# Patient Record
Sex: MALE | Race: WHITE | NOT HISPANIC OR LATINO | Employment: FULL TIME | URBAN - METROPOLITAN AREA
[De-identification: names, ages, dates, MRNs, and addresses within clinical notes are randomized per-mention and may not be internally consistent; named-entity substitution may affect disease eponyms.]

---

## 2017-01-05 ENCOUNTER — MEDICAL CORRESPONDENCE (OUTPATIENT)
Dept: HEALTH INFORMATION MANAGEMENT | Facility: CLINIC | Age: 57
End: 2017-01-05

## 2017-01-11 DIAGNOSIS — Z53.9 DIAGNOSIS NOT YET DEFINED: Primary | ICD-10-CM

## 2017-01-11 PROCEDURE — G0179 MD RECERTIFICATION HHA PT: HCPCS | Performed by: FAMILY MEDICINE

## 2017-01-17 ENCOUNTER — TELEPHONE (OUTPATIENT)
Dept: FAMILY MEDICINE | Facility: CLINIC | Age: 57
End: 2017-01-17

## 2017-01-17 NOTE — TELEPHONE ENCOUNTER
RN called and spoke with Sheila.  RN approved the order requested below to continue home care as it states below.  Sheila was able to take the verbal order.    Jesusita HAIDER RN, BSN

## 2017-01-17 NOTE — TELEPHONE ENCOUNTER
Reason for Call: Request for an order or referral:    Order or referral being requested: want order to continue home care. Once per week, every other week for 60 days.     Date needed: as soon as possible    Has the patient been seen by the PCP for this problem? YES    Additional comments: na    Phone number Patient can be reached at:  Other phone number:  Number above.     Best Time:  any    Can we leave a detailed message on this number?  YES    Call taken on 1/17/2017 at 9:03 AM by Antonia Egan

## 2017-02-21 DIAGNOSIS — Z53.9 DIAGNOSIS NOT YET DEFINED: Primary | ICD-10-CM

## 2017-02-21 PROCEDURE — G0179 MD RECERTIFICATION HHA PT: HCPCS | Performed by: FAMILY MEDICINE

## 2017-03-14 DIAGNOSIS — Z53.9 DIAGNOSIS NOT YET DEFINED: Primary | ICD-10-CM

## 2017-03-14 PROCEDURE — G0179 MD RECERTIFICATION HHA PT: HCPCS | Performed by: FAMILY MEDICINE

## 2017-04-07 DIAGNOSIS — R35.0 URINARY FREQUENCY: ICD-10-CM

## 2017-04-07 RX ORDER — TAMSULOSIN HYDROCHLORIDE 0.4 MG/1
CAPSULE ORAL
Qty: 30 CAPSULE | Refills: 0 | OUTPATIENT
Start: 2017-04-07

## 2017-04-07 NOTE — TELEPHONE ENCOUNTER
tamsulosin (FLOMAX) 0.4 MG capsule (Discontinued)         Last Written Prescription Date: 12/05/16  Last Fill Quantity: 30, # refills: 3    Last Office Visit with G, ESTIVEN or [facility] prescribing provider:  12/23/2016   Future Office Visit:      BP Readings from Last 3 Encounters:   12/23/16 104/60   12/05/16 104/70   12/22/15 95/66       Carla Mcneil CMA

## 2017-04-17 ENCOUNTER — OFFICE VISIT (OUTPATIENT)
Dept: FAMILY MEDICINE | Facility: CLINIC | Age: 57
End: 2017-04-17
Payer: MEDICARE

## 2017-04-17 VITALS
DIASTOLIC BLOOD PRESSURE: 66 MMHG | HEIGHT: 66 IN | TEMPERATURE: 96.9 F | BODY MASS INDEX: 32.88 KG/M2 | WEIGHT: 204.6 LBS | HEART RATE: 73 BPM | OXYGEN SATURATION: 96 % | SYSTOLIC BLOOD PRESSURE: 116 MMHG

## 2017-04-17 DIAGNOSIS — R21 RASH: ICD-10-CM

## 2017-04-17 DIAGNOSIS — R21 RASH OF HANDS: ICD-10-CM

## 2017-04-17 DIAGNOSIS — S60.222A: Primary | ICD-10-CM

## 2017-04-17 DIAGNOSIS — Z12.11 SCREEN FOR COLON CANCER: ICD-10-CM

## 2017-04-17 PROCEDURE — 99213 OFFICE O/P EST LOW 20 MIN: CPT | Performed by: FAMILY MEDICINE

## 2017-04-17 RX ORDER — PHENOL 1.4 %
10 AEROSOL, SPRAY (ML) MUCOUS MEMBRANE AT BEDTIME
COMMUNITY
End: 2019-07-31 | Stop reason: DRUGHIGH

## 2017-04-17 RX ORDER — TRIAMCINOLONE ACETONIDE 1 MG/G
CREAM TOPICAL
Qty: 30 G | Refills: 0 | Status: SHIPPED | OUTPATIENT
Start: 2017-04-17 | End: 2018-09-11

## 2017-04-17 RX ORDER — DIAPER,BRIEF,INFANT-TODD,DISP
EACH MISCELLANEOUS
Qty: 30 G | Refills: 0 | COMMUNITY
Start: 2017-04-17 | End: 2018-09-11

## 2017-04-17 NOTE — MR AVS SNAPSHOT
After Visit Summary   4/17/2017    Yair Benites    MRN: [id number]           Patient Information     Date Of Birth          1960        Visit Information        Provider Department      4/17/2017 4:00 PM Alvin Johnson MD [facility]        Today's Diagnoses     Traumatic bruise of hand, left, initial encounter    -  1    Rash: Left maxillary        Rash of hand: Lt        Screen for colon cancer          Care Instructions    [facility]    If you have any questions regarding to your visit please contact your care team:       Team Purple:   Clinic Hours Telephone Number   [address]   7am-7pm  Monday - Thursday   7am-5pm  Fridays  [phone]  (Appointment scheduling available 24/7)    Questions about your Visit?   Team Line:  [phone]   [facility] [place] [facility] - 11am-9pm Monday-Friday Saturday-Sunday- 9am-5pm   [place] - 5pm-9pm Monday-Friday Saturday-Sunday- 9am-5pm  [phone] - [place]   [phone] - [place]       What options do I have for visits at the clinic other than the traditional office visit?  To expand how we care for you, many of our providers are utilizing electronic visits (e-visits) and telephone visits, when medically appropriate, for interactions with their patients rather than a visit in the clinic.   We also offer nurse visits for many medical concerns. Just like any other service, we will bill your insurance company for this type of visit based on time spent on the phone with your provider. Not all insurance companies cover these visits. Please check with your medical insurance if this type of visit is covered. You will be responsible for any charges that are not paid by your insurance.      E-visits via 8Trip:  generally incur a $35.00 fee.  Telephone visits:  Time spent on the phone: *charged based on time that is spent on  "the phone in increments of 10 minutes. Estimated cost:   5-10 mins $30.00   11-20 mins. $59.00   21-30 mins. $85.00     Use Boundaryhart (secure email communication and access to your chart) to send your primary care provider a message or make an appointment. Ask someone on your Team how to sign up for Booster Packt.  For a Price Quote for your services, please call our Mimvi Line at [phone].  As always, Thank you for trusting us with your health care needs!    Discharged By: An          Follow-ups after your visit        Future tests that were ordered for you today     Open Future Orders        Priority Expected Expires Ordered    Fecal colorectal cancer screen (FIT) Routine 5/8/2017 7/10/2017 4/17/2017            Who to contact     If you have questions or need follow up information about today's clinic visit or your schedule please contact [facility] directly at [phone].  Normal or non-critical lab and imaging results will be communicated to you by Boundaryhart, letter or phone within 4 business days after the clinic has received the results. If you do not hear from us within 7 days, please contact the clinic through Boundaryhart or phone. If you have a critical or abnormal lab result, we will notify you by phone as soon as possible.  Submit refill requests through [employer] or call your pharmacy and they will forward the refill request to us. Please allow 3 business days for your refill to be completed.          Additional Information About Your Visit        Boundaryhart Information     [employer] lets you send messages to your doctor, view your test results, renew your prescriptions, schedule appointments and more. To sign up, go to [email or web address] . Click on \"Log in\" on the left side of the screen, which will take you to the Welcome page. Then click on \"Sign up Now\" on the right side of the page.     You will be asked to enter the access code listed below, as well as some personal information. " "Please follow the directions to create your username and password.     Your access code is: [email or web address]  Expires: 2017  4:42 PM     Your access code will  in 90 days. If you need help or a new code, please call your [facility] or [phone].        Care EveryWhere ID     This is your Care EveryWhere ID. This could be used by other organizations to access your [place] medical records  [id number]        Your Vitals Were     Pulse Temperature Height Pulse Oximetry BMI (Body Mass Index)       73 96.9  F (36.1  C) (Oral) 5' 5.95\" (1.675 m) 96% 33.08 kg/m2        Blood Pressure from Last 3 Encounters:   17 116/66   16 104/60   16 104/70    Weight from Last 3 Encounters:   17 204 lb 9.6 oz (92.8 kg)   16 201 lb (91.2 kg)   16 198 lb 12.8 oz (90.2 kg)                 Today's Medication Changes          These changes are accurate as of: 17  4:42 PM.  If you have any questions, ask your nurse or doctor.               Start taking these medicines.        Dose/Directions    hydrocortisone 1 % ointment   Used for:  Rash   Started by:  Alvin Johnson MD        Apply sparingly to affected area three times daily for 14 days.   Quantity:  30 g   Refills:  0       triamcinolone 0.1 % cream   Commonly known as:  KENALOG   Used for:  Rash of hands   Started by:  Alvin Johnson MD        Apply sparingly to affected area three times daily for 14 days (apply to back of left hand).   Quantity:  30 g   Refills:  0            Where to get your medicines      These medications were sent to [facility] - [place] - [address] AT [facility] & [place]  [address] [place] [address]     Phone:  [phone]     triamcinolone 0.1 % cream         Some of these will need a paper prescription and others can be bought over the counter.  Ask your nurse if you have questions.     You don't need a prescription for these " medications     hydrocortisone 1 % ointment                Primary Care Provider Office Phone # Fax #    Radha Villegas [phone]       [address]  [facility] [address]        Thank you!     Thank you for choosing [facility]  for your care. Our goal is always to provide you with excellent care. Hearing back from our patients is one way we can continue to improve our services. Please take a few minutes to complete the written survey that you may receive in the mail after your visit with us. Thank you!             Your Updated Medication List - Protect others around you: Learn how to safely use, store and throw away your medicines at www.disposemymeds.org.          This list is accurate as of: 4/17/17  4:42 PM.  Always use your most recent med list.                   Brand Name Dispense Instructions for use    ABILIFY 15 MG tablet   Generic drug:  ARIPiprazole      Take 15 mg by mouth daily Reported on 4/17/2017       aspirin 81 MG tablet      Take 1 tablet by mouth daily.       clonazePAM 0.5 MG tablet    klonoPIN     Take 1 tablet by mouth At Bedtime.       diphenhydrAMINE HCl (Sleep) 25 MG Tabs     30 tablet    Take 25 mg by mouth nightly as needed       FLAX SEED OIL PO      Take  by mouth daily. Uses powder on food.       hydrocortisone 1 % ointment     30 g    Apply sparingly to affected area three times daily for 14 days.       Melatonin 10 MG Tabs tablet      Take 10 mg by mouth At Bedtime       mirtazapine 30 MG tablet    REMERON     Take 30 mg by mouth At Bedtime.       MULTIVITAMIN & MINERAL PO      Take 1 tablet by mouth daily.       order for DME     1 Device    Equipment being ordered:Face mask for CPAP machine size small with tubing and filter. [facility] ([phone])       polyethylene glycol powder    MIRALAX/GLYCOLAX    510 g    MIX AS DIRECTED AND TAKE 17 GRAMS BY MOUTH DAILY       simvastatin 40 MG tablet    ZOCOR     30 tablet    TAKE 1 TABLET(40 MG) BY MOUTH AT BEDTIME       triamcinolone 0.1 % cream    KENALOG    30 g    Apply sparingly to affected area three times daily for 14 days (apply to back of left hand).       vitamin D 1000 UNITS capsule      Take 1 capsule by mouth daily.

## 2017-04-17 NOTE — NURSING NOTE
"Chief Complaint   Patient presents with     Hand Problem     top of left hand feels raw x 2 weeks- when playing basketball and ball must of scratched left hand, have  some leaves also, possible allergic reaction . - have been using hydrcortisone      Eye Swelling     and redness in left eye x 2 weeks possible from using cpap mask at night      Fall     yesterday- injured left hand- using Bacitracin        Initial /66  Pulse 73  Temp 96.9  F (36.1  C) (Oral)  Ht 5' 5.95\" (1.675 m)  Wt 204 lb 9.6 oz (92.8 kg)  SpO2 96%  BMI 33.08 kg/m2 Estimated body mass index is 33.08 kg/(m^2) as calculated from the following:    Height as of this encounter: 5' 5.95\" (1.675 m).    Weight as of this encounter: 204 lb 9.6 oz (92.8 kg).  Medication Reconciliation: complete     An JEIMY Ryan    "

## 2017-04-17 NOTE — PROGRESS NOTES
SUBJECTIVE:                                                    Yair Benites is a 56 year old male who presents to clinic today for the following health issues:    Patient presents with:  Hand Problem: top of left hand feels raw x 2 weeks- when playing basketball and ball must of scratched left hand, have  some leaves also, possible allergic reaction . - have been using hydrcortisone   Eye Swelling: and redness in left eye x 2 weeks possible from using cpap mask at night   Fall: yesterday- injured left hand- using Bacitracin       Rash    On the Rt hand and below the left eye.  Denies any exposure to any new chemicals but possible contact with some irritant. The rash on the hand is itchy but not the one below the eye.    Problem list and histories reviewed & adjusted, as indicated.  Additional history: as documented    Patient Active Problem List   Diagnosis     Colon polyp     Traumatic brain injury (H)     IBS (irritable bowel syndrome)     Elevated glucose     Synovitis and tenosynovitis     Dizziness - light-headed     Primary localized osteoarthrosis, ankle and foot     Hyperlipidemia LDL goal <130     Obesity     Depression, major     Headache disorder     SANDRA (obstructive sleep apnea)     Chronic gout without tophus, unspecified cause, unspecified site     Past Surgical History:   Procedure Laterality Date     SURGICAL HISTORY OF -       nose       Social History   Substance Use Topics     Smoking status: Former Smoker     Years: 6.00     Types: Cigarettes     Quit date: 1/1/1983     Smokeless tobacco: Never Used     Alcohol use No     Family History   Problem Relation Age of Onset     C.A.D. Father      MI age 66     HEART DISEASE Father      Depression Brother      CANCER Brother      Brain Tumor     Respiratory Brother      Depression Brother      Gallbladder Disease Brother      Dementia Brother      CANCER Brother      brain ca d age 67     DIABETES Maternal Uncle      Asthma No family hx of  "     Hypertension No family hx of            Reviewed and updated as needed this visit by clinical staff  Tobacco  Allergies  Meds  Med Hx  Surg Hx  Fam Hx  Soc Hx        ROS:  Constitutional, HEENT, cardiovascular, pulmonary, gi and gu systems are negative, except as otherwise noted.    OBJECTIVE:                                                    /66  Pulse 73  Temp 96.9  F (36.1  C) (Oral)  Ht 5' 5.95\" (1.675 m)  Wt 204 lb 9.6 oz (92.8 kg)  SpO2 96%  BMI 33.08 kg/m2  Body mass index is 33.08 kg/(m^2).  GENERAL: healthy, alert and no distress  NECK: no adenopathy, no asymmetry, masses, or scars and thyroid normal to palpation  RESP: lungs clear to auscultation - no rales, rhonchi or wheezes  CV: regular rate and rhythm, no murmur, click or rub, no peripheral edema and peripheral pulses strong  MS: no gross musculoskeletal defects noted, no edema  SKIN: Dry erythematous patch on the back of the Rt hand. A small similar patch on the left upper cheek  Diagnostic Test Results:     ASSESSMENT/PLAN:                                                    (S60.222A) Traumatic bruise of hand, left, initial encounter, Active  (primary encounter diagnosis)  Comment: Some swelling could have been from injury but also reports picking some leaves given some itchiness likely irritant as well    (R21) Rash: Left maxillary, Active  Plan: hydrocortisone 1 % ointment    (R21) Rash of hand: Lt, Active  Comment: Contact dermatitis  Plan: triamcinolone (KENALOG) 0.1 % cream    (Z12.11) Screen for colon cancer  Plan: Fecal colorectal cancer screen (FIT)    Call or return to clinic prn if these symptoms worsen or fail to improve as anticipated in 1 week.    Alvin Johnson MD  [facility]  "

## 2017-04-17 NOTE — PATIENT INSTRUCTIONS
[facility]    If you have any questions regarding to your visit please contact your care team:       Team Purple:   Clinic Hours Telephone Number   [address]   7am-7pm  Monday - Thursday   7am-5pm  Fridays  [phone]  (Appointment scheduling available 24/7)    Questions about your Visit?   Team Line:  [phone]   Urgent Care - [place] and [facility] - 11am-9pm Monday-Friday Saturday-Sunday- 9am-5pm   [place] - 5pm-9pm Monday-Friday Saturday-Sunday- 9am-5pm  [phone] - [facility]  [phone] - [place]       What options do I have for visits at the clinic other than the traditional office visit?  To expand how we care for you, many of our providers are utilizing electronic visits (e-visits) and telephone visits, when medically appropriate, for interactions with their patients rather than a visit in the clinic.   We also offer nurse visits for many medical concerns. Just like any other service, we will bill your insurance company for this type of visit based on time spent on the phone with your provider. Not all insurance companies cover these visits. Please check with your medical insurance if this type of visit is covered. You will be responsible for any charges that are not paid by your insurance.      E-visits via AfterShipt:  generally incur a $35.00 fee.  Telephone visits:  Time spent on the phone: *charged based on time that is spent on the phone in increments of 10 minutes. Estimated cost:   5-10 mins $30.00   11-20 mins. $59.00   21-30 mins. $85.00     Use AfterShipt (secure email communication and access to your chart) to send your primary care provider a message or make an appointment. Ask someone on your Team how to sign up for Bauble[employer].  For a Price Quote for your services, please call our Consumer Price Line at [phone].  As always, Thank you for trusting us with your health care  needs!    Discharged By: An

## 2017-04-24 ENCOUNTER — OFFICE VISIT (OUTPATIENT)
Dept: FAMILY MEDICINE | Facility: CLINIC | Age: 57
End: 2017-04-24
Payer: MEDICARE

## 2017-04-24 VITALS
BODY MASS INDEX: 33.17 KG/M2 | DIASTOLIC BLOOD PRESSURE: 60 MMHG | SYSTOLIC BLOOD PRESSURE: 100 MMHG | WEIGHT: 206.4 LBS | HEIGHT: 66 IN | OXYGEN SATURATION: 95 % | HEART RATE: 79 BPM | TEMPERATURE: 96.2 F

## 2017-04-24 DIAGNOSIS — L57.8 DERMATITIS DUE TO SUNBURN: Primary | ICD-10-CM

## 2017-04-24 PROCEDURE — 99212 OFFICE O/P EST SF 10 MIN: CPT | Performed by: FAMILY MEDICINE

## 2017-04-24 NOTE — PATIENT INSTRUCTIONS
Sunburn  A sunburn is an injury to the skin caused by over-exposure to ultraviolet (UV) light from the sun. The skin becomes pink or red and painful. Very severe sunburns may cause blistering and fluid draining from the skin. Open blisters may become infected, so watch for signs of infection such as worsening pain, redness, swelling, or pus draining from the burn.  Sunburn starts to get better after 1 to 2 days. A few days later the skin begins to peel. Depending on how severe the burn is, it may take up to 3 weeks to fully heal.  Home care  The following guidelines will help you care for your sunburn at home:    You can use an ice pack (ice cubes in a plastic bag, wrapped in a towel) over the injured area for 20 minutes every 1 to 2 hours the first day for pain relief. Don't put the ice directly on your skin. This can cause damage. You can use an ice pack at least 3 to 4 times a day until the pain goes away. Cool baths and showers will also help with pain relief.    If there are blisters, don't break them. Open blisters slow the healing process and increase the risk of infection. You can cover the open blisters with antibacterial cream or ointment and a dressing or bandage.    Wash the burned area daily with soap and water and then gently dry it with a clean towel. If a dressing was used, put a clean one back on until any open blisters dry up. If the bandage sticks, soak it off in warm water. You can also use nonstick dressings to help prevent this from happening.    Drink plenty of fluids to avoid dehydration.  Here is some information regarding sunburn and medications:    You can take acetaminophen or ibuprofen for pain, unless you were given a different pain medicine to use. If you have chronic liver or kidney disease or ever had a stomach ulcer or GI bleeding or are taking blood thinner medications, talk with your doctor before using these medicines. Don't use ibuprofen in children under 6 months of  "age.    Over-the-counter first-aid creams and sprays containing lidocaine or benzocaine can also help with pain. However, some people are sensitive to these \"anders.\" If the redness or itching gets worse, stop using these medications. You can use aloe or a moisturizing cream with aloe, or hydrocortisone cream (sold over the counter) to help with pain and swelling. Use these only over spots that do not have broken blisters.    Antibiotics are usually not given unless there is an infection. If you were prescribed antibiotics, take them until they are finished. It is important to finish the antibiotics even if the burn looks better. This will ensure the infection has cleared.  Prevention  Sun exposure damages the DNA of skin cells and contributes to premature skin aging. Sun exposure is the main cause of skin cancer. Protect your skin using the following tips:    Limit your exposure to UV light. The sun is strongest during the hours 10 a.m. and 4 p.m. If possible, arrange your sun exposure to be before or after those hours. The effect is more intense at the beach where light reflects off the sand and water. The sun is also more intense at higher altitudes, especially where there is reflecting snow. You can even get sunburn on a cloudy day, because UV light passes through clouds.    Wear protective clothing and a hat. Clothing is more effective than sunscreen alone in blocking UV light.    Stay in the shade or carry an umbrella.    Apply sunscreen to skin. Put sunscreen on 15 to 20 minutes before exposure to the sun to give it time to interact with your skin. It is important to reapply sunscreen every 1 to 2 hours, or sooner if it is washed away by sweating or water. Use a sunscreen rated at SPF 15 or higher.    Wear sunglasses to protect your eyes from UV exposure.    Many heart, nausea, anti-inflammatory, and diabetic medications, as well as antibiotics and diuretics, can increase your sensitivity to the sun. Check " medication pamphlets and talk with your doctor if you are unsure about your increased risk of sun sensitivity. Sunscreens may not prevent this response.   Follow-up care  Sunburn usually heals without complications. Follow up with your doctor if your sunburn is not healing with home treatments or you experience signs and symptoms of infection.  When to seek medical care  Get prompt medical attention if any of the following occur:    Pain that gets worse    Increasing redness, or red streaks leading away from an open blister    Swelling or pus coming from open blisters    Nausea, dizziness, fainting, or weakness    Fever over 100.4  F (38.0  C)    6119-1904 [employer]. [address] [place] [address]. All rights reserved. This information is not intended as a substitute for professional medical care. Always follow your healthcare professional's instructions.      [facility]    If you have any questions regarding to your visit please contact your care team:       Team Purple:   Clinic Hours Telephone Number   [address] Dr[address]   7am-7pm  Monday - Thursday   7am-5pm  Fridays  [phone]  (Appointment scheduling available 24/7)    Questions about your Visit?   Team Line:  [phone]   Urgent Care - [place] and [place] - 11am-9pm Monday-Friday Saturday-Sunday- 9am-5pm   [place] - 5pm-9pm Monday-Friday Saturday-Sunday- 9am-5pm  [phone] - [place]   [phone] - [place]       What options do I have for visits at the clinic other than the traditional office visit?  To expand how we care for you, many of our providers are utilizing electronic visits (e-visits) and telephone visits, when medically appropriate, for interactions with their patients rather than a visit in the clinic.   We also offer nurse visits for many medical concerns. Just like any other service, we will bill your insurance  company for this type of visit based on time spent on the phone with your provider. Not all insurance companies cover these visits. Please check with your medical insurance if this type of visit is covered. You will be responsible for any charges that are not paid by your insurance.      E-visits via GlobalPayhart:  generally incur a $35.00 fee.  Telephone visits:  Time spent on the phone: *charged based on time that is spent on the phone in increments of 10 minutes. Estimated cost:   5-10 mins $30.00   11-20 mins. $59.00   21-30 mins. $85.00     Use [employer] (secure email communication and access to your chart) to send your primary care provider a message or make an appointment. Ask someone on your Team how to sign up for S[employer].  For a Price Quote for your services, please call our Consumer Price Line at [phone].  As always, Thank you for trusting us with your health care needs!    Discharged By: An

## 2017-04-24 NOTE — PROGRESS NOTES
SUBJECTIVE:                                                    Yair Benites is a 56 year old male who presents to clinic today for the following health issues:    Patient presents with:  RECHECK: Rash in left hand- rash is spreading to the right hand     The rash is now on the Rt hand and has developed red rash in areas exposed to the sun; face up to hairline, neck to collar of shirt. It also hurts.  He was in the sun this weekend; he has in the sun for at least 3 hours.    Problem list and histories reviewed & adjusted, as indicated.  Additional history: as documented    Patient Active Problem List   Diagnosis     Colon polyp     Traumatic brain injury (H)     IBS (irritable bowel syndrome)     Elevated glucose     Synovitis and tenosynovitis     Dizziness - light-headed     Primary localized osteoarthrosis, ankle and foot     Hyperlipidemia LDL goal <130     Obesity     Depression, major     Headache disorder     SANDRA (obstructive sleep apnea)     Chronic gout without tophus, unspecified cause, unspecified site     Past Surgical History:   Procedure Laterality Date     SURGICAL HISTORY OF -       nose       Social History   Substance Use Topics     Smoking status: Former Smoker     Years: 6.00     Types: Cigarettes     Quit date: 1/1/1983     Smokeless tobacco: Never Used     Alcohol use No     Family History   Problem Relation Age of Onset     C.A.D. Father      MI age 66     HEART DISEASE Father      Depression Brother      CANCER Brother      Brain Tumor     Respiratory Brother      Depression Brother      Gallbladder Disease Brother      Dementia Brother      CANCER Brother      brain ca d age 67     DIABETES Maternal Uncle      Asthma No family hx of      Hypertension No family hx of            Reviewed and updated as needed this visit by clinical staff  Tobacco  Allergies  Meds  Med Hx  Surg Hx  Fam Hx  Soc Hx      Reviewed and updated as needed this visit by Provider      "    ROS:  Constitutional, HEENT, cardiovascular, pulmonary, gi and gu systems are negative, except as otherwise noted.    OBJECTIVE:                                                    /60  Pulse 79  Temp 96.2  F (35.7  C) (Oral)  Ht 5' 5.94\" (1.675 m)  Wt 206 lb 6.4 oz (93.6 kg)  SpO2 95%  BMI 33.37 kg/m2  Body mass index is 33.37 kg/(m^2).  GENERAL: healthy, alert and no distress  NECK: no adenopathy and thyroid normal to palpation  RESP: lungs clear to auscultation - no rales, rhonchi or wheezes  CV: regular rate and rhythm, no murmur, click or rub, no peripheral edema  MS: no gross musculoskeletal defects noted, no edema  SKIN: Erythematous rash on the face, neck and dorsum of the hands  Diagnostic Test Results:     ASSESSMENT/PLAN:                                                    (L55.9) Dermatitis due to sunburn  (primary encounter diagnosis)  Comment: Instructed on etiology and exasserbating factors of dermatitis; in this case exposure to sunlight for long..  Reviewed efficacy of emolient creams as well as prescription options.  Avoid unnecessary exposure to sun or wear protective clothing and hat as well as use sun screen when will be out for long..    Call or return to clinic prn if these symptoms worsen or fail to improve as anticipated in 2 weeks.    Alvin Johnson MD  [facility]  "

## 2017-04-24 NOTE — MR AVS SNAPSHOT
After Visit Summary   4/24/2017    Yair Benites    MRN: [id number]           Patient Information     Date Of Birth          1960        Visit Information        Provider Department      4/24/2017 4:40 PM Alvin Johnson MD [facility]        Today's Diagnoses     Dermatitis due to sunburn    -  1      Care Instructions      Sunburn  A sunburn is an injury to the skin caused by over-exposure to ultraviolet (UV) light from the sun. The skin becomes pink or red and painful. Very severe sunburns may cause blistering and fluid draining from the skin. Open blisters may become infected, so watch for signs of infection such as worsening pain, redness, swelling, or pus draining from the burn.  Sunburn starts to get better after 1 to 2 days. A few days later the skin begins to peel. Depending on how severe the burn is, it may take up to 3 weeks to fully heal.  Home care  The following guidelines will help you care for your sunburn at home:    You can use an ice pack (ice cubes in a plastic bag, wrapped in a towel) over the injured area for 20 minutes every 1 to 2 hours the first day for pain relief. Don't put the ice directly on your skin. This can cause damage. You can use an ice pack at least 3 to 4 times a day until the pain goes away. Cool baths and showers will also help with pain relief.    If there are blisters, don't break them. Open blisters slow the healing process and increase the risk of infection. You can cover the open blisters with antibacterial cream or ointment and a dressing or bandage.    Wash the burned area daily with soap and water and then gently dry it with a clean towel. If a dressing was used, put a clean one back on until any open blisters dry up. If the bandage sticks, soak it off in warm water. You can also use nonstick dressings to help prevent this from happening.    Drink plenty of fluids to avoid dehydration.  Here is some information regarding  "sunburn and medications:    You can take acetaminophen or ibuprofen for pain, unless you were given a different pain medicine to use. If you have chronic liver or kidney disease or ever had a stomach ulcer or GI bleeding or are taking blood thinner medications, talk with your doctor before using these medicines. Don't use ibuprofen in children under 6 months of age.    Over-the-counter first-aid creams and sprays containing lidocaine or benzocaine can also help with pain. However, some people are sensitive to these \"anders.\" If the redness or itching gets worse, stop using these medications. You can use aloe or a moisturizing cream with aloe, or hydrocortisone cream (sold over the counter) to help with pain and swelling. Use these only over spots that do not have broken blisters.    Antibiotics are usually not given unless there is an infection. If you were prescribed antibiotics, take them until they are finished. It is important to finish the antibiotics even if the burn looks better. This will ensure the infection has cleared.  Prevention  Sun exposure damages the DNA of skin cells and contributes to premature skin aging. Sun exposure is the main cause of skin cancer. Protect your skin using the following tips:    Limit your exposure to UV light. The sun is strongest during the hours 10 a.m. and 4 p.m. If possible, arrange your sun exposure to be before or after those hours. The effect is more intense at the beach where light reflects off the sand and water. The sun is also more intense at higher altitudes, especially where there is reflecting snow. You can even get sunburn on a cloudy day, because UV light passes through clouds.    Wear protective clothing and a hat. Clothing is more effective than sunscreen alone in blocking UV light.    Stay in the shade or carry an umbrella.    Apply sunscreen to skin. Put sunscreen on 15 to 20 minutes before exposure to the sun to give it time to interact with your skin. It " is important to reapply sunscreen every 1 to 2 hours, or sooner if it is washed away by sweating or water. Use a sunscreen rated at SPF 15 or higher.    Wear sunglasses to protect your eyes from UV exposure.    Many heart, nausea, anti-inflammatory, and diabetic medications, as well as antibiotics and diuretics, can increase your sensitivity to the sun. Check medication pamphlets and talk with your doctor if you are unsure about your increased risk of sun sensitivity. Sunscreens may not prevent this response.   Follow-up care  Sunburn usually heals without complications. Follow up with your doctor if your sunburn is not healing with home treatments or you experience signs and symptoms of infection.  When to seek medical care  Get prompt medical attention if any of the following occur:    Pain that gets worse    Increasing redness, or red streaks leading away from an open blister    Swelling or pus coming from open blisters    Nausea, dizziness, fainting, or weakness    Fever over 100.4  F (38.0  C)    8249-7941 [employer]. [address] [place] [address]. All rights reserved. This information is not intended as a substitute for professional medical care. Always follow your healthcare professional's instructions.      [facility]    If you have any questions regarding to your visit please contact your care team:       Team Purple:   Clinic Hours Telephone Number   [address]   7am-7pm  Monday - Thursday   7am-5pm  Fridays  [phone]  (Appointment scheduling available 24/7)    Questions about your Visit?   Team Line:  [phone]   Urgent Care - [place] - 11am-9pm Monday-Friday Saturday-Sunday- 9am-5pm   [place] - 5pm-9pm Monday-Friday Saturday-Sunday- 9am-5pm  [phone] - [place]  [phone] - [place]       What options do I have for visits at the clinic other than  the traditional office visit?  To expand how we care for you, many of our providers are utilizing electronic visits (e-visits) and telephone visits, when medically appropriate, for interactions with their patients rather than a visit in the clinic.   We also offer nurse visits for many medical concerns. Just like any other service, we will bill your insurance company for this type of visit based on time spent on the phone with your provider. Not all insurance companies cover these visits. Please check with your medical insurance if this type of visit is covered. You will be responsible for any charges that are not paid by your insurance.      E-visits via [employer]:  generally incur a $35.00 fee.  Telephone visits:  Time spent on the phone: *charged based on time that is spent on the phone in increments of 10 minutes. Estimated cost:   5-10 mins $30.00   11-20 mins. $59.00   21-30 mins. $85.00     Use BluelightApp (secure email communication and access to your chart) to send your primary care provider a message or make an appointment. Ask someone on your Team how to sign up for [employer]App.  For a Price Quote for your services, please call our [employer] Line at [phone].  As always, Thank you for trusting us with your health care needs!    Discharged By: An          Follow-ups after your visit        Who to contact     If you have questions or need follow up information about today's clinic visit or your schedule please contact [facility] directly at [phone].  Normal or non-critical lab and imaging results will be communicated to you by [employer], letter or phone within 4 business days after the clinic has received the results. If you do not hear from us within 7 days, please contact the clinic through [employer] or phone. If you have a critical or abnormal lab result, we will notify you by phone as soon as possible.  Submit refill requests through [employer]App or call your pharmacy and they will forward the  "refill request to us. Please allow 3 business days for your refill to be completed.          Additional Information About Your Visit        MyChart Information     Stonewedge lets you send messages to your doctor, view your test results, renew your prescriptions, schedule appointments and more. To sign up, go to [email or web address] . Click on \"Log in\" on the left side of the screen, which will take you to the Welcome page. Then click on \"Sign up Now\" on the right side of the page.     You will be asked to enter the access code listed below, as well as some personal information. Please follow the directions to create your username and password.     Your access code is: [email or web address]  Expires: 2017  4:42 PM     Your access code will  in 90 days. If you need help or a new code, please call your [place] clinic or [phone].        Care EveryWhere ID     This is your Care EveryWhere ID. This could be used by other organizations to access your [place] medical records  [id number]        Your Vitals Were     Pulse Temperature Height Pulse Oximetry BMI (Body Mass Index)       79 96.2  F (35.7  C) (Oral) 5' 5.94\" (1.675 m) 95% 33.37 kg/m2        Blood Pressure from Last 3 Encounters:   17 100/60   17 116/66   16 104/60    Weight from Last 3 Encounters:   17 206 lb 6.4 oz (93.6 kg)   17 204 lb 9.6 oz (92.8 kg)   16 201 lb (91.2 kg)              Today, you had the following     No orders found for display       Primary Care Provider Office Phone # Fax #    Radha Villegas [phone]       [facility] [place] [address] [facility] [address]        Thank you!     Thank you for choosing [facility]  for your care. Our goal is always to provide you with excellent care. Hearing back from our patients is one way we can continue to improve our services. Please take a few minutes to complete the written survey that you may receive in the mail " after your visit with us. Thank you!             Your Updated Medication List - Protect others around you: Learn how to safely use, store and throw away your medicines at www.disposemymeds.org.          This list is accurate as of: 4/24/17  4:41 PM.  Always use your most recent med list.                   Brand Name Dispense Instructions for use    ABILIFY 15 MG tablet   Generic drug:  ARIPiprazole      Take 15 mg by mouth daily Reported on 4/17/2017       aspirin 81 MG tablet      Take 1 tablet by mouth daily.       clonazePAM 0.5 MG tablet    klonoPIN     Take 1 tablet by mouth At Bedtime.       diphenhydrAMINE HCl (Sleep) 25 MG Tabs     30 tablet    Take 25 mg by mouth nightly as needed       FLAX SEED OIL PO      Take  by mouth daily. Uses powder on food.       hydrocortisone 1 % ointment     30 g    Apply sparingly to affected area three times daily for 14 days.       Melatonin 10 MG Tabs tablet      Take 10 mg by mouth At Bedtime       mirtazapine 30 MG tablet    REMERON     Take 30 mg by mouth At Bedtime.       MULTIVITAMIN & MINERAL PO      Take 1 tablet by mouth daily.       order for DME     1 Device    Equipment being ordered:Face mask for CPAP machine size small with tubing and filter. [facility] [employer] [facility] ([phone])       polyethylene glycol powder    MIRALAX/GLYCOLAX    510 g    MIX AS DIRECTED AND TAKE 17 GRAMS BY MOUTH DAILY       simvastatin 40 MG tablet    ZOCOR    30 tablet    TAKE 1 TABLET(40 MG) BY MOUTH AT BEDTIME       triamcinolone 0.1 % cream    KENALOG    30 g    Apply sparingly to affected area three times daily for 14 days (apply to back of left hand).       vitamin D 1000 UNITS capsule      Take 1 capsule by mouth daily.

## 2017-04-27 DIAGNOSIS — E78.5 HYPERLIPIDEMIA LDL GOAL <130: ICD-10-CM

## 2017-04-27 RX ORDER — SIMVASTATIN 40 MG
TABLET ORAL
Qty: 30 TABLET | Refills: 11 | Status: CANCELLED | OUTPATIENT
Start: 2017-04-27

## 2017-04-27 NOTE — TELEPHONE ENCOUNTER
simvastatin (ZOCOR) 40 MG tablet     Last Written Prescription Date: 12/16/16  Last Fill Quantity: 30, # refills: 11  Last Office Visit with G, P or [facility] prescribing provider: 04/24/17       Lab Results   Component Value Date    CHOL 158 11/18/2016     Lab Results   Component Value Date    HDL 42 11/18/2016     Lab Results   Component Value Date    LDL 76 11/18/2016     Lab Results   Component Value Date    TRIG 202 11/18/2016     Lab Results   Component Value Date    CHOLHDLRATIO 3.9 03/23/2015       Carla Mcneil [facility]

## 2017-05-03 DIAGNOSIS — Z53.9 DIAGNOSIS NOT YET DEFINED: Primary | ICD-10-CM

## 2017-05-03 PROCEDURE — G0179 MD RECERTIFICATION HHA PT: HCPCS | Performed by: FAMILY MEDICINE

## 2017-05-16 DIAGNOSIS — Z53.9 DIAGNOSIS NOT YET DEFINED: Primary | ICD-10-CM

## 2017-05-16 PROCEDURE — G0179 MD RECERTIFICATION HHA PT: HCPCS | Performed by: FAMILY MEDICINE

## 2017-05-19 ENCOUNTER — TELEPHONE (OUTPATIENT)
Dept: FAMILY MEDICINE | Facility: CLINIC | Age: 57
End: 2017-05-19

## 2017-05-19 NOTE — TELEPHONE ENCOUNTER
Nurse called and left a detailed voicemail on a secure line for Gabby (HC)  Verbal order was given for skilled nurses requested below.  For Gabby to call the clinic at [phone] with any further questions.    Jesusita HAIDER RN, BSN

## 2017-05-19 NOTE — TELEPHONE ENCOUNTER
Reason for call: Order   Order or referral being requested: to continue skilled nursing 1x every other week for 60 days  Reason for request:for medication manegement  Date needed: 5/21/2017  Has the patient been seen by the PCP for this problem? YES    Additional comments: verbal order needed    Phone Number Pt can be reached at: Other phone number:  [phone]*  Best Time: anytime  Can we leave a detailed message on this number? YES

## 2017-06-07 DIAGNOSIS — Z53.9 DIAGNOSIS NOT YET DEFINED: Primary | ICD-10-CM

## 2017-06-07 PROCEDURE — G0179 MD RECERTIFICATION HHA PT: HCPCS | Performed by: FAMILY MEDICINE

## 2017-07-08 ENCOUNTER — HEALTH MAINTENANCE LETTER (OUTPATIENT)
Age: 57
End: 2017-07-08

## 2017-07-26 ENCOUNTER — TELEPHONE (OUTPATIENT)
Dept: FAMILY MEDICINE | Facility: CLINIC | Age: 57
End: 2017-07-26

## 2017-07-26 DIAGNOSIS — Z53.9 DIAGNOSIS NOT YET DEFINED: Primary | ICD-10-CM

## 2017-07-26 PROCEDURE — G0179 MD RECERTIFICATION HHA PT: HCPCS | Performed by: FAMILY MEDICINE

## 2017-07-26 NOTE — TELEPHONE ENCOUNTER
RN spoke with Gabby and gave a verbal order for the skilled nursing requested below.  No further action needed.    Jesusita HAIDER RN, BSN

## 2017-07-26 NOTE — TELEPHONE ENCOUNTER
Reason for call:  Order   Order or referral being requested: Home care   Reason for request: Verbal Order  Date needed: as soon as possible  Has the patient been seen by the PCP for this problem? YES    Additional comments: Verbal order to continue skilled nursing  1 x every other week for 60 days for medication management and set up, health assessment.      Phone number to reach patient:  Other phone number:  [phone] Sheila RITTER    Best Time:  anytime    Can we leave a detailed message on this number?  YES

## 2017-07-31 ENCOUNTER — TELEPHONE (OUTPATIENT)
Dept: FAMILY MEDICINE | Facility: CLINIC | Age: 57
End: 2017-07-31

## 2017-07-31 NOTE — TELEPHONE ENCOUNTER
Reason for Call:  Other call back    Detailed comments: Verbal  Ok Order to change frequency once every other week effective as of 07/20/17     Phone Number Patient can be reached at: Other phone number:  [phone]    Best Time: anytime    Can we leave a detailed message on this number? YES    Call taken on 7/31/2017 at 11:16 AM by Tobin Patterson

## 2017-07-31 NOTE — TELEPHONE ENCOUNTER
RN called and left a detailed VM for Gabby on a secure line with the approval of the order requested below and to call the clinic at [phone] with any further questions or concerns.    Jesusita HAIDER RN, BSN

## 2017-08-07 ENCOUNTER — ALLIED HEALTH/NURSE VISIT (OUTPATIENT)
Dept: NURSING | Facility: CLINIC | Age: 57
End: 2017-08-07
Payer: MEDICARE

## 2017-08-07 DIAGNOSIS — H61.23 BILATERAL IMPACTED CERUMEN: Primary | ICD-10-CM

## 2017-08-07 PROCEDURE — 99207 ZZC NO CHARGE NURSE ONLY: CPT

## 2017-08-07 NOTE — MR AVS SNAPSHOT
"              After Visit Summary   2017    Yair Benites    MRN: [id number]           Patient Information     Date Of Birth          1960        Visit Information        Provider Department      2017 12:00 PM [facility]        Today's Diagnoses     Bilateral impacted cerumen    -  1       Follow-ups after your visit        Who to contact     If you have questions or need follow up information about today's clinic visit or your schedule please contact [facility] directly at [phone].  Normal or non-critical lab and imaging results will be communicated to you by MyChart, letter or phone within 4 business days after the clinic has received the results. If you do not hear from us within 7 days, please contact the clinic through [employer] or phone. If you have a critical or abnormal lab result, we will notify you by phone as soon as possible.  Submit refill requests through PeerApp or call your pharmacy and they will forward the refill request to us. Please allow 3 business days for your refill to be completed.          Additional Information About Your Visit        MyChart Information     PeerApp lets you send messages to your doctor, view your test results, renew your prescriptions, schedule appointments and more. To sign up, go to [email or web address] . Click on \"Log in\" on the left side of the screen, which will take you to the Welcome page. Then click on \"Sign up Now\" on the right side of the page.     You will be asked to enter the access code listed below, as well as some personal information. Please follow the directions to create your username and password.     Your access code is: [email or web address]  Expires: 2017 12:06 PM     Your access code will  in 90 days. If you need help or a new code, please call your [facility] or [phone].        Care EveryWhere ID     This is your Care EveryWhere ID. This could be used by other " organizations to access your [place] medical records  [id number]         Blood Pressure from Last 3 Encounters:   04/24/17 100/60   04/17/17 116/66   12/23/16 104/60    Weight from Last 3 Encounters:   04/24/17 206 lb 6.4 oz (93.6 kg)   04/17/17 204 lb 9.6 oz (92.8 kg)   12/23/16 201 lb (91.2 kg)              We Performed the Following     REMOVE IMPACTED THONY        Primary Care Provider Office Phone # Fax #    Radha Villegas [phone]       [facility] [address] [place] [address]        Equal Access to Services     RUSLAN HAAS : Hadii aad ku hadasho Sotrina, waaxda luqadaha, qaybta kaalmada adealtayada, stephany ziegler . So [facility] [phone].    ATENCIÓN: Si habla español, tiene a phillips disposición servicios gratuitos de asistencia lingüística. Llame al [phone].    We comply with applicable federal civil rights laws and [place] laws. We do not discriminate on the basis of race, color, national origin, age, disability sex, sexual orientation or gender identity.            Thank you!     Thank you for choosing [facility]  for your care. Our goal is always to provide you with excellent care. Hearing back from our patients is one way we can continue to improve our services. Please take a few minutes to complete the written survey that you may receive in the mail after your visit with us. Thank you!             Your Updated Medication List - Protect others around you: Learn how to safely use, store and throw away your medicines at www.disposemymeds.org.          This list is accurate as of: 8/7/17 12:06 PM.  Always use your most recent med list.                   Brand Name Dispense Instructions for use Diagnosis    ABILIFY 15 MG tablet   Generic drug:  ARIPiprazole      Take 15 mg by mouth daily Reported on 4/17/2017        aspirin 81 MG tablet      Take 1 tablet by mouth daily.        clonazePAM 0.5 MG tablet    klonoPIN      Take 1 tablet by mouth At Bedtime.        diphenhydrAMINE HCl (Sleep) 25 MG Tabs     30 tablet    Take 25 mg by mouth nightly as needed    Primary insomnia       FLAX SEED OIL PO      Take  by mouth daily. Uses powder on food.        hydrocortisone 1 % ointment     30 g    Apply sparingly to affected area three times daily for 14 days.    Rash       Melatonin 10 MG Tabs tablet      Take 10 mg by mouth At Bedtime        mirtazapine 30 MG tablet    REMERON     Take 30 mg by mouth At Bedtime.        MULTIVITAMIN & MINERAL PO      Take 1 tablet by mouth daily.        order for DME     1 Device    Equipment being ordered:Face mask for CPAP machine size small with tubing and filter. [employer] ([phone])    SANDRA (obstructive sleep apnea)       polyethylene glycol powder    MIRALAX/GLYCOLAX    510 g    MIX AS DIRECTED AND TAKE 17 GRAMS BY MOUTH DAILY    Constipation       simvastatin 40 MG tablet    ZOCOR    30 tablet    TAKE 1 TABLET(40 MG) BY MOUTH AT BEDTIME    Hyperlipidemia LDL goal <130       triamcinolone 0.1 % cream    KENALOG    30 g    Apply sparingly to affected area three times daily for 14 days (apply to back of left hand).    Rash of hands       vitamin D 1000 UNITS capsule      Take 1 capsule by mouth daily.

## 2017-08-07 NOTE — NURSING NOTE
"Chief Complaint   Patient presents with     Ear Lavage     Bilateral ear lavage       Initial There were no vitals taken for this visit. Estimated body mass index is 33.37 kg/(m^2) as calculated from the following:    Height as of 4/24/17: 5' 5.94\" (1.675 m).    Weight as of 4/24/17: 206 lb 6.4 oz (93.6 kg).  Medication Reconciliation: unable or not appropriate to perform   ONSET:  When did the symptoms begin? 1 months ago    DURATION:  Describe the duration of the symptoms: 1 month    LOCATION:  side:  Binaural    PAIN SEVERITY:  0/10    ADDITIONAL SYMPTOMS:  plugged sensation bilaterally    AFFECTS ON ADLs: [place] to hear    HISTORY:  cerumen impaction    INTERVENTIONS TAKEN:  OTC medications Debrtox    MEASURES WHICH RELIEVE SYMPTOMS: none    AGE: 56 year old  Patient came ion for bilateral ear lavage. Ear lavage completed using luke warm water and elephant ear flusher. RN verified before and after.  Andressa PETERS CMA ([facility])        "

## 2017-08-16 DIAGNOSIS — Z53.9 DIAGNOSIS NOT YET DEFINED: Primary | ICD-10-CM

## 2017-08-16 PROCEDURE — G0179 MD RECERTIFICATION HHA PT: HCPCS | Performed by: FAMILY MEDICINE

## 2017-08-25 ENCOUNTER — TRANSFERRED RECORDS (OUTPATIENT)
Dept: HEALTH INFORMATION MANAGEMENT | Facility: CLINIC | Age: 57
End: 2017-08-25

## 2017-09-13 ENCOUNTER — TELEPHONE (OUTPATIENT)
Dept: FAMILY MEDICINE | Facility: CLINIC | Age: 57
End: 2017-09-13

## 2017-09-13 NOTE — TELEPHONE ENCOUNTER
Message left for pSarkle () to call the RN hotline # at [phone] as unsure if her voicemail is secure as there is no indication on the VM to leave .    Jesusita HAIDER RN, BSN

## 2017-09-13 NOTE — TELEPHONE ENCOUNTER
Reason for Call:  Other call back    Detailed comments: Sparkle from [facility] would like verbal orders for skilled nursing visit every other week for 60 days.  Please call her at: [phone] thank you!    Phone Number Patient can be reached at: Other phone number:  [phone]    Best Time: any    Can we leave a detailed message on this number? Not Applicable    Call taken on 9/13/2017 at 4:35 PM by Estephania Mckeon

## 2017-09-15 NOTE — TELEPHONE ENCOUNTER
RN spoke with Sparkle (HC nurse) and gave a verbal order for the skilled nursing requested below.  Sparkle was minoo to take the verbal order.    Jesusita HAIDER, RN, BSN

## 2017-09-17 DIAGNOSIS — Z53.9 DIAGNOSIS NOT YET DEFINED: Primary | ICD-10-CM

## 2017-09-17 PROCEDURE — G0179 MD RECERTIFICATION HHA PT: HCPCS | Performed by: FAMILY MEDICINE

## 2017-09-27 ENCOUNTER — MEDICAL CORRESPONDENCE (OUTPATIENT)
Dept: HEALTH INFORMATION MANAGEMENT | Facility: CLINIC | Age: 57
End: 2017-09-27

## 2017-10-23 ENCOUNTER — MEDICAL CORRESPONDENCE (OUTPATIENT)
Dept: HEALTH INFORMATION MANAGEMENT | Facility: CLINIC | Age: 57
End: 2017-10-23

## 2017-11-06 DIAGNOSIS — Z53.9 DIAGNOSIS NOT YET DEFINED: Primary | ICD-10-CM

## 2017-11-06 PROCEDURE — G0179 MD RECERTIFICATION HHA PT: HCPCS | Performed by: FAMILY MEDICINE

## 2017-11-21 ENCOUNTER — TELEPHONE (OUTPATIENT)
Dept: FAMILY MEDICINE | Facility: CLINIC | Age: 57
End: 2017-11-21

## 2017-11-21 NOTE — TELEPHONE ENCOUNTER
Reason for Call: Request for an order or referral:    Order or referral being requested: verbal order for skilled nursing one time every other week for 60 days.     Date needed: as soon as possible    Has the patient been seen by the PCP for this problem? YES    Additional comments:  verbal order for skilled nursing one time every other week for 60 days.     Phone number Patient can be reached at:  Other phone number:  [phone]    Best Time:  any    Can we leave a detailed message on this number?  Not Applicable    Call taken on 11/21/2017 at 4:24 PM by DIAMOND BROOKS

## 2017-11-28 ENCOUNTER — TELEPHONE (OUTPATIENT)
Dept: FAMILY MEDICINE | Facility: CLINIC | Age: 57
End: 2017-11-28

## 2017-11-28 NOTE — TELEPHONE ENCOUNTER
Reason for call:  Medication   If this is a refill request, has the caller requested the refill from the pharmacy already? Yes  Will the patient be using a [facility]? No  Name of the pharmacy and phone number for the current request: [facility] - [place], [place] - [address] AT [facility]    Name of the medication requested: For Gout - did not know    Other request: Patient needs refill, he is out.      Phone number to reach patient:  Home number on file [phone] (home)    Best Time:  ASAP    Can we leave a detailed message on this number?  YES

## 2017-11-28 NOTE — TELEPHONE ENCOUNTER
Spoke with patient.  He had meds for gout several years ago (indomethacin)   Denies current flare up but wanted something on hand.  Advised patient to monitor symptoms and schedule an appointment to see provider if he notes the start of a flare up  Patient verbalized understanding.  Sohail Melvin RN

## 2017-12-04 ENCOUNTER — TELEPHONE (OUTPATIENT)
Dept: FAMILY MEDICINE | Facility: CLINIC | Age: 57
End: 2017-12-04

## 2017-12-04 DIAGNOSIS — E78.5 HYPERLIPIDEMIA LDL GOAL <130: ICD-10-CM

## 2017-12-05 ENCOUNTER — OFFICE VISIT (OUTPATIENT)
Dept: FAMILY MEDICINE | Facility: CLINIC | Age: 57
End: 2017-12-05
Payer: MEDICARE

## 2017-12-05 VITALS
OXYGEN SATURATION: 94 % | WEIGHT: 209 LBS | HEIGHT: 66 IN | HEART RATE: 107 BPM | DIASTOLIC BLOOD PRESSURE: 70 MMHG | SYSTOLIC BLOOD PRESSURE: 106 MMHG | BODY MASS INDEX: 33.59 KG/M2 | TEMPERATURE: 97.2 F

## 2017-12-05 DIAGNOSIS — Z87.898 HISTORY OF PREDIABETES: ICD-10-CM

## 2017-12-05 DIAGNOSIS — L82.1 KERATOSIS, SEBORRHEIC: ICD-10-CM

## 2017-12-05 DIAGNOSIS — M1A.0620 IDIOPATHIC CHRONIC GOUT OF LEFT KNEE WITHOUT TOPHUS: Primary | ICD-10-CM

## 2017-12-05 DIAGNOSIS — E78.5 HYPERLIPIDEMIA LDL GOAL <100: ICD-10-CM

## 2017-12-05 DIAGNOSIS — R73.09 ELEVATED GLUCOSE: ICD-10-CM

## 2017-12-05 DIAGNOSIS — Z23 NEED FOR PROPHYLACTIC VACCINATION AND INOCULATION AGAINST INFLUENZA: ICD-10-CM

## 2017-12-05 LAB
ANION GAP SERPL CALCULATED.3IONS-SCNC: 10 MMOL/L (ref 3–14)
BUN SERPL-MCNC: 12 MG/DL (ref 7–30)
CALCIUM SERPL-MCNC: 9.4 MG/DL (ref 8.5–10.1)
CHLORIDE SERPL-SCNC: 103 MMOL/L (ref 94–109)
CHOLEST SERPL-MCNC: 177 MG/DL
CO2 SERPL-SCNC: 24 MMOL/L (ref 20–32)
CREAT SERPL-MCNC: 0.87 MG/DL (ref 0.66–1.25)
GFR SERPL CREATININE-BSD FRML MDRD: 90 ML/MIN/1.7M2
GLUCOSE SERPL-MCNC: 175 MG/DL (ref 70–99)
HBA1C MFR BLD: 7.1 % (ref 4.3–6)
HDLC SERPL-MCNC: 39 MG/DL
LDLC SERPL CALC-MCNC: ABNORMAL MG/DL
LDLC SERPL DIRECT ASSAY-MCNC: 106 MG/DL
NONHDLC SERPL-MCNC: 138 MG/DL
POTASSIUM SERPL-SCNC: 4 MMOL/L (ref 3.4–5.3)
SODIUM SERPL-SCNC: 137 MMOL/L (ref 133–144)
TRIGL SERPL-MCNC: 492 MG/DL
URATE SERPL-MCNC: 7.8 MG/DL (ref 3.5–7.2)

## 2017-12-05 PROCEDURE — 36415 COLL VENOUS BLD VENIPUNCTURE: CPT | Performed by: FAMILY MEDICINE

## 2017-12-05 PROCEDURE — 17110 DESTRUCTION B9 LES UP TO 14: CPT | Performed by: FAMILY MEDICINE

## 2017-12-05 PROCEDURE — 83721 ASSAY OF BLOOD LIPOPROTEIN: CPT | Mod: 59 | Performed by: FAMILY MEDICINE

## 2017-12-05 PROCEDURE — 80061 LIPID PANEL: CPT | Performed by: FAMILY MEDICINE

## 2017-12-05 PROCEDURE — 80048 BASIC METABOLIC PNL TOTAL CA: CPT | Performed by: FAMILY MEDICINE

## 2017-12-05 PROCEDURE — 99214 OFFICE O/P EST MOD 30 MIN: CPT | Mod: 25 | Performed by: FAMILY MEDICINE

## 2017-12-05 PROCEDURE — 84550 ASSAY OF BLOOD/URIC ACID: CPT | Performed by: FAMILY MEDICINE

## 2017-12-05 PROCEDURE — 83036 HEMOGLOBIN GLYCOSYLATED A1C: CPT | Performed by: FAMILY MEDICINE

## 2017-12-05 RX ORDER — INDOMETHACIN 50 MG/1
50 CAPSULE ORAL 3 TIMES DAILY PRN
Qty: 30 CAPSULE | Refills: 3 | Status: SHIPPED | OUTPATIENT
Start: 2017-12-05 | End: 2018-10-01

## 2017-12-05 ASSESSMENT — PATIENT HEALTH QUESTIONNAIRE - PHQ9: SUM OF ALL RESPONSES TO PHQ QUESTIONS 1-9: 4

## 2017-12-05 NOTE — MR AVS SNAPSHOT
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 Uric acid forms crystals that collect in the joints. The immune response to these crystals brings on symptoms of joint pain and swelling. This is called a gout attack. Often, medications and diet changes are combined to manage gout. Below are some guidelines for changing your diet to help you manage gout and prevent attacks. Your health care provider will help you determine the best eating plan for you.     Eating to manage gout  Weight loss for those who are overweight may help reduce gout attacks.  Eat less of these foods  Eating too many foods containing purines may raise the levels of uric acid in your body. This raises your risk for a gout attack. Try to limit these foods and drinks:    Alcohol, such as beer and red wine. You may be told to avoid alcohol completely.    Soft drinks that contain sugar or high fructose corn syrup    Certain fish, including anchovies, sardines, fish eggs, and herring    Shellfish    Certain meats, such as red meat, hot dogs, luncheon meats, and turkey    Organ meats, such as liver, kidneys, and sweetbreads    Legumes, such as dried beans and peas    Other high fat foods such as gravy, whole milk, and high fat cheeses    Vegetables such as asparagus, cauliflower, spinach, and mushrooms  used to be thought to contribute to an increased risk for a gout attack, but recent studies show that high purine vegetables don't increase the risk for a gout attack.  Eat more of these foods  Other foods may be helpful for people with gout. Add some of these foods to your diet:    Cherries contain chemicals that may lower uric acid.    Omega fatty acids. These are found in some fatty fish such as salmon, certain oils (flax, olive, or nut), and nuts themselves. Omega fatty acids may help prevent inflammation due to gout.    Dairy products that are low-fat or fat-free, such as cheese and yogurt    Complex carbohydrate foods, including whole grains, brown rice, oats, and beans    Coffee, in moderation    Water, approximately 64 ounces per day  Follow-up care  Follow up with your healthcare provider as advised.  When to seek medical advice  Call your healthcare provider right away if any of these occur:    Return of gout symptoms, usually at night:    Severe pain, swelling, and heat in a joint, especially the base of the big toe    Affected joint is hard to move    Skin of the affected joint is purple or red    Fever of 100.4 F (38 C) or higher    Pain that doesn't get better even with prescribed medicine   Date Last Reviewed: 1/12/2016 2000-2017 [employer]. [address] [place] [address], [place] [id number]. All rights reserved. This information is not intended as a substitute for professional medical care. Always follow your healthcare professional's instructions.        What Is Gout?  Gout is a disease that affects the joints. Left untreated, it can lead to painful foot and joint deformities and even kidney problems. But, by treating gout early, you can relieve pain and help prevent future problems. Gout can usually be treated with medication and proper diet. In severe cases, surgery may be needed.  What causes gout?  Gout is caused by an excess of uric acid (a waste product made by the body). Uric acid is  "excreted by the kidneys. If the uric acid level in your blood rises too high, the uric acid may form crystals that collect in the joints, bringing on a gout attack. If you have many gout attacks, crystals may form large deposits called tophi. Tophi can damage joints and cause deformity.  Who is at risk for gout?  Men are more likely to have gout than women. But women can also be affected, mostly after menopause. Some health problems, such as obesity and high cholesterol, make gout more likely. And some medications, such as diuretics ( water pills ), can trigger a gout attack. People who drink a lot of alcohol are at high risk for gout. Certain foods can also trigger a gout attack.  Substances that may trigger a gout attack  To help prevent a gout attack, avoid these foods:    Alcohol (particularly beer, but also red wine and spirits)    Certain meats (red meat, processed meat, turkey)    Organ meats (kidney, liver, sweetbread)    Shellfish (lobster, crab, shrimp, scallop, mussel)    Certain fish (anchovy, sardine, herring, mackerel)   Treatment    Lifestyle changes, including weight loss, exercise, and quitting tobacco use    Reducing consumption of the food groups above as well as high fructose corn syrup, found in many foods including sodas and energy drinks    Changing non-essential medications that may contribute to gout (such as thiazide diuretics--\"water pills\")    Medications to reduce the amount of uric acid in the blood, such as allopurinol or others    Medications to treat acute gout attacks, including NSAIDs (nonsteroidal anti-inflammatory medicines), steroids, and colchicine  Date Last Reviewed: 2/1/2016 2000-2017 [employer]. [address] [place] [address], [place] [address]. All rights reserved. This information is not intended as a substitute for professional medical care. Always follow your healthcare professional's instructions.        Understanding Seborrheic Keratosis  Seborrheic " keratoses are wart-like growths on the skin. These growths are not cancer. Many people get them, especially after age 30.  How to say it  [id number]   What causes seborrheic keratoses?  Doctors do not know what causes seborrheic keratoses. They may run in families. In addition, they become more common as people get older.  What are the symptoms of seborrheic keratoses?  Here is what seborrheic keratoses often look like:    They tend to be round or oval in shape. They can be very small or about as big across as a quarter.    They can appear singly or in clusters.    They tend to be tan, brown, or black in color.    The edges may be scalloped or uneven-looking.    They can have a waxy or scaly look.    They can be a bit raised, appearing to be  stuck on  the skin.    They may become red and irritated if scratched or rubbed by clothing  Sebhorreic keratoses are not painful, but they may be itchy. They can become irritated if they are continually rubbed by skin or clothing. Seborrheic keratoses most often appear on the face, arms, chest, back, or belly.  How are seborrheic keratoses treated?  Seborrheic keratoses don t need to be treated unless they bother you. You may choose to have them removed because you find them unattractive. You may also want them removed because they get irritated and uncomfortable. A healthcare provider can remove them in an office visit. Ways that seborrheic keratoses can be removed include:    Freezing them off with liquid nitrogen    Cutting them off with a scalpel    Burning them off with electricity  What are the complications of seborrheic keratoses?  Seborrheic keratoses are not cancer, but they can look like some types of skin cancer. So it s a good idea to ask your healthcare provider to check any new skin growths. Ask your healthcare provider about any skin problem that concerns you.  When should I call my healthcare provider?  Call your healthcare provider right  "away if any of these occur:    You develop a lot of seborrheic keratoses very quickly    You have a sore that does not heal within a few weeks, or heals and then comes back    You have a mole or skin growth that is changing in size, shape, or color    You have a mole or skin growth that looks different on one side from the other    You have a mole or skin growth that is not the same color throughout   Date Last Reviewed: 5/1/2016 2000-2017 [employer]. [address] [place] [address]. All rights reserved. This information is not intended as a substitute for professional medical care. Always follow your healthcare professional's instructions.                Follow-ups after your visit        Follow-up notes from your care team     Return in about 1 week (around 12/12/2017) for recheck skin lesion.      Who to contact     If you have questions or need follow up information about today's clinic visit or your schedule please contact [facility] directly at [phone].  Normal or non-critical lab and imaging results will be communicated to you by GillBushart, letter or phone within 4 business days after the clinic has received the results. If you do not hear from us within 7 days, please contact the clinic through MyChart or phone. If you have a critical or abnormal lab result, we will notify you by phone as soon as possible.  Submit refill requests through [employer] or call your pharmacy and they will forward the refill request to us. Please allow 3 business days for your refill to be completed.          Additional Information About Your Visit        GillBusharI Move You Information     [employer] lets you send messages to your doctor, view your test results, renew your prescriptions, schedule appointments and more. To sign up, go to [email or web address] . Click on \"Log in\" on the left side of the screen, which will take you to the Welcome page. Then click on \"Sign up Now\" on the right side of " "the page.     You will be asked to enter the access code listed below, as well as some personal information. Please follow the directions to create your username and password.     Your access code is: [email or web address]  Expires: 3/5/2018  3:09 PM     Your access code will  in 90 days. If you need help or a new code, please call your [facility] or [phone].        Care EveryWhere ID     This is your Care EveryWhere ID. This could be used by other organizations to access your [facility] medical records  [id number]        Your Vitals Were     Pulse Temperature Height Pulse Oximetry BMI (Body Mass Index)       107 97.2  F (36.2  C) (Temporal) 5' 5.94\" (1.675 m) 94% 33.79 kg/m2        Blood Pressure from Last 3 Encounters:   17 106/70   17 100/60   17 116/66    Weight from Last 3 Encounters:   17 209 lb (94.8 kg)   17 206 lb 6.4 oz (93.6 kg)   17 204 lb 9.6 oz (92.8 kg)              We Performed the Following     BASIC METABOLIC PANEL     DESTRUCT BENIGN LESION, UP TO 14     Hemoglobin A1c     Lipid panel reflex to direct LDL Fasting     Uric acid          Today's Medication Changes          These changes are accurate as of: 17  3:09 PM.  If you have any questions, ask your nurse or doctor.               Start taking these medicines.        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 tophus   Started by:  Ramiro Sorensen MD        Dose:  50 mg   Take 1 capsule (50 mg) by mouth 3 times daily as needed for moderate pain   Quantity:  30 capsule   Refills:  3            Where to get your medicines      These medications were sent to [id number] - [place] - [address] AT [facility] & [place]  [address] [place] [address]     Phone:  [phone]     indomethacin 50 MG capsule                Primary Care Provider " Office Phone # Fax #    Radha Villegas [phone] [address] [facility]  [facility] [address]        Equal Access to Services     DIANAKEYA SAMINA : Hadzee erin lyle jahairafarheen Brando, wacalivnda luqmirtha, qaybta kabaljitda anna, stephany garcia lasashanathen joy. So [facility] [phone].    ATENCIÓN: Si habla español, tiene a phillips disposición servicios gratuitos de asistencia lingüística. Llame al [phone].    We comply with applicable federal civil rights laws and [place] laws. We do not discriminate on the basis of race, color, national origin, age, disability, sex, sexual orientation, or gender identity.            Thank you!     Thank you for choosing [facility]  for your care. Our goal is always to provide you with excellent care. Hearing back from our patients is one way we can continue to improve our services. Please take a few minutes to complete the written survey that you may receive in the mail after your visit with us. Thank you!             Your Updated Medication List - Protect others around you: Learn how to safely use, store and throw away your medicines at www.disposemymeds.org.          This list is accurate as of: 12/5/17  3:09 PM.  Always use your most recent med list.                   Brand Name Dispense Instructions for use Diagnosis    ABILIFY 15 MG tablet   Generic drug:  ARIPiprazole      Take 15 mg by mouth daily Reported on 4/17/2017        aspirin 81 MG tablet      Take 1 tablet by mouth daily.        clonazePAM 0.5 MG tablet    klonoPIN     Take 1 tablet by mouth At Bedtime.        diphenhydrAMINE HCl (Sleep) 25 MG Tabs     30 tablet    Take 25 mg by mouth nightly as needed    Primary insomnia       FLAX SEED OIL PO      Take  by mouth daily. Uses powder on food.        hydrocortisone 1 % ointment     30 g    Apply sparingly to affected area three times daily for 14 days.    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        MULTIVITAMIN & MINERAL PO      Take 1 tablet by mouth daily.        order for DME     1 Device    Equipment being ordered:Face mask for CPAP machine size small with tubing and filter. [employer] ([phone])    SANDRA (obstructive sleep apnea)       OVER-THE-COUNTER      Beta Prostate- Take 1 tablet daily.        polyethylene glycol powder    MIRALAX/GLYCOLAX    510 g    MIX AS DIRECTED AND TAKE 17 GRAMS BY MOUTH DAILY    Constipation       simvastatin 40 MG tablet    ZOCOR    30 tablet    TAKE 1 TABLET(40 MG) BY MOUTH AT BEDTIME    Hyperlipidemia LDL goal <130       triamcinolone 0.1 % cream    KENALOG    30 g    Apply sparingly to affected area three times daily for 14 days (apply to back of left hand).    Rash of hands       vitamin D 1000 UNITS capsule      Take 1 capsule by mouth daily.

## 2017-12-05 NOTE — NURSING NOTE
"Chief Complaint   Patient presents with     Arthritis     gout on left knee. very painful this morning      Mole     on thigh        Initial /70  Pulse 107  Temp 97.2  F (36.2  C) (Temporal)  Ht 5' 5.94\" (1.675 m)  Wt 209 lb (94.8 kg)  SpO2 94%  BMI 33.79 kg/m2 Estimated body mass index is 33.79 kg/(m^2) as calculated from the following:    Height as of this encounter: 5' 5.94\" (1.675 m).    Weight as of this encounter: 209 lb (94.8 kg).  Medication Reconciliation: complete     An JEIMY Ryan    "

## 2017-12-05 NOTE — LETTER
December 5, 2017      Yair Benites  [address] [place] [id number]        To Whom It May Concern:    Yair Benites  was seen on 12/5/2017.  Please excuse him through 12/6/2017 due to illness.        Sincerely,        Ramiro Cummings MD

## 2017-12-05 NOTE — LETTER
[address] [place] [address]    December 12, 2017    Yair DIEUDONNE Benites  [address] [place] [address] [place] [address]          Dear Yair,  Starting from the top:  Your cholesterol panel shows that your triglycerides are much more elevated than previous values.  This isn't too concerning given that you recently ate prior to obtaining labs, we'll recheck in 6 months or so.  Your good cholesterol (HDL) is a little low.  To increase this, try to get more fiber in your diet and get regular cardiovascular exercise.  Your bad cholesterol (LDL) is only slightly elevated, so it's not too concerning at this time.  Please continue to take your simvastatin as prescribed. Your electrolytes are normal, your blood sugar is elevated, which is expected given your history of diabetes and that your ate prior to obtaining blood work.  Your kidney function is normal.  Your baseline uric acid is 7.8.  Uric acid precipitates into joints at around 6.8 mg/dL.  I would recommend starting a gout diet if your haven't already in order to keep your uric acid low.  I also don't have a problem starting allopurinol if you would like to go that route,  however given you only have a few gout flare-ups per year we can hold off as well.  Please let me know if you have any questions.  Results for orders placed or performed in visit on 12/05/17   BASIC METABOLIC PANEL   Result Value Ref Range    Sodium 137 133 - 144 mmol/L    Potassium 4.0 3.4 - 5.3 mmol/L    Chloride 103 94 - 109 mmol/L    Carbon Dioxide 24 20 - 32 mmol/L    Anion Gap 10 3 - 14 mmol/L    Glucose 175 (H) 70 - 99 mg/dL    Urea Nitrogen 12 7 - 30 mg/dL    Creatinine 0.87 0.66 - 1.25 mg/dL    GFR Estimate 90 >60 mL/min/1.7m2    GFR Estimate If Black >90 >60 mL/min/1.7m2    Calcium 9.4 8.5 - 10.1 mg/dL   Uric acid   Result Value Ref Range    Uric Acid 7.8 (H) 3.5 - 7.2 mg/dL   Hemoglobin A1c    Result Value Ref Range    Hemoglobin A1C 7.1 (H) 4.3 - 6.0 %   Lipid panel reflex to direct LDL Non-fasting   Result Value Ref Range    Cholesterol 177 <200 mg/dL    Triglycerides 492 (H) <150 mg/dL    HDL Cholesterol 39 (L) >39 mg/dL    LDL Cholesterol Calculated  <100 mg/dL     Cannot estimate LDL when triglyceride exceeds 400 mg/dL    Non HDL Cholesterol 138 (H) <130 mg/dL   LDL cholesterol direct   Result Value Ref Range    LDL Cholesterol Direct 106 (H) <100 mg/dL       If you have any questions or concerns, please me or my clinic team at [phone].      Sincerely,        Ramiro Sorensen MD/bt

## 2017-12-05 NOTE — PATIENT INSTRUCTIONS
Gout Diet  Gout is a painful condition caused by an excess of uric acid, a waste product made by the body. Uric acid forms crystals that collect in the joints. The immune response to these crystals brings on symptoms of joint pain and swelling. This is called a gout attack. Often, medications and diet changes are combined to manage gout. Below are some guidelines for changing your diet to help you manage gout and prevent attacks. Your health care provider will help you determine the best eating plan for you.     Eating to manage gout  Weight loss for those who are overweight may help reduce gout attacks.  Eat less of these foods  Eating too many foods containing purines may raise the levels of uric acid in your body. This raises your risk for a gout attack. Try to limit these foods and drinks:    Alcohol, such as beer and red wine. You may be told to avoid alcohol completely.    Soft drinks that contain sugar or high fructose corn syrup    Certain fish, including anchovies, sardines, fish eggs, and herring    Shellfish    Certain meats, such as red meat, hot dogs, luncheon meats, and turkey    Organ meats, such as liver, kidneys, and sweetbreads    Legumes, such as dried beans and peas    Other high fat foods such as gravy, whole milk, and high fat cheeses    Vegetables such as asparagus, cauliflower, spinach, and mushrooms used to be thought to contribute to an increased risk for a gout attack, but recent studies show that high purine vegetables don't increase the risk for a gout attack.  Eat more of these foods  Other foods may be helpful for people with gout. Add some of these foods to your diet:    Cherries contain chemicals that may lower uric acid.    Omega fatty acids. These are found in some fatty fish such as salmon, certain oils (flax, olive, or nut), and nuts themselves. Omega fatty acids may help prevent inflammation due to gout.    Dairy products that are low-fat or fat-free, such as cheese and yogurt     Complex carbohydrate foods, including whole grains, brown rice, oats, and beans    Coffee, in moderation    Water, approximately 64 ounces per day  Follow-up care  Follow up with your healthcare provider as advised.  When to seek medical advice  Call your healthcare provider right away if any of these occur:    Return of gout symptoms, usually at night:    Severe pain, swelling, and heat in a joint, especially the base of the big toe    Affected joint is hard to move    Skin of the affected joint is purple or red    Fever of 100.4 F (38 C) or higher    Pain that doesn't get better even with prescribed medicine   Date Last Reviewed: 1/12/2016 2000-2017 [employer]. [address] [place] [address], [place] [address]. All rights reserved. This information is not intended as a substitute for professional medical care. Always follow your healthcare professional's instructions.        What Is Gout?  Gout is a disease that affects the joints. Left untreated, it can lead to painful foot and joint deformities and even kidney problems. But, by treating gout early, you can relieve pain and help prevent future problems. Gout can usually be treated with medication and proper diet. In severe cases, surgery may be needed.  What causes gout?  Gout is caused by an excess of uric acid (a waste product made by the body). Uric acid is excreted by the kidneys. If the uric acid level in your blood rises too high, the uric acid may form crystals that collect in the joints, bringing on a gout attack. If you have many gout attacks, crystals may form large deposits called tophi. Tophi can damage joints and cause deformity.  Who is at risk for gout?  Men are more likely to have gout than women. But women can also be affected, mostly after menopause. Some health problems, such as obesity and high cholesterol, make gout more likely. And some medications, such as diuretics ( water pills ), can trigger a gout attack. People who drink a  "lot of alcohol are at high risk for gout. Certain foods can also trigger a gout attack.  Substances that may trigger a gout attack  To help prevent a gout attack, avoid these foods:    Alcohol (particularly beer, but also red wine and spirits)    Certain meats (red meat, processed meat, turkey)    Organ meats (kidney, liver, sweetbread)    Shellfish (lobster, crab, shrimp, scallop, mussel)    Certain fish (anchovy, sardine, herring, mackerel)   Treatment    Lifestyle changes, including weight loss, exercise, and quitting tobacco use    Reducing consumption of the food groups above as well as high fructose corn syrup, found in many foods including sodas and energy drinks    Changing non-essential medications that may contribute to gout (such as thiazide diuretics \"water pills\")    Medications to reduce the amount of uric acid in the blood, such as allopurinol or others    Medications to treat acute gout attacks, including NSAIDs (nonsteroidal anti-inflammatory medicines), steroids, and colchicine  Date Last Reviewed: 2/1/2016 2000-2017 [employer]. [address] [place] [address]. All rights reserved. This information is not intended as a substitute for professional medical care. Always follow your healthcare professional's instructions.        Understanding Seborrheic Keratosis  Seborrheic keratoses are wart-like growths on the skin. These growths are not cancer. Many people get them, especially after age 30.  How to say it  [id number]   What causes seborrheic keratoses?  Doctors do not know what causes seborrheic keratoses. They may run in families. In addition, they become more common as people get older.  What are the symptoms of seborrheic keratoses?  Here is what seborrheic keratoses often look like:    They tend to be round or oval in shape. They can be very small or about as big across as a quarter.    They can appear singly or in clusters.    They tend to be tan, " brown, or black in color.    The edges may be scalloped or uneven-looking.    They can have a waxy or scaly look.    They can be a bit raised, appearing to be  stuck on  the skin.    They may become red and irritated if scratched or rubbed by clothing  Sebhorreic keratoses are not painful, but they may be itchy. They can become irritated if they are continually rubbed by skin or clothing. Seborrheic keratoses most often appear on the face, arms, chest, back, or belly.  How are seborrheic keratoses treated?  Seborrheic keratoses don t need to be treated unless they bother you. You may choose to have them removed because you find them unattractive. You may also want them removed because they get irritated and uncomfortable. A healthcare provider can remove them in an office visit. Ways that seborrheic keratoses can be removed include:    Freezing them off with liquid nitrogen    Cutting them off with a scalpel    Burning them off with electricity  What are the complications of seborrheic keratoses?  Seborrheic keratoses are not cancer, but they can look like some types of skin cancer. So it s a good idea to ask your healthcare provider to check any new skin growths. Ask your healthcare provider about any skin problem that concerns you.  When should I call my healthcare provider?  Call your healthcare provider right away if any of these occur:    You develop a lot of seborrheic keratoses very quickly    You have a sore that does not heal within a few weeks, or heals and then comes back    You have a mole or skin growth that is changing in size, shape, or color    You have a mole or skin growth that looks different on one side from the other    You have a mole or skin growth that is not the same color throughout   Date Last Reviewed: 5/1/2016 2000-2017 [employer]. [address] [place] [address], [place] [address]. All rights reserved. This information is not intended as a substitute for professional medical  care. Always follow your healthcare professional's instructions.

## 2017-12-05 NOTE — PROGRESS NOTES
"  SUBJECTIVE:   Yair Benites is a 57 year old male who presents to clinic today for the following health issues:    Gout  Duration: ongoing   Description   Location: knee - left  Joint Swelling: not sure   Redness: no   Pain intensity:  8/10  Accompanying signs and symptoms: None  History  Previous history of gout: YES   Trauma to the area: YES- fall this morning on left knee   Precipitating factors:   Alcohol usage: none  Diuretic use: no   Recent illness: no   Therapies tried and outcome: tylenol       Gout left knee, using tylenol for pain  Patient has 3-4 flare-ups per year    Problem list and histories reviewed & adjusted, as indicated.  Additional history: as documented    BP Readings from Last 3 Encounters:   12/05/17 106/70   04/24/17 100/60   04/17/17 116/66    Wt Readings from Last 3 Encounters:   12/05/17 209 lb (94.8 kg)   04/24/17 206 lb 6.4 oz (93.6 kg)   04/17/17 204 lb 9.6 oz (92.8 kg)             Reviewed and updated as needed this visit by clinical staffTobacco  Allergies  Meds  Problems  Med Hx  Surg Hx  Fam Hx  Soc Hx        Reviewed and updated as needed this visit by Provider  Allergies  Meds  Problems         ROS:  Constitutional, HEENT, cardiovascular, pulmonary, gi and gu systems are negative, except as otherwise noted.      OBJECTIVE:   /70  Pulse 107  Temp 97.2  F (36.2  C) (Temporal)  Ht 5' 5.94\" (1.675 m)  Wt 209 lb (94.8 kg)  SpO2 94%  BMI 33.79 kg/m2  Body mass index is 33.79 kg/(m^2).  GENERAL: healthy, alert and no distress  NECK: no adenopathy, no asymmetry, masses, or scars and thyroid normal to palpation  RESP: lungs clear to auscultation - no rales, rhonchi or wheezes  CV: regular rate and rhythm, normal S1 S2, no S3 or S4, no murmur, click or rub, no peripheral edema and peripheral pulses strong  MS: No swelling, warmth, or effusion of left knee, some tenderness over lateral aspect of joint.  SKIN: Hyperpigmented, \"stuck-on\" lesion on left " thigh  PSYCH: mentation appears normal, affect normal/bright    ASSESSMENT/PLAN:     1. Idiopathic chronic gout of left knee without tophus  Will obtain uric acid and start indomethacin  - BASIC METABOLIC PANEL  - Uric acid  - indomethacin (INDOCIN) 50 MG capsule; Take 1 capsule (50 mg) by mouth 3 times daily as needed for moderate pain  Dispense: 30 capsule; Refill: 3    2. Keratosis, seborrheic  Cryotherapy performed for lesion on left thigh  - DESTRUCT BENIGN LESION, UP TO 14    3. History of prediabetes  Follow-up labs  - BASIC METABOLIC PANEL  - Lipid panel reflex to direct LDL Non-fasting    4. Hyperlipidemia LDL goal <100  - Lipid panel reflex to direct LDL Non-fasting    5. Elevated glucose  - Hemoglobin A1c    Follow-up in 1 week for stacie Cummings MD  [facility]

## 2017-12-06 NOTE — TELEPHONE ENCOUNTER
Routing refill request to provider for review/approval because:  Labs out of range:  LDL      Yenni Patterson RN - [facility]

## 2017-12-08 RX ORDER — SIMVASTATIN 40 MG
TABLET ORAL
Qty: 30 TABLET | Refills: 0 | Status: SHIPPED | OUTPATIENT
Start: 2017-12-08 | End: 2018-01-11

## 2017-12-12 NOTE — TELEPHONE ENCOUNTER
Called patients mobile number and left message to make appointment to get his medication refilled.  Rosalba Gomes CMA ([facility])

## 2017-12-29 DIAGNOSIS — Z53.9 DIAGNOSIS NOT YET DEFINED: Primary | ICD-10-CM

## 2017-12-29 PROCEDURE — G0179 MD RECERTIFICATION HHA PT: HCPCS | Performed by: FAMILY MEDICINE

## 2018-01-04 ENCOUNTER — OFFICE VISIT (OUTPATIENT)
Dept: FAMILY MEDICINE | Facility: CLINIC | Age: 58
End: 2018-01-04
Payer: MEDICARE

## 2018-01-04 VITALS
TEMPERATURE: 97 F | RESPIRATION RATE: 20 BRPM | WEIGHT: 213 LBS | BODY MASS INDEX: 33.43 KG/M2 | DIASTOLIC BLOOD PRESSURE: 60 MMHG | SYSTOLIC BLOOD PRESSURE: 100 MMHG | HEIGHT: 67 IN | HEART RATE: 89 BPM | OXYGEN SATURATION: 95 %

## 2018-01-04 DIAGNOSIS — J06.9 UPPER RESPIRATORY TRACT INFECTION, UNSPECIFIED TYPE: Primary | ICD-10-CM

## 2018-01-04 PROCEDURE — 99213 OFFICE O/P EST LOW 20 MIN: CPT | Performed by: FAMILY MEDICINE

## 2018-01-04 RX ORDER — BENZONATATE 200 MG/1
200 CAPSULE ORAL 3 TIMES DAILY PRN
Qty: 21 CAPSULE | Refills: 0 | Status: SHIPPED | OUTPATIENT
Start: 2018-01-04 | End: 2018-09-11

## 2018-01-04 NOTE — LETTER
My Depression Action Plan  Name: Yair Benites   Date of Birth 1960  Date: 1/4/2018    My doctor: Radha Villegas   My clinic: [address]  [place] [address] [phone]          GREEN    ZONE   Good Control    What it looks like:     Things are going generally well. You have normal up s and down s. You may even feel depressed from time to time, but bad moods usually last less than a day.   What you need to do:  1. Continue to care for yourself (see self care plan)  2. Check your depression survival kit and update it as needed  3. Follow your physician s recommendations including any medication.  4. Do not stop taking medication unless you consult with your physician first.           YELLOW         ZONE Getting Worse    What it looks like:     Depression is starting to interfere with your life.     It may be hard to get out of bed; you may be starting to isolate yourself from others.    Symptoms of depression are starting to last most all day and this has happened for several days.     You may have suicidal thoughts but they are not constant.   What you need to do:     1. Call your care team, your response to treatment will improve if you keep your care team informed of your progress. Yellow periods are signs an adjustment may need to be made.     2. Continue your self-care, even if you have to fake it!    3. Talk to someone in your support network    4. Open up your depression survival kit           RED    ZONE Medical Alert - Get Help    What it looks like:     Depression is seriously interfering with your life.     You may experience these or other symptoms: You can t get out of bed most days, can t work or engage in other necessary activities, you have trouble taking care of basic hygiene, or basic responsibilities, thoughts of suicide or death that will not go away, self-injurious behavior.     What you need to do:  1. Call your care team and request  a same-day appointment. If they are not available (weekends or after hours) call your local crisis line, emergency room or 911.      Electronically signed by: Radha Villegas, January 4, 2018    Depression Self Care Plan / Survival Kit    Self-Care for Depression  Here s the deal. Your body and mind are really not as separate as most people think.  What you do and think affects how you feel and how you feel influences what you do and think. This means if you do things that people who feel good do, it will help you feel better.  Sometimes this is all it takes.  There is also a place for medication and therapy depending on how severe your depression is, so be sure to consult with your medical provider and/ or Behavioral Health Consultant if your symptoms are worsening or not improving.     In order to better manage my stress, I will:    Exercise  Get some form of exercise, every day. This will help reduce pain and release endorphins, the  feel good  chemicals in your brain. This is almost as good as taking antidepressants!  This is not the same as joining a gym and then never going! (they count on that by the way ) It can be as simple as just going for a walk or doing some gardening, anything that will get you moving.      Hygiene   Maintain good hygiene (Get out of bed in the morning, Make your bed, Brush your teeth, Take a shower, and Get dressed like you were going to work, even if you are unemployed).  If your clothes don't fit try to get ones that do.    Diet  I will strive to eat foods that are good for me, drink plenty of water, and avoid excessive sugar, caffeine, alcohol, and other mood-altering substances.  Some foods that are helpful in depression are: complex carbohydrates, B vitamins, flaxseed, fish or fish oil, fresh fruits and vegetables.    Psychotherapy  I agree to participate in Individual Therapy (if recommended).    Medication  If prescribed medications, I agree to take them.  Missing doses can result  in serious side effects.  I understand that drinking alcohol, or other illicit drug use, may cause potential side effects.  I will not stop my medication abruptly without first discussing it with my provider.    Staying Connected With Others  I will stay in touch with my friends, family members, and my primary care provider/team.    Use your imagination  Be creative.  We all have a creative side; it doesn t matter if it s oil painting, sand castles, or mud pies! This will also kick up the endorphins.    Witness Beauty  (AKA stop and smell the roses) Take a look outside, even in mid-winter. Notice colors, textures. Watch the squirrels and birds.     Service to others  Be of service to others.  There is always someone else in need.  By helping others we can  get out of ourselves  and remember the really important things.  This also provides opportunities for practicing all the other parts of the program.    Humor  Laugh and be silly!  Adjust your TV habits for less news and crime-drama and more comedy.    Control your stress  Try breathing deep, massage therapy, biofeedback, and meditation. Find time to relax each day.     My support system    Clinic Contact:  Phone number:    Contact 1:  Phone number:    Contact 2:  Phone number:    [facility]/:  Phone number:    Therapist:  Phone number:    Local crisis center:    Phone number:    Other community support:  Phone number:

## 2018-01-04 NOTE — MR AVS SNAPSHOT
After Visit Summary   1/4/2018    Yair Benites    MRN: [id number]           Patient Information     Date Of Birth          1960        Visit Information        Provider Department      1/4/2018 12:20 PM Radha Villegas MD [facility] Instructions    [facility]    If you have any questions regarding to your visit please contact your care team:       Team Red:   Clinic Hours Telephone Number   Dr. Cary Latif NP   7am-7pm  Monday - Thursday   7am-5pm  Fridays  [phone]  (Appointment scheduling available 24/7)    Questions about your visit?   Team Line  [phone]   Urgent Care - [place] and [place] - 11am-9pm Monday-Friday Saturday-Sunday- 9am-5pm   [place] - 5pm-9pm Monday-Friday Saturday-Sunday- 9am-5pm  [phone] - [place]   [phone] - [place]       What options do I have for visits at the clinic other than the traditional office visit?  To expand how we care for you, many of our providers are utilizing electronic visits (e-visits) and telephone visits, when medically appropriate, for interactions with their patients rather than a visit in the clinic.   We also offer nurse visits for many medical concerns. Just like any other service, we will bill your insurance company for this type of visit based on time spent on the phone with your provider. Not all insurance companies cover these visits. Please check with your medical insurance if this type of visit is covered. You will be responsible for any charges that are not paid by your insurance.      E-visits via Collect.it:  generally incur a $35.00 fee.  Telephone visits:  Time spent on the phone: *charged based on time that is spent on the phone in increments of 10 minutes. Estimated cost:   5-10 mins $30.00   11-20 mins. $59.00   21-30 mins. $85.00     Use Collect.it (secure email communication and access to your chart)  "to send your primary care provider a message or make an appointment. Ask someone on your Team how to sign up for Crunchyroll.  For a Price Quote for your services, please call our Consumer Price Line at [phone].      As always, Thank you for trusting us with your health care needs!  Discharged by Andressa PETERS CMA ([facility])            Follow-ups after your visit        Who to contact     If you have questions or need follow up information about today's clinic visit or your schedule please contact [facility] directly at [phone].  Normal or non-critical lab and imaging results will be communicated to you by MyChart, letter or phone within 4 business days after the clinic has received the results. If you do not hear from us within 7 days, please contact the clinic through coUrbanizehart or phone. If you have a critical or abnormal lab result, we will notify you by phone as soon as possible.  Submit refill requests through Crunchyroll or call your pharmacy and they will forward the refill request to us. Please allow 3 business days for your refill to be completed.          Additional Information About Your Visit        MyChart Information     Crunchyroll lets you send messages to your doctor, view your test results, renew your prescriptions, schedule appointments and more. To sign up, go to [email or web address] . Click on \"Log in\" on the left side of the screen, which will take you to the Welcome page. Then click on \"Sign up Now\" on the right side of the page.     You will be asked to enter the access code listed below, as well as some personal information. Please follow the directions to create your username and password.     Your access code is: [email or web address]  Expires: 3/5/2018  3:09 PM     Your access code will  in 90 days. If you need help or a new code, please call your [facility] or [phone].        Care EveryWhere ID     This is your Care EveryWhere ID. This could be used by other organizations " "to access your [facility] medical records  [id number]        Your Vitals Were     Pulse Temperature Respirations Height Pulse Oximetry BMI (Body Mass Index)    89 97  F (36.1  C) (Oral) 20 5' 6.5\" (1.689 m) 95% 33.86 kg/m2       Blood Pressure from Last 3 Encounters:   01/04/18 100/60   12/05/17 106/70   04/24/17 100/60    Weight from Last 3 Encounters:   01/04/18 213 lb (96.6 kg)   12/05/17 209 lb (94.8 kg)   04/24/17 206 lb 6.4 oz (93.6 kg)              We Performed the Following     DEPRESSION ACTION PLAN (DAP)        Primary Care Provider Office Phone # Fax #    Radha Villegas [phone] [address] [facility] [address]        Equal Access to Services     [facility]: Hadii erin campoo Sotrina, waaxda luqadaha, qaybta kaalmada adealtayada, stephany ziegler . So [facility] [place] [facility] [phone].    ATENCIÓN: Si habla español, tiene a phillips disposición servicios gratuitos de asistencia lingüística. Llame al [phone].    We comply with applicable federal civil rights laws and [place] laws. We do not discriminate on the basis of race, color, national origin, age, disability, sex, sexual orientation, or gender identity.            Thank you!     Thank you for choosing [facility]  for your care. Our goal is always to provide you with excellent care. Hearing back from our patients is one way we can continue to improve our services. Please take a few minutes to complete the written survey that you may receive in the mail after your visit with us. Thank you!             Your Updated Medication List - Protect others around you: Learn how to safely use, store and throw away your medicines at www.disposemymeds.org.          This list is accurate as of: 1/4/18 12:22 PM.  Always use your most recent med list.                   Brand Name Dispense Instructions for use Diagnosis    ABILIFY 15 MG tablet   Generic drug:  ARIPiprazole      Take 15 mg by mouth daily " Reported on 4/17/2017        aspirin 81 MG tablet      Take 1 tablet by mouth daily.        clonazePAM 0.5 MG tablet    klonoPIN     Take 1 tablet by mouth At Bedtime.        diphenhydrAMINE HCl (Sleep) 25 MG Tabs     30 tablet    Take 25 mg by mouth nightly as needed    Primary insomnia       FLAX SEED OIL PO      Take  by mouth daily. Uses powder on food.        hydrocortisone 1 % ointment     30 g    Apply sparingly to affected area three times daily for 14 days.    Rash       indomethacin 50 MG capsule    INDOCIN    30 capsule    Take 1 capsule (50 mg) by mouth 3 times daily as needed for moderate pain    Idiopathic chronic gout of left knee without tophus       Melatonin 10 MG Tabs tablet      Take 10 mg by mouth At Bedtime        mirtazapine 30 MG tablet    REMERON     Take 30 mg by mouth At Bedtime.        MULTIVITAMIN & MINERAL PO      Take 1 tablet by mouth daily.        order for DME     1 Device    Equipment being ordered:Face mask for CPAP machine size small with tubing and filter. [employer] ([phone])    SANDRA (obstructive sleep apnea)       * OVER-THE-COUNTER      Beta Prostate- Take 1 tablet daily.        * OVER-THE-COUNTER      Allergy med        polyethylene glycol powder    MIRALAX/GLYCOLAX    510 g    MIX AS DIRECTED AND TAKE 17 GRAMS BY MOUTH DAILY    Constipation       simvastatin 40 MG tablet    ZOCOR    30 tablet    TAKE 1 TABLET(40 MG) BY MOUTH AT BEDTIME    Hyperlipidemia LDL goal <130       triamcinolone 0.1 % cream    KENALOG    30 g    Apply sparingly to affected area three times daily for 14 days (apply to back of left hand).    Rash of hands       vitamin D 1000 UNITS capsule      Take 1 capsule by mouth daily.        * Notice:  This list has 2 medication(s) that are the same as other medications prescribed for you. Read the directions carefully, and ask your doctor or other care provider to review them with you.

## 2018-01-04 NOTE — PATIENT INSTRUCTIONS
[facility]    If you have any questions regarding to your visit please contact your care team:       Team Red:   Clinic Hours Telephone Number   Dr. Cary Latif, NP   7am-7pm  Monday - Thursday   7am-5pm  Fridays  [phone]  (Appointment scheduling available 24/7)    Questions about your visit?   Team Line  [phone]   [facility] [place] [facility] 11am-9pm Monday-Friday Saturday-Sunday- 9am-5pm   [place] - 5pm-9pm Monday-Friday Saturday-Sunday- 9am-5pm  [phone] - [place]   [phone] - [place]       What options do I have for visits at the clinic other than the traditional office visit?  To expand how we care for you, many of our providers are utilizing electronic visits (e-visits) and telephone visits, when medically appropriate, for interactions with their patients rather than a visit in the clinic.   We also offer nurse visits for many medical concerns. Just like any other service, we will bill your insurance company for this type of visit based on time spent on the phone with your provider. Not all insurance companies cover these visits. Please check with your medical insurance if this type of visit is covered. You will be responsible for any charges that are not paid by your insurance.      E-visits via stylemarks:  generally incur a $35.00 fee.  Telephone visits:  Time spent on the phone: *charged based on time that is spent on the phone in increments of 10 minutes. Estimated cost:   5-10 mins $30.00   11-20 mins. $59.00   21-30 mins. $85.00     Use Threadboxt (secure email communication and access to your chart) to send your primary care provider a message or make an appointment. Ask someone on your Team how to sign up for stylemarks.  For a Price Quote for your services, please call our Consumer Price Line at [phone].      As always, Thank you for trusting us with your health care needs!  Discharged  by Andressa PETERS CMA ([facility])

## 2018-01-04 NOTE — PROGRESS NOTES
SUBJECTIVE:   Yair Benites is a 57 year old male who presents to clinic today for the following health issues:        Acute Illness   Acute illness concerns: cough  Onset: 3-4 days    Fever: no    Chills/Sweats: YES    Headache (location?): no    Sinus Pressure:YES    Conjunctivitis:  no    Ear Pain: no    Rhinorrhea: YES    Congestion: YES    Sore Throat: no     Cough: YES-productive of yellow sputum    Wheeze: YES    Decreased Appetite: YES    Nausea: no    Vomiting: no    Diarrhea:  no    Dysuria/Freq.: no    Fatigue/Achiness: YES    Sick/Strep Exposure: no     Therapies Tried and outcome: none            Problem list and histories reviewed & adjusted, as indicated.  Additional history: as documented    Patient Active Problem List   Diagnosis     Colon polyp     Traumatic brain injury (H)     IBS (irritable bowel syndrome)     Elevated glucose     Synovitis and tenosynovitis     Dizziness - light-headed     Primary localized osteoarthrosis, ankle and foot     Hyperlipidemia LDL goal <130     Obesity     Depression, major     Headache disorder     SANDRA (obstructive sleep apnea)     Chronic gout without tophus, unspecified cause, unspecified site     Past Surgical History:   Procedure Laterality Date     SURGICAL HISTORY OF -       nose       Social History   Substance Use Topics     Smoking status: Former Smoker     Years: 6.00     Types: Cigarettes     Quit date: 1/1/1983     Smokeless tobacco: Never Used     Alcohol use No     Family History   Problem Relation Age of Onset     C.A.D. Father      MI age 66     HEART DISEASE Father      Depression Brother      CANCER Brother      Brain Tumor     Respiratory Brother      Depression Brother      Gallbladder Disease Brother      Dementia Brother      CANCER Brother      Parkinsonism Brother      DIABETES Maternal Uncle      Asthma No family hx of      Hypertension No family hx of          Current Outpatient Prescriptions   Medication Sig Dispense Refill      OVER-THE-COUNTER Allergy med       benzonatate (TESSALON) 200 MG capsule Take 1 capsule (200 mg) by mouth 3 times daily as needed for cough 21 capsule 0     simvastatin (ZOCOR) 40 MG tablet TAKE 1 TABLET(40 MG) BY MOUTH AT BEDTIME 30 tablet 0     OVER-THE-COUNTER Beta Prostate- Take 1 tablet daily.       indomethacin (INDOCIN) 50 MG capsule Take 1 capsule (50 mg) by mouth 3 times daily as needed for moderate pain 30 capsule 3     Melatonin 10 MG TABS tablet Take 10 mg by mouth At Bedtime       ARIPiprazole (ABILIFY) 15 MG tablet Take 15 mg by mouth daily Reported on 4/17/2017       mirtazapine (REMERON) 30 MG tablet Take 30 mg by mouth At Bedtime.       Cholecalciferol (VITAMIN D) 1000 UNIT capsule Take 1 capsule by mouth daily.       Flaxseed, Linseed, (FLAX SEED OIL PO) Take  by mouth daily. Uses powder on food.        aspirin 81 MG tablet Take 1 tablet by mouth daily.       ClonAZEPAM (KLONOPIN) 0.5 MG tablet Take 1 tablet by mouth At Bedtime.       Multiple Vitamins-Minerals (MULTIVITAMIN & MINERAL PO) Take 1 tablet by mouth daily.       triamcinolone (KENALOG) 0.1 % cream Apply sparingly to affected area three times daily for 14 days (apply to back of left hand). (Patient not taking: Reported on 12/5/2017) 30 g 0     hydrocortisone 1 % ointment Apply sparingly to affected area three times daily for 14 days. (Patient not taking: Reported on 1/4/2018) 30 g 0     order for DME Equipment being ordered:Face mask for CPAP machine size small with tubing and filter. [employer] ([phone]) 1 Device 0     polyethylene glycol (MIRALAX/GLYCOLAX) powder MIX AS DIRECTED AND TAKE 17 GRAMS BY MOUTH DAILY (Patient not taking: Reported on 12/5/2017) 510 g 6     diphenhydrAMINE HCl, Sleep, 25 MG TABS Take 25 mg by mouth nightly as needed (Patient not taking: Reported on 1/4/2018) 30 tablet 3     Allergies   Allergen Reactions     Erythromycin Nausea     Imipramine Hcl      Toxic levels     Recent Labs   Lab  "Test  12/05/17   1530  12/23/16   1420  12/05/16   1405  11/18/16   1152  03/23/15   0953  09/22/14   1055  04/01/13   1511  02/18/13   1113   02/03/12   1237   A1C  7.1*   --   5.9   --   6.2*   --    --   5.8   --    --    LDL  Cannot estimate LDL when triglyceride exceeds 400 mg/dL  106*   --    --   76  71  67   --   73   --   78   HDL  39*   --    --   42  39*  43   --   35*   --   28*   TRIG  492*   --    --   202*  206*  252*   --   283*   --   148   ALT   --    --    --    --    --   34   --   43   --   40   CR  0.87  1.12   --    --    --    --    --   0.96   --    --    GFRESTIMATED  90  67   --    --    --    --    --   82   --    --    GFRESTBLACK  >90  81   --    --    --    --    --   >90   --    --    POTASSIUM  4.0  4.0   --    --    --    --    --   4.4   < >   --    TSH   --    --    --    --   2.69   --   3.72   --    --   1.47    < > = values in this interval not displayed.      BP Readings from Last 3 Encounters:   01/04/18 100/60   12/05/17 106/70   04/24/17 100/60    Wt Readings from Last 3 Encounters:   01/04/18 213 lb (96.6 kg)   12/05/17 209 lb (94.8 kg)   04/24/17 206 lb 6.4 oz (93.6 kg)                  Labs reviewed in EPIC          Reviewed and updated as needed this visit by clinical staffTobacco  Allergies  Meds  Med Hx  Surg Hx  Fam Hx  Soc Hx      Reviewed and updated as needed this visit by Provider         ROS:  C: NEGATIVE for fever, chills, change in weight  INTEGUMENTARY/SKIN: NEGATIVE for worrisome rashes, moles or lesions  ENT/MOUTH: as above  RESP:as above,no sob  CV: NEGATIVE for chest pain, palpitations or peripheral edema  GI: NEGATIVE for nausea, abdominal pain, heartburn, or change in bowel habits  MUSCULOSKELETAL: NEGATIVE for significant arthralgias or myalgia    OBJECTIVE:     /60  Pulse 89  Temp 97  F (36.1  C) (Oral)  Resp 20  Ht 5' 6.5\" (1.689 m)  Wt 213 lb (96.6 kg)  SpO2 95%  BMI 33.86 kg/m2  Body mass index is 33.86 kg/(m^2).  GENERAL: " healthy, alert and no distress  EYES: Eyes grossly normal to inspection, PERRL and conjunctivae and sclerae normal  HENT: ear canals and TM's normal, nose congestion and mouth without ulcers or lesions  NECK: no adenopathy, no asymmetry, masses, or scars and thyroid normal to palpation  RESP: lungs clear to auscultation - no rales, rhonchi or wheezes  CV: regular rate and rhythm, normal S1 S2, no S3 or S4, no murmur, click or rub, no peripheral edema and peripheral pulses strong  ABDOMEN: soft, nontender, no hepatosplenomegaly, no masses and bowel sounds normal  MS: no gross musculoskeletal defects noted, no edema    Diagnostic Test Results:  none     ASSESSMENT/PLAN:           1. Upper respiratory tract infection, unspecified type  Advised symptomatic Treatment  Rest/fluids/Tylenol  - benzonatate (TESSALON) 200 MG capsule; Take 1 capsule (200 mg) by mouth 3 times daily as needed for cough  Dispense: 21 capsule; Refill: 0  Follow up 1 week if not better/sooner if worse        Radha Villegas MD  [facility]

## 2018-01-04 NOTE — LETTER
[address] [place] [address]  [phone]          January 4, 2018    RE:  Yair Benites                                                                                                                                                       [facility] [address] [place] [id number]            To whom it may concern:    Yair Benites is under my professional care.  Pt unable to go to work for 2-3 days.    Sincerely,        Radha Villegas MD

## 2018-01-05 ASSESSMENT — PATIENT HEALTH QUESTIONNAIRE - PHQ9: SUM OF ALL RESPONSES TO PHQ QUESTIONS 1-9: 2

## 2018-01-12 ENCOUNTER — OFFICE VISIT (OUTPATIENT)
Dept: FAMILY MEDICINE | Facility: CLINIC | Age: 58
End: 2018-01-12
Payer: MEDICARE

## 2018-01-12 VITALS
HEART RATE: 84 BPM | BODY MASS INDEX: 32.65 KG/M2 | WEIGHT: 208 LBS | RESPIRATION RATE: 20 BRPM | SYSTOLIC BLOOD PRESSURE: 100 MMHG | OXYGEN SATURATION: 96 % | TEMPERATURE: 98.9 F | HEIGHT: 67 IN | DIASTOLIC BLOOD PRESSURE: 56 MMHG

## 2018-01-12 DIAGNOSIS — E11.9 NEW ONSET TYPE 2 DIABETES MELLITUS (H): ICD-10-CM

## 2018-01-12 DIAGNOSIS — Z00.01 ENCOUNTER FOR ROUTINE ADULT PHYSICAL EXAM WITH ABNORMAL FINDINGS: Primary | ICD-10-CM

## 2018-01-12 DIAGNOSIS — Z12.5 SCREENING FOR PROSTATE CANCER: ICD-10-CM

## 2018-01-12 DIAGNOSIS — S06.9X0S TRAUMATIC BRAIN INJURY, WITHOUT LOSS OF CONSCIOUSNESS, SEQUELA (H): ICD-10-CM

## 2018-01-12 DIAGNOSIS — F32.9 MAJOR DEPRESSIVE DISORDER WITH SINGLE EPISODE, REMISSION STATUS UNSPECIFIED: ICD-10-CM

## 2018-01-12 DIAGNOSIS — E78.5 HYPERLIPIDEMIA LDL GOAL <100: ICD-10-CM

## 2018-01-12 DIAGNOSIS — E66.811 CLASS 1 OBESITY DUE TO EXCESS CALORIES WITH SERIOUS COMORBIDITY AND BODY MASS INDEX (BMI) OF 33.0 TO 33.9 IN ADULT: ICD-10-CM

## 2018-01-12 DIAGNOSIS — G47.33 OSA (OBSTRUCTIVE SLEEP APNEA): ICD-10-CM

## 2018-01-12 DIAGNOSIS — Z23 ENCOUNTER FOR IMMUNIZATION: ICD-10-CM

## 2018-01-12 DIAGNOSIS — E66.09 CLASS 1 OBESITY DUE TO EXCESS CALORIES WITH SERIOUS COMORBIDITY AND BODY MASS INDEX (BMI) OF 33.0 TO 33.9 IN ADULT: ICD-10-CM

## 2018-01-12 DIAGNOSIS — Z23 NEED FOR PROPHYLACTIC VACCINATION AND INOCULATION AGAINST INFLUENZA: ICD-10-CM

## 2018-01-12 LAB
PSA SERPL-ACNC: 1.02 UG/L (ref 0–4)
TSH SERPL DL<=0.005 MIU/L-ACNC: 1.84 MU/L (ref 0.4–4)

## 2018-01-12 PROCEDURE — G0103 PSA SCREENING: HCPCS | Performed by: FAMILY MEDICINE

## 2018-01-12 PROCEDURE — 90746 HEPB VACCINE 3 DOSE ADULT IM: CPT | Performed by: FAMILY MEDICINE

## 2018-01-12 PROCEDURE — 36415 COLL VENOUS BLD VENIPUNCTURE: CPT | Performed by: FAMILY MEDICINE

## 2018-01-12 PROCEDURE — G0010 ADMIN HEPATITIS B VACCINE: HCPCS | Performed by: FAMILY MEDICINE

## 2018-01-12 PROCEDURE — 90686 IIV4 VACC NO PRSV 0.5 ML IM: CPT | Performed by: FAMILY MEDICINE

## 2018-01-12 PROCEDURE — 99213 OFFICE O/P EST LOW 20 MIN: CPT | Mod: 25 | Performed by: FAMILY MEDICINE

## 2018-01-12 PROCEDURE — 99396 PREV VISIT EST AGE 40-64: CPT | Mod: 25 | Performed by: FAMILY MEDICINE

## 2018-01-12 PROCEDURE — G0008 ADMIN INFLUENZA VIRUS VAC: HCPCS | Performed by: FAMILY MEDICINE

## 2018-01-12 PROCEDURE — 84443 ASSAY THYROID STIM HORMONE: CPT | Performed by: FAMILY MEDICINE

## 2018-01-12 RX ORDER — METFORMIN HCL 500 MG
500 TABLET, EXTENDED RELEASE 24 HR ORAL DAILY
Qty: 30 TABLET | Refills: 1 | Status: SHIPPED | OUTPATIENT
Start: 2018-01-12 | End: 2018-03-05

## 2018-01-12 RX ORDER — GLUCOSAMINE HCL/CHONDROITIN SU 500-400 MG
CAPSULE ORAL
Qty: 100 EACH | Refills: 3 | Status: SHIPPED | OUTPATIENT
Start: 2018-01-12 | End: 2021-02-16

## 2018-01-12 RX ORDER — LANCETS
EACH MISCELLANEOUS
Qty: 100 EACH | Refills: 6 | Status: SHIPPED | OUTPATIENT
Start: 2018-01-12 | End: 2019-04-02

## 2018-01-12 RX ORDER — ATORVASTATIN CALCIUM 20 MG/1
20 TABLET, FILM COATED ORAL DAILY
Qty: 30 TABLET | Refills: 1 | Status: SHIPPED | OUTPATIENT
Start: 2018-01-12 | End: 2018-03-05

## 2018-01-12 NOTE — LETTER
[address] [place] [address]    January 16, 2018    [facility] [address] [place] [address]  [place] [place] [address]          Dear Yair,  Thyroid test is normal   Prostate test is normal   Take care   Enclosed is a copy of your results.     Results for orders placed or performed in visit on 01/12/18   Prostate spec antigen screen   Result Value Ref Range    PSA 1.02 0 - 4 ug/L   TSH with free T4 reflex   Result Value Ref Range    TSH 1.84 0.40 - 4.00 mU/L       If you have any questions or concerns, please call myself or my nurse at [phone].      Sincerely,        Radha Villegas MD/ashlyn

## 2018-01-12 NOTE — MR AVS SNAPSHOT
After Visit Summary   1/12/2018    Yair Benites    MRN: [id number]           Patient Information     Date Of Birth          1960        Visit Information        Provider Department      1/12/2018 3:00 PM Radha Villegas MD [facility]        Today's Diagnoses     New onset type 2 diabetes mellitus (H)    -  1    Encounter for immunization        Hyperlipidemia LDL goal <100        Screening for prostate cancer          Care Instructions    [facility]    If you have any questions regarding to your visit please contact your care team:       Team Red:   Clinic Hours Telephone Number   Dr. Cary Latif, NP   7am-7pm  Monday - Thursday   7am-5pm  Fridays  [phone]  (Appointment scheduling available 24/7)    Questions about your visit?   Team Line  [phone]   Urgent Care - [place] and [facility] - 11am-9pm Monday-Friday Saturday-Sunday- 9am-5pm   [place] - 5pm-9pm Monday-Friday Saturday-Sunday- 9am-5pm  [phone] - [facility]  [phone] - [place]       What options do I have for visits at the clinic other than the traditional office visit?  To expand how we care for you, many of our providers are utilizing electronic visits (e-visits) and telephone visits, when medically appropriate, for interactions with their patients rather than a visit in the clinic.   We also offer nurse visits for many medical concerns. Just like any other service, we will bill your insurance company for this type of visit based on time spent on the phone with your provider. Not all insurance companies cover these visits. Please check with your medical insurance if this type of visit is covered. You will be responsible for any charges that are not paid by your insurance.      E-visits via [facility]:  generally incur a $35.00 fee.  Telephone visits:  Time spent on the phone: *charged based on time that is spent on the  phone in increments of 10 minutes. Estimated cost:   5-10 mins $30.00   11-20 mins. $59.00   21-30 mins. $85.00     Use E-Semble[employer] (secure email communication and access to your chart) to send your primary care provider a message or make an appointment. Ask someone on your Team how to sign up for [email or web address].  For a Price Quote for your services, please call our [employer] Line at [phone].      As always, Thank you for trusting us with your health care needs!    Preventive Health Recommendations  Male Ages 50 - 64    Yearly exam:             See your health care provider every year in order to  o   Review health changes.   o   Discuss preventive care.    o   Review your medicines if your doctor has prescribed any.     Have a cholesterol test every 5 years, or more frequently if you are at risk for high cholesterol/heart disease.     Have a diabetes test (fasting glucose) every three years. If you are at risk for diabetes, you should have this test more often.     Have a colonoscopy at age 50, or have a yearly FIT test (stool test). These exams will check for colon cancer.      Talk with your health care provider about whether or not a prostate cancer screening test (PSA) is right for you.    You should be tested each year for STDs (sexually transmitted diseases), if you re at risk.     Shots: Get a flu shot each year. Get a tetanus shot every 10 years.     Nutrition:    Eat at least 5 servings of fruits and vegetables daily.     Eat whole-grain bread, whole-wheat pasta and brown rice instead of white grains and rice.     Talk to your provider about Calcium and Vitamin D.     Lifestyle    Exercise for at least 150 minutes a week (30 minutes a day, 5 days a week). This will help you control your weight and prevent disease.     Limit alcohol to one drink per day.     No smoking.     Wear sunscreen to prevent skin cancer.     See your dentist every six months for an exam and cleaning.     See your eye doctor every 1  to 2 years.          Please make appointment with Diabetic educator  Start Metformin ones daily with Food  Start Lipitor daily  Stop Simvastatin  See Dr Villegas in 2 months  Radha Villegas MD                Follow-ups after your visit        Additional Services     DIABETES EDUCATOR REFERRAL       Your provider has referred you to Diabetes Education: FMG: Diabetes Education - All [facility] [phone]   [email or web address]     If an urgent visit is needed or A1C is above 12, Care Team to call the Diabetes  Education Team at [phone] or send an In Basket message to the Diabetes Education Pool (P DIAB ED-PATIENT CARE).    This is a New Diagnosis: Initial group DSMT - 10 hours.    Type of diabetes is Type 2 - On Oral Medication                                                          A1C is: Lab Results       Component                Value               Date                       A1C                      7.1                 12/05/2017            If an urgent visit is needed or A1C is above 12, Care Team to call the diabetes education team at [phone] or send a message to the diabetes education pool (P DIAB ED-PATIENT CARE).    Diabetes education focus: Comprehensive Knowledge Assessment and Instruction      Education needs: Cognitive Impairment                                                                                                                                                      Please be aware that coverage of these services is subject to the terms and limitations of your health insurance plan.  Call member services at your health plan to determine Diabetes Self-Management Training benefits and ask which blood glucose monitor brands are covered by your plan.      Please bring the following to your appointment:    -   List of current medications   -   List of blood glucose monitor brands that are covered by your insurance plan  -   Blood glucose  "monitor and log book  -   Food records for the 3 days prior to your visit                  Who to contact     If you have questions or need follow up information about today's clinic visit or your schedule please contact [facility] directly at [phone].  Normal or non-critical lab and imaging results will be communicated to you by MyChart, letter or phone within 4 business days after the clinic has received the results. If you do not hear from us within 7 days, please contact the clinic through [employer] or phone. If you have a critical or abnormal lab result, we will notify you by phone as soon as possible.  Submit refill requests through [employer] or call your pharmacy and they will forward the refill request to us. Please allow 3 business days for your refill to be completed.          Additional Information About Your Visit        Fis[employer] Information     [employer] lets you send messages to your doctor, view your test results, renew your prescriptions, schedule appointments and more. To sign up, go to [email or web address] . Click on \"Log in\" on the left side of the screen, which will take you to the Welcome page. Then click on \"Sign up Now\" on the right side of the page.     You will be asked to enter the access code listed below, as well as some personal information. Please follow the directions to create your username and password.     Your access code is: [email or web address]  Expires: 3/5/2018  3:09 PM     Your access code will  in 90 days. If you need help or a new code, please call your [place] clinic or [phone].        Care EveryWhere ID     This is your Care EveryWhere ID. This could be used by other organizations to access your [place] medical records  [id number]        Your Vitals Were     Pulse Temperature Respirations Height Pulse Oximetry BMI (Body Mass Index)    84 98.9  F (37.2  C) (Oral) 20 5' 6.5\" (1.689 m) 96% 33.07 kg/m2       Blood Pressure from Last 3 Encounters: "   01/12/18 100/56   01/04/18 100/60   12/05/17 106/70    Weight from Last 3 Encounters:   01/12/18 208 lb (94.3 kg)   01/04/18 213 lb (96.6 kg)   12/05/17 209 lb (94.8 kg)              We Performed the Following     Albumin Random Urine Quantitative with Creat Ratio     DIABETES EDUCATOR REFERRAL     HEPATITIS B VACCINE,ADULT,IM     Prostate spec antigen screen     TSH with free T4 reflex          Today's Medication Changes          These changes are accurate as of: 1/12/18  3:30 PM.  If you have any questions, ask your nurse or doctor.               Start taking these medicines.        Dose/Directions    alcohol swab prep pads   Used for:  New onset type 2 diabetes mellitus (H)   Started by:  Radha Villegas MD        Use to swab area of injection/reba as directed.   Quantity:  100 each   Refills:  3       atorvastatin 20 MG tablet   Commonly known as:  LIPITOR   Used for:  Hyperlipidemia LDL goal <100   Started by:  Radha Villegas MD        Dose:  20 mg   Take 1 tablet (20 mg) by mouth daily   Quantity:  30 tablet   Refills:  1       blood glucose calibration solution   Commonly known as:  NO BRAND SPECIFIED   Used for:  New onset type 2 diabetes mellitus (H)   Started by:  Radha Villegas MD        To accompany: Blood Glucose Monitor Brands: per insurance.   Quantity:  1 Bottle   Refills:  3       blood glucose monitoring meter device kit   Commonly known as:  no brand specified   Used for:  New onset type 2 diabetes mellitus (H)   Started by:  Radha Villegas MD        Use to test blood sugar 1 times daily or as directed. Preferred blood glucose meter OR supplies to accompany: Blood Glucose Monitor Brands: per insurance.   Quantity:  1 kit   Refills:  0       blood glucose monitoring test strip   Commonly known as:  no brand specified   Used for:  New onset type 2 diabetes mellitus (H)   Started by:  Radha Villegas MD        Use to test blood sugar 1 times daily or as directed. To accompany: Blood Glucose Monitor  Brands: per insurance.   Quantity:  100 strip   Refills:  6       metFORMIN 500 MG 24 hr tablet   Commonly known as:  GLUCOPHAGE-XR   Used for:  New onset type 2 diabetes mellitus (H)   Started by:  Radha Villegas MD        Dose:  500 mg   Take 1 tablet (500 mg) by mouth daily   Quantity:  30 tablet   Refills:  1       thin lancets   Commonly known as:  NO BRAND SPECIFIED   Used for:  New onset type 2 diabetes mellitus (H)   Started by:  Radha Villegas MD        Use with lanceting device. To accompany: Blood Glucose Monitor Brands: per insurance.   Quantity:  100 each   Refills:  6         Stop taking these medicines if you haven't already. Please contact your care team if you have questions.     simvastatin 40 MG tablet   Commonly known as:  ZOCOR   Stopped by:  Radha Villegas MD                Where to get your medicines      These medications [email or web address] [address] [facility] [address] AT [facility] [address] [place] [id number] [facility] [address]     Phone:  [phone]     alcohol swab prep pads    atorvastatin 20 MG tablet    blood glucose calibration solution    blood glucose monitoring test strip    metFORMIN 500 MG 24 hr tablet    thin lancets         Some of these will need a paper prescription and others can be bought over the counter.  Ask your nurse if you have questions.     Bring a paper prescription for each of these medications     blood glucose monitoring meter device kit                Primary Care Provider Office Phone # Fax #    Radha Villegas [phone] [address] [facility] [address]        Equal Access to Services     KEYA HAAS : Hadii erin campoo Sotrina, waaxda luqadaha, qaybta kaalmada adeegyada, stephany joy. So [facility] [phone].    ATENCIÓN: Si habla español, tiene a phillips disposición servicios gratuitos de asistencia lingüística. Llame al [phone].    We comply with  applicable federal civil rights laws and [place] laws. We do not discriminate on the basis of race, color, national origin, age, disability, sex, sexual orientation, or gender identity.            Thank you!     Thank you for choosing [facility]  for your care. Our goal is always to provide you with excellent care. Hearing back from our patients is one way we can continue to improve our services. Please take a few minutes to complete the written survey that you may receive in the mail after your visit with us. Thank you!             Your Updated Medication List - Protect others around you: Learn how to safely use, store and throw away your medicines at www.disposemymeds.org.          This list is accurate as of: 1/12/18  3:30 PM.  Always use your most recent med list.                   Brand Name Dispense Instructions for use Diagnosis    ABILIFY 15 MG tablet   Generic drug:  ARIPiprazole      Take 15 mg by mouth daily Reported on 4/17/2017        alcohol swab prep pads     100 each    Use to swab area of injection/reba as directed.    New onset type 2 diabetes mellitus (H)       aspirin 81 MG tablet      Take 1 tablet by mouth daily.        atorvastatin 20 MG tablet    LIPITOR    30 tablet    Take 1 tablet (20 mg) by mouth daily    Hyperlipidemia LDL goal <100       benzonatate 200 MG capsule    TESSALON    21 capsule    Take 1 capsule (200 mg) by mouth 3 times daily as needed for cough    Upper respiratory tract infection, unspecified type       blood glucose calibration solution    NO BRAND SPECIFIED    1 Bottle    To accompany: Blood Glucose Monitor Brands: per insurance.    New onset type 2 diabetes mellitus (H)       blood glucose monitoring meter device kit    no brand specified    1 kit    Use to test blood sugar 1 times daily or as directed. Preferred blood glucose meter OR supplies to accompany: Blood Glucose Monitor Brands: per insurance.    New onset type 2 diabetes mellitus (H)        blood glucose monitoring test strip    no brand specified    100 strip    Use to test blood sugar 1 times daily or as directed. To accompany: Blood Glucose Monitor Brands: per insurance.    New onset type 2 diabetes mellitus (H)       clonazePAM 0.5 MG tablet    klonoPIN     Take 1 tablet by mouth At Bedtime.        diphenhydrAMINE HCl (Sleep) 25 MG Tabs     30 tablet    Take 25 mg by mouth nightly as needed    Primary insomnia       FLAX SEED OIL PO      Take  by mouth daily. Uses powder on food.        hydrocortisone 1 % ointment     30 g    Apply sparingly to affected area three times daily for 14 days.    Rash       indomethacin 50 MG capsule    INDOCIN    30 capsule    Take 1 capsule (50 mg) by mouth 3 times daily as needed for moderate pain    Idiopathic chronic gout of left knee without tophus       Melatonin 10 MG Tabs tablet      Take 10 mg by mouth At Bedtime        metFORMIN 500 MG 24 hr tablet    GLUCOPHAGE-XR    30 tablet    Take 1 tablet (500 mg) by mouth daily    New onset type 2 diabetes mellitus (H)       mirtazapine 30 MG tablet    REMERON     Take 30 mg by mouth At Bedtime.        MULTIVITAMIN & MINERAL PO      Take 1 tablet by mouth daily.        order for DME     1 Device    Equipment being ordered:Face mask for CPAP machine size small with tubing and filter. [facility] ([phone])    SANDRA (obstructive sleep apnea)       * OVER-THE-COUNTER      Beta Prostate- Take 1 tablet daily.        * OVER-THE-COUNTER      Allergy med        polyethylene glycol powder    MIRALAX/GLYCOLAX    510 g    MIX AS DIRECTED AND TAKE 17 GRAMS BY MOUTH DAILY    Constipation       thin lancets    NO BRAND SPECIFIED    100 each    Use with lanceting device. To accompany: Blood Glucose Monitor Brands: per insurance.    New onset type 2 diabetes mellitus (H)       triamcinolone 0.1 % cream    KENALOG    30 g    Apply sparingly to affected area three times daily for 14 days (apply to back of left hand).     Rash of hands       vitamin D 1000 UNITS capsule      Take 1 capsule by mouth daily.        * Notice:  This list has 2 medication(s) that are the same as other medications prescribed for you. Read the directions carefully, and ask your doctor or other care provider to review them with you.

## 2018-01-12 NOTE — PATIENT INSTRUCTIONS
[facility]    If you have any questions regarding to your visit please contact your care team:       Team Red:   Clinic Hours Telephone Number   Dr. Cary Latif, NP   7am-7pm  Monday - Thursday   7am-5pm  Fridays  [phone]  (Appointment scheduling available 24/7)    Questions about your visit?   Team Line  [phone]   [facility] [place] [facility]-9pm Monday-Friday Saturday-Sunday- 9am-5pm   [place] - 5pm-9pm Monday-Friday Saturday-Sunday- 9am-5pm  [phone] - [place]   [phone] - [place]       What options do I have for visits at the clinic other than the traditional office visit?  To expand how we care for you, many of our providers are utilizing electronic visits (e-visits) and telephone visits, when medically appropriate, for interactions with their patients rather than a visit in the clinic.   We also offer nurse visits for many medical concerns. Just like any other service, we will bill your insurance company for this type of visit based on time spent on the phone with your provider. Not all insurance companies cover these visits. Please check with your medical insurance if this type of visit is covered. You will be responsible for any charges that are not paid by your insurance.      E-visits via Activehours:  generally incur a $35.00 fee.  Telephone visits:  Time spent on the phone: *charged based on time that is spent on the phone in increments of 10 minutes. Estimated cost:   5-10 mins $30.00   11-20 mins. $59.00   21-30 mins. $85.00     Use [employer] (secure email communication and access to your chart) to send your primary care provider a message or make an appointment. Ask someone on your Team how to sign up for Activehours.  For a Price Quote for your services, please call our Consumer Price Line at [phone].      As always, Thank you for trusting us with your health care needs!    Preventive  Health Recommendations  Male Ages 50   64    Yearly exam:             See your health care provider every year in order to  o   Review health changes.   o   Discuss preventive care.    o   Review your medicines if your doctor has prescribed any.     Have a cholesterol test every 5 years, or more frequently if you are at risk for high cholesterol/heart disease.     Have a diabetes test (fasting glucose) every three years. If you are at risk for diabetes, you should have this test more often.     Have a colonoscopy at age 50, or have a yearly FIT test (stool test). These exams will check for colon cancer.      Talk with your health care provider about whether or not a prostate cancer screening test (PSA) is right for you.    You should be tested each year for STDs (sexually transmitted diseases), if you re at risk.     Shots: Get a flu shot each year. Get a tetanus shot every 10 years.     Nutrition:    Eat at least 5 servings of fruits and vegetables daily.     Eat whole-grain bread, whole-wheat pasta and brown rice instead of white grains and rice.     Talk to your provider about Calcium and Vitamin D.     Lifestyle    Exercise for at least 150 minutes a week (30 minutes a day, 5 days a week). This will help you control your weight and prevent disease.     Limit alcohol to one drink per day.     No smoking.     Wear sunscreen to prevent skin cancer.     See your dentist every six months for an exam and cleaning.     See your eye doctor every 1 to 2 years.          Please make appointment with Diabetic educator  Start Metformin ones daily with Food  Start Lipitor daily  Stop Simvastatin  See Dr Villegas in 2 months  Radha Villegas MD      What Is Diabetes?  If you have diabetes, your body does not make enough insulin (a hormone), or the insulin it makes does not work the way it should. Diabetes is a lifelong disease.  Glucose (sugar) is your body's main source of energy. Insulin carries glucose from the bloodstream into  your body's cells. But if you have diabetes, glucose builds up in your blood. This is called high blood glucose (high blood sugar).  High blood glucose can lead to damage in many parts of your body, including your eyes, kidneys, heart, blood vessels, nerves and skin.  What are the symptoms of diabetes?  Symptoms include:    Extreme thirst    Needing to urinate more often    Headache    Hunger    Blurred vision    Feeling drowsy or tired    Slow healing after an illness or injury    Frequent infections.  What should I do if I have diabetes?  Your first step is to learn how to manage your diabetes. The goal is to keep your blood glucose as close to normal as possible. (A normal level is 70 to 100.) By doing this, you can prevent or control damage to your body.  To manage your diabetes, you will need to:    Eat a wide range of healthy foods.    Manage your weight.    Be physically active.    Test your blood glucose as prescribed.    Control your blood pressure and cholesterol.    Take your medicines as prescribed.  Are there different kinds of diabetes?  There are two basic kinds of diabetes: type 1 and type 2.  Type 1 diabetes  Type 1 diabetes occurs when the cells that make insulin are destroyed by your body's immune system. Your body can no longer make insulin on its own. People who have type 1 diabetes must take insulin to manage it.  Type 2 diabetes  Type 2 diabetes occurs when the cells of your body cannot use insulin or glucose normally. Over time, your body cannot make enough insulin to meet your body's needs. This is the most common kind of diabetes.  You are more likely to develop type 2 diabetes if you:    Are overweight    Have high blood pressure    Have high cholesterol    Are not physically active    Have a family history of type 2 diabetes    Are , /, ,  American or     Are a woman who has given birth to a baby weighing over 9 pounds, or  you have had gestational diabetes (diabetes that occurs in pregnancy).  For informational purposes only. Not to replace the advice of your health care provider.  Copyright   2006 [facility]. All rights reserved. [employer] [id number]   REV 12/15.    Diet: Diabetes  Food is an important tool that you can use to control diabetes and stay healthy. Eating well-balanced meals in the correct amounts will help you control your blood glucose levels and prevent low blood sugar reactions. It will also help you reduce the health risks of diabetes. There is no one specific diet that is right for everyone with diabetes. But there are general guidelines to follow. A registered dietitian (MAIRA) will create a tailored diet approach that s just right for you. He or she will also help you plan healthy meals and snacks. If you have any questions, call your dietitian for advice.     Guidelines for success  Talk with your healthcare provider before starting a diabetes diet or weight loss program. If you haven't talked with a dietitian yet, ask your provider for a referral. The following guidelines can help you succeed:    Select foods from the 6 food groups below. Your dietitian will help you find food choices within each group. He or she will also show you serving sizes and how many servings you can have at each meal.    Grains, beans, and starchy vegetables    Vegetables    Fruit    Milk or yogurt    Meat, poultry, fish, or tofu    Healthy fats    Check your blood sugar levels as directed by your provider. Take any medicine as prescribed by your provider.    Learn to read food labels and pick the right portion sizes.    Eat only the amount of food in your meal plan. Eat about the same amount of food at regular times each day. Don t skip meals. Eat meals 4 to 5 hours apart, with snacks in between.    Limit alcohol. It raises blood sugar levels. Drink water or calorie-free diet drinks that use safe sweeteners.    Eat less fat to  help lower your risk of heart disease. Use nonfat or low-fat dairy products and lean meats. Avoid fried foods. Use cooking oils that are unsaturated, such as olive, canola, or peanut oil.    Talk with your dietitian about safe sugar substitutes.    Avoid added salt. It can contribute to high blood pressure, which can cause heart disease. People with diabetes already have a risk of high blood pressure and heart disease.    Stay at a healthy weight. If you need to lose weight, cut down on your portion sizes. But don t skip meals. Exercise is an important part of any weight management program. Talk with your provider about an exercise program that s right for you.    For more information about the best diet plan for you, talk with a registered dietitian (RD). To find an RD in your area, contact:    Academy of Nutrition and Dietetics www.eatright.org    The American Diabetes Association 972-194-9231 www.diabetes.org  Date Last Reviewed: 8/1/2016 2000-2017 [employer]. [address] [place] [address]. All rights reserved. This information is not intended as a substitute for professional medical care. Always follow your healthcare professional's instructions.        Before You Start Your Diabetes Exercise Plan  Fitness plays a special role for people who have diabetes. Being fit means becoming healthier by adding activity to your day. Talk to your healthcare provider before getting started. He or she may want you to have a checkup before you become more active. Also, having certain tests first helps you and your healthcare provider learn how you will respond to a fitness program.    Your checkup  A medical checkup helps ensure that your fitness plan will bring you the most benefit. It also keeps at a minimum the risks that may come with physical activity. As part of your checkup:    You may have a test called a hemoglobin A1C. The A1C test measures your average glucose (sugar) level over 2 to 3  months. A1C is usually given as a percentage. But it is now also given as a number representing estimated Average Glucose (eAG). Unlike the A1C percentage, eAG gives you a number similar to the numbers listed on your daily glucose monitor.    Your healthcare provider may check the health of your heart with a resting electrocardiogram. Diabetes can cause heart problems. But a fitness program can help these problems get better. Being fit can also help improve your cholesterol and blood pressure levels.    Your healthcare provider may check the health of your feet. People who have diabetes can have problems with their feet. Your healthcare provider can check your feet before you become more active. Take time to find shoes that will support your feet well while you exercise.   If you have an exercise stress test  An exercise stress test can show how your heart responds to activity and what level of exercise is right for you. You will have small electrodes placed on your chest. You will then walk on a treadmill or ride an exercise bike while your heart rate is monitored. If you have this test, your healthcare provider will give you more details.     Date Last Reviewed: 7/1/2016 2000-2017 [employer]. [address] [place], [place] [id number]. All rights reserved. This information is not intended as a substitute for professional medical care. Always follow your healthcare professional's instructions.        Your Diabetes Foot Care Program    Every day you depend on your feet to keep you moving. But when you have diabetes, your feet need special care. Even a small foot problem can become very serious. So don t take your feet for granted. By working with your diabetes healthcare team, you can learn how to protect your feet and keep them healthy.  Evaluating your feet  An evaluation helps your healthcare provider check the condition of your feet. The evaluation includes a review of your diabetes history and  overall health. It may also include a foot exam, X-rays, or other tests. These can help show problems beneath the skin that you can t see or feel.  Medical history  You will be asked about your overall health and any history of foot problems. You ll also discuss your diabetes history, such as whether your blood sugar level has changed over time. It also includes questions about sensations of pain, tingling, pins and needles, or numbness. Your healthcare provider will also want to know if you have high blood pressure and heart disease, or if you smoke. Be sure to mention any medicines (including over-the-counter), supplements, or herbal remedies you take.  Foot exam  A foot exam checks the condition of different parts of your foot. First, your skin and nails are examined for any signs of infection. Blood flow is checked by feeling for the pulses in each foot. You may also have tests to study the nerves in the foot. These include using a small filament (wire) to see how sensitive your feet are. In certain cases, you will be asked to walk a short distance to check for bone, joint, and muscle problems.  Diagnostic tests  If needed, your healthcare provider will suggest certain tests to learn more about your feet. These include:    Doppler tests to measure blood flow in the feet and lower leg.    X-rays, which can show bone or joint problems.    Other imaging tests, such as an MRI (magnetic resonance imaging), bone scan, and CT (computed tomography) scan. These can help show bone infections.    Other tests, such as vascular tests, which study the blood flow in your feet and legs. You may also have nerve studies to learn how sensitive your feet are.  Creating a foot care program  Based on the evaluation, your healthcare provider will create a foot care program for you. Your program may be as simple as starting a daily self-care routine and changing the types of shoes your wear. It may also involve treating minor foot  problems, such as a corn or blister. In some cases, surgery will be needed to treat an infection or mechanical problems, such as hammer toes.  Preventing problems  When you have diabetes, it s easier to prevent problems than to treat them later on. So see your healthcare team for regular checkups and foot care. Your healthcare team can also help you learn more about caring for your feet at home. For example, you may be told to avoid walking barefoot. Or you may be told that special footwear is needed to protect your feet.  Have regular checkups  Foot problems can develop quickly. So be sure to follow your healthcare team s schedule for regular checkups. During office visits, take off your shoes and socks as soon as you get in the exam room. Ask your healthcare provider to examine your feet for problems. This will make it easier to find and treat small skin irritations before they get worse. Regular checkups can also help keep track of the blood flow and feeling in your feet. If you have neuropathy (lack of feeling in your feet), you will need to have checkups more often.  Learn about self-care  The more you know about diabetes and your feet, the easier it will be to prevent problems. Members of your healthcare team can teach you how to inspect your feet and teach you to look for warning signs. They can also give you other foot care tips. During office visits, be sure to ask any questions you have.  Date Last Reviewed: 7/1/2016 2000-2017 [employer]. [address] [place] [address], [place] [address]. All rights reserved. This information is not intended as a substitute for professional medical care. Always follow your healthcare professional's instructions.

## 2018-01-12 NOTE — PROGRESS NOTES
SUBJECTIVE:   CC: Yair Benites is an 57 year old male who presents for preventative health visit.     Healthy Habits:    Do you get at least three servings of calcium containing foods daily (dairy, green leafy vegetables, etc.)? yes    Amount of exercise or daily activities, outside of work: 0 day(s) per week    Problems taking medications regularly No    Medication side effects: No    Have you had an eye exam in the past two years? yes    Do you see a dentist twice per year? yearly    Do you have sleep apnea, excessive snoring or daytime drowsiness?sleep apnea         Pt is a new Diabetic.  No polyuria, polydipsia, blurry vision, chest pain, dyspnea or claudication.  No foot burning, numbness or pain.   Hyperlipidemia Follow-Up      Rate your low fat/cholesterol diet?: good    Taking statin?  Yes, no muscle aches from statin    Other lipid medications/supplements?:  none    Depression Followup    Status since last visit: Stable     See PHQ-9 for current symptoms.  Other associated symptoms: None    Complicating factors:   Significant life event:  No   Current substance abuse:  None  Anxiety or Panic symptoms:  No    PHQ-9 Score and MyChart F/U Questions 12/6/2016 12/5/2017 1/5/2018   Total Score 3 4 2   Q9: Suicide Ideation Not at all Not at all Not at all     In the past two weeks have you had thoughts of suicide or self-harm?  No.    Do you have concerns about your personal safety or the safety of others?   No    PHQ-9  English  PHQ-9   Any Language  Suicide Assessment Five-step Evaluation and Treatment (SAFE-T)        Amount of exercise or physical activity: None    Problems taking medications regularly: No    Medication side effects: none  Diet: regular (no restrictions)      Today's PHQ-2 Score: PHQ-2 ( 1999 Pfizer) 1/4/2018 12/5/2017   Q1: Little interest or pleasure in doing things 0 1   Q2: Feeling down, depressed or hopeless 1 1   PHQ-2 Score 1 2         Abuse: Current or Past(Physical, Sexual or  Emotional)- No  Do you feel safe in your environment - Yes  Social History   Substance Use Topics     Smoking status: Former Smoker     Years: 6.00     Types: Cigarettes     Quit date: 1/1/1983     Smokeless tobacco: Never Used     Alcohol use No      If you drink alcohol do you typically have >3 drinks per day or >7 drinks per week? No                      Last PSA:   PSA   Date Value Ref Range Status   12/05/2016 0.59 0 - 4 ug/L Final     Comment:     Assay Method:  Chemiluminescence using Siemens Vista analyzer       Reviewed orders with patient. Reviewed health maintenance and updated orders accordingly - Yes  Labs reviewed in EPIC  BP Readings from Last 3 Encounters:   01/12/18 100/56   01/04/18 100/60   12/05/17 106/70    Wt Readings from Last 3 Encounters:   01/12/18 208 lb (94.3 kg)   01/04/18 213 lb (96.6 kg)   12/05/17 209 lb (94.8 kg)                  Patient Active Problem List   Diagnosis     Colon polyp     Traumatic brain injury (H)     IBS (irritable bowel syndrome)     Elevated glucose     Synovitis and tenosynovitis     Dizziness - light-headed     Primary localized osteoarthrosis, ankle and foot     Obesity     Depression, major     Headache disorder     SANDRA (obstructive sleep apnea)     Chronic gout without tophus, unspecified cause, unspecified site     Hyperlipidemia LDL goal <100     Encounter for routine adult physical exam with abnormal findings     New onset type 2 diabetes mellitus (H)     Past Surgical History:   Procedure Laterality Date     SURGICAL HISTORY OF -       nose       Social History   Substance Use Topics     Smoking status: Former Smoker     Years: 6.00     Types: Cigarettes     Quit date: 1/1/1983     Smokeless tobacco: Never Used     Alcohol use No     Family History   Problem Relation Age of Onset     C.A.D. Father      MI age 66     HEART DISEASE Father      Depression Brother      CANCER Brother      Brain Tumor     Respiratory Brother      Depression Brother       Gallbladder Disease Brother      Dementia Brother      CANCER Brother      Parkinsonism Brother      DIABETES Maternal Uncle      Asthma No family hx of      Hypertension No family hx of          Current Outpatient Prescriptions   Medication Sig Dispense Refill     metFORMIN (GLUCOPHAGE-XR) 500 MG 24 hr tablet Take 1 tablet (500 mg) by mouth daily 30 tablet 1     blood glucose monitoring (NO BRAND SPECIFIED) meter device kit Use to test blood sugar 1 times daily or as directed. Preferred blood glucose meter OR supplies to accompany: Blood Glucose Monitor Brands: per insurance. 1 kit 0     blood glucose monitoring (NO BRAND SPECIFIED) test strip Use to test blood sugar 1 times daily or as directed. To accompany: Blood Glucose Monitor Brands: per insurance. 100 strip 6     blood glucose calibration (NO BRAND SPECIFIED) solution To accompany: Blood Glucose Monitor Brands: per insurance. 1 Bottle 3     thin (NO BRAND SPECIFIED) lancets Use with lanceting device. To accompany: Blood Glucose Monitor Brands: per insurance. 100 each 6     alcohol swab prep pads Use to swab area of injection/reba as directed. 100 each 3     atorvastatin (LIPITOR) 20 MG tablet Take 1 tablet (20 mg) by mouth daily 30 tablet 1     ACE/ARB/ARNI NOT PRESCRIBED, INTENTIONAL, ACE & ARB not prescribed due to Other:BP at goal       OVER-THE-COUNTER Allergy med       OVER-THE-COUNTER Beta Prostate- Take 1 tablet daily.       indomethacin (INDOCIN) 50 MG capsule Take 1 capsule (50 mg) by mouth 3 times daily as needed for moderate pain 30 capsule 3     Melatonin 10 MG TABS tablet Take 10 mg by mouth At Bedtime       triamcinolone (KENALOG) 0.1 % cream Apply sparingly to affected area three times daily for 14 days (apply to back of left hand). 30 g 0     hydrocortisone 1 % ointment Apply sparingly to affected area three times daily for 14 days. 30 g 0     order for DME Equipment being ordered:Face mask for CPAP machine size small with tubing and filter.  [facility] ([phone]) 1 Device 0     polyethylene glycol (MIRALAX/GLYCOLAX) powder MIX AS DIRECTED AND TAKE 17 GRAMS BY MOUTH DAILY 510 g 6     ARIPiprazole (ABILIFY) 15 MG tablet Take 15 mg by mouth daily Reported on 4/17/2017       mirtazapine (REMERON) 30 MG tablet Take 30 mg by mouth At Bedtime.       Cholecalciferol (VITAMIN D) 1000 UNIT capsule Take 1 capsule by mouth daily.       Flaxseed, Linseed, (FLAX SEED OIL PO) Take  by mouth daily. Uses powder on food.        aspirin 81 MG tablet Take 1 tablet by mouth daily.       ClonAZEPAM (KLONOPIN) 0.5 MG tablet Take 1 tablet by mouth At Bedtime.       Multiple Vitamins-Minerals (MULTIVITAMIN & MINERAL PO) Take 1 tablet by mouth daily.       benzonatate (TESSALON) 200 MG capsule Take 1 capsule (200 mg) by mouth 3 times daily as needed for cough (Patient not taking: Reported on 1/12/2018) 21 capsule 0     diphenhydrAMINE HCl, Sleep, 25 MG TABS Take 25 mg by mouth nightly as needed (Patient not taking: Reported on 1/4/2018) 30 tablet 3     Allergies   Allergen Reactions     Erythromycin Nausea     Imipramine Hcl      Toxic levels     Recent Labs   Lab Test  12/05/17   1530  12/23/16   1420  12/05/16   1405  11/18/16   1152  03/23/15   0953  09/22/14   1055  04/01/13   1511  02/18/13   1113   02/03/12   1237   A1C  7.1*   --   5.9   --   6.2*   --    --   5.8   --    --    LDL  Cannot estimate LDL when triglyceride exceeds 400 mg/dL  106*   --    --   76  71  67   --   73   --   78   HDL  39*   --    --   42  39*  43   --   35*   --   28*   TRIG  492*   --    --   202*  206*  252*   --   283*   --   148   ALT   --    --    --    --    --   34   --   43   --   40   CR  0.87  1.12   --    --    --    --    --   0.96   --    --    GFRESTIMATED  90  67   --    --    --    --    --   82   --    --    GFRESTBLACK  >90  81   --    --    --    --    --   >90   --    --    POTASSIUM  4.0  4.0   --    --    --    --    --   4.4   < >   --    TSH   --    --     --    --   2.69   --   3.72   --    --   1.47    < > = values in this interval not displayed.              Reviewed and updated as needed this visit by clinical staff         Reviewed and updated as needed this visit by Provider        Past Medical History:   Diagnosis Date     Colon polyp      Depression, major      Elevated glucose      Headache disorder 6/15/2011     hyperlipidemia ldl goal < 130      IBS (irritable bowel syndrome)      Increased BMI      Obesity      Traumatic brain injury (H)     MOM in accident when pregnant with pt      Past Surgical History:   Procedure Laterality Date     SURGICAL HISTORY OF -       nose       ROS:  C: NEGATIVE for fever, chills, change in weight  I: NEGATIVE for worrisome rashes, moles or lesions  E: NEGATIVE for vision changes or irritation  ENT: NEGATIVE for ear, mouth and throat problems  R: NEGATIVE for significant cough or SOB  CV: NEGATIVE for chest pain, palpitations or peripheral edema  GI: NEGATIVE for nausea, abdominal pain, heartburn, or change in bowel habits   male: negative for dysuria, hematuria, decreased urinary stream, erectile dysfunction, urethral discharge  M: NEGATIVE for significant arthralgias or myalgia  P: NEGATIVE for changes in mood or affect    OBJECTIVE:   There were no vitals taken for this visit.  EXAM:  GENERAL: alert and no distress  EYES: Eyes grossly normal to inspection, PERRL and conjunctivae and sclerae normal  HENT: ear canals and TM's normal, nose and mouth without ulcers or lesions  NECK: no adenopathy, no asymmetry, masses, or scars and thyroid normal to palpation  RESP: lungs clear to auscultation - no rales, rhonchi or wheezes  CV: regular rate and rhythm, normal S1 S2, no S3 or S4, no murmur, click or rub, no peripheral edema and peripheral pulses strong  ABDOMEN: soft, nontender, no hepatosplenomegaly, no masses and bowel sounds normal  MS: no gross musculoskeletal defects noted, no edema  SKIN: no suspicious lesions or  haley  NEURO: Normal strength and tone, sensory exam grossly normal, mentation intact and cognitive Impairment due to TBI  PSYCH: mentation appears normal, affect normal/bright  Diabetic foot exam: normal DP and PT pulses, no trophic changes or ulcerative lesions and normal sensory exam    ASSESSMENT/PLAN:   1. Encounter for routine adult physical exam with abnormal findings      2. New onset type 2 diabetes mellitus (H)  We discussed about Diabetes  Discussed Diet/Risk of uncontrolled Diabetes/Importance of Good control/Importance of follow up   Foot exams/Eye exam annually,Dental checks q 6 months  Advised start metformin  SEE EPIC care orders  The potential side effects of this medication have been discussed with the patient.  Call if any significant problems with these are experienced.   See me 2 months  - DIABETES EDUCATOR REFERRAL  - Albumin Random Urine Quantitative with Creat Ratio  - metFORMIN (GLUCOPHAGE-XR) 500 MG 24 hr tablet; Take 1 tablet (500 mg) by mouth daily  Dispense: 30 tablet; Refill: 1  - blood glucose monitoring (NO BRAND SPECIFIED) meter device kit; Use to test blood sugar 1 times daily or as directed. Preferred blood glucose meter OR supplies to accompany: Blood Glucose Monitor Brands: per insurance.  Dispense: 1 kit; Refill: 0  - blood glucose monitoring (NO BRAND SPECIFIED) test strip; Use to test blood sugar 1 times daily or as directed. To accompany: Blood Glucose Monitor Brands: per insurance.  Dispense: 100 strip; Refill: 6  - blood glucose calibration (NO BRAND SPECIFIED) solution; To accompany: Blood Glucose Monitor Brands: per insurance.  Dispense: 1 Bottle; Refill: 3  - thin (NO BRAND SPECIFIED) lancets; Use with lanceting device. To accompany: Blood Glucose Monitor Brands: per insurance.  Dispense: 100 each; Refill: 6  - alcohol swab prep pads; Use to swab area of injection/reba as directed.  Dispense: 100 each; Refill: 3    3. Traumatic brain injury, without loss of  "consciousness, sequela (H)      4. Major depressive disorder with single episode, remission status unspecified  controlled    5. SANDRA (obstructive sleep apnea)  On cpap    6. Class 1 obesity due to excess calories with serious comorbidity and body mass index (BMI) of 33.0 to 33.9 in adult  Low josé miguel diet/Exercise    7. Hyperlipidemia LDL goal <100  Advised switch to Lipitor  Follow up labs 6 weeks  - atorvastatin (LIPITOR) 20 MG tablet; Take 1 tablet (20 mg) by mouth daily  Dispense: 30 tablet; Refill: 1  - TSH with free T4 reflex    8. Screening for prostate cancer    - Prostate spec antigen screen    9. Encounter for immunization  Advised   - HEPATITIS B VACCINE,ADULT,IM    10. Need for prophylactic vaccination and inoculation against influenza  Advised   - FLU VAC, SPLIT VIRUS IM > 3 YO (QUADRIVALENT) [27828]  - Vaccine Administration, Initial [64947]    COUNSELING:  Reviewed preventive health counseling, as reflected in patient instructions       Regular exercise       Healthy diet/nutrition       Vision screening       Hearing screening       Immunizations    Vaccinated for: Hepatitis B and Influenza           Aspirin Prophylaxsis       Colon cancer screening       Prostate cancer screening       The 10-year ASCVD risk score (Cornwall DELORES Jr, et al., 2013) is: 10.5%    Values used to calculate the score:      Age: 57 years      Sex: Male      Is Non- : No      Diabetic: Yes      Tobacco smoker: No      Systolic Blood Pressure: 100 mmHg      Is BP treated: No      HDL Cholesterol: 39 mg/dL      Total Cholesterol: 177 mg/dL         reports that he quit smoking about 35 years ago. His smoking use included Cigarettes. He quit after 6.00 years of use. He has never used smokeless tobacco.      Estimated body mass index is 33.86 kg/(m^2) as calculated from the following:    Height as of 1/4/18: 5' 6.5\" (1.689 m).    Weight as of 1/4/18: 213 lb (96.6 kg).   Weight management plaas aboveas " above    Counseling Resources:  ATP IV Guidelines  Pooled Cohorts Equation Calculator  FRAX Risk Assessment  ICSI Preventive Guidelines  Dietary Guidelines for Americans, 2010  [employer]'s MyPlate  ASA Prophylaxis  Lung CA Screening    Radha Villegas MD  [facility]  Injectable Influenza Immunization Documentation    1.  Is the person to be vaccinated sick today?   No    2. Does the person to be vaccinated have an allergy to a component   of the vaccine?   No  Egg Allergy Algorithm Link    3. Has the person to be vaccinated ever had a serious reaction   to influenza vaccine in the past?   No    4. Has the person to be vaccinated ever had Guillain-Barré syndrome?   No    Form completed by Rima CHOWDHURY MA

## 2018-01-17 ENCOUNTER — TELEPHONE (OUTPATIENT)
Dept: FAMILY MEDICINE | Facility: CLINIC | Age: 58
End: 2018-01-17

## 2018-01-17 NOTE — TELEPHONE ENCOUNTER
Spoke with Gabby.   Verbal orders given for requested home care.   Gabby verbalized understanding and no further questions at this time.    Swapna Bowen RN

## 2018-01-17 NOTE — TELEPHONE ENCOUNTER
Reason for Call: Request for an order or referral:    Order or referral being requested: Verbal home care orders    Date needed: as soon as possible    Has the patient been seen by the PCP for this problem? Not Applicable    Additional comments: Please call with verbal orders for home care skilled nursing    Phone number Patient can be reached at:  Other phone number:  [phone]    Best Time:  any    Can we leave a detailed message on this number?  Not Applicable    Call taken on 1/17/2018 at 12:00 PM by DIAMOND BROOKS

## 2018-01-19 ENCOUNTER — NURSE TRIAGE (OUTPATIENT)
Dept: NURSING | Facility: CLINIC | Age: 58
End: 2018-01-19

## 2018-01-19 NOTE — TELEPHONE ENCOUNTER
France Mazariegos's ILS worker reported that Osmani has a bag from [employer] with a new prescription and glucose monitor that is unopened. France was wondering if Osmani should start the medication now or wait for the homecare nurse that comes on Monday to help him. Osmnai has an appointment with the diabetic educator next Friday [phone] and reports that he is unable to use the glucose monitor independently., Transferred call to Dr. Villegas's RN at the clinic.

## 2018-01-26 ENCOUNTER — ALLIED HEALTH/NURSE VISIT (OUTPATIENT)
Dept: EDUCATION SERVICES | Facility: CLINIC | Age: 58
End: 2018-01-26
Attending: FAMILY MEDICINE
Payer: MEDICARE

## 2018-01-26 DIAGNOSIS — E11.9 NEW ONSET TYPE 2 DIABETES MELLITUS (H): Primary | ICD-10-CM

## 2018-01-26 PROCEDURE — G0108 DIAB MANAGE TRN  PER INDIV: HCPCS | Performed by: NUTRITIONIST

## 2018-01-26 NOTE — MR AVS SNAPSHOT
"              After Visit Summary   1/26/2018    Yair Benites    MRN: [id number]           Patient Information     Date Of Birth          1960        Visit Information        Provider Department      1/26/2018 1:30 PM [facility]iddle RD [facility]        Today's Diagnoses     New onset type 2 diabetes mellitus (H)    -  1       Follow-ups after your visit        Your next 10 appointments already scheduled     Feb 09, 2018  1:30 PM CST   Office Visit with Barbara Riddle RD   [facility],  Diabetes Education ([facility])    [address] [place] [id number] [phone]           Bring a current list of meds and any records pertaining to this visit. For Physicals, please bring immunization records and any forms needing to be filled out. Please arrive 10 minutes early to complete paperwork.              Who to contact     If you have questions or need follow up information about today's clinic visit or your schedule please contact [facility], [place],  DIABETES EDUCATION directly at [phone].  Normal or non-critical lab and imaging results will be communicated to you by [employer], letter or phone within 4 business days after the clinic has received the results. If you do not hear from us within 7 days, please contact the clinic through [employer] or phone. If you have a critical or abnormal lab result, we will notify you by phone as soon as possible.  Submit refill requests through [employer] or call your pharmacy and they will forward the refill request to us. Please allow 3 business days for your refill to be completed.          Additional Information About Your Visit        [employer] Information     Switchcam lets you send messages to your doctor, view your test results, renew your prescriptions, schedule appointments and more. To sign up, go to [email or web address] . Click on \"Log in\" on the left side of the " "screen, which will take you to the Welcome page. Then click on \"Sign up Now\" on the right side of the page.     You will be asked to enter the access code listed below, as well as some personal information. Please follow the directions to create your username and password.     Your access code is: [email or web address]  Expires: 3/5/2018  3:09 PM     Your access code will  in 90 days. If you need help or a new code, please call your [place] clinic or [phone].        Care EveryWhere ID     This is your Care EveryWhere ID. This could be used by other organizations to access your [place] medical records  [id number]         Blood Pressure from Last 3 Encounters:   18 100/56   18 100/60   17 106/70    Weight from Last 3 Encounters:   18 94.3 kg (208 lb)   18 96.6 kg (213 lb)   17 94.8 kg (209 lb)              We Performed the Following     DIABETES EDUCATION - Individual  []        Primary Care Provider Office Phone # Fax #    Radha Villegas [phone] [facility] [address]        Equal Access to Services     KEYA HAAS : Hadii erin ku hadasho Sokarmaali, waaxda luqadaha, qaybta kaalmada adeegyada, waxay arabella ziegler . So [facility] [phone].    ATENCIÓN: Si habla español, tiene a phillips disposición servicios gratuitos de asistencia lingüística. Llame al [phone].    We comply with applicable federal civil rights laws and [place] laws. We do not discriminate on the basis of race, color, national origin, age, disability, sex, sexual orientation, or gender identity.            Thank you!     Thank you for choosing [facility]  DIABETES EDUCATION  for your care. Our goal is always to provide you with excellent care. Hearing back from our patients is one way we can continue to improve our services. Please take a few minutes to complete the written survey that you may receive in the mail after your visit with us. " Thank you!             Your Updated Medication List - Protect others around you: Learn how to safely use, store and throw away your medicines at www.disposemymeds.org.          This list is accurate as of 1/26/18  5:17 PM.  Always use your most recent med list.                   Brand Name Dispense Instructions for use Diagnosis    ABILIFY 15 MG tablet   Generic drug:  ARIPiprazole      Take 15 mg by mouth daily Reported on 4/17/2017        ACE/ARB/ARNI NOT PRESCRIBED (INTENTIONAL)      ACE & ARB not prescribed due to Other:BP at goal        alcohol swab prep pads     100 each    Use to swab area of injection/reba as directed.    New onset type 2 diabetes mellitus (H)       aspirin 81 MG tablet      Take 1 tablet by mouth daily.        atorvastatin 20 MG tablet    LIPITOR    30 tablet    Take 1 tablet (20 mg) by mouth daily    Hyperlipidemia LDL goal <100       benzonatate 200 MG capsule    TESSALON    21 capsule    Take 1 capsule (200 mg) by mouth 3 times daily as needed for cough    Upper respiratory tract infection, unspecified type       blood glucose calibration solution    NO BRAND SPECIFIED    1 Bottle    To accompany: Blood Glucose Monitor Brands: per insurance.    New onset type 2 diabetes mellitus (H)       blood glucose monitoring meter device kit    no brand specified    1 kit    Use to test blood sugar 1 times daily or as directed. Preferred blood glucose meter OR supplies to accompany: Blood Glucose Monitor Brands: per insurance.    New onset type 2 diabetes mellitus (H)       blood glucose monitoring test strip    no brand specified    100 strip    Use to test blood sugar 1 times daily or as directed. To accompany: Blood Glucose Monitor Brands: per insurance.    New onset type 2 diabetes mellitus (H)       clonazePAM 0.5 MG tablet    klonoPIN     Take 1 tablet by mouth At Bedtime.        diphenhydrAMINE HCl (Sleep) 25 MG Tabs     30 tablet    Take 25 mg by mouth nightly as needed    Primary insomnia        FLAX SEED OIL PO      Take  by mouth daily. Uses powder on food.        hydrocortisone 1 % ointment     30 g    Apply sparingly to affected area three times daily for 14 days.    Rash       indomethacin 50 MG capsule    INDOCIN    30 capsule    Take 1 capsule (50 mg) by mouth 3 times daily as needed for moderate pain    Idiopathic chronic gout of left knee without tophus       Melatonin 10 MG Tabs tablet      Take 10 mg by mouth At Bedtime        metFORMIN 500 MG 24 hr tablet    GLUCOPHAGE-XR    30 tablet    Take 1 tablet (500 mg) by mouth daily    New onset type 2 diabetes mellitus (H)       mirtazapine 30 MG tablet    REMERON     Take 30 mg by mouth At Bedtime.        MULTIVITAMIN & MINERAL PO      Take 1 tablet by mouth daily.        order for DME     1 Device    Equipment being ordered:Face mask for CPAP machine size small with tubing and filter. [employer] ([phone])    SANDRA (obstructive sleep apnea)       * OVER-THE-COUNTER      Beta Prostate- Take 1 tablet daily.        * OVER-THE-COUNTER      Allergy med        polyethylene glycol powder    MIRALAX/GLYCOLAX    510 g    MIX AS DIRECTED AND TAKE 17 GRAMS BY MOUTH DAILY    Constipation       thin lancets    NO BRAND SPECIFIED    100 each    Use with lanceting device. To accompany: Blood Glucose Monitor Brands: per insurance.    New onset type 2 diabetes mellitus (H)       triamcinolone 0.1 % cream    KENALOG    30 g    Apply sparingly to affected area three times daily for 14 days (apply to back of left hand).    Rash of hands       vitamin D 1000 UNITS capsule      Take 1 capsule by mouth daily.        * Notice:  This list has 2 medication(s) that are the same as other medications prescribed for you. Read the directions carefully, and ask your doctor or other care provider to review them with you.

## 2018-01-26 NOTE — PROGRESS NOTES
"  Diabetes Self Management Training: Initial Assessment Visit for Newly Diagnosed Patients (Complete AADE Goals Flowsheet)    Yair Benites presents today for education related to Type 2 diabetes.    He is accompanied by his ILS worker Kalina     Patient's emotional response to diabetes: expresses readiness to learn    Patient would like this visit to be focused around the following diabetes-related behaviors and goals: Monitoring    ASSESSMENT:  Patient Problem List and Family Medical History reviewed for relevant medical history, current medical status, and diabetes risk factors.    Current Diabetes Management per Patient:  Taking diabetes medications?   yes:     Diabetes Medication(s)     Biguanides Sig    metFORMIN (GLUCOPHAGE-XR) 500 MG 24 hr tablet Take 1 tablet (500 mg) by mouth daily         Patient glucose self monitoring as follows: BG meter taught today.   BG meter: Accu-Chek Guide meter  BG results: not available     BG values are: unable to assess  Patient's most recent   Lab Results   Component Value Date    A1C 7.1 12/05/2017    is not meeting goal of <7.0    Vitals:  There were no vitals taken for this visit.  Estimated body mass index is 33.07 kg/(m^2) as calculated from the following:    Height as of 1/12/18: 1.689 m (5' 6.5\").    Weight as of 1/12/18: 94.3 kg (208 lb).   Last 3 BP:   BP Readings from Last 3 Encounters:   01/12/18 100/56   01/04/18 100/60   12/05/17 106/70       History   Smoking Status     Former Smoker     Years: 6.00     Types: Cigarettes     Quit date: 1/1/1983   Smokeless Tobacco     Never Used       Labs:  Lab Results   Component Value Date    A1C 7.1 12/05/2017     Lab Results   Component Value Date     12/05/2017     Lab Results   Component Value Date    LDL  12/05/2017     Cannot estimate LDL when triglyceride exceeds 400 mg/dL     12/05/2017     HDL Cholesterol   Date Value Ref Range Status   12/05/2017 39 (L) >39 mg/dL Final   ]  GFR Estimate   Date " Value Ref Range Status   12/05/2017 90 >60 mL/min/1.7m2 Final     Comment:     Non  GFR Calc     GFR Estimate If Black   Date Value Ref Range Status   12/05/2017 >90 >60 mL/min/1.7m2 Final     Comment:      GFR Calc     Lab Results   Component Value Date    CR 0.87 12/05/2017     No results found for: MICROALBUMIN    Socio/Economic Considerations:    Support system: Patient lives with his wife. He has an in home nurse that comes once per week. He has an ILS worker who spends time with him on Friday.     Health Beliefs and Attitudes:   Patient Activation Measure Survey Score:  IMMANUEL Score (Last Two) 2/18/2013   IMMANUEL Raw Score 42   Activation Score 66   IMMANUEL Level 3       Stage of Change: ACTION (Actively working towards change)      Diabetes knowledge and skills assessment:     Barriers to Learning Assessment: TBI    Based on learning assessment above, most appropriate setting for further diabetes education would be: Individual setting.    INTERVENTION:   Education provided today on:  AADE Self-Care Behaviors:  Monitoring: purpose, proper technique, frequency of monitoring and proper sharps disposal  We went through the steps four times until Yair was comfortable with all steps on his own and didn't need assistance.     Opportunities for ongoing education and support in diabetes-self management were discussed.    Pt verbalized understanding of concepts discussed and recommendations provided today.       PLAN:  See Patient Instructions for co-developed, patient-stated behavior change goals.    Do these steps every morning before you eat:  1. Wash your finger with an alcohol swab.  2. Put a strip into the meter. Arrows face in.  3. Change the needle. Move the white button back and forth. (If you see 1, change out the fastclix.)   4. Prick your finger.  5. Milk your finger.  6. Touch the blood to the strip.  7. Clean up. Toss the strip and alcohol swab in the garbage. (Put the fastclix  into a sharps container.)  AVS printed and provided to patient today.    FOLLOW-UP:  Chart routed to referring provider.  Two weeks    Time Spent: 60 minutes  Encounter Type: Individual

## 2018-01-29 ENCOUNTER — TELEPHONE (OUTPATIENT)
Dept: FAMILY MEDICINE | Facility: CLINIC | Age: 58
End: 2018-01-29

## 2018-01-29 NOTE — TELEPHONE ENCOUNTER
Reason for Call:  Other call back    Detailed comments:  Sparkle calling. The wife called, that all of his medications are mixed up. She is on her way there and needs an order to see him. Please call asap.     Phone Number Patient can be reached at: Other phone number:   [phone]     Best Time:  asap    Can we leave a detailed message on this number? YES    Call taken on 1/29/2018 at 1:04 PM by Antonia Egan

## 2018-01-29 NOTE — TELEPHONE ENCOUNTER
Spoke with Sparkle and verbal orders given for visit to help with medications.   Swapna Bowen RN

## 2018-02-02 ENCOUNTER — ALLIED HEALTH/NURSE VISIT (OUTPATIENT)
Dept: NURSING | Facility: CLINIC | Age: 58
End: 2018-02-02
Payer: MEDICARE

## 2018-02-02 DIAGNOSIS — H61.23 BILATERAL IMPACTED CERUMEN: Primary | ICD-10-CM

## 2018-02-02 PROCEDURE — 99207 ZZC NO CHARGE LOS: CPT

## 2018-02-02 NOTE — NURSING NOTE
ONSET:  When did the symptoms begin? 2 weeks ago    DURATION:  Describe the duration of the symptoms: 2 weeks    LOCATION:  side: bilateral    PAIN SEVERITY:  0/10    ADDITIONAL SYMPTOMS:  plugged sensation bilaterally    AFFECTS ON ADLs: hard to hear out of right ear    HISTORY:  cerumen impaction    INTERVENTIONS TAKEN:  none    Patient came in for bilateral ear lavage. Ear lavage completed using luke warm water, peroxide and elephant ear flush. RN verified before and after cleaning.   Andressa PETERS CMA ([facility])

## 2018-02-02 NOTE — MR AVS SNAPSHOT
"              After Visit Summary   2/2/2018    Yair Benites    MRN: [id number]           Patient Information     Date Of Birth          1960        Visit Information        Provider Department      2/2/2018 3:30 PM [facility] ANCILLARY [facility]        Today's Diagnoses     Bilateral impacted cerumen    -  1       Follow-ups after your visit        Your next 10 appointments already scheduled     Feb 09, 2018  1:30 PM CST   Office Visit with Barbara Riddle RD   [facility], [place],  Diabetes Education ([facility])    [address] [place] [id number] [phone]           Bring a current list of meds and any records pertaining to this visit. For Physicals, please bring immunization records and any forms needing to be filled out. Please arrive 10 minutes early to complete paperwork.              Who to contact     If you have questions or need follow up information about today's clinic visit or your schedule please contact [facility] directly at [phone].  Normal or non-critical lab and imaging results will be communicated to you by Graphiclyhart, letter or phone within 4 business days after the clinic has received the results. If you do not hear from us within 7 days, please contact the clinic through [email or web address] or phone. If you have a critical or abnormal lab result, we will notify you by phone as soon as possible.  Submit refill requests through [employer] or call your pharmacy and they will forward the refill request to us. Please allow 3 business days for your refill to be completed.          Additional Information About Your Visit        GraphiclyharSoftricity Information     [employer] lets you send messages to your doctor, view your test results, renew your prescriptions, schedule appointments and more. To sign up, go to [email or web address] . Click on \"Log in\" on the left side of the screen, which will take you to the Welcome " "page. Then click on \"Sign up Now\" on the right side of the page.     You will be asked to enter the access code listed below, as well as some personal information. Please follow the directions to create your username and password.     Your access code is: [email or web address]  Expires: 3/5/2018  3:09 PM     Your access code will  in 90 days. If you need help or a new code, please call your [place] clinic or [phone].        Care EveryWhere ID     This is your Care EveryWhere ID. This could be used by other organizations to access your [place] medical records  [id number]         Blood Pressure from Last 3 Encounters:   18 100/56   18 100/60   17 106/70    Weight from Last 3 Encounters:   18 208 lb (94.3 kg)   18 213 lb (96.6 kg)   17 209 lb (94.8 kg)              We Performed the Following     REMOVE [facility]        Primary Care Provider Office Phone # Fax #    Radha Villegas [phone] [address] [facility] [address]        Equal Access to Services     [facility]A [facility]: Hadii erin campoo Sotrina, waaxda luqadaha, qaybta kaalmada adealtayada, stephany joy. So [facility] [phone].    ATENCIÓN: Si habla español, tiene a phillips disposición servicios gratuitos de asistencia lingüística. Cristianaame al [phone].    We comply with applicable federal civil rights laws and [place] laws. We do not discriminate on the basis of race, color, national origin, age, disability, sex, sexual orientation, or gender identity.            Thank you!     Thank you for choosing [facility]  for your care. Our goal is always to provide you with excellent care. Hearing back from our patients is one way we can continue to improve our services. Please take a few minutes to complete the written survey that you may receive in the mail after your visit with us. Thank you!             Your Updated Medication List - Protect others " around you: Learn how to safely use, store and throw away your medicines at www.disposemymeds.org.          This list is accurate as of 2/2/18  3:41 PM.  Always use your most recent med list.                   Brand Name Dispense Instructions for use Diagnosis    ABILIFY 15 MG tablet   Generic drug:  ARIPiprazole      Take 15 mg by mouth daily Reported on 4/17/2017        ACE/ARB/ARNI NOT PRESCRIBED (INTENTIONAL)      ACE & ARB not prescribed due to Other:BP at goal        alcohol swab prep pads     100 each    Use to swab area of injection/reba as directed.    New onset type 2 diabetes mellitus (H)       aspirin 81 MG tablet      Take 1 tablet by mouth daily.        atorvastatin 20 MG tablet    LIPITOR    30 tablet    Take 1 tablet (20 mg) by mouth daily    Hyperlipidemia LDL goal <100       benzonatate 200 MG capsule    TESSALON    21 capsule    Take 1 capsule (200 mg) by mouth 3 times daily as needed for cough    Upper respiratory tract infection, unspecified type       blood glucose calibration solution    NO BRAND SPECIFIED    1 Bottle    To accompany: Blood Glucose Monitor Brands: per insurance.    New onset type 2 diabetes mellitus (H)       blood glucose monitoring meter device kit    no brand specified    1 kit    Use to test blood sugar 1 times daily or as directed. Preferred blood glucose meter OR supplies to accompany: Blood Glucose Monitor Brands: per insurance.    New onset type 2 diabetes mellitus (H)       blood glucose monitoring test strip    no brand specified    100 strip    Use to test blood sugar 1 times daily or as directed. To accompany: Blood Glucose Monitor Brands: per insurance.    New onset type 2 diabetes mellitus (H)       clonazePAM 0.5 MG tablet    klonoPIN     Take 1 tablet by mouth At Bedtime.        diphenhydrAMINE HCl (Sleep) 25 MG Tabs     30 tablet    Take 25 mg by mouth nightly as needed    Primary insomnia       FLAX SEED OIL PO      Take  by mouth daily. Uses powder on food.         hydrocortisone 1 % ointment     30 g    Apply sparingly to affected area three times daily for 14 days.    Rash       indomethacin 50 MG capsule    INDOCIN    30 capsule    Take 1 capsule (50 mg) by mouth 3 times daily as needed for moderate pain    Idiopathic chronic gout of left knee without tophus       Melatonin 10 MG Tabs tablet      Take 10 mg by mouth At Bedtime        metFORMIN 500 MG 24 hr tablet    GLUCOPHAGE-XR    30 tablet    Take 1 tablet (500 mg) by mouth daily    New onset type 2 diabetes mellitus (H)       mirtazapine 30 MG tablet    REMERON     Take 30 mg by mouth At Bedtime.        MULTIVITAMIN & MINERAL PO      Take 1 tablet by mouth daily.        order for DME     1 Device    Equipment being ordered:Face mask for CPAP machine size small with tubing and filter. [employer] ([phone])    SANDRA (obstructive sleep apnea)       * OVER-THE-COUNTER      Beta Prostate- Take 1 tablet daily.        * OVER-THE-COUNTER      Allergy med        polyethylene glycol powder    MIRALAX/GLYCOLAX    510 g    MIX AS DIRECTED AND TAKE 17 GRAMS BY MOUTH DAILY    Constipation       thin lancets    NO BRAND SPECIFIED    100 each    Use with lanceting device. To accompany: Blood Glucose Monitor Brands: per insurance.    New onset type 2 diabetes mellitus (H)       triamcinolone 0.1 % cream    KENALOG    30 g    Apply sparingly to affected area three times daily for 14 days (apply to back of left hand).    Rash of hands       vitamin D 1000 UNITS capsule      Take 1 capsule by mouth daily.        * Notice:  This list has 2 medication(s) that are the same as other medications prescribed for you. Read the directions carefully, and ask your doctor or other care provider to review them with you.

## 2018-02-09 ENCOUNTER — OFFICE VISIT (OUTPATIENT)
Dept: EDUCATION SERVICES | Facility: CLINIC | Age: 58
End: 2018-02-09
Attending: FAMILY MEDICINE
Payer: MEDICARE

## 2018-02-09 DIAGNOSIS — E11.9 NEW ONSET TYPE 2 DIABETES MELLITUS (H): Primary | ICD-10-CM

## 2018-02-09 PROCEDURE — G0108 DIAB MANAGE TRN  PER INDIV: HCPCS | Performed by: NUTRITIONIST

## 2018-02-09 NOTE — MR AVS SNAPSHOT
"              After Visit Summary   2/9/2018    Yair Benites    MRN: [id number]           Patient Information     Date Of Birth          1960        Visit Information        Provider Department      2/9/2018 1:30 PM [facility]        Today's Diagnoses     New onset type 2 diabetes mellitus (H)    -  1       Follow-ups after your visit        Your next 10 appointments already scheduled     Feb 23, 2018 12:30 PM CST   [facility] ([facility])    [address] [place] [id number] [phone]           Bring a current list of meds and any records pertaining to this visit. For Physicals, please bring immunization records and any forms needing to be filled out. Please arrive 10 minutes early to complete paperwork.              Who to contact     If you have questions or need follow up information about today's clinic visit or your schedule please contact [facility], [place],  DIABETES EDUCATION directly at [phone].  Normal or non-critical lab and imaging results will be communicated to you by Earmarkhart, letter or phone within 4 business days after the clinic has received the results. If you do not hear from us within 7 days, please contact the clinic through [employer] or phone. If you have a critical or abnormal lab result, we will notify you by phone as soon as possible.  Submit refill requests through [employer] or call your pharmacy and they will forward the refill request to us. Please allow 3 business days for your refill to be completed.          Additional Information About Your Visit        Earmarkhart Information     [employer] lets you send messages to your doctor, view your test results, renew your prescriptions, schedule appointments and more. To sign up, go to [email or web address] . Click on \"Log in\" on the left side of the " "screen, which will take you to the Welcome page. Then click on \"Sign up Now\" on the right side of the page.     You will be asked to enter the access code listed below, as well as some personal information. Please follow the directions to create your username and password.     Your access code is: [email or web address]  Expires: 3/5/2018  3:09 PM     Your access code will  in 90 days. If you need help or a new code, please call your [place] clinic or [phone].        Care EveryWhere ID     This is your Care EveryWhere ID. This could be used by other organizations to access your [place] medical records  [id number]         Blood Pressure from Last 3 Encounters:   18 100/56   18 100/60   17 106/70    Weight from Last 3 Encounters:   18 94.3 kg (208 lb)   18 96.6 kg (213 lb)   17 94.8 kg (209 lb)              We Performed the Following     DIABETES EDUCATION - Individual  []        Primary Care Provider Office Phone # Fax #    Radha Villegas [phone] [address] [facility] [address]        Equal Access to Services     KEYA HAAS : Hadii erin ku hadasho Sokarmaali, waaxda luqadaha, qaybta kaalmada adeegyada, waxay arabella ziegler . So [facility] [phone].    ATENCIÓN: Si habla español, tiene a phillips disposición servicios gratuitos de asistencia lingüística. Llame al [phone].    We comply with applicable federal civil rights laws and [place] laws. We do not discriminate on the basis of race, color, national origin, age, disability, sex, sexual orientation, or gender identity.            Thank you!     Thank you for choosing [facility]  DIABETES EDUCATION  for your care. Our goal is always to provide you with excellent care. Hearing back from our patients is one way we can continue to improve our services. Please take a few minutes to complete the written survey that you may receive in the mail after your visit with us. " Thank you!             Your Updated Medication List - Protect others around you: Learn how to safely use, store and throw away your medicines at www.disposemymeds.org.          This list is accurate as of 2/9/18 11:59 PM.  Always use your most recent med list.                   Brand Name Dispense Instructions for use Diagnosis    ABILIFY 15 MG tablet   Generic drug:  ARIPiprazole      Take 15 mg by mouth daily Reported on 4/17/2017        ACE/ARB/ARNI NOT PRESCRIBED (INTENTIONAL)      ACE & ARB not prescribed due to Other:BP at goal        alcohol swab prep pads     100 each    Use to swab area of injection/reba as directed.    New onset type 2 diabetes mellitus (H)       aspirin 81 MG tablet      Take 1 tablet by mouth daily.        atorvastatin 20 MG tablet    LIPITOR    30 tablet    Take 1 tablet (20 mg) by mouth daily    Hyperlipidemia LDL goal <100       benzonatate 200 MG capsule    TESSALON    21 capsule    Take 1 capsule (200 mg) by mouth 3 times daily as needed for cough    Upper respiratory tract infection, unspecified type       blood glucose calibration solution    NO BRAND SPECIFIED    1 Bottle    To accompany: Blood Glucose Monitor Brands: per insurance.    New onset type 2 diabetes mellitus (H)       blood glucose monitoring meter device kit    no brand specified    1 kit    Use to test blood sugar 1 times daily or as directed. Preferred blood glucose meter OR supplies to accompany: Blood Glucose Monitor Brands: per insurance.    New onset type 2 diabetes mellitus (H)       blood glucose monitoring test strip    no brand specified    100 strip    Use to test blood sugar 1 times daily or as directed. To accompany: Blood Glucose Monitor Brands: per insurance.    New onset type 2 diabetes mellitus (H)       clonazePAM 0.5 MG tablet    klonoPIN     Take 1 tablet by mouth At Bedtime.        diphenhydrAMINE HCl (Sleep) 25 MG Tabs     30 tablet    Take 25 mg by mouth nightly as needed    Primary insomnia        FLAX SEED OIL PO      Take  by mouth daily. Uses powder on food.        hydrocortisone 1 % ointment     30 g    Apply sparingly to affected area three times daily for 14 days.    Rash       indomethacin 50 MG capsule    INDOCIN    30 capsule    Take 1 capsule (50 mg) by mouth 3 times daily as needed for moderate pain    Idiopathic chronic gout of left knee without tophus       Melatonin 10 MG Tabs tablet      Take 10 mg by mouth At Bedtime        metFORMIN 500 MG 24 hr tablet    GLUCOPHAGE-XR    30 tablet    Take 1 tablet (500 mg) by mouth daily    New onset type 2 diabetes mellitus (H)       mirtazapine 30 MG tablet    REMERON     Take 30 mg by mouth At Bedtime.        MULTIVITAMIN & MINERAL PO      Take 1 tablet by mouth daily.        order for DME     1 Device    Equipment being ordered:Face mask for CPAP machine size small with tubing and filter. [employer] ([phone])    SANDRA (obstructive sleep apnea)       * OVER-THE-COUNTER      Beta Prostate- Take 1 tablet daily.        * OVER-THE-COUNTER      Allergy med        polyethylene glycol powder    MIRALAX/GLYCOLAX    510 g    MIX AS DIRECTED AND TAKE 17 GRAMS BY MOUTH DAILY    Constipation       thin lancets    NO BRAND SPECIFIED    100 each    Use with lanceting device. To accompany: Blood Glucose Monitor Brands: per insurance.    New onset type 2 diabetes mellitus (H)       triamcinolone 0.1 % cream    KENALOG    30 g    Apply sparingly to affected area three times daily for 14 days (apply to back of left hand).    Rash of hands       vitamin D 1000 UNITS capsule      Take 1 capsule by mouth daily.        * Notice:  This list has 2 medication(s) that are the same as other medications prescribed for you. Read the directions carefully, and ask your doctor or other care provider to review them with you.

## 2018-02-09 NOTE — PROGRESS NOTES
Diabetes Self Management Training: Follow-up Visit    Yair Benites presents today for education and evaluation of glucose control Type 2 diabetes.    He is accompanied by his ILS worker Kalina    Patient's diabetes management related comments/concerns: this is our second session. At the first session chintan learned how to test his glucose. He has been testing and doing well with it. No major challenges have arisen. He did get an error message a few times at first but found that that was do to bent strips.     Patient would like this visit to be focused around the following diabetes-related behaviors and goals: glucose testing review and food    ASSESSMENT:  Patient Problem List reviewed for relevant medical history and current medical status.    Current Diabetes Management per Patient:  Taking diabetes medications?   yes:     Diabetes Medication(s)     Biguanides Sig    metFORMIN (GLUCOPHAGE-XR) 500 MG 24 hr tablet Take 1 tablet (500 mg) by mouth daily        Patient glucose self monitoring as follows: one time daily.   BG results: fasting glucose- 116-143     BG values are: Not in goal  Patient's most recent   Lab Results   Component Value Date    A1C 7.1 12/05/2017    is not meeting goal of <7.0    Nutrition:  Patient eats three meals daily. Today he slept in so he ate breakfast and lunch back to back and his glucose here is 254.  He doesn't want to skip a meal. So he decided that in the future he would start waking up earlier on fridays so that he can have time for breakfast and lunch before meeting with his ILS worker. This should help avoid that spike in glucose.    Breakfast - raisin bran or cheerios with almond milk - he has one bowl - he likes it cause it is quick. We talked about how important it is to watch the portions of cereal and how larger bowls can make more sugar. He is willing to try some other options for breakfast a few times per week like eggs and a slice of toast.  Lunch - peanut butter  "sandwich with whole wheat bread   Dinner - might get subway 6 inch sub - tuna with cheese and veg. On fridays he goes out with friends. At follow up he'd like to look at some of the common restaurants he goes to and to discuss what some of the healthier options are   He's trying to avoid pizza.  At home for dinner his wife will sometimes make spaghetti. Again we talked about portion control. He likes to have salads with his meal.  Does sometimes drink soda when he's out but can try to stop that habit.   Sometimes has chips and cheese. Snacks such as fruit or popcorn or package of crackers and cheese or a granola bar    Physical Activity:    Discuss at follow up if time permits    Vitals:  There were no vitals taken for this visit.  Estimated body mass index is 33.07 kg/(m^2) as calculated from the following:    Height as of 1/12/18: 1.689 m (5' 6.5\").    Weight as of 1/12/18: 94.3 kg (208 lb).   Last 3 BP:   BP Readings from Last 3 Encounters:   01/12/18 100/56   01/04/18 100/60   12/05/17 106/70       History   Smoking Status     Former Smoker     Years: 6.00     Types: Cigarettes     Quit date: 1/1/1983   Smokeless Tobacco     Never Used       Labs:  Lab Results   Component Value Date    A1C 7.1 12/05/2017     Lab Results   Component Value Date     12/05/2017     Lab Results   Component Value Date    LDL  12/05/2017     Cannot estimate LDL when triglyceride exceeds 400 mg/dL     12/05/2017     HDL Cholesterol   Date Value Ref Range Status   12/05/2017 39 (L) >39 mg/dL Final   ]  GFR Estimate   Date Value Ref Range Status   12/05/2017 90 >60 mL/min/1.7m2 Final     Comment:     Non  GFR Calc     GFR Estimate If Black   Date Value Ref Range Status   12/05/2017 >90 >60 mL/min/1.7m2 Final     Comment:      GFR Calc     Lab Results   Component Value Date    CR 0.87 12/05/2017     No results found for: MICROALBUMIN    Health Beliefs and Attitudes:   Patient Activation Measure " Survey Score:  IMMANUEL Score (Last Two) 2/18/2013   IMMANUEL Raw Score 42   Activation Score 66   IMMANUEL Level 3       Stage of Change: ACTION (Actively working towards change)    INTERVENTION:    Education provided today on:  AADE Self-Care Behaviors:  Healthy Eating: see above  Monitoring: step by step process, patent demonstrated understanding    Opportunities for ongoing education and support in diabetes-self management were discussed.    Pt verbalized understanding of concepts discussed and recommendations provided today.       PLAN:  See Patient Instructions for co-developed, patient-stated behavior change goals.  Continue to test glucose once daily  Try switching out the breakfast three times per week for a lower carb breakfast as discussed. Switch cereal out for eggs and a slice of toast.  Avoid soda.  At follow up we will look at the menus from some restaurants you go to and will discuss activity if we have time.   AVS printed and provided to patient today.    FOLLOW-UP:  Chart routed to referring provider.  Two weeks      Time Spent: 60 minutes  Encounter Type: Individual

## 2018-02-20 ENCOUNTER — TELEPHONE (OUTPATIENT)
Dept: FAMILY MEDICINE | Facility: CLINIC | Age: 58
End: 2018-02-20

## 2018-02-20 NOTE — TELEPHONE ENCOUNTER
Reason for Call:  Other appointment    Detailed comments: patient wife calling stating patient was just diagnosed Diabetes. Patient would like to get his Kidney's checked. Please contact patient to discuss further.    Phone Number Patient can be reached at: Other phone number:  [phone]    Best Time: any time    Can we leave a detailed message on this number? YES    Call taken on 2/20/2018 at 4:26 PM by Jillian Storey

## 2018-02-21 NOTE — TELEPHONE ENCOUNTER
Called [phone] cell number on file and message left on  to return call to RN hotline at [phone].   Swapna Bowen RN

## 2018-02-23 ENCOUNTER — OFFICE VISIT (OUTPATIENT)
Dept: EDUCATION SERVICES | Facility: CLINIC | Age: 58
End: 2018-02-23
Attending: FAMILY MEDICINE
Payer: MEDICARE

## 2018-02-23 DIAGNOSIS — E11.9 TYPE 2 DIABETES MELLITUS (H): Primary | ICD-10-CM

## 2018-02-23 PROCEDURE — G0108 DIAB MANAGE TRN  PER INDIV: HCPCS | Performed by: NUTRITIONIST

## 2018-02-23 NOTE — MR AVS SNAPSHOT
After Visit Summary   2/23/2018    Yair Benites    MRN: [id number]           Patient Information     Date Of Birth          1960        Visit Information        Provider Department      2/23/2018 12:30 PM Barbara Riddle RD [facility] [place],  Diabetes Education        Today's Diagnoses     Type 2 diabetes mellitus (H)    -  1       Follow-ups after your visit        Additional Services     PODIATRY/FOOT & ANKLE SURGERY REFERRAL       Your provider has referred you to: [facility]: [facility] [phone]   [email or web address]    Please be aware that coverage of these services is subject to the terms and limitations of your health insurance plan.  Call member services at your health plan with any benefit or coverage questions.      Please bring the following to your appointment:  >>   Any x-rays, CTs or MRIs which have been performed.  Contact the facility where they were done to arrange for  prior to your scheduled appointment.    >>   List of current medications   >>   This referral request   >>   Any documents/labs given to you for this referral                  Your next 10 appointments already scheduled     Mar 09, 2018 12:30 PM CST   Office Visit with Barbara Riddle RD   [facility], [place],  Diabetes Education ([facility])    [address] [place] [id number] [phone]           Bring a current list of meds and any records pertaining to this visit. For Physicals, please bring immunization records and any forms needing to be filled out. Please arrive 10 minutes early to complete paperwork.            Mar 16, 2018  3:00 PM CDT   Office Visit with Radha Villegas MD   [facility] [place] ([facility])    [address] [facility] [id number] [phone]           Bring a current list of meds and any records pertaining to this visit. For Physicals,  "please bring immunization records and any forms needing to be filled out. Please arrive 10 minutes early to complete paperwork.              Who to contact     If you have questions or need follow up information about today's clinic visit or your schedule please contact [facility]  [facility]UC[facility] directly at [phone].  Normal or non-critical lab and imaging results will be communicated to you by MyChart, letter or phone within 4 business days after the clinic has received the results. If you do not hear from us within 7 days, please contact the clinic through [employer] or phone. If you have a critical or abnormal lab result, we will notify you by phone as soon as possible.  Submit refill requests through [employer] or call your pharmacy and they will forward the refill request to us. Please allow 3 business days for your refill to be completed.          Additional Information About Your Visit        MyChart Information     [employer] lets you send messages to your doctor, view your test results, renew your prescriptions, schedule appointments and more. To sign up, go to [email or web address] . Click on \"Log in\" on the left side of the screen, which will take you to the Welcome page. Then click on \"Sign up Now\" on the right side of the page.     You will be asked to enter the access code listed below, as well as some personal information. Please follow the directions to create your username and password.     Your access code is: [email or web address]  Expires: 3/5/2018  3:09 PM     Your access code will  in 90 days. If you need help or a new code, please call your [facility] clinic or [phone].        Care EveryWhere ID     This is your Care EveryWhere ID. This could be used by other organizations to access your [facility] medical records  [id number]         Blood Pressure from Last 3 Encounters:   18 100/56   18 100/60   17 106/70    Weight from Last 3 Encounters:   18 94.3 kg (208 lb) "   01/04/18 96.6 kg (213 lb)   12/05/17 94.8 kg (209 lb)              We Performed the Following     DIABETES EDUCATION - Individual  []     PODIATRY/FOOT & ANKLE SURGERY REFERRAL        Primary Care Provider Office Phone # Fax #    Radha Villegas [phone] [address] [facility]  [place] [address]        Equal Access to Services     [facility]: Hadii aad ku hadasho Soomaali, waaxda luqadaha, qaybta kaalmada adeegyada, waxay idiin hayaan adeeg kharash la'aan . So [facility] [phone].    ATENCIÓN: Si rolandola kalani, tiene a phillips disposición servicios gratuitos de asistencia lingüística. Llame al [phone].    We comply with applicable federal civil rights laws and [place] laws. We do not discriminate on the basis of race, color, national origin, age, disability, sex, sexual orientation, or gender identity.            Thank you!     Thank you for choosing [facility]  DIABETES EDUCATION  for your care. Our goal is always to provide you with excellent care. Hearing back from our patients is one way we can continue to improve our services. Please take a few minutes to complete the written survey that you may receive in the mail after your visit with us. Thank you!             Your Updated Medication List - Protect others around you: Learn how to safely use, store and throw away your medicines at www.disposemymeds.org.          This list is accurate as of 2/23/18 11:59 PM.  Always use your most recent med list.                   Brand Name Dispense Instructions for use Diagnosis    ABILIFY 15 MG tablet   Generic drug:  ARIPiprazole      Take 15 mg by mouth daily Reported on 4/17/2017        ACE/ARB/ARNI NOT PRESCRIBED (INTENTIONAL)      ACE & ARB not prescribed due to Other:BP at goal        alcohol swab prep pads     100 each    Use to swab area of injection/reba as directed.    New onset type 2 diabetes mellitus (H)       aspirin 81 MG tablet      Take 1 tablet by mouth daily.         atorvastatin 20 MG tablet    LIPITOR    30 tablet    Take 1 tablet (20 mg) by mouth daily    Hyperlipidemia LDL goal <100       benzonatate 200 MG capsule    TESSALON    21 capsule    Take 1 capsule (200 mg) by mouth 3 times daily as needed for cough    Upper respiratory tract infection, unspecified type       blood glucose calibration solution    NO BRAND SPECIFIED    1 Bottle    To accompany: Blood Glucose Monitor Brands: per insurance.    New onset type 2 diabetes mellitus (H)       blood glucose monitoring meter device kit    no brand specified    1 kit    Use to test blood sugar 1 times daily or as directed. Preferred blood glucose meter OR supplies to accompany: Blood Glucose Monitor Brands: per insurance.    New onset type 2 diabetes mellitus (H)       blood glucose monitoring test strip    no brand specified    100 strip    Use to test blood sugar 1 times daily or as directed. To accompany: Blood Glucose Monitor Brands: per insurance.    New onset type 2 diabetes mellitus (H)       clonazePAM 0.5 MG tablet    klonoPIN     Take 1 tablet by mouth At Bedtime.        diphenhydrAMINE HCl (Sleep) 25 MG Tabs     30 tablet    Take 25 mg by mouth nightly as needed    Primary insomnia       FLAX SEED OIL PO      Take  by mouth daily. Uses powder on food.        hydrocortisone 1 % ointment     30 g    Apply sparingly to affected area three times daily for 14 days.    Rash       indomethacin 50 MG capsule    INDOCIN    30 capsule    Take 1 capsule (50 mg) by mouth 3 times daily as needed for moderate pain    Idiopathic chronic gout of left knee without tophus       Melatonin 10 MG Tabs tablet      Take 10 mg by mouth At Bedtime        metFORMIN 500 MG 24 hr tablet    GLUCOPHAGE-XR    30 tablet    Take 1 tablet (500 mg) by mouth daily    New onset type 2 diabetes mellitus (H)       mirtazapine 30 MG tablet    REMERON     Take 30 mg by mouth At Bedtime.        MULTIVITAMIN & MINERAL PO      Take 1 tablet by mouth daily.         order for DME     1 Device    Equipment being ordered:Face mask for CPAP machine size small with tubing and filter. [employer] ([phone])    SANDRA (obstructive sleep apnea)       * OVER-THE-COUNTER      Beta Prostate- Take 1 tablet daily.        * OVER-THE-COUNTER      Allergy med        polyethylene glycol powder    MIRALAX/GLYCOLAX    510 g    MIX AS DIRECTED AND TAKE 17 GRAMS BY MOUTH DAILY    Constipation       thin lancets    NO BRAND SPECIFIED    100 each    Use with lanceting device. To accompany: Blood Glucose Monitor Brands: per insurance.    New onset type 2 diabetes mellitus (H)       triamcinolone 0.1 % cream    KENALOG    30 g    Apply sparingly to affected area three times daily for 14 days (apply to back of left hand).    Rash of hands       vitamin D 1000 UNITS capsule      Take 1 capsule by mouth daily.        * Notice:  This list has 2 medication(s) that are the same as other medications prescribed for you. Read the directions carefully, and ask your doctor or other care provider to review them with you.

## 2018-02-26 NOTE — TELEPHONE ENCOUNTER
2nd attempt:  Message left on  to return call to RN hotline at [phone].   Swapna Bowen RN

## 2018-02-27 NOTE — PROGRESS NOTES
Diabetes Self Management Training: Follow-up Visit    Yair Benites presents today for education and evaluation of glucose control Type 2 diabetes.  He is accompanied by His ILS worker Kalina     ASSESSMENT:  Patient Problem List reviewed for relevant medical history and current medical status.    Current Diabetes Management per Patient:  Taking diabetes medications?   yes:     Diabetes Medication(s)     Biguanides Sig    metFORMIN (GLUCOPHAGE-XR) 500 MG 24 hr tablet Take 1 tablet (500 mg) by mouth daily        Patient glucose self monitoring as follows: Osmani is testing his glucose once daily. He is doing a great job with this and is doing it on his own. Says he doesn't need the written instructions anymore. Osmani demonstrated glucose testing again today during our appointment. His glucose was elevated post breakfast - 221 - this again was after eating raisin bran cereal.     At the last appointment we had talked about some other options for breakfast, but Osmani hasn't been able to initiate those and has been eating raisin bran every day. He states he needs the options to be very convenient. Also it is hard because he likes raisin bran. He does like the idea of having some hard boiled eggs with a couple pieces of toast though and is willing to try that. States he was talking to Kalina about that earlier.     Otherwise, his glucose is improved. His fasting glucose 7 day average is 117 and 14 day average is 127.     Patient's ILS worker has some questions from patient's care coordinator today which patient would also like to address.  1) Is there a code chip that needs to be changes for the meter? No, meters no longer have code chips.  2) What would be an emergency glucose reading for Osmani? We discussed how glucose monitoring regularly as well as having A1C checked as recommended will help to prevent emergency situations. We discussed that if half the readings are running above goal at any period of time, that would  "be a good time to call the doctor/team. If fasting glucose is running over 150, that would warrant a call. If post prandial glucoses are running over 240, that would warrant a call. Or any time a rising trend in glucoses is noted. It is always best to call if you even suspect you should or if you have any questions or concerns.     BG values are: In goal  Patient's most recent   Lab Results   Component Value Date    A1C 7.1 12/05/2017       Nutrition:  See note above.  In addition, patient is still drinking soda. He thought that soda has less sugar than a cookie, so we took a quantitative look at how much sugar soda contains in the portions he drinks, and he was surprised. States he thinks he can cut back.  He is also willing to make some changes to his breakfast.   We didn't have time today to look into some of his favorite restaurants and food options so will do that at a follow up appointment.     Breakfast - raisin bran  Lunch - peanut butter sandwich and chips   Dinner - subway sandwich and 21 ounce soda x2     Vitals:  There were no vitals taken for this visit.  Estimated body mass index is 33.07 kg/(m^2) as calculated from the following:    Height as of 1/12/18: 1.689 m (5' 6.5\").    Weight as of 1/12/18: 94.3 kg (208 lb).   Last 3 BP:   BP Readings from Last 3 Encounters:   01/12/18 100/56   01/04/18 100/60   12/05/17 106/70       History   Smoking Status     Former Smoker     Years: 6.00     Types: Cigarettes     Quit date: 1/1/1983   Smokeless Tobacco     Never Used       Labs:  Lab Results   Component Value Date    A1C 7.1 12/05/2017     Lab Results   Component Value Date     12/05/2017     Lab Results   Component Value Date    LDL  12/05/2017     Cannot estimate LDL when triglyceride exceeds 400 mg/dL     12/05/2017     HDL Cholesterol   Date Value Ref Range Status   12/05/2017 39 (L) >39 mg/dL Final   ]  GFR Estimate   Date Value Ref Range Status   12/05/2017 90 >60 mL/min/1.7m2 Final     " Comment:     Non  GFR Calc     GFR Estimate If Black   Date Value Ref Range Status   12/05/2017 >90 >60 mL/min/1.7m2 Final     Comment:      GFR Calc     Lab Results   Component Value Date    CR 0.87 12/05/2017     No results found for: MICROALBUMIN    Health Beliefs and Attitudes:   Patient Activation Measure Survey Score:  IMMANUEL Score (Last Two) 2/18/2013   IMMANUEL Raw Score 42   Activation Score 66   IMMANUEL Level 3       Stage of Change: ACTION (Actively working towards change)    INTERVENTION:    Education provided today on:  AADE Self-Care Behaviors:  Healthy Eating: Reducing sugar intake, portion control  Reducing Risks: major complications of diabetes, prevention, early diagnostic measures and treatment of complications, foot care, appropriate dental care, annual eye exam, A1C - goals, relating to blood glucose levels, how often to check, lipids levels and goals and blood pressure and goals  Patient states he has a hard time clipping his toe nails because they are thick. Podiatry referral placed today.     Opportunities for ongoing education and support in diabetes-self management were discussed.    Pt verbalized understanding of concepts discussed and recommendations provided today.       Education Materials Provided:  Foot Care    PLAN:  See Patient Instructions for co-developed, patient-stated behavior change goals.  Meal Plan Recommendation: Try some of the other breakfast options we came up with today. Cut back on the soda.  Check blood sugars once daily  Referral to podiatry  AVS printed and provided to patient today.    FOLLOW-UP:  Chart routed to referring provider.  Two weeks    Time Spent: 60 minutes  Encounter Type: Individual

## 2018-03-05 DIAGNOSIS — E78.5 HYPERLIPIDEMIA LDL GOAL <100: ICD-10-CM

## 2018-03-05 DIAGNOSIS — E11.9 NEW ONSET TYPE 2 DIABETES MELLITUS (H): ICD-10-CM

## 2018-03-06 RX ORDER — METFORMIN HCL 500 MG
TABLET, EXTENDED RELEASE 24 HR ORAL
Qty: 90 TABLET | Refills: 0 | Status: SHIPPED | OUTPATIENT
Start: 2018-03-06 | End: 2018-05-04

## 2018-03-06 RX ORDER — ATORVASTATIN CALCIUM 20 MG/1
TABLET, FILM COATED ORAL
Qty: 90 TABLET | Refills: 0 | Status: SHIPPED | OUTPATIENT
Start: 2018-03-06 | End: 2018-05-04

## 2018-03-06 NOTE — TELEPHONE ENCOUNTER
"Routing refill request to provider for review/approval because:  Labs not current:  Micro Albumin      Requested Prescriptions   Pending Prescriptions Disp Refills     metFORMIN (GLUCOPHAGE-XR) 500 MG 24 hr tablet [Pharmacy Med Name: METFORMIN ER 500MG 24HR TABS] 30 tablet 0     Sig: TAKE 1 TABLET(500 MG) BY MOUTH DAILY    Biguanide Agents Failed    3/5/2018 12:35 PM       Failed - Patient has had a Microalbumin in the past 12 mos.    No lab results found.         Passed - Blood pressure less than 140/90 in past 6 months    BP Readings from Last 3 Encounters:   01/12/18 100/56   01/04/18 100/60   12/05/17 106/70                Passed - Patient has documented LDL within the past 12 mos.    Recent Labs   Lab Test  12/05/17   1530   LDL  Cannot estimate LDL when triglyceride exceeds 400 mg/dL  106*            Passed - Patient is age 10 or older       Passed - Patient has documented A1c within the specified period of time.    Recent Labs   Lab Test  12/05/17   1530   A1C  7.1*            Passed - Patient's CR is NOT>1.4 OR Patient's EGFR is NOT<45 within past 12 mos.    Recent Labs   Lab Test  12/05/17   1530   GFRESTIMATED  90   GFRESTBLACK  >90       Recent Labs   Lab Test  12/05/17   1530   CR  0.87            Passed - Patient does NOT have a diagnosis of CHF.       Passed - Recent (6 mo) or future (30 days) visit within the authorizing provider's specialty    Patient had office visit in the last 6 months or has a visit in the next 30 days with authorizing provider.  See \"Patient Info\" tab in inbasket, or \"Choose Columns\" in Meds & Orders section of the refill encounter.            atorvastatin (LIPITOR) 20 MG tablet [Pharmacy Med Name: ATORVASTATIN 20MG TABLETS] 30 tablet 0     Sig: TAKE 1 TABLET(20 MG) BY MOUTH DAILY    Statins Protocol Passed    3/5/2018 12:35 PM       Passed - LDL on file in past 12 months    Recent Labs   Lab Test  12/05/17   1530   LDL  Cannot estimate LDL when triglyceride exceeds 400 mg/dL  " "106*            Passed - No abnormal creatine kinase in past 12 months    No lab results found.         Passed - Recent (12 mo) or future (30 days) visit within the authorizing provider's specialty    Patient had office visit in the last year or has a visit in the next 30 days with authorizing provider.  See \"Patient Info\" tab in inbasket, or \"Choose Columns\" in Meds & Orders section of the refill encounter.            Passed - Patient is age 18 or older        Yenni Patterson RN - BC      "

## 2018-03-09 ENCOUNTER — OFFICE VISIT (OUTPATIENT)
Dept: EDUCATION SERVICES | Facility: CLINIC | Age: 58
End: 2018-03-09
Attending: FAMILY MEDICINE
Payer: MEDICARE

## 2018-03-09 DIAGNOSIS — E11.9 NEW ONSET TYPE 2 DIABETES MELLITUS (H): Primary | ICD-10-CM

## 2018-03-09 PROCEDURE — G0108 DIAB MANAGE TRN  PER INDIV: HCPCS | Performed by: NUTRITIONIST

## 2018-03-09 NOTE — MR AVS SNAPSHOT
After Visit Summary   3/9/2018    Yair Benites    MRN: [id number]           Patient Information     Date Of Birth          1960        Visit Information        Provider Department      3/9/2018 12:30 PM [facility]        Today's Diagnoses     New onset type 2 diabetes mellitus (H)    -  1       Follow-ups after your visit        Your next 10 appointments already scheduled     Mar 16, 2018  3:00 PM CDT   Office Visit with Radha Villegas MD   [facility] ([facility]    [address] [facility] [id number] [phone]           Bring a current list of meds and any records pertaining to this visit. For Physicals, please bring immunization records and any forms needing to be filled out. Please arrive 10 minutes early to complete paperwork.            Mar 23, 2018  1:30 PM CDT   Office Visit with [facility]iddle [facility]   [facility]  [facility] ([facility])    [address] [place] [id number] [phone]           Bring a current list of meds and any records pertaining to this visit. For Physicals, please bring immunization records and any forms needing to be filled out. Please arrive 10 minutes early to complete paperwork.              Who to contact     If you have questions or need follow up information about today's clinic visit or your schedule please contact [facility]  DIABETES EDUCATION directly at [phone].  Normal or non-critical lab and imaging results will be communicated to you by MyChart, letter or phone within 4 business days after the clinic has received the results. If you do not hear from us within 7 days, please contact the clinic through TechnoVaxhart or phone. If you have a critical or abnormal lab result, we will notify you by phone as soon as possible.  Submit refill requests through TechnoVaxhart or call your  "pharmacy and they will forward the refill request to us. Please allow 3 business days for your refill to be completed.          Additional Information About Your Visit        MyChart Information     Moonfruit lets you send messages to your doctor, view your test results, renew your prescriptions, schedule appointments and more. To sign up, go to [email or web address] . Click on \"Log in\" on the left side of the screen, which will take you to the Welcome page. Then click on \"Sign up Now\" on the right side of the page.     You will be asked to enter the access code listed below, as well as some personal information. Please follow the directions to create your username and password.     Your access code is: [email or web address]  Expires: 2018  4:36 PM     Your access code will  in 90 days. If you need help or a new code, please call your [place] clinic or [phone].        Care EveryWhere ID     This is your Care EveryWhere ID. This could be used by other organizations to access your [place] medical records  [id number]         Blood Pressure from Last 3 Encounters:   18 100/56   18 100/60   17 106/70    Weight from Last 3 Encounters:   18 94.3 kg (208 lb)   18 96.6 kg (213 lb)   17 94.8 kg (209 lb)              We Performed the Following     DIABETES EDUCATION - Individual  []        Primary Care Provider Office Phone # Fax #    Radha Villegas [phone] [address] [facility] [address]        Equal Access to Services     [facility] : Hadii erin Michaels, waaxda haily, qaybta dawoodalstephany franco. So [facility] [phone].    ATENCIÓN: Si habla español, tiene a phillips disposición servicios gratuitos de asistencia lingüística. Llame al [phone].    We comply with applicable federal civil rights laws and [place] laws. We do not discriminate on the basis of race, color, national origin, age, " disability, sex, sexual orientation, or gender identity.            Thank you!     Thank you for choosing [facility]  DIABETES EDUCATION  for your care. Our goal is always to provide you with excellent care. Hearing back from our patients is one way we can continue to improve our services. Please take a few minutes to complete the written survey that you may receive in the mail after your visit with us. Thank you!             Your Updated Medication List - Protect others around you: Learn how to safely use, store and throw away your medicines at www.disposemymeds.org.          This list is accurate as of 3/9/18  4:36 PM.  Always use your most recent med list.                   Brand Name Dispense Instructions for use Diagnosis    ABILIFY 15 MG tablet   Generic drug:  ARIPiprazole      Take 15 mg by mouth daily Reported on 4/17/2017        ACE/ARB/ARNI NOT PRESCRIBED (INTENTIONAL)      ACE & ARB not prescribed due to Other:BP at goal        alcohol swab prep pads     100 each    Use to swab area of injection/reba as directed.    New onset type 2 diabetes mellitus (H)       aspirin 81 MG tablet      Take 1 tablet by mouth daily.        atorvastatin 20 MG tablet    LIPITOR    90 tablet    TAKE 1 TABLET(20 MG) BY MOUTH DAILY    Hyperlipidemia LDL goal <100       benzonatate 200 MG capsule    TESSALON    21 capsule    Take 1 capsule (200 mg) by mouth 3 times daily as needed for cough    Upper respiratory tract infection, unspecified type       blood glucose calibration solution    NO BRAND SPECIFIED    1 Bottle    To accompany: Blood Glucose Monitor Brands: per insurance.    New onset type 2 diabetes mellitus (H)       blood glucose monitoring meter device kit    no brand specified    1 kit    Use to test blood sugar 1 times daily or as directed. Preferred blood glucose meter OR supplies to accompany: Blood Glucose Monitor Brands: per insurance.    New onset type 2 diabetes mellitus (H)       blood glucose  monitoring test strip    no brand specified    100 strip    Use to test blood sugar 1 times daily or as directed. To accompany: Blood Glucose Monitor Brands: per insurance.    New onset type 2 diabetes mellitus (H)       clonazePAM 0.5 MG tablet    klonoPIN     Take 1 tablet by mouth At Bedtime.        diphenhydrAMINE HCl (Sleep) 25 MG Tabs     30 tablet    Take 25 mg by mouth nightly as needed    Primary insomnia       FLAX SEED OIL PO      Take  by mouth daily. Uses powder on food.        hydrocortisone 1 % ointment     30 g    Apply sparingly to affected area three times daily for 14 days.    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        MULTIVITAMIN & MINERAL PO      Take 1 tablet by mouth daily.        order for DME     1 Device    Equipment being ordered:Face mask for CPAP machine size small with tubing and filter. [employer] ([phone])    SANDRA (obstructive sleep apnea)       * OVER-THE-COUNTER      Beta Prostate- Take 1 tablet daily.        * OVER-THE-COUNTER      Allergy med        polyethylene glycol powder    MIRALAX/GLYCOLAX    510 g    MIX AS DIRECTED AND TAKE 17 GRAMS BY MOUTH DAILY    Constipation       thin lancets    NO BRAND SPECIFIED    100 each    Use with lanceting device. To accompany: Blood Glucose Monitor Brands: per insurance.    New onset type 2 diabetes mellitus (H)       triamcinolone 0.1 % cream    KENALOG    30 g    Apply sparingly to affected area three times daily for 14 days (apply to back of left hand).    Rash of hands        vitamin D 1000 UNITS capsule      Take 1 capsule by mouth daily.        * Notice:  This list has 2 medication(s) that are the same as other medications prescribed for you. Read the directions carefully, and ask your doctor or other care provider to review them with you.

## 2018-03-09 NOTE — PROGRESS NOTES
Diabetes Self Management Training: Follow-up Visit    Yair Benites presents today for education and evaluation of glucose control Type 2 diabetes.  He is accompanied by his ILS worker Kalina     ASSESSMENT:  Patient Problem List reviewed for relevant medical history and current medical status.    Current Diabetes Management per Patient:  Taking diabetes medications?   yes:     Diabetes Medication(s)     Biguanides Sig    metFORMIN (GLUCOPHAGE-XR) 500 MG 24 hr tablet TAKE 1 TABLET(500 MG) BY MOUTH DAILY          Patient glucose self monitoring as follows: Patient is testing once daily and no longer needs his written instructions.   BG results: fasting glucose- ; all fasting readings are at goal and we tested again today at diabetes education two hours after cereal and his glucose was 216.      Patient states he wants to lose weight. Wonders why he isn't. His weight today is 212lbs.     His homecare RN took care of clipping his toe nails so he no longer is planning to go to podiatry.     BG values are: In goal  Patient's most recent   Lab Results   Component Value Date    A1C 7.1 12/05/2017       Nutrition:  Patient has stopped drinking soda and he feels good about that.   Otherwise he mostly just drinks water. Has a large glass of juice about once per week. He is willing to cut this back to no more than 8 ounces.   He is still having a very large bowl of raisin bran which is causing his glucose to spike per the checks he has done here.  He is willing to try a different cereal option or to cut his portion back. He likes the idea that this could help with weight loss as well. He's also willing to eat less chips and cheese for weight loss.     Physical Activity:    We talked today about how activity can aid in glucose control as well as weight loss.   Patient would like to start a new routine. He likes to swim and likes to walk outside. He has a stationary bike but doesn't like to use it.    States he has  "been using his CPAP every night.     Vitals:  There were no vitals taken for this visit.  Estimated body mass index is 33.07 kg/(m^2) as calculated from the following:    Height as of 1/12/18: 1.689 m (5' 6.5\").    Weight as of 1/12/18: 94.3 kg (208 lb).   Last 3 BP:   BP Readings from Last 3 Encounters:   01/12/18 100/56   01/04/18 100/60   12/05/17 106/70       History   Smoking Status     Former Smoker     Years: 6.00     Types: Cigarettes     Quit date: 1/1/1983   Smokeless Tobacco     Never Used       Labs:  Lab Results   Component Value Date    A1C 7.1 12/05/2017     Lab Results   Component Value Date     12/05/2017     Lab Results   Component Value Date    LDL  12/05/2017     Cannot estimate LDL when triglyceride exceeds 400 mg/dL     12/05/2017     HDL Cholesterol   Date Value Ref Range Status   12/05/2017 39 (L) >39 mg/dL Final   ]  GFR Estimate   Date Value Ref Range Status   12/05/2017 90 >60 mL/min/1.7m2 Final     Comment:     Non  GFR Calc     GFR Estimate If Black   Date Value Ref Range Status   12/05/2017 >90 >60 mL/min/1.7m2 Final     Comment:      GFR Calc     Lab Results   Component Value Date    CR 0.87 12/05/2017     No results found for: MICROALBUMIN    Health Beliefs and Attitudes:   Patient Activation Measure Survey Score:  IMMANUEL Score (Last Two) 2/18/2013   IMMANUEL Raw Score 42   Activation Score 66   IMMANUEL Level 3       Stage of Change: ACTION (Actively working towards change)    PLAN:  See Patient Instructions for co-developed, patient-stated behavior change goals.  Lower Carbohydrate Higher Fiber Cereals:  Bran Flakes  Grape nut flakes  Honey nut cheerios  All bran  Kashi Go lean  Fiber plus  Exercise three times per week for 20 minutes. Go for a walk or go swimming.   If you eat raisin bran eat a smaller portion by using the smaller bowl.  Keep a food journal.   AVS printed and provided to patient today.    FOLLOW-UP:  Chart routed to referring " provider.  Two weeks      Time Spent: 60 minutes  Encounter Type: Individual

## 2018-03-20 ENCOUNTER — TELEPHONE (OUTPATIENT)
Dept: FAMILY MEDICINE | Facility: CLINIC | Age: 58
End: 2018-03-20

## 2018-03-20 NOTE — TELEPHONE ENCOUNTER
Detailed VM left on Sheila's phone regarding verbal OK for requested orders. Advised to call back with questions, [phone].    Swapna Bowen RN

## 2018-03-20 NOTE — TELEPHONE ENCOUNTER
Reason for Call:  Other call back    Detailed comments:  clay calling to get home care orders. Skilled nursing once every other week for 60 days. For medication set up. Diabetic education and general health assessment.     Phone Number Patient can be reached at: Other phone number:  [phone]    Best Time:  Any     Can we leave a detailed message on this number? YES    Call taken on 3/20/2018 at 2:08 PM by Antonia Egan

## 2018-03-23 ENCOUNTER — OFFICE VISIT (OUTPATIENT)
Dept: EDUCATION SERVICES | Facility: CLINIC | Age: 58
End: 2018-03-23
Attending: FAMILY MEDICINE
Payer: MEDICARE

## 2018-03-23 DIAGNOSIS — E11.9 DIABETES MELLITUS, TYPE 2 (H): Primary | ICD-10-CM

## 2018-03-23 PROCEDURE — G0108 DIAB MANAGE TRN  PER INDIV: HCPCS | Performed by: NUTRITIONIST

## 2018-03-23 NOTE — MR AVS SNAPSHOT
"              After Visit Summary   3/23/2018    Yair Benites    MRN: [id number]           Patient Information     Date Of Birth          1960        Visit Information        Provider Department      3/23/2018 1:30 PM [facility]        Today's Diagnoses     Diabetes mellitus, type 2 (H)    -  1       Follow-ups after your visit        Your next 10 appointments already scheduled     Apr 06, 2018  1:30 PM CDT   Office Visit with Barbara Riddle RD   [facility],  Diabetes Education ([facility])    [address] [place] [address] [phone]           Bring a current list of meds and any records pertaining to this visit. For Physicals, please bring immunization records and any forms needing to be filled out. Please arrive 10 minutes early to complete paperwork.              Who to contact     If you have questions or need follow up information about today's clinic visit or your schedule please contact [facility], [place],  DIABETES EDUCATION directly at [phone].  Normal or non-critical lab and imaging results will be communicated to you by Spreetaleshart, letter or phone within 4 business days after the clinic has received the results. If you do not hear from us within 7 days, please contact the clinic through Karrot Rewardst or phone. If you have a critical or abnormal lab result, we will notify you by phone as soon as possible.  Submit refill requests through Justworks or call your pharmacy and they will forward the refill request to us. Please allow 3 business days for your refill to be completed.          Additional Information About Your Visit        Spreetaleshart Information     Justworks lets you send messages to your doctor, view your test results, renew your prescriptions, schedule appointments and more. To sign up, go to [email or web address] . Click on \"Log in\" on the left side of the screen, " "which will take you to the Welcome page. Then click on \"Sign up Now\" on the right side of the page.     You will be asked to enter the access code listed below, as well as some personal information. Please follow the directions to create your username and password.     Your access code is: [email or web address]  Expires: 2018  5:36 PM     Your access code will  in 90 days. If you need help or a new code, please call your [place] clinic or [phone].        Care EveryWhere ID     This is your Care EveryWhere ID. This could be used by other organizations to access your [place] medical records  [id number]         Blood Pressure from Last 3 Encounters:   18 100/56   18 100/60   17 106/70    Weight from Last 3 Encounters:   18 94.3 kg (208 lb)   18 96.6 kg (213 lb)   17 94.8 kg (209 lb)              We Performed the Following     DIABETES EDUCATION - Individual  []        Primary Care Provider Office Phone # Fax #    Radha Villegas [phone] [address] [facility] [address]        Equal Access to Services     KEYA HAAS : Hadii erin ku hadasho Sokarmaali, waaxda luqadaha, qaybta kaalmada adeegyada, stephany ziegler . So [facility] [phone].    ATENCIÓN: Si habla español, tiene a phillips disposición servicios gratuitos de asistencia lingüística. Llame al [phone].    We comply with applicable federal civil rights laws and [place] laws. We do not discriminate on the basis of race, color, national origin, age, disability, sex, sexual orientation, or gender identity.            Thank you!     Thank you for choosing [facility]  DIABETES EDUCATION  for your care. Our goal is always to provide you with excellent care. Hearing back from our patients is one way we can continue to improve our services. Please take a few minutes to complete the written survey that you may receive in the mail after your visit with us. Thank " you!             Your Updated Medication List - Protect others around you: Learn how to safely use, store and throw away your medicines at www.disposemymeds.org.          This list is accurate as of 3/23/18 11:59 PM.  Always use your most recent med list.                   Brand Name Dispense Instructions for use Diagnosis    ABILIFY 15 MG tablet   Generic drug:  ARIPiprazole      Take 15 mg by mouth daily Reported on 4/17/2017        ACE/ARB/ARNI NOT PRESCRIBED (INTENTIONAL)      ACE & ARB not prescribed due to Other:BP at goal        alcohol swab prep pads     100 each    Use to swab area of injection/reba as directed.    New onset type 2 diabetes mellitus (H)       aspirin 81 MG tablet      Take 1 tablet by mouth daily.        atorvastatin 20 MG tablet    LIPITOR    90 tablet    TAKE 1 TABLET(20 MG) BY MOUTH DAILY    Hyperlipidemia LDL goal <100       benzonatate 200 MG capsule    TESSALON    21 capsule    Take 1 capsule (200 mg) by mouth 3 times daily as needed for cough    Upper respiratory tract infection, unspecified type       blood glucose calibration solution    NO BRAND SPECIFIED    1 Bottle    To accompany: Blood Glucose Monitor Brands: per insurance.    New onset type 2 diabetes mellitus (H)       blood glucose monitoring meter device kit    no brand specified    1 kit    Use to test blood sugar 1 times daily or as directed. Preferred blood glucose meter OR supplies to accompany: Blood Glucose Monitor Brands: per insurance.    New onset type 2 diabetes mellitus (H)       blood glucose monitoring test strip    no brand specified    100 strip    Use to test blood sugar 1 times daily or as directed. To accompany: Blood Glucose Monitor Brands: per insurance.    New onset type 2 diabetes mellitus (H)       clonazePAM 0.5 MG tablet    klonoPIN     Take 1 tablet by mouth At Bedtime.        diphenhydrAMINE HCl (Sleep) 25 MG Tabs     30 tablet    Take 25 mg by mouth nightly as needed    Primary insomnia        FLAX SEED OIL PO      Take  by mouth daily. Uses powder on food.        hydrocortisone 1 % ointment     30 g    Apply sparingly to affected area three times daily for 14 days.    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        MULTIVITAMIN & MINERAL PO      Take 1 tablet by mouth daily.        order for DME     1 Device    Equipment being ordered:Face mask for CPAP machine size small with tubing and filter. [employer] ([phone])    SANDRA (obstructive sleep apnea)       * OVER-THE-COUNTER      Beta Prostate- Take 1 tablet daily.        * OVER-THE-COUNTER      Allergy med        polyethylene glycol powder    MIRALAX/GLYCOLAX    510 g    MIX AS DIRECTED AND TAKE 17 GRAMS BY MOUTH DAILY    Constipation       thin lancets    NO BRAND SPECIFIED    100 each    Use with lanceting device. To accompany: Blood Glucose Monitor Brands: per insurance.    New onset type 2 diabetes mellitus (H)       triamcinolone 0.1 % cream    KENALOG    30 g    Apply sparingly to affected area three times daily for 14 days (apply to back of left hand).    Rash of hands       vitamin D 1000 UNITS capsule      Take 1 capsule by mouth daily.        * Notice:  This list has 2 medication(s) that are the same as other medications prescribed for you. Read the directions carefully, and ask your doctor or other care provider to review them with you.

## 2018-03-26 NOTE — PROGRESS NOTES
Diabetes Self Management Training: Follow-up Visit    Yair Benites presents today for education and evaluation of glucose control Type 2 diabetes.  He is accompanied by his ILS worker Kalina    ASSESSMENT:  Patient Problem List reviewed for relevant medical history and current medical status.    Current Diabetes Management per Patient:  Taking diabetes medications?   yes:     Diabetes Medication(s)     Biguanides Sig    metFORMIN (GLUCOPHAGE-XR) 500 MG 24 hr tablet TAKE 1 TABLET(500 MG) BY MOUTH DAILY        Patient glucose self monitoring as follows: one time daily.   BG results: fasting glucose- 112-122     BG values are: In goal  Patient's most recent   Lab Results   Component Value Date    A1C 7.1 12/05/2017    is not meeting goal of <7.0    Nutrition:  Patient switched out raisin bran for cheerios which reduced his carbohydrate intake at breakfast. We tested his glucose today and it was 174 two hours after eating. The last two visits patient's blood glucose was in the 200s. He was happy to see it at goal today and to see that the diet changes are helping. He also reduced his intake of juice to 6 ounces and hasn't drank soda the past couple weeks.  Osmani also kept a food journal. He wants to lose weight. From his journal it appears that he is getting up to 500 calories per day from cheese some days. Other high calorie foods include chips (chips and cheese). Pizza. He is eating vegetables with breakfast.  Today we went over how to read a food label for carbohydrates and then how to use that when portioning foods or when making choices at the grocery store.   Discussed weight loss concepts like portion control, caloric reduction, vegetables at every meal.   Osmani will be eating out tonight at a chinese restaurants, he wonders if rice or noodle is better. We discussed fiber and carbohydrate content of grains. And I recommended choosing brown rice and preferably getting it on the side in order to have more  "control over portions. We reviewed some other helpful tips for eating out in order to aid in weight loss and good glucose control.     Physical Activity:    Osmani walked one day over the past two weeks. He reports that he felt good after walking and thinks he can go for a walk three times per week for a goal.     Weight is 210 lbs    Vitals:  There were no vitals taken for this visit.  Estimated body mass index is 33.07 kg/(m^2) as calculated from the following:    Height as of 1/12/18: 1.689 m (5' 6.5\").    Weight as of 1/12/18: 94.3 kg (208 lb).   Last 3 BP:   BP Readings from Last 3 Encounters:   01/12/18 100/56   01/04/18 100/60   12/05/17 106/70       History   Smoking Status     Former Smoker     Years: 6.00     Types: Cigarettes     Quit date: 1/1/1983   Smokeless Tobacco     Never Used       Labs:  Lab Results   Component Value Date    A1C 7.1 12/05/2017     Lab Results   Component Value Date     12/05/2017     Lab Results   Component Value Date    LDL  12/05/2017     Cannot estimate LDL when triglyceride exceeds 400 mg/dL     12/05/2017     HDL Cholesterol   Date Value Ref Range Status   12/05/2017 39 (L) >39 mg/dL Final   ]  GFR Estimate   Date Value Ref Range Status   12/05/2017 90 >60 mL/min/1.7m2 Final     Comment:     Non  GFR Calc     GFR Estimate If Black   Date Value Ref Range Status   12/05/2017 >90 >60 mL/min/1.7m2 Final     Comment:      GFR Calc     Lab Results   Component Value Date    CR 0.87 12/05/2017     No results found for: MICROALBUMIN    Health Beliefs and Attitudes:   Patient Activation Measure Survey Score:  IMMANUEL Score (Last Two) 2/18/2013   IMMANUEL Raw Score 42   Activation Score 66   IMMANUEL Level 3       Stage of Change: ACTION (Actively working towards change)    Diabetes knowledge and skills assessment:     Barriers to Learning Assessment: Cognitive impairment    Based on learning assessment above, most appropriate setting for further diabetes " education would be: Individual setting.    INTERVENTION:    Education provided today on:  AADE Self-Care Behaviors:  Healthy Eating: eating out, portion control and label reading  Being Active: relationship to blood glucose, describe appropriate activity program and precautions to take    Opportunities for ongoing education and support in diabetes-self management were discussed.    Pt verbalized understanding of concepts discussed and recommendations provided today.       PLAN:  See Patient Instructions for co-developed, patient-stated behavior change goals.  Meal Plan Recommendation: eat 3 meals a day, have small snacks between meals, if needed, use portion control and keep snacks under 30 grams of carbohydrate. Try to get vegetables at lunch and dinner. Reduce intake of cheese/nachos.   Exercise / activity plan: walk three times per week  Check blood sugars fasting  AVS printed and provided to patient today.    FOLLOW-UP:  Chart routed to referring provider.  Two weeks    Time Spent: 60 minutes  Encounter Type: Individual

## 2018-03-27 DIAGNOSIS — Z53.9 DIAGNOSIS NOT YET DEFINED: Primary | ICD-10-CM

## 2018-03-27 PROCEDURE — G0179 MD RECERTIFICATION HHA PT: HCPCS | Performed by: FAMILY MEDICINE

## 2018-04-06 ENCOUNTER — OFFICE VISIT (OUTPATIENT)
Dept: EDUCATION SERVICES | Facility: CLINIC | Age: 58
End: 2018-04-06
Attending: FAMILY MEDICINE
Payer: MEDICARE

## 2018-04-06 DIAGNOSIS — E11.9 DIABETES MELLITUS, TYPE 2 (H): Primary | ICD-10-CM

## 2018-04-06 PROCEDURE — G0108 DIAB MANAGE TRN  PER INDIV: HCPCS | Performed by: NUTRITIONIST

## 2018-04-06 NOTE — MR AVS SNAPSHOT
"              After Visit Summary   4/6/2018    Yair Benites    MRN: [id number]           Patient Information     Date Of Birth          1960        Visit Information        Provider Department      4/6/2018 1:30 PM Barbara Riddle RD [facility],  Diabetes Education        Care Instructions    Check your blood sugar once daily in the morning.    Walk twice per week.    Consider calling [employer] to see if that would be a good option for you.    Lighter ice cream options without fake sugar:  - Ulisses's slow churned  - Ortega's frozen yogurt          Follow-ups after your visit        Who to contact     If you have questions or need follow up information about today's clinic visit or your schedule please [facility] directly at [phone].  Normal or non-critical lab and imaging results will be communicated to you by [employer], letter or phone within 4 business days after the clinic has received the results. If you do not hear from us within 7 days, please contact the clinic through [employer] or phone. If you have a critical or abnormal lab result, we will notify you by phone as soon as possible.  Submit refill requests through [employer] or call your pharmacy and they will forward the refill request to us. Please allow 3 business days for your refill to be completed.          Additional Information About Your Visit        [employer] Information     [employer] lets you send messages to your doctor, view your test results, renew your prescriptions, schedule appointments and more. To sign up, go to [email or web address] . Click on \"Log in\" on the left side of the screen, which will take you to the Welcome page. Then click on \"Sign up Now\" on the right side of the page.     You will be asked to enter the access code listed below, as well as some personal information. Please follow the directions to create your username and password.     Your access code is: [email or web address]  Expires: " 2018  5:36 PM     Your access code will  in 90 days. If you need help or a new code, please call your [place] clinic or [phone].        Care EveryWhere ID     This is your Care EveryWhere ID. This could be used by other organizations to access your [place] medical records  [id number]         Blood Pressure from Last 3 Encounters:   18 100/56   18 100/60   17 106/70    Weight from Last 3 Encounters:   18 94.3 kg (208 lb)   18 96.6 kg (213 lb)   17 94.8 kg (209 lb)              Today, you had the following     No orders found for display       Primary Care Provider Office Phone # Fax #    Radha Villegas [phone] [address] [facility]  [facility] [id number]        Equal Access to Services     [facility] : Hadii aad ku hadasho Soomaali, waaxda luqadaha, qaybta kaalmada adeegyada, waxay arthurin hayusmann alisson ziegler . So [facility] [phone].    ATENCIÓN: Si habla español, tiene a phillips disposición servicios gratuitos de asistencia lingüística. Cristianaame al [phone].    We comply with applicable federal civil rights laws and [place] laws. We do not discriminate on the basis of race, color, national origin, age, disability, sex, sexual orientation, or gender identity.            Thank you!     Thank you for choosing [facility]  DIABETES [facility]UC[facility]  for your care. Our goal is always to provide you with excellent care. Hearing back from our patients is one way we can continue to improve our services. Please take a few minutes to complete the written survey that you may receive in the mail after your visit with us. Thank you!             Your Updated Medication List - Protect others around you: Learn how to safely use, store and throw away your medicines at www.disposemymeds.org.          This list is accurate as of 18  2:21 PM.  Always use your most recent med list.                   Brand Name Dispense Instructions for use Diagnosis     ABILIFY 15 MG tablet   Generic drug:  ARIPiprazole      Take 15 mg by mouth daily Reported on 4/17/2017        ACE/ARB/ARNI NOT PRESCRIBED (INTENTIONAL)      ACE & ARB not prescribed due to Other:BP at goal        alcohol swab prep pads     100 each    Use to swab area of injection/reba as directed.    New onset type 2 diabetes mellitus (H)       aspirin 81 MG tablet      Take 1 tablet by mouth daily.        atorvastatin 20 MG tablet    LIPITOR    90 tablet    TAKE 1 TABLET(20 MG) BY MOUTH DAILY    Hyperlipidemia LDL goal <100       benzonatate 200 MG capsule    TESSALON    21 capsule    Take 1 capsule (200 mg) by mouth 3 times daily as needed for cough    Upper respiratory tract infection, unspecified type       blood glucose calibration solution    NO BRAND SPECIFIED    1 Bottle    To accompany: Blood Glucose Monitor Brands: per insurance.    New onset type 2 diabetes mellitus (H)       blood glucose monitoring meter device kit    no brand specified    1 kit    Use to test blood sugar 1 times daily or as directed. Preferred blood glucose meter OR supplies to accompany: Blood Glucose Monitor Brands: per insurance.    New onset type 2 diabetes mellitus (H)       blood glucose monitoring test strip    no brand specified    100 strip    Use to test blood sugar 1 times daily or as directed. To accompany: Blood Glucose Monitor Brands: per insurance.    New onset type 2 diabetes mellitus (H)       clonazePAM 0.5 MG tablet    klonoPIN     Take 1 tablet by mouth At Bedtime.        diphenhydrAMINE HCl (Sleep) 25 MG Tabs     30 tablet    Take 25 mg by mouth nightly as needed    Primary insomnia       FLAX SEED OIL PO      Take  by mouth daily. Uses powder on food.        hydrocortisone 1 % ointment     30 g    Apply sparingly to affected area three times daily for 14 days.    Rash       indomethacin 50 MG capsule    INDOCIN    30 capsule    Take 1 capsule (50 mg) by mouth 3 times daily as needed for moderate pain     Idiopathic chronic gout of left knee without tophus       Melatonin 10 MG Tabs tablet      Take 10 mg by mouth At Bedtime        metFORMIN 500 MG 24 hr tablet    GLUCOPHAGE-XR    90 tablet    TAKE 1 TABLET(500 MG) BY MOUTH DAILY    New onset type 2 diabetes mellitus (H)       mirtazapine 30 MG tablet    REMERON     Take 30 mg by mouth At Bedtime.        MULTIVITAMIN & MINERAL PO      Take 1 tablet by mouth daily.        order for DME     1 Device    Equipment being ordered:Face mask for CPAP machine size small with tubing and filter. [employer] ([phone])    SANDRA (obstructive sleep apnea)       * OVER-THE-COUNTER      Beta Prostate- Take 1 tablet daily.        * OVER-THE-COUNTER      Allergy med        polyethylene glycol powder    MIRALAX/GLYCOLAX    510 g    MIX AS DIRECTED AND TAKE 17 GRAMS BY MOUTH DAILY    Constipation       thin lancets    NO BRAND SPECIFIED    100 each    Use with lanceting device. To accompany: Blood Glucose Monitor Brands: per insurance.    New onset type 2 diabetes mellitus (H)       triamcinolone 0.1 % cream    KENALOG    30 g    Apply sparingly to affected area three times daily for 14 days (apply to back of left hand).    Rash of hands       vitamin D 1000 UNITS capsule      Take 1 capsule by mouth daily.        * Notice:  This list has 2 medication(s) that are the same as other medications prescribed for you. Read the directions carefully, and ask your doctor or other care provider to review them with you.

## 2018-04-06 NOTE — PATIENT INSTRUCTIONS
Check your blood sugar once daily in the morning before breakfast.    Walk twice per week. Aim for about 30 minutes for each walk.    Consider calling [employer] to see if that would be a good option for you.    Lighter ice cream options without fake sugar:  - Ulisses's slow churned vanilla   - Jordan's frozen yogurt    Both of these are lower calorie and around 15 grams of carbohydrate per serving.

## 2018-04-09 NOTE — PROGRESS NOTES
Diabetes Self Management Training: Follow-up Visit    Yair Benites presents today for education and evaluation of glucose control Type 2 diabetes.    He is accompanied by his ILS worker     Patient's diabetes management related comments/concerns: Wants to lose some weight. And states he didn't meet his goal for exercise these past two weeks. Wants to keep working at it.     Patient would like this visit to be focused around the following diabetes-related behaviors and goals: Exercise, Monitoring, Healthy eating    ASSESSMENT:  Patient Problem List reviewed for relevant medical history and current medical status.    Current Diabetes Management per Patient:  Taking diabetes medications?   yes:     Diabetes Medication(s)     Biguanides Sig    metFORMIN (GLUCOPHAGE-XR) 500 MG 24 hr tablet TAKE 1 TABLET(500 MG) BY MOUTH DAILY        Patient glucose self monitoring as follows: one time daily.   BG results: fasting glucose- 104-117  Weight is 210 lbs which is unchanged.  Glucose in session today was 177 one hour after breakfast.     BG values are: In goal  Patient's most recent   Lab Results   Component Value Date    A1C 7.1 12/05/2017    is not meeting goal of <7.0    Nutrition:  Patient currently eats 3 meals per day.  His made significant changes to his breakfast so now is getting around 45-60 grams of carbohydrate at that meal.  He likes oatmeal with fruit and almond milk or Cheerios.  He has stopped drinking soda. Doesn't like diet options.  Goes out to eat with a friend every Friday and is trying to make healthier choices. Like thin crust veggie pizza and avoiding sweet drinks.  He'd like some ideas for better ice cream options. Last time he mentioned putting maple syrup on ice cream which he is no longer doing after we discussed how much sugar is in syrup.  He wants to lose weight but admits to having a large plate of chips and cheese which could be providing 1/3 of his calorie needs in a day if not more.  "Cutting this back by using a small plate, following serving sizes or finding lighter alternatives could help. He agreed to cut back.   Will continue to work with patient to make small steps in his nutrition and healthy eating.     Physical Activity:  Type: walking  Also has a membership at [employer] which he hasn't been using.   May consider using the track there, equipment or taking a class.  Doesn't like the idea of using a community pool.   Duration: 4 miles. Went once this week. His goal is three times per week.  Frequency: 1 days/week  Barriers: States he doesn't always have someone to walk with, the weather also makes it hard and his wife can't go quite as far as him.    Diabetes Complications:  Acute Complications: At risk for hypoglycemia? no  Chronic Complication Prevention: To be discussed at follow up    Vitals:  There were no vitals taken for this visit.  Estimated body mass index is 33.07 kg/(m^2) as calculated from the following:    Height as of 1/12/18: 1.689 m (5' 6.5\").    Weight as of 1/12/18: 94.3 kg (208 lb).   Last 3 BP:   BP Readings from Last 3 Encounters:   01/12/18 100/56   01/04/18 100/60   12/05/17 106/70       History   Smoking Status     Former Smoker     Years: 6.00     Types: Cigarettes     Quit date: 1/1/1983   Smokeless Tobacco     Never Used       Labs:  Lab Results   Component Value Date    A1C 7.1 12/05/2017     Lab Results   Component Value Date     12/05/2017     Lab Results   Component Value Date    LDL  12/05/2017     Cannot estimate LDL when triglyceride exceeds 400 mg/dL     12/05/2017     HDL Cholesterol   Date Value Ref Range Status   12/05/2017 39 (L) >39 mg/dL Final   ]  GFR Estimate   Date Value Ref Range Status   12/05/2017 90 >60 mL/min/1.7m2 Final     Comment:     Non  GFR Calc     GFR Estimate If Black   Date Value Ref Range Status   12/05/2017 >90 >60 mL/min/1.7m2 Final     Comment:      GFR Calc     Lab Results "   Component Value Date    CR 0.87 12/05/2017     No results found for: MICROALBUMIN    Health Beliefs and Attitudes:   Patient Activation Measure Survey Score:  IMMANUEL Score (Last Two) 2/18/2013   IMMANUEL Raw Score 42   Activation Score 66   IMMANUEL Level 3       Stage of Change: ACTION (Actively working towards change)    Progress toward meeting diabetes-related behavioral goals:  50%    Diabetes knowledge and skills assessment:     Patient needs further education on the following diabetes management concepts: Healthy Eating, Being Active and Reducing Risks    Barriers to Learning Assessment: history of TBI    Based on learning assessment above, most appropriate setting for further diabetes education would be: Individual setting.    INTERVENTION:    Education provided today on:  AADE Self-Care Behaviors:  Healthy Eating: Strategies for weight loss, lighter ice cream alternatives  Being Active: recommendation for 150 minutes of activity in a week, explored convenient options for exercise, relationship to weight loss and glucose     Opportunities for ongoing education and support in diabetes-self management were discussed.    Pt verbalized understanding of concepts discussed and recommendations provided today.       Education Materials Provided:  [place] Understanding Diabetes Booklet, Safe Disposal Options for Needles & Syringes, BG Log Sheet and No new materials provided today    PLAN:  See Patient Instructions for co-developed, patient-stated behavior change goals.  AVS printed and provided to patient today.    FOLLOW-UP:  Chart routed to referring provider.  Three weeks    Time Spent: 30 minutes  Encounter Type: Individual

## 2018-04-27 ENCOUNTER — OFFICE VISIT (OUTPATIENT)
Dept: EDUCATION SERVICES | Facility: CLINIC | Age: 58
End: 2018-04-27
Attending: FAMILY MEDICINE
Payer: MEDICARE

## 2018-04-27 DIAGNOSIS — E11.9 NEW ONSET TYPE 2 DIABETES MELLITUS (H): Primary | ICD-10-CM

## 2018-04-27 PROCEDURE — G0108 DIAB MANAGE TRN  PER INDIV: HCPCS | Performed by: NUTRITIONIST

## 2018-04-27 NOTE — PROGRESS NOTES
Diabetes Self Management Training: Follow-up Visit    Yair Benites presents today for education and evaluation of glucose control Type 2 diabetes.    He is accompanied by his ILS worker    Patient's diabetes management related comments/concerns: Wants to lose weight, states he walked once but not as much as he wants to, wants to try for a two mile walk instead of the four mile walk he had planned to do twice per week, thinks this would be more achievable     Patient would like this visit to be focused around the following diabetes-related behaviors and goals: Exercise, glucose monitoring and diet    ASSESSMENT:  Patient Problem List reviewed for relevant medical history and current medical status.    Current Diabetes Management per Patient:  Taking diabetes medications?   yes:     Diabetes Medication(s)     Biguanides Sig    metFORMIN (GLUCOPHAGE-XR) 500 MG 24 hr tablet TAKE 1 TABLET(500 MG) BY MOUTH DAILY          Patient glucose self monitoring as follows: Osmani checks his glucose every morning.   BG results: his fasting readings have all been at goal of under 130   When we check his post meal glucose during the session it is 133. His post meal glucose checks during sessions have improved since the first few visits do to making changes to his breakfast.  BG values are: In goal  Patient's most recent   Lab Results   Component Value Date    A1C 7.1 12/05/2017    is not meeting goal of <7.0  Needs recheck of A1C    Nutrition:  Patient wants to lose weight but making changes is a challenge.  Reinforced portion control with some of the higher calorie foods he eats like chips and cheese, sweets.   Breakfast - oatmeal  Lunch - pbj   Dinner - chips and cheese  Beverages: Not drinking sweetened drinks anymore  Biggest Challenge to Healthy Eating: making changes  Weight today is 210 lbs. This has been stable for the last three appointments.     Physical Activity:    Osmani walked one time since I saw him last. The four  "mile walk which he planned to do was a challenge for him.  He thinks two miles would be a better start. He feels he can get this two mile walk in twice per week.   The two mile walk is near his home. His ILS worker states she can go with him when she is with him on Fridays.    Diabetes Complications:  Chronic Complication Prevention: Eyes: exam within in the last year? No  Nerve/Circulation: foot exam within the last year No  Dental Health: brushing/flossing regularly Yes, dental exam within last year No    Vitals:  There were no vitals taken for this visit.  Estimated body mass index is 33.07 kg/(m^2) as calculated from the following:    Height as of 1/12/18: 1.689 m (5' 6.5\").    Weight as of 1/12/18: 94.3 kg (208 lb).   Last 3 BP:   BP Readings from Last 3 Encounters:   01/12/18 100/56   01/04/18 100/60   12/05/17 106/70       History   Smoking Status     Former Smoker     Years: 6.00     Types: Cigarettes     Quit date: 1/1/1983   Smokeless Tobacco     Never Used       Labs:  Lab Results   Component Value Date    A1C 7.1 12/05/2017     Lab Results   Component Value Date     12/05/2017     Lab Results   Component Value Date    LDL  12/05/2017     Cannot estimate LDL when triglyceride exceeds 400 mg/dL     12/05/2017     HDL Cholesterol   Date Value Ref Range Status   12/05/2017 39 (L) >39 mg/dL Final   ]  GFR Estimate   Date Value Ref Range Status   12/05/2017 90 >60 mL/min/1.7m2 Final     Comment:     Non  GFR Calc     GFR Estimate If Black   Date Value Ref Range Status   12/05/2017 >90 >60 mL/min/1.7m2 Final     Comment:      GFR Calc     Lab Results   Component Value Date    CR 0.87 12/05/2017     No results found for: MICROALBUMIN    Health Beliefs and Attitudes:   Patient Activation Measure Survey Score:  IMMANUEL Score (Last Two) 2/18/2013   IMMANUEL Raw Score 42   Activation Score 66   IMMANUEL Level 3       Stage of Change: ACTION (Actively working towards " change)    Progress toward meeting diabetes-related behavioral goals:  50%    INTERVENTION:    Education provided today on:  AADE Self-Care Behaviors:  Healthy eating, weight loss, monitoring, goals, exercise plan, benefits of making healthy lifestyle change.    Opportunities for ongoing education and support in diabetes-self management were discussed.    Pt verbalized understanding of concepts discussed and recommendations provided today.       PLAN:  See Patient Instructions for co-developed, patient-stated behavior change goals.  AVS printed and provided to patient today.    FOLLOW-UP:  Chart routed to referring provider.  Three weeks    Time Spent: 30 minutes  Encounter Type: Individual

## 2018-04-27 NOTE — PATIENT INSTRUCTIONS
Make an appointment for primary care - foot exam and A1C recheck.  Make an eye and dental appointment.    Walk twice per week for 2 miles.  Look into getting OT.     Cut back the chips and cheese.

## 2018-04-27 NOTE — MR AVS SNAPSHOT
"              After Visit Summary   2018    Yair Benites    MRN: [id number]           Patient Information     Date Of Birth          1960        Visit Information        Provider Department      2018 1:30 PM Barbara Riddle RD [facility],  Diabetes Education        Care Instructions    Make an appointment for primary care - foot exam and A1C recheck.  Make an eye and dental appointment.    Walk twice per week for 2 miles.  Look into getting OT.     Cut back the chips and cheese.           Follow-ups after your visit        Who to contact     If you have questions or need follow up information about today's clinic visit or your schedule please [facility] directly at [phone].  Normal or non-critical lab and imaging results will be communicated to you by [employer], letter or phone within 4 business days after the clinic has received the results. If you do not hear from us within 7 days, please contact the clinic through [employer] or phone. If you have a critical or abnormal lab result, we will notify you by phone as soon as possible.  Submit refill requests through [employer] or call your pharmacy and they will forward the refill request to us. Please allow 3 business days for your refill to be completed.          Additional Information About Your Visit        Arts[employer] Information     [employer] lets you send messages to your doctor, view your test results, renew your prescriptions, schedule appointments and more. To sign up, go to [email or web address] . Click on \"Log in\" on the left side of the screen, which will take you to the Welcome page. Then click on \"Sign up Now\" on the right side of the page.     You will be asked to enter the access code listed below, as well as some personal information. Please follow the directions to create your username and password.     Your access code is: [email or web address]  Expires: 2018  5:36 PM     Your access code will  in " 90 days. If you need help or a new code, please call your [facility] clinic or [phone].        Care EveryWhere ID     This is your Care EveryWhere ID. This could be used by other organizations to access your [facility] medical records  [id number]         Blood Pressure from Last 3 Encounters:   01/12/18 100/56   01/04/18 100/60   12/05/17 106/70    Weight from Last 3 Encounters:   01/12/18 94.3 kg (208 lb)   01/04/18 96.6 kg (213 lb)   12/05/17 94.8 kg (209 lb)              Today, you had the following     No orders found for display       Primary Care Provider Office Phone # Fax #    Radha Villegas [phone] [address] [facility] [place] [address]        Equal Access to Services     [facility] Hospital[facility] : Hadii aad dariela hadasho Soomaali, waaxda luqadaha, qaybta kaalmada adeegyada, stephany ziegler . So [facility] [phone].    ATENCIÓN: Si habla español, tiene a phillips disposición servicios gratuitos de asistencia lingüística. Roxann al [phone].    We comply with applicable federal civil rights laws and [place] laws. We do not discriminate on the basis of race, color, national origin, age, disability, sex, sexual orientation, or gender identity.            Thank you!     Thank you for choosing [facility]  DIABETES EDUCATION  for your care. Our goal is always to provide you with excellent care. Hearing back from our patients is one way we can continue to improve our services. Please take a few minutes to complete the written survey that you may receive in the mail after your visit with us. Thank you!             Your Updated Medication List - Protect others around you: Learn how to safely use, store and throw away your medicines at www.disposemymeds.org.          This list is accurate as of 4/27/18  2:21 PM.  Always use your most recent med list.                   Brand Name Dispense Instructions for use Diagnosis    ABILIFY 15 MG tablet   Generic drug:  ARIPiprazole       Take 15 mg by mouth daily Reported on 4/17/2017        ACE/ARB/ARNI NOT PRESCRIBED (INTENTIONAL)      ACE & ARB not prescribed due to Other:BP at goal        alcohol swab prep pads     100 each    Use to swab area of injection/reba as directed.    New onset type 2 diabetes mellitus (H)       aspirin 81 MG tablet      Take 1 tablet by mouth daily.        atorvastatin 20 MG tablet    LIPITOR    90 tablet    TAKE 1 TABLET(20 MG) BY MOUTH DAILY    Hyperlipidemia LDL goal <100       benzonatate 200 MG capsule    TESSALON    21 capsule    Take 1 capsule (200 mg) by mouth 3 times daily as needed for cough    Upper respiratory tract infection, unspecified type       blood glucose calibration solution    NO BRAND SPECIFIED    1 Bottle    To accompany: Blood Glucose Monitor Brands: per insurance.    New onset type 2 diabetes mellitus (H)       blood glucose monitoring meter device kit    no brand specified    1 kit    Use to test blood sugar 1 times daily or as directed. Preferred blood glucose meter OR supplies to accompany: Blood Glucose Monitor Brands: per insurance.    New onset type 2 diabetes mellitus (H)       blood glucose monitoring test strip    no brand specified    100 strip    Use to test blood sugar 1 times daily or as directed. To accompany: Blood Glucose Monitor Brands: per insurance.    New onset type 2 diabetes mellitus (H)       clonazePAM 0.5 MG tablet    klonoPIN     Take 1 tablet by mouth At Bedtime.        diphenhydrAMINE HCl (Sleep) 25 MG Tabs     30 tablet    Take 25 mg by mouth nightly as needed    Primary insomnia       FLAX SEED OIL PO      Take  by mouth daily. Uses powder on food.        hydrocortisone 1 % ointment     30 g    Apply sparingly to affected area three times daily for 14 days.    Rash       indomethacin 50 MG capsule    INDOCIN    30 capsule    Take 1 capsule (50 mg) by mouth 3 times daily as needed for moderate pain    Idiopathic chronic gout of left knee without tophus        Melatonin 10 MG Tabs tablet      Take 10 mg by mouth At Bedtime        metFORMIN 500 MG 24 hr tablet    GLUCOPHAGE-XR    90 tablet    TAKE 1 TABLET(500 MG) BY MOUTH DAILY    New onset type 2 diabetes mellitus (H)       mirtazapine 30 MG tablet    REMERON     Take 30 mg by mouth At Bedtime.        MULTIVITAMIN & MINERAL PO      Take 1 tablet by mouth daily.        order for DME     1 Device    Equipment being ordered:Face mask for CPAP machine size small with tubing and filter. [employer] ([phone])    SANDRA (obstructive sleep apnea)       * OVER-THE-COUNTER      Beta Prostate- Take 1 tablet daily.        * OVER-THE-COUNTER      Allergy med        polyethylene glycol powder    MIRALAX/GLYCOLAX    510 g    MIX AS DIRECTED AND TAKE 17 GRAMS BY MOUTH DAILY    Constipation       thin lancets    NO BRAND SPECIFIED    100 each    Use with lanceting device. To accompany: Blood Glucose Monitor Brands: per insurance.    New onset type 2 diabetes mellitus (H)       triamcinolone 0.1 % cream    KENALOG    30 g    Apply sparingly to affected area three times daily for 14 days (apply to back of left hand).    Rash of hands       vitamin D 1000 units capsule      Take 1 capsule by mouth daily.        * Notice:  This list has 2 medication(s) that are the same as other medications prescribed for you. Read the directions carefully, and ask your doctor or other care provider to review them with you.

## 2018-04-30 NOTE — PROGRESS NOTES
SUBJECTIVE:   Yair Benites is a 57 year old male who presents to clinic today for the following health issues:      Diabetes Follow-up    Patient is checking blood sugars: once daily.  Results are as follows:         am - patient is unsure, we would have to check with his wife. Today it was 113.    Diabetic concerns: None     Symptoms of hypoglycemia (low blood sugar): none     Paresthesias (numbness or burning in feet) or sores: No     Date of last diabetic eye exam: patient is unsure but over one year.     BP Readings from Last 2 Encounters:   01/12/18 100/56   01/04/18 100/60     Hemoglobin A1C (%)   Date Value   12/05/2017 7.1 (H)   12/05/2016 5.9     LDL Cholesterol Calculated (mg/dL)   Date Value   12/05/2017     Cannot estimate LDL when triglyceride exceeds 400 mg/dL   11/18/2016 76     LDL Cholesterol Direct (mg/dL)   Date Value   12/05/2017 106 (H)       Amount of exercise or physical activity: 1 day/week for an average of greater than 60 minutes    Problems taking medications regularly: No    Medication side effects: none    Diet: No sugar        Hyperlipidemia Follow-Up      Rate your low fat/cholesterol diet?: good    Taking statin?  Yes, no muscle aches from statin    Other lipid medications/supplements?:  none    Depression Followup    Status since last visit: Stable -sees psych-    See PHQ-9 for current symptoms.  Other associated symptoms: None    Complicating factors:   Significant life event:  No   Current substance abuse:  None  Anxiety or Panic symptoms:  No    PHQ-9 12/5/2017 1/5/2018 5/4/2018   Total Score 4 2 2   Q9: Suicide Ideation Not at all Not at all Not at all     .  PHQ-9  English  PHQ-9   Any Language  Suicide Assessment Five-step Evaluation and Treatment (SAFE-T)      Problem list and histories reviewed & adjusted, as indicated.  Additional history: as documented    Patient Active Problem List   Diagnosis     Colon polyp     Traumatic brain injury (H)     IBS (irritable bowel  syndrome)     Synovitis and tenosynovitis     Dizziness - light-headed     Primary localized osteoarthrosis, ankle and foot     Obesity     Depression, major     Headache disorder     SANDRA (obstructive sleep apnea)     Chronic gout without tophus, unspecified cause, unspecified site     Hyperlipidemia LDL goal <100     New onset type 2 diabetes mellitus (H)     Past Surgical History:   Procedure Laterality Date     SURGICAL HISTORY OF -       nose       Social History   Substance Use Topics     Smoking status: Former Smoker     Years: 6.00     Types: Cigarettes     Quit date: 1/1/1983     Smokeless tobacco: Never Used     Alcohol use No     Family History   Problem Relation Age of Onset     C.A.D. Father      MI age 66     HEART DISEASE Father      Depression Brother      CANCER Brother      Brain Tumor     Respiratory Brother      Depression Brother      Gallbladder Disease Brother      Dementia Brother      CANCER Brother      Parkinsonism Brother      DIABETES Maternal Uncle      Asthma No family hx of      Hypertension No family hx of          Current Outpatient Prescriptions   Medication Sig Dispense Refill     ACE/ARB/ARNI NOT PRESCRIBED, INTENTIONAL, ACE & ARB not prescribed due to Other:BP at goal       alcohol swab prep pads Use to swab area of injection/reba as directed. 100 each 3     ARIPiprazole (ABILIFY) 15 MG tablet Take 15 mg by mouth daily Reported on 4/17/2017       aspirin 81 MG tablet Take 1 tablet by mouth daily.       atorvastatin (LIPITOR) 20 MG tablet TAKE 1 TABLET(20 MG) BY MOUTH DAILY 90 tablet 3     blood glucose calibration (NO BRAND SPECIFIED) solution To accompany: Blood Glucose Monitor Brands: per insurance. 1 Bottle 3     blood glucose monitoring (NO BRAND SPECIFIED) meter device kit Use to test blood sugar 1 times daily or as directed. Preferred blood glucose meter OR supplies to accompany: Blood Glucose Monitor Brands: per insurance. 1 kit 0     blood glucose monitoring (NO BRAND  SPECIFIED) test strip Use to test blood sugar 1 times daily or as directed. To accompany: Blood Glucose Monitor Brands: per insurance. 100 strip 6     Cholecalciferol (VITAMIN D) 1000 UNIT capsule Take 1 capsule by mouth daily.       Flaxseed, Linseed, (FLAX SEED OIL PO) Take  by mouth daily. Uses powder on food.        indomethacin (INDOCIN) 50 MG capsule Take 1 capsule (50 mg) by mouth 3 times daily as needed for moderate pain 30 capsule 3     Melatonin 10 MG TABS tablet Take 10 mg by mouth At Bedtime       metFORMIN (GLUCOPHAGE-XR) 500 MG 24 hr tablet TAKE 1 TABLET(500 MG) BY MOUTH DAILY 90 tablet 1     mirtazapine (REMERON) 30 MG tablet Take 30 mg by mouth At Bedtime.       Multiple Vitamins-Minerals (MULTIVITAMIN & MINERAL PO) Take 1 tablet by mouth daily.       order for DME Equipment being ordered:Face mask for CPAP machine size small with tubing and filter. [employer] ([phone]) 1 Device 0     OVER-THE-COUNTER Beta Prostate- Take 1 tablet daily.       OVER-THE-COUNTER Allergy med       polyethylene glycol (MIRALAX/GLYCOLAX) powder MIX AS DIRECTED AND TAKE 17 GRAMS BY MOUTH DAILY 510 g 1     thin (NO BRAND SPECIFIED) lancets Use with lanceting device. To accompany: Blood Glucose Monitor Brands: per insurance. 100 each 6     triamcinolone (KENALOG) 0.1 % cream Apply sparingly to affected area three times daily for 14 days (apply to back of left hand). 30 g 0     benzonatate (TESSALON) 200 MG capsule Take 1 capsule (200 mg) by mouth 3 times daily as needed for cough (Patient not taking: Reported on 1/12/2018) 21 capsule 0     ClonAZEPAM (KLONOPIN) 0.5 MG tablet Take 1 tablet by mouth At Bedtime.       diphenhydrAMINE HCl, Sleep, 25 MG TABS Take 25 mg by mouth nightly as needed (Patient not taking: Reported on 1/4/2018) 30 tablet 3     hydrocortisone 1 % ointment Apply sparingly to affected area three times daily for 14 days. (Patient not taking: Reported on 5/4/2018) 30 g 0      [DISCONTINUED] atorvastatin (LIPITOR) 20 MG tablet TAKE 1 TABLET(20 MG) BY MOUTH DAILY 90 tablet 0     [DISCONTINUED] metFORMIN (GLUCOPHAGE-XR) 500 MG 24 hr tablet TAKE 1 TABLET(500 MG) BY MOUTH DAILY 90 tablet 0     Allergies   Allergen Reactions     Erythromycin Nausea     Imipramine Hcl      Toxic levels     Recent Labs   Lab Test  05/04/18   1304  01/12/18   1541  12/05/17   1530  12/23/16   1420  12/05/16   1405  11/18/16   1152  03/23/15   0953  09/22/14   1055   02/18/13   1113   02/03/12   1237   A1C  6.3*   --   7.1*   --   5.9   --   6.2*   --    --   5.8   --    --    LDL   --    --   Cannot estimate LDL when triglyceride exceeds 400 mg/dL  106*   --    --   76  71  67   --   73   --   78   HDL   --    --   39*   --    --   42  39*  43   --   35*   --   28*   TRIG   --    --   492*   --    --   202*  206*  252*   --   283*   --   148   ALT   --    --    --    --    --    --    --   34   --   43   --   40   CR   --    --   0.87  1.12   --    --    --    --    --   0.96   < >   --    GFRESTIMATED   --    --   90  67   --    --    --    --    --   82   < >   --    GFRESTBLACK   --    --   >90  81   --    --    --    --    --   >90   < >   --    POTASSIUM   --    --   4.0  4.0   --    --    --    --    --   4.4   < >   --    TSH   --   1.84   --    --    --    --   2.69   --    < >   --    --   1.47    < > = values in this interval not displayed.      BP Readings from Last 3 Encounters:   05/04/18 110/68   01/12/18 100/56   01/04/18 100/60    Wt Readings from Last 3 Encounters:   05/04/18 211 lb 12.8 oz (96.1 kg)   01/12/18 208 lb (94.3 kg)   01/04/18 213 lb (96.6 kg)                  Labs reviewed in EPIC    Reviewed and updated as needed this visit by clinical staff  Allergies       Reviewed and updated as needed this visit by Provider         ROS:    REVIEW OF SYSTEMS:  Complete/DM ROS -  C: NEGATIVE for fatigue, unexpected change in weight  E: NEGATIVE for acute vision problems or changes  R: NEGATIVE  "for significant cough or shortness of breath  CV: NEGATIVE for chest pain, palpitations or new or worsening peripheral edema  M: NEGATIVE for claudication, myalgias  N: NEGATIVE for weakness, dizziness, syncope, headaches  P: NEGATIVE for changes in mood or affect        OBJECTIVE:     /68  Pulse 73  Temp 97  F (36.1  C) (Oral)  Resp 16  Ht 5' 6.5\" (1.689 m)  Wt 211 lb 12.8 oz (96.1 kg)  SpO2 95%  BMI 33.67 kg/m2  Body mass index is 33.67 kg/(m^2).    EXAM:  /68  Pulse 73  Temp 97  F (36.1  C) (Oral)  Resp 16  Ht 5' 6.5\" (1.689 m)  Wt 211 lb 12.8 oz (96.1 kg)  SpO2 95%  BMI 33.67 kg/m2      GENERAL APPEARANCE: alert and no acute distress  PSYCH: mentation appears normal and affect normal/bright  RESP: lungs clear to auscultation - no rales, rhonchi or wheezes  CV: regular rate and rhythm, normal S1 S2, no S3 or S4 and no murmur, click or rub -  EXT: no cyanosis or edema in lower extremities  SKIN: no venous stasis changes  Foot Exam: normal DP and PT pulses, no trophic changes or ulcerative lesions and normal sensory exam        Diagnostic Test Results:  Results for orders placed or performed in visit on 05/04/18 (from the past 24 hour(s))   Hemoglobin A1c   Result Value Ref Range    Hemoglobin A1C 6.3 (H) 0 - 5.6 %       ASSESSMENT/PLAN:         BMI:   Estimated body mass index is 33.67 kg/(m^2) as calculated from the following:    Height as of this encounter: 5' 6.5\" (1.689 m).    Weight as of this encounter: 211 lb 12.8 oz (96.1 kg).   Weight management plan: Low josé miguel diet/Exercise      1. Type 2 diabetes mellitus without complication, without long-term current use of insulin (H)  controlled  - Albumin Random Urine Quantitative with Creat Ratio  - metFORMIN (GLUCOPHAGE-XR) 500 MG 24 hr tablet; TAKE 1 TABLET(500 MG) BY MOUTH DAILY  Dispense: 90 tablet; Refill: 1    2. Hyperlipidemia LDL goal <100  At Goal  - atorvastatin (LIPITOR) 20 MG tablet; TAKE 1 TABLET(20 MG) BY MOUTH DAILY  " Dispense: 90 tablet; Refill: 3  - Lipid panel reflex to direct LDL Fasting; Future    3. Major depressive disorder with single episode, remission status unspecified  Stable -sees psych    4. SANDRA (obstructive sleep apnea)  Has CPAP    5. Encounter for immunization  Advised   - HEPATITIS B VACCINE,ADULT,IM    6. Screening for human immunodeficiency virus    - HIV Antigen Antibody Combo    7. Class 1 obesity due to excess calories with serious comorbidity and body mass index (BMI) of 33.0 to 33.9 in adult  As above    8. Traumatic brain injury, without loss of consciousness, sequela (H)      Follow up 3months    Radha Villegas MD  [facility]

## 2018-05-04 ENCOUNTER — OFFICE VISIT (OUTPATIENT)
Dept: FAMILY MEDICINE | Facility: CLINIC | Age: 58
End: 2018-05-04
Payer: MEDICARE

## 2018-05-04 VITALS
DIASTOLIC BLOOD PRESSURE: 68 MMHG | HEIGHT: 67 IN | HEART RATE: 73 BPM | TEMPERATURE: 97 F | WEIGHT: 211.8 LBS | BODY MASS INDEX: 33.24 KG/M2 | RESPIRATION RATE: 16 BRPM | SYSTOLIC BLOOD PRESSURE: 110 MMHG | OXYGEN SATURATION: 95 %

## 2018-05-04 DIAGNOSIS — Z11.4 SCREENING FOR HUMAN IMMUNODEFICIENCY VIRUS: ICD-10-CM

## 2018-05-04 DIAGNOSIS — E11.9 TYPE 2 DIABETES MELLITUS WITHOUT COMPLICATION, WITHOUT LONG-TERM CURRENT USE OF INSULIN (H): Primary | ICD-10-CM

## 2018-05-04 DIAGNOSIS — E66.09 CLASS 1 OBESITY DUE TO EXCESS CALORIES WITH SERIOUS COMORBIDITY AND BODY MASS INDEX (BMI) OF 33.0 TO 33.9 IN ADULT: ICD-10-CM

## 2018-05-04 DIAGNOSIS — G47.33 OSA (OBSTRUCTIVE SLEEP APNEA): ICD-10-CM

## 2018-05-04 DIAGNOSIS — Z23 ENCOUNTER FOR IMMUNIZATION: ICD-10-CM

## 2018-05-04 DIAGNOSIS — E78.5 HYPERLIPIDEMIA LDL GOAL <100: ICD-10-CM

## 2018-05-04 DIAGNOSIS — E66.811 CLASS 1 OBESITY DUE TO EXCESS CALORIES WITH SERIOUS COMORBIDITY AND BODY MASS INDEX (BMI) OF 33.0 TO 33.9 IN ADULT: ICD-10-CM

## 2018-05-04 DIAGNOSIS — S06.9X0S TRAUMATIC BRAIN INJURY, WITHOUT LOSS OF CONSCIOUSNESS, SEQUELA (H): ICD-10-CM

## 2018-05-04 DIAGNOSIS — F32.9 MAJOR DEPRESSIVE DISORDER WITH SINGLE EPISODE, REMISSION STATUS UNSPECIFIED: ICD-10-CM

## 2018-05-04 LAB — HBA1C MFR BLD: 6.3 % (ref 0–5.6)

## 2018-05-04 PROCEDURE — 99214 OFFICE O/P EST MOD 30 MIN: CPT | Mod: 25 | Performed by: FAMILY MEDICINE

## 2018-05-04 PROCEDURE — 83036 HEMOGLOBIN GLYCOSYLATED A1C: CPT | Performed by: FAMILY MEDICINE

## 2018-05-04 PROCEDURE — 90746 HEPB VACCINE 3 DOSE ADULT IM: CPT | Performed by: FAMILY MEDICINE

## 2018-05-04 PROCEDURE — G0010 ADMIN HEPATITIS B VACCINE: HCPCS | Performed by: FAMILY MEDICINE

## 2018-05-04 PROCEDURE — 87389 HIV-1 AG W/HIV-1&-2 AB AG IA: CPT | Performed by: FAMILY MEDICINE

## 2018-05-04 PROCEDURE — 36415 COLL VENOUS BLD VENIPUNCTURE: CPT | Performed by: FAMILY MEDICINE

## 2018-05-04 RX ORDER — ATORVASTATIN CALCIUM 20 MG/1
TABLET, FILM COATED ORAL
Qty: 90 TABLET | Refills: 3 | Status: SHIPPED | OUTPATIENT
Start: 2018-05-04 | End: 2019-05-30

## 2018-05-04 RX ORDER — METFORMIN HCL 500 MG
TABLET, EXTENDED RELEASE 24 HR ORAL
Qty: 90 TABLET | Refills: 1 | Status: SHIPPED | OUTPATIENT
Start: 2018-05-04 | End: 2018-09-10

## 2018-05-04 ASSESSMENT — PAIN SCALES - GENERAL: PAINLEVEL: NO PAIN (0)

## 2018-05-04 NOTE — MR AVS SNAPSHOT
After Visit Summary   5/4/2018    Yair Benites    MRN: [id number]           Patient Information     Date Of Birth          1960        Visit Information        Provider Department      5/4/2018 1:00 PM Radha Villegas MD [facility]        Today's Diagnoses     Type 2 diabetes mellitus without complication, without long-term current use of insulin (H)    -  1    Hyperlipidemia LDL goal <100        Screening for human immunodeficiency virus        Major depressive disorder with single episode, remission status unspecified        SANDRA (obstructive sleep apnea)        Encounter for immunization          Care Instructions    [facility]    If you have any questions regarding to your visit please contact your care team:       Team Red:   Clinic Hours Telephone Number   Dr. Cary Latif, NP   7am-7pm  Monday - Thursday   7am-5pm  Fridays  [phone]  (Appointment scheduling available 24/7)    Questions about your recent visit?   Team Line  [phone]   Urgent Care - [place] and [facility] - 11am-9pm Monday-Friday Saturday-Sunday- 9am-5pm   [place] - 5pm-9pm Monday-Friday Saturday-Sunday- 9am-5pm  [phone] - Margaux Barraza  [phone] - [place]       What options do I have for a visit other than an office visit? We offer electronic visits (e-visits) and telephone visits, when medically appropriate.  Please check with your medical insurance to see if these types of visits are covered, as you will be responsible for any charges that are not paid by your insurance.      You can use [employer] (secure electronic communication) to access to your chart, send your primary care provider a message, or make an appointment. Ask a team member how to get started.     For a price quote for your services, please call our Consumer Price Line at [phone] or our Imaging Cost estimation line at [phone] (for  "imaging tests).      Discharged by: Jeri.             Follow-ups after your visit        Your next 10 appointments already scheduled     May 18, 2018  1:30 PM CDT   Office Visit with Barbara Riddle RD   [facility], [place],  Diabetes Education ([facility])    [address] [place] [id number] [phone]           Bring a current list of meds and any records pertaining to this visit. For Physicals, please bring immunization records and any forms needing to be filled out. Please arrive 10 minutes early to complete paperwork.              Future tests that were ordered for you today     Open Future Orders        Priority Expected Expires Ordered    Lipid panel reflex to direct LDL Fasting Routine  7/4/2018 5/4/2018            Who to contact     If you have questions or need follow up information about today's clinic visit or your schedule please contact [facility] [place] directly at [phone].  Normal or non-critical lab and imaging results will be communicated to you by [employer], letter or phone within 4 business days after the clinic has received the results. If you do not hear from us within 7 days, please contact the clinic through [employer] or phone. If you have a critical or abnormal lab result, we will notify you by phone as soon as possible.  Submit refill requests through [employer] or call your pharmacy and they will forward the refill request to us. Please allow 3 business days for your refill to be completed.          Additional Information About Your Visit        [employer] Information     [employer] lets you send messages to your doctor, view your test results, renew your prescriptions, schedule appointments and more. To sign up, go to [email or web address] . Click on \"Log in\" on the left side of the screen, which will take you to the Welcome page. Then click on \"Sign up Now\" on the right side of the page.     You will be asked to " "enter the access code listed below, as well as some personal information. Please follow the directions to create your username and password.     Your access code is: [email or web address]  Expires: 2018  5:36 PM     Your access code will  in 90 days. If you need help or a new code, please call your [place] clinic or [phone].        Care EveryWhere ID     This is your Care EveryWhere ID. This could be used by other organizations to access your [place] medical records  [id number]        Your Vitals Were     Pulse Temperature Respirations Height Pulse Oximetry BMI (Body Mass Index)    73 97  F (36.1  C) (Oral) 16 5' 6.5\" (1.689 m) 95% 33.67 kg/m2       Blood Pressure from Last 3 Encounters:   18 110/68   18 100/56   18 100/60    Weight from Last 3 Encounters:   18 211 lb 12.8 oz (96.1 kg)   18 208 lb (94.3 kg)   18 213 lb (96.6 kg)              We Performed the Following     Albumin Random Urine Quantitative with Creat Ratio     Hemoglobin A1c     HEPATITIS B VACCINE,ADULT,IM     HIV Antigen Antibody Combo          Today's Medication Changes          These changes are accurate as of 18  1:39 PM.  If you have any questions, ask your nurse or doctor.               These medicines have changed or have updated prescriptions.        Dose/Directions    atorvastatin 20 MG tablet   Commonly known as:  LIPITOR   This may have changed:  See the new instructions.   Used for:  Hyperlipidemia LDL goal <100   Changed by:  Radha Villegas MD        TAKE 1 TABLET(20 MG) BY MOUTH DAILY   Quantity:  90 tablet   Refills:  3       metFORMIN 500 MG 24 hr tablet   Commonly known as:  GLUCOPHAGE-XR   This may have changed:  See the new instructions.   Used for:  Type 2 diabetes mellitus without complication, without long-term current use of insulin (H)   Changed by:  Radha Villegas MD        TAKE 1 TABLET(500 MG) BY MOUTH DAILY   Quantity:  90 tablet   Refills:  1            Where to get your " medicines      These medications were sent to [id number] - [place] - [address] AT [facility] & [address]  [address] [facility], [place] [address]     Phone:  [phone]     atorvastatin 20 MG tablet    metFORMIN 500 MG 24 hr tablet                Primary Care Provider Office Phone # Fax #    Radha Villegas [phone] [address]  [place] [address]        Equal Access to Services     [facility] : Hadii aad ku hadasho Soomaali, waaxda luqadaha, qaybta kaalmada adeegyada, waxay idiin hayaan adeeg kharash ladeborah . So [facility] [phone].    ATENCIÓN: Si habla español, tiene a phillips disposición servicios gratuitos de asistencia lingüística. [facility] [phone].    We comply with applicable federal civil rights laws and [place] laws. We do not discriminate on the basis of race, color, national origin, age, disability, sex, sexual orientation, or gender identity.            Thank you!     Thank you for choosing [facility]  for your care. Our goal is always to provide you with excellent care. Hearing back from our patients is one way we can continue to improve our services. Please take a few minutes to complete the written survey that you may receive in the mail after your visit with us. Thank you!             Your Updated Medication List - Protect others around you: Learn how to safely use, store and throw away your medicines at www.disposemymeds.org.          This list is accurate as of 5/4/18  1:39 PM.  Always use your most recent med list.                   Brand Name Dispense Instructions for use Diagnosis    ABILIFY 15 MG tablet   Generic drug:  ARIPiprazole      Take 15 mg by mouth daily Reported on 4/17/2017        ACE/ARB/ARNI NOT PRESCRIBED (INTENTIONAL)      ACE & ARB not prescribed due to Other:BP at goal        alcohol swab prep pads     100 each    Use to swab area of injection/reba as directed.    New onset type 2  diabetes mellitus (H)       aspirin 81 MG tablet      Take 1 tablet by mouth daily.        atorvastatin 20 MG tablet    LIPITOR    90 tablet    TAKE 1 TABLET(20 MG) BY MOUTH DAILY    Hyperlipidemia LDL goal <100       benzonatate 200 MG capsule    TESSALON    21 capsule    Take 1 capsule (200 mg) by mouth 3 times daily as needed for cough    Upper respiratory tract infection, unspecified type       blood glucose calibration solution    NO BRAND SPECIFIED    1 Bottle    To accompany: Blood Glucose Monitor Brands: per insurance.    New onset type 2 diabetes mellitus (H)       blood glucose monitoring meter device kit    no brand specified    1 kit    Use to test blood sugar 1 times daily or as directed. Preferred blood glucose meter OR supplies to accompany: Blood Glucose Monitor Brands: per insurance.    New onset type 2 diabetes mellitus (H)       blood glucose monitoring test strip    no brand specified    100 strip    Use to test blood sugar 1 times daily or as directed. To accompany: Blood Glucose Monitor Brands: per insurance.    New onset type 2 diabetes mellitus (H)       clonazePAM 0.5 MG tablet    klonoPIN     Take 1 tablet by mouth At Bedtime.        diphenhydrAMINE HCl (Sleep) 25 MG Tabs     30 tablet    Take 25 mg by mouth nightly as needed    Primary insomnia       FLAX SEED OIL PO      Take  by mouth daily. Uses powder on food.        hydrocortisone 1 % ointment     30 g    Apply sparingly to affected area three times daily for 14 days.    Rash       indomethacin 50 MG capsule    INDOCIN    30 capsule    Take 1 capsule (50 mg) by mouth 3 times daily as needed for moderate pain    Idiopathic chronic gout of left knee without tophus       Melatonin 10 MG Tabs tablet      Take 10 mg by mouth At Bedtime        metFORMIN 500 MG 24 hr tablet    GLUCOPHAGE-XR    90 tablet    TAKE 1 TABLET(500 MG) BY MOUTH DAILY    Type 2 diabetes mellitus without complication, without long-term current use of insulin (H)        mirtazapine 30 MG tablet    REMERON     Take 30 mg by mouth At Bedtime.        MULTIVITAMIN & MINERAL PO      Take 1 tablet by mouth daily.        order for DME     1 Device    Equipment being ordered:Face mask for CPAP machine size small with tubing and filter. [employer] ([phone])    SANDRA (obstructive sleep apnea)       * OVER-THE-COUNTER      Beta Prostate- Take 1 tablet daily.        * OVER-THE-COUNTER      Allergy med        polyethylene glycol powder    MIRALAX/GLYCOLAX    510 g    MIX AS DIRECTED AND TAKE 17 GRAMS BY MOUTH DAILY    Constipation       thin lancets    NO BRAND SPECIFIED    100 each    Use with lanceting device. To accompany: Blood Glucose Monitor Brands: per insurance.    New onset type 2 diabetes mellitus (H)       triamcinolone 0.1 % cream    KENALOG    30 g    Apply sparingly to affected area three times daily for 14 days (apply to back of left hand).    Rash of hands       vitamin D 1000 units capsule      Take 1 capsule by mouth daily.        * Notice:  This list has 2 medication(s) that are the same as other medications prescribed for you. Read the directions carefully, and ask your doctor or other care provider to review them with you.

## 2018-05-04 NOTE — PATIENT INSTRUCTIONS
[facility]    If you have any questions regarding to your visit please contact your care team:       Team Red:   Clinic Hours Telephone Number   Dr. Cary Latif, NP   7am-7pm  Monday - Thursday   7am-5pm  Fridays  [phone]  (Appointment scheduling available 24/7)    Questions about your recent visit?   Team Line  [phone]   Urgent Care - [place] and [facility] - 11am-9pm Monday-Friday Saturday-Sunday- 9am-5pm   [place] - 5pm-9pm Monday-Friday Saturday-Sunday- 9am-5pm  [phone] - [place]  [phone] - [place]       What options do I have for a visit other than an office visit? We offer electronic visits (e-visits) and telephone visits, when medically appropriate.  Please check with your medical insurance to see if these types of visits are covered, as you will be responsible for any charges that are not paid by your insurance.      You can use [employer] (secure electronic communication) to access to your chart, send your primary care provider a message, or make an appointment. Ask a team member how to get started.     For a price quote for your services, please call our Consumer Price Line at [phone] or our Imaging Cost estimation line at [phone] (for imaging tests).      Discharged by: Jeri.

## 2018-05-05 ASSESSMENT — PATIENT HEALTH QUESTIONNAIRE - PHQ9: SUM OF ALL RESPONSES TO PHQ QUESTIONS 1-9: 2

## 2018-05-07 LAB — HIV 1+2 AB+HIV1 P24 AG SERPL QL IA: NONREACTIVE

## 2018-05-14 ENCOUNTER — TELEPHONE (OUTPATIENT)
Dept: FAMILY MEDICINE | Facility: CLINIC | Age: 58
End: 2018-05-14

## 2018-05-14 NOTE — TELEPHONE ENCOUNTER
Reason for Call:  Other call back    Detailed comments: need orders for skilled nursing for every other week for medications set up and diabetic education and health assesment    Phone Number Patient can be reached at: Other phone number:  [phone]    Best Time: any    Can we leave a detailed message on this number? YES    Call taken on 5/14/2018 at 5:27 PM by Gertrude Otero

## 2018-05-15 NOTE — TELEPHONE ENCOUNTER
Detailed VM left on patients phone regarding verbal OK for requested orders. Advised to call back with questions, [phone].    Swapna Bowen RN

## 2018-05-18 ENCOUNTER — OFFICE VISIT (OUTPATIENT)
Dept: EDUCATION SERVICES | Facility: CLINIC | Age: 58
End: 2018-05-18
Attending: FAMILY MEDICINE
Payer: MEDICARE

## 2018-05-18 DIAGNOSIS — E11.9 TYPE 2 DIABETES MELLITUS WITHOUT COMPLICATION, WITHOUT LONG-TERM CURRENT USE OF INSULIN (H): Primary | ICD-10-CM

## 2018-05-18 PROCEDURE — G0108 DIAB MANAGE TRN  PER INDIV: HCPCS | Performed by: NUTRITIONIST

## 2018-05-18 NOTE — PATIENT INSTRUCTIONS
Keep testing your blood sugar every morning.   Bring your meter to your doctor appointments.    Bike every Monday and Thursday at 4pm.  20 minutes.

## 2018-05-18 NOTE — MR AVS SNAPSHOT
"              After Visit Summary   2018    Yair Benites    MRN: [id number]           Patient Information     Date Of Birth          1960        Visit Information        Provider Department      2018 1:30 PM Barbara Riddle RD [facility],  Diabetes Education        Care Instructions    Keep testing your blood sugar every morning.   Bring your meter to your doctor appointments.    Bike every Monday and Thursday at 4pm.  20 minutes.           Follow-ups after your visit        Who to contact     If you have questions or need follow up information about today's clinic visit or your schedule please contact [facility]  DIABETES EDUCATION directly at [phone].  Normal or non-critical lab and imaging results will be communicated to you by MyChart, letter or phone within 4 business days after the clinic has received the results. If you do not hear from us within 7 days, please contact the clinic through [employer] or phone. If you have a critical or abnormal lab result, we will notify you by phone as soon as possible.  Submit refill requests through [employer] or call your pharmacy and they will forward the refill request to us. Please allow 3 business days for your refill to be completed.          Additional Information About Your Visit        MyChart Information     [employer] lets you send messages to your doctor, view your test results, renew your prescriptions, schedule appointments and more. To sign up, go to [email or web address] . Click on \"Log in\" on the left side of the screen, which will take you to the Welcome page. Then click on \"Sign up Now\" on the right side of the page.     You will be asked to enter the access code listed below, as well as some personal information. Please follow the directions to create your username and password.     Your access code is: [email or web address]  Expires: 2018  5:36 PM     Your access code will  in 90 days. If you need help or a new code, please " call your [place] clinic or [phone].        Care EveryWhere ID     This is your Care EveryWhere ID. This could be used by other organizations to access your [place] medical records  [id number]         Blood Pressure from Last 3 Encounters:   05/04/18 110/68   01/12/18 100/56   01/04/18 100/60    Weight from Last 3 Encounters:   05/04/18 96.1 kg (211 lb 12.8 oz)   01/12/18 94.3 kg (208 lb)   01/04/18 96.6 kg (213 lb)              Today, you had the following     No orders found for display       Primary Care Provider Office Phone # Fax #    Radha Villegas [phone]       [address] [facility]  [place] [address]        Equal Access to Services     [facility] : Hadii erin lyle hadasho Soomaali, waaxda luqadaha, qaybta kaalmada adeegyada, stephany ziegler . So [facility] [phone].    ATENCIÓN: Si habla español, tiene a phillips disposición servicios gratuitos de asistencia lingüística. Roxann al [phone].    We comply with applicable federal civil rights laws and [place] laws. We do not discriminate on the basis of race, color, national origin, age, disability, sex, sexual orientation, or gender identity.            Thank you!     Thank you for choosing [facility],  DIABETES EDUCATION  for your care. Our goal is always to provide you with excellent care. Hearing back from our patients is one way we can continue to improve our services. Please take a few minutes to complete the written survey that you may receive in the mail after your visit with us. Thank you!             Your Updated Medication List - Protect others around you: Learn how to safely use, store and throw away your medicines at www.disposemymeds.org.          This list is accurate as of 5/18/18  2:19 PM.  Always use your most recent med list.                   Brand Name Dispense Instructions for use Diagnosis    ABILIFY 15 MG tablet   Generic drug:  ARIPiprazole      Take 15 mg by mouth daily Reported on  4/17/2017        ACE/ARB/ARNI NOT PRESCRIBED (INTENTIONAL)      ACE & ARB not prescribed due to Other:BP at goal        alcohol swab prep pads     100 each    Use to swab area of injection/reba as directed.    New onset type 2 diabetes mellitus (H)       aspirin 81 MG tablet      Take 1 tablet by mouth daily.        atorvastatin 20 MG tablet    LIPITOR    90 tablet    TAKE 1 TABLET(20 MG) BY MOUTH DAILY    Hyperlipidemia LDL goal <100       benzonatate 200 MG capsule    TESSALON    21 capsule    Take 1 capsule (200 mg) by mouth 3 times daily as needed for cough    Upper respiratory tract infection, unspecified type       blood glucose calibration solution    NO BRAND SPECIFIED    1 Bottle    To accompany: Blood Glucose Monitor Brands: per insurance.    New onset type 2 diabetes mellitus (H)       blood glucose monitoring meter device kit    no brand specified    1 kit    Use to test blood sugar 1 times daily or as directed. Preferred blood glucose meter OR supplies to accompany: Blood Glucose Monitor Brands: per insurance.    New onset type 2 diabetes mellitus (H)       blood glucose monitoring test strip    no brand specified    100 strip    Use to test blood sugar 1 times daily or as directed. To accompany: Blood Glucose Monitor Brands: per insurance.    New onset type 2 diabetes mellitus (H)       clonazePAM 0.5 MG tablet    klonoPIN     Take 1 tablet by mouth At Bedtime.        diphenhydrAMINE HCl (Sleep) 25 MG Tabs     30 tablet    Take 25 mg by mouth nightly as needed    Primary insomnia       FLAX SEED OIL PO      Take  by mouth daily. Uses powder on food.        hydrocortisone 1 % ointment     30 g    Apply sparingly to affected area three times daily for 14 days.    Rash       indomethacin 50 MG capsule    INDOCIN    30 capsule    Take 1 capsule (50 mg) by mouth 3 times daily as needed for moderate pain    Idiopathic chronic gout of left knee without tophus       Melatonin 10 MG Tabs tablet      Take 10 mg  by mouth At Bedtime        metFORMIN 500 MG 24 hr tablet    GLUCOPHAGE-XR    90 tablet    TAKE 1 TABLET(500 MG) BY MOUTH DAILY    Type 2 diabetes mellitus without complication, without long-term current use of insulin (H)       mirtazapine 30 MG tablet    REMERON     Take 30 mg by mouth At Bedtime.        MULTIVITAMIN & MINERAL PO      Take 1 tablet by mouth daily.        order for DME     1 Device    Equipment being ordered:Face mask for CPAP machine size small with tubing and filter. [employer] ([phone])    SANDRA (obstructive sleep apnea)       * OVER-THE-COUNTER      Beta Prostate- Take 1 tablet daily.        * OVER-THE-COUNTER      Allergy med        polyethylene glycol powder    MIRALAX/GLYCOLAX    510 g    MIX AS DIRECTED AND TAKE 17 GRAMS BY MOUTH DAILY    Constipation       thin lancets    NO BRAND SPECIFIED    100 each    Use with lanceting device. To accompany: Blood Glucose Monitor Brands: per insurance.    New onset type 2 diabetes mellitus (H)       triamcinolone 0.1 % cream    KENALOG    30 g    Apply sparingly to affected area three times daily for 14 days (apply to back of left hand).    Rash of hands       vitamin D 1000 units capsule      Take 1 capsule by mouth daily.        * Notice:  This list has 2 medication(s) that are the same as other medications prescribed for you. Read the directions carefully, and ask your doctor or other care provider to review them with you.

## 2018-05-21 NOTE — PROGRESS NOTES
Diabetes Self Management Training: Follow-up Visit    Yair Benites presents today for education Type 2 diabetes.  He is accompanied by his ILS worker.  Patient's diabetes management related comments/concerns: Patient wants to avoid diabetes medications.  Patient would like this visit to be focused around the following diabetes-related behaviors and goals: review glucose, diet, exercise    ASSESSMENT:  Patient Problem List reviewed for relevant medical history and current medical status.    Current Diabetes Management per Patient:  Taking diabetes medications? no, Problems taking diabetes medications regularly? No     Patient glucose self monitoring as follows: one time daily.   BG results: Osmani has been testing his glucose every day. When I first met Osmani he wasn't sure he'd be able to do glucose testing, so congratulated him today on doing such a great job with his tracking. Encouraged him to bring this attitude into other areas of his life like activity. Patient's ILS worker Kalina points out that Osmani is a pretty routine person, so once he gets into a routine he does really well with sticking to it. Patient agrees.    Glucose was tested today about an hour after a snack and it was 180 mg/dL.     BG values are: In goal  Patient's most recent   Lab Results   Component Value Date    A1C 6.3 05/04/2018    is meeting goal of <7.0  This is down from diagnosis. Reviewed what A1C means with patient today.     Nutrition:  Patient hasn't been eating chips and cheese.  Breakfast - instant sweetened oatmeal two packets. Patient is willing to switch to low sugar oatmeal.  Lunch - peanut butter sandwich   Dinner - bowl of chili  When patient works he will have a V8 (15 grams of carb) and a half sandwich. Later on he has some crackers and cheese from a vending machine. He states healthier options are marked with a heart and that he is looking for those options.    Beverages: water  Cultural/Catholic diet restrictions: No  "  Biggest Challenge to Healthy Eating: making changes/habits/establishing new routines  We also discussed whole grains today, fiber, and to look for these things when choosing crackers.    Physical Activity:    The goal today was to work on getting Osmani to establish a routine he feels confident he can start.   Decided to start with biking on his stationary bike for about 20 minutes on Monday and Thursday.   He will do this prior to dinner around 4pm.  He states he has to listen to music when he exercises so biking is a good option as he has speakers and music set up in that room.     Diabetes Complications:  None    Vitals:  There were no vitals taken for this visit.  Estimated body mass index is 33.67 kg/(m^2) as calculated from the following:    Height as of 5/4/18: 1.689 m (5' 6.5\").    Weight as of 5/4/18: 96.1 kg (211 lb 12.8 oz).   Last 3 BP:   BP Readings from Last 3 Encounters:   05/04/18 110/68   01/12/18 100/56   01/04/18 100/60       History   Smoking Status     Former Smoker     Years: 6.00     Types: Cigarettes     Quit date: 1/1/1983   Smokeless Tobacco     Never Used       Labs:  Lab Results   Component Value Date    A1C 6.3 05/04/2018     Lab Results   Component Value Date     12/05/2017     Lab Results   Component Value Date    LDL  12/05/2017     Cannot estimate LDL when triglyceride exceeds 400 mg/dL     12/05/2017     HDL Cholesterol   Date Value Ref Range Status   12/05/2017 39 (L) >39 mg/dL Final   ]  GFR Estimate   Date Value Ref Range Status   12/05/2017 90 >60 mL/min/1.7m2 Final     Comment:     Non  GFR Calc     GFR Estimate If Black   Date Value Ref Range Status   12/05/2017 >90 >60 mL/min/1.7m2 Final     Comment:      GFR Calc     Lab Results   Component Value Date    CR 0.87 12/05/2017     No results found for: MICROALBUMIN    Health Beliefs and Attitudes:   Patient Activation Measure Survey Score:  IMMANUEL Score (Last Two) 2/18/2013   IMMANUEL Raw " Score 42   Activation Score 66   IMMANUEL Level 3       Stage of Change: ACTION (Actively working towards change)    Progress toward meeting diabetes-related behavioral goals:  Exercise 50%  Nutrition 100%    INTERVENTION:    Education provided today on:  AADE Self-Care Behaviors:  Healthy eating and activity    Opportunities for ongoing education and support in diabetes-self management were discussed.    Pt verbalized understanding of concepts discussed and recommendations provided today.       PLAN:  See Patient Instructions for co-developed, patient-stated behavior change goals.  AVS printed and provided to patient today.    FOLLOW-UP:  Chart routed to referring provider.  Three weeks    Time Spent: 30 minutes  Encounter Type: Individual

## 2018-06-08 DIAGNOSIS — Z53.9 DIAGNOSIS NOT YET DEFINED: Primary | ICD-10-CM

## 2018-06-08 PROCEDURE — G0179 MD RECERTIFICATION HHA PT: HCPCS | Performed by: FAMILY MEDICINE

## 2018-06-22 ENCOUNTER — OFFICE VISIT (OUTPATIENT)
Dept: EDUCATION SERVICES | Facility: CLINIC | Age: 58
End: 2018-06-22
Attending: FAMILY MEDICINE
Payer: MEDICARE

## 2018-06-22 DIAGNOSIS — E11.9 TYPE 2 DIABETES MELLITUS WITHOUT COMPLICATION, WITHOUT LONG-TERM CURRENT USE OF INSULIN (H): Primary | ICD-10-CM

## 2018-06-22 PROCEDURE — G0108 DIAB MANAGE TRN  PER INDIV: HCPCS | Performed by: NUTRITIONIST

## 2018-06-22 NOTE — MR AVS SNAPSHOT
After Visit Summary   6/22/2018    Yair Benites    MRN: [id number]           Patient Information     Date Of Birth          1960        Visit Information        Provider Department      6/22/2018 1:30 PM Barbara Riddle RD [facility],  Diabetes Education        Today's Diagnoses     Type 2 diabetes mellitus without complication, without long-term current use of insulin (H)    -  1       Follow-ups after your visit        Your next 10 appointments already scheduled     Jul 20, 2018  1:30 PM CDT   Office Visit with Barbara Riddle RD   [facility],  Diabetes Education ([facility])    [address] [place] [id number] [phone]           Bring a current list of meds and any records pertaining to this visit. For Physicals, please bring immunization records and any forms needing to be filled out. Please arrive 10 minutes early to complete paperwork.              Who to contact     If you have questions or need follow up information about today's clinic visit or your schedule please contact [facility]  DIAB[facility] EDUC[facility] directly at [phone].  Normal or non-critical lab and imaging results will be communicated to you by [employer], letter or phone within 4 business days after the clinic has received the results. If you do not hear from us within 7 days, please contact the clinic through [employer] or phone. If you have a critical or abnormal lab result, we will notify you by phone as soon as possible.  Submit refill requests through [employer] or call your pharmacy and they will forward the refill request to us. Please allow 3 business days for your refill to be completed.          Additional Information About Your Visit        [employer] Information     [employer] lets you send messages to your doctor, view your test results, renew your prescriptions, schedule appointments and more. To sign up, go to  [email or web address] . Click on \"Log in\" on the left side of the screen, which will take you to the Welcome page. Then click on \"Sign up Now\" on the right side of the page.     You will be asked to enter the access code listed below, as well as some personal information. Please follow the directions to create your username and password.     Your access code is: [email or web address]  Expires: 2018 12:23 PM     Your access code will  in 90 days. If you need help or a new code, please call your [facility] clinic or [phone].        Care EveryWhere ID     This is your Care EveryWhere ID. This could be used by other organizations to access your [facility] medical records  [id number]         Blood Pressure from Last 3 Encounters:   18 110/68   18 100/56   18 100/60    Weight from Last 3 Encounters:   18 96.1 kg (211 lb 12.8 oz)   18 94.3 kg (208 lb)   18 96.6 kg (213 lb)              We Performed the Following     DIABETES EDUCATION - Individual  []        Primary Care Provider Office Phone # Fax #    Radha Villegas [phone] [facility] [id number]        Equal Access to Services     KEYA [facility] : Hadii erin lyle hadjezo Sotrina, waaxda luqadaha, qaybta kaalmada anna, stephany ziegler . So [facility] [phone].    ATENCIÓN: Si habla español, tiene a phillips disposición servicios gratuitos de asistencia lingüística. Roxann al [phone].    We comply with applicable federal civil rights laws and [place] laws. We do not discriminate on the basis of race, color, national origin, age, disability, sex, sexual orientation, or gender identity.            Thank you!     Thank you for choosing [facility]  DIABETES EDUCATION  for your care. Our goal is always to provide you with excellent care. Hearing back from our patients is one way we can continue to improve our services. Please take a few minutes to complete " the written survey that you may receive in the mail after your visit with us. Thank you!             Your Updated Medication List - Protect others around you: Learn how to safely use, store and throw away your medicines at www.disposemymeds.org.          This list is accurate as of 6/22/18 11:59 PM.  Always use your most recent med list.                   Brand Name Dispense Instructions for use Diagnosis    ABILIFY 15 MG tablet   Generic drug:  ARIPiprazole      Take 15 mg by mouth daily Reported on 4/17/2017        ACE/ARB/ARNI NOT PRESCRIBED (INTENTIONAL)      ACE & ARB not prescribed due to Other:BP at goal        alcohol swab prep pads     100 each    Use to swab area of injection/reba as directed.    New onset type 2 diabetes mellitus (H)       aspirin 81 MG tablet      Take 1 tablet by mouth daily.        atorvastatin 20 MG tablet    LIPITOR    90 tablet    TAKE 1 TABLET(20 MG) BY MOUTH DAILY    Hyperlipidemia LDL goal <100       benzonatate 200 MG capsule    TESSALON    21 capsule    Take 1 capsule (200 mg) by mouth 3 times daily as needed for cough    Upper respiratory tract infection, unspecified type       blood glucose calibration solution    NO BRAND SPECIFIED    1 Bottle    To accompany: Blood Glucose Monitor Brands: per insurance.    New onset type 2 diabetes mellitus (H)       blood glucose monitoring meter device kit    no brand specified    1 kit    Use to test blood sugar 1 times daily or as directed. Preferred blood glucose meter OR supplies to accompany: Blood Glucose Monitor Brands: per insurance.    New onset type 2 diabetes mellitus (H)       blood glucose monitoring test strip    no brand specified    100 strip    Use to test blood sugar 1 times daily or as directed. To accompany: Blood Glucose Monitor Brands: per insurance.    New onset type 2 diabetes mellitus (H)       clonazePAM 0.5 MG tablet    klonoPIN     Take 1 tablet by mouth At Bedtime.        diphenhydrAMINE HCl (Sleep) 25 MG  Tabs     30 tablet    Take 25 mg by mouth nightly as needed    Primary insomnia       FLAX SEED OIL PO      Take  by mouth daily. Uses powder on food.        hydrocortisone 1 % ointment     30 g    Apply sparingly to affected area three times daily for 14 days.    Rash       indomethacin 50 MG capsule    INDOCIN    30 capsule    Take 1 capsule (50 mg) by mouth 3 times daily as needed for moderate pain    Idiopathic chronic gout of left knee without tophus       Melatonin 10 MG Tabs tablet      Take 10 mg by mouth At Bedtime        metFORMIN 500 MG 24 hr tablet    GLUCOPHAGE-XR    90 tablet    TAKE 1 TABLET(500 MG) BY MOUTH DAILY    Type 2 diabetes mellitus without complication, without long-term current use of insulin (H)       mirtazapine 30 MG tablet    REMERON     Take 30 mg by mouth At Bedtime.        MULTIVITAMIN & MINERAL PO      Take 1 tablet by mouth daily.        order for DME     1 Device    Equipment being ordered:Face mask for CPAP machine size small with tubing and filter. [employer] ([phone])    SANDRA (obstructive sleep apnea)       * OVER-THE-COUNTER      Beta Prostate- Take 1 tablet daily.        * OVER-THE-COUNTER      Allergy med        thin lancets    NO BRAND SPECIFIED    100 each    Use with lanceting device. To accompany: Blood Glucose Monitor Brands: per insurance.    New onset type 2 diabetes mellitus (H)       triamcinolone 0.1 % cream    KENALOG    30 g    Apply sparingly to affected area three times daily for 14 days (apply to back of left hand).    Rash of hands       vitamin D 1000 units capsule      Take 1 capsule by mouth daily.        * Notice:  This list has 2 medication(s) that are the same as other medications prescribed for you. Read the directions carefully, and ask your doctor or other care provider to review them with you.

## 2018-06-27 NOTE — PROGRESS NOTES
"  Diabetes Self Management Training: Follow-up Visit    Yair Benites presents today for education and evaluation of glucose control Type 2 diabetes.    He is accompanied by his ILS worker    ASSESSMENT:  Patient Problem List reviewed for relevant medical history and current medical status.    Current Diabetes Management per Patient:  Taking diabetes medications?   yes:     Diabetes Medication(s)     Biguanides Sig    metFORMIN (GLUCOPHAGE-XR) 500 MG 24 hr tablet TAKE 1 TABLET(500 MG) BY MOUTH DAILY        Do you have any difficulty affording your medications or glucose monitoring supplies?  No     Patient glucose self monitoring as follows: one time daily.   BG results: fasting glucose . Now that he is exercising he is seeing more fasting readings between . His glucose today is 146 two hours after oatmeal.      BG values are: In goal  Patient's most recent   Lab Results   Component Value Date    A1C 6.3 05/04/2018    is meeting goal of <7.0    Nutrition:  Eating less chips and cheese.  Avoiding sweets and sweetened drinks.     Physical Activity:    Osmani has started exercising. He is going to [facility] three times per week on M [address] F and swimming for 30 minutes. He feels good about his. He is also doing lawn work.     His weight is down five pounds today. 206 lbs.     Diabetes Complications:  None    Recent health service and resource utilization related to diabetes (hyperglycemia, hypoglycemia, etc.):  None    Vitals:  There were no vitals taken for this visit.  Estimated body mass index is 33.67 kg/(m^2) as calculated from the following:    Height as of 5/4/18: 1.689 m (5' 6.5\").    Weight as of 5/4/18: 96.1 kg (211 lb 12.8 oz).   Last 3 BP:   BP Readings from Last 3 Encounters:   05/04/18 110/68   01/12/18 100/56   01/04/18 100/60       History   Smoking Status     Former Smoker     Years: 6.00     Types: Cigarettes     Quit date: 1/1/1983   Smokeless Tobacco     Never Used       Labs:  Lab Results "   Component Value Date    A1C 6.3 05/04/2018     Lab Results   Component Value Date     12/05/2017     Lab Results   Component Value Date    LDL  12/05/2017     Cannot estimate LDL when triglyceride exceeds 400 mg/dL     12/05/2017     HDL Cholesterol   Date Value Ref Range Status   12/05/2017 39 (L) >39 mg/dL Final   ]  GFR Estimate   Date Value Ref Range Status   12/05/2017 90 >60 mL/min/1.7m2 Final     Comment:     Non  GFR Calc     GFR Estimate If Black   Date Value Ref Range Status   12/05/2017 >90 >60 mL/min/1.7m2 Final     Comment:      GFR Calc     Lab Results   Component Value Date    CR 0.87 12/05/2017     No results found for: MICROALBUMIN    Health Beliefs and Attitudes:   Patient Activation Measure Survey Score:  IMMANUEL Score (Last Two) 2/18/2013   IMMANUEL Raw Score 42   Activation Score 66   IMMANUEL Level 3       Stage of Change: ACTION (Actively working towards change)    Progress toward meeting diabetes-related behavioral goals:  100%    INTERVENTION:    Education provided today on:  Encouraged Osmani to continue to use portion control and to maintain his activity routine.     Opportunities for ongoing education and support in diabetes-self management were discussed.    Pt verbalized understanding of concepts discussed and recommendations provided today.       PLAN:  See Patient Instructions for co-developed, patient-stated behavior change goals.  Meal Plan Recommendation: eat 3 meals a day, have small snacks between meals, if needed and use portion control  Exercise / activity plan: Gym three days per week  Check blood sugars fasting  AVS printed and provided to patient today.    FOLLOW-UP:  One month    Time Spent: 30 minutes  Encounter Type: Individual    Any diabetes medication dose changes were made via the CDE Protocol and Collaborative Practice Agreement with the patient's referring provider. A copy of this encounter was shared with the provider.

## 2018-07-12 ENCOUNTER — TELEPHONE (OUTPATIENT)
Dept: FAMILY MEDICINE | Facility: CLINIC | Age: 58
End: 2018-07-12

## 2018-07-12 NOTE — TELEPHONE ENCOUNTER
Gave verbal okay for home care orders below to Sheila per standing orders, she verbalized understanding.    Elisha Glez RN

## 2018-07-12 NOTE — TELEPHONE ENCOUNTER
Reason for Call:  Home Health Care    Sheila with [facility] called regarding (reason for call): verbal orders     Orders are needed for this patient.     PT: YAW    OT: NA    Skilled Nursing: skilled nursing once a week every other week for 60 days for med management diabetic management and health assesment.    Pt Provider: Bakari    Phone Number Homecare Nurse can be reached at: [phone]    Can we leave a detailed message on this number? YES    Phone number patient can be reached at: Home number on file [phone] (home)    Best Time: any time    Call taken on 7/12/2018 at 4:15 PM by Jillian Storey

## 2018-07-20 ENCOUNTER — OFFICE VISIT (OUTPATIENT)
Dept: EDUCATION SERVICES | Facility: CLINIC | Age: 58
End: 2018-07-20
Attending: FAMILY MEDICINE
Payer: MEDICARE

## 2018-07-20 DIAGNOSIS — E11.9 TYPE 2 DIABETES MELLITUS WITHOUT COMPLICATION, WITHOUT LONG-TERM CURRENT USE OF INSULIN (H): Primary | ICD-10-CM

## 2018-07-20 PROCEDURE — G0108 DIAB MANAGE TRN  PER INDIV: HCPCS | Performed by: NUTRITIONIST

## 2018-07-20 NOTE — MR AVS SNAPSHOT
After Visit Summary   7/20/2018    Yair Benites    MRN: [id number]           Patient Information     Date Of Birth          1960        Visit Information        Provider Department      7/20/2018 1:30 PM [facility]iddle [facility]  [facility]        Today's Diagnoses     Type 2 diabetes mellitus without complication, without long-term current use of insulin (H)    -  1       Follow-ups after your visit        Your next 10 appointments already scheduled     Aug 17, 2018 12:30 PM CDT   Office Visit with Barbara Riddle RD   [facility]  Diabetes Education ([facility])    [address] [place] [id number] [phone]           Bring a current list of meds and any records pertaining to this visit. For Physicals, please bring immunization records and any forms needing to be filled out. Please arrive 10 minutes early to complete paperwork.              Who to contact     If you have questions or need follow up information about today's clinic visit or your schedule please contact [facility]UC[facility] directly at [phone].  Normal or non-critical lab and imaging results will be communicated to you by FrameBuzzhart, letter or phone within 4 business days after the clinic has received the results. If you do not hear from us within 7 days, please contact the clinic through Diabetes Care Groupt or phone. If you have a critical or abnormal lab result, we will notify you by phone as soon as possible.  Submit refill requests through Swoon Editions or call your pharmacy and they will forward the refill request to us. Please allow 3 business days for your refill to be completed.          Additional Information About Your Visit        FrameBuzzharBioClinica Information     S[employer] Editions lets you send messages to your doctor, view your test results, renew your prescriptions, schedule appointments and more. To sign up, go to  [email or web address] . Click on \"Log in\" on the left side of the screen, which will take you to the Welcome page. Then click on \"Sign up Now\" on the right side of the page.     You will be asked to enter the access code listed below, as well as some personal information. Please follow the directions to create your username and password.     Your access code is: [email or web address]  Expires: 2018 12:23 PM     Your access code will  in 90 days. If you need help or a new code, please call your [place] clinic or [phone].        Care EveryWhere ID     This is your Care EveryWhere ID. This could be used by other organizations to access your [place] medical records  [id number]         Blood Pressure from Last 3 Encounters:   18 110/68   18 100/56   18 100/60    Weight from Last 3 Encounters:   18 96.1 kg (211 lb 12.8 oz)   18 94.3 kg (208 lb)   18 96.6 kg (213 lb)              We Performed the Following     DIABETES EDUCATION - Individual  []        Primary Care Provider Office Phone # Fax #    Radha Villegas [phone] [address] [facility] [address]        Equal Access to Services     [facility] : Hadii erin lyle hadjezo Sotrina, waaxda luqadaha, qaybta kaalmada anna, stephany ziegler . So [facility] [phone].    ATENCIÓN: Si habla español, tiene a phillips disposición servicios gratuitos de asistencia lingüística. Roxann al [phone].    We comply with applicable federal civil rights laws and [place] laws. We do not discriminate on the basis of race, color, national origin, age, disability, sex, sexual orientation, or gender identity.            Thank you!     Thank you for choosing [facility]  DIABETES EDUCATION  for your care. Our goal is always to provide you with excellent care. Hearing back from our patients is one way we can continue to improve our services. Please take a few minutes to complete " the written survey that you may receive in the mail after your visit with us. Thank you!             Your Updated Medication List - Protect others around you: Learn how to safely use, store and throw away your medicines at www.disposemymeds.org.          This list is accurate as of 7/20/18 11:59 PM.  Always use your most recent med list.                   Brand Name Dispense Instructions for use Diagnosis    ABILIFY 15 MG tablet   Generic drug:  ARIPiprazole      Take 15 mg by mouth daily Reported on 4/17/2017        ACE/ARB/ARNI NOT PRESCRIBED (INTENTIONAL)      ACE & ARB not prescribed due to Other:BP at goal        alcohol swab prep pads     100 each    Use to swab area of injection/reba as directed.    New onset type 2 diabetes mellitus (H)       aspirin 81 MG tablet      Take 1 tablet by mouth daily.        atorvastatin 20 MG tablet    LIPITOR    90 tablet    TAKE 1 TABLET(20 MG) BY MOUTH DAILY    Hyperlipidemia LDL goal <100       benzonatate 200 MG capsule    TESSALON    21 capsule    Take 1 capsule (200 mg) by mouth 3 times daily as needed for cough    Upper respiratory tract infection, unspecified type       blood glucose calibration solution    NO BRAND SPECIFIED    1 Bottle    To accompany: Blood Glucose Monitor Brands: per insurance.    New onset type 2 diabetes mellitus (H)       blood glucose monitoring meter device kit    no brand specified    1 kit    Use to test blood sugar 1 times daily or as directed. Preferred blood glucose meter OR supplies to accompany: Blood Glucose Monitor Brands: per insurance.    New onset type 2 diabetes mellitus (H)       blood glucose monitoring test strip    no brand specified    100 strip    Use to test blood sugar 1 times daily or as directed. To accompany: Blood Glucose Monitor Brands: per insurance.    New onset type 2 diabetes mellitus (H)       clonazePAM 0.5 MG tablet    klonoPIN     Take 1 tablet by mouth At Bedtime.        diphenhydrAMINE HCl (Sleep) 25 MG  Tabs     30 tablet    Take 25 mg by mouth nightly as needed    Primary insomnia       FLAX SEED OIL PO      Take  by mouth daily. Uses powder on food.        hydrocortisone 1 % ointment     30 g    Apply sparingly to affected area three times daily for 14 days.    Rash       indomethacin 50 MG capsule    INDOCIN    30 capsule    Take 1 capsule (50 mg) by mouth 3 times daily as needed for moderate pain    Idiopathic chronic gout of left knee without tophus       Melatonin 10 MG Tabs tablet      Take 10 mg by mouth At Bedtime        metFORMIN 500 MG 24 hr tablet    GLUCOPHAGE-XR    90 tablet    TAKE 1 TABLET(500 MG) BY MOUTH DAILY    Type 2 diabetes mellitus without complication, without long-term current use of insulin (H)       mirtazapine 30 MG tablet    REMERON     Take 30 mg by mouth At Bedtime.        MULTIVITAMIN & MINERAL PO      Take 1 tablet by mouth daily.        order for DME     1 Device    Equipment being ordered:Face mask for CPAP machine size small with tubing and filter. [employer] ([phone])    SANDRA (obstructive sleep apnea)       * OVER-THE-COUNTER      Beta Prostate- Take 1 tablet daily.        * OVER-THE-COUNTER      Allergy med        polyethylene glycol powder    MIRALAX/GLYCOLAX    510 g    MIX AS DIRECTED AND TAKE 17 GRAMS BY MOUTH DAILY    Constipation       thin lancets    NO BRAND SPECIFIED    100 each    Use with lanceting device. To accompany: Blood Glucose Monitor Brands: per insurance.    New onset type 2 diabetes mellitus (H)       triamcinolone 0.1 % cream    KENALOG    30 g    Apply sparingly to affected area three times daily for 14 days (apply to back of left hand).    Rash of hands       vitamin D 1000 units capsule      Take 1 capsule by mouth daily.        * Notice:  This list has 2 medication(s) that are the same as other medications prescribed for you. Read the directions carefully, and ask your doctor or other care provider to review them with you.

## 2018-07-23 NOTE — PROGRESS NOTES
Diabetes Self Management Training: Follow-up Visit    Yair Benites presents today for education Type 2 diabetes.    He is accompanied by his ILS worker    ASSESSMENT:  Patient Problem List reviewed for relevant medical history and current medical status.    Current Diabetes Management per Patient:  Taking diabetes medications?   yes:     Diabetes Medication(s)     Biguanides Sig    metFORMIN (GLUCOPHAGE-XR) 500 MG 24 hr tablet TAKE 1 TABLET(500 MG) BY MOUTH DAILY          Do you have any difficulty affording your medications or glucose monitoring supplies?  No     Patient glucose self monitoring as follows: once daily except lately having some trouble. States was told by his RN to test on the side of his finger so he moved further to the side and is now not getting blood. We turned up the lancing device and patient was able to perform test today. Reinforced that how he had been doing it before on the side was also fine.   BG results: fasting glucose- 100-110, two readings at 135 and 133 in the past two week patient notes that he had frozen yogurt with maple syrup the night before     BG values are: In goal  Patient's most recent   Lab Results   Component Value Date    A1C 6.3 05/04/2018    is meeting goal of <7.0    Nutrition:  Patient is doing well maintaining changes to his diet.   Maintained weight loss. Weight is 206 lbs today.     Breakfast - two packets oatmeal  Lunch - sandwich  Crackers for a snack   Dinner - meat/veg/starch - plate method  Patient is willing to add in more vegetables as snacks and with lunch. We discussed some ways to do this today.     Physical Activity:    His goal is to swim three times per week  Got there 1-2 times/week the past couple weeks because he was busy with friends and family  Brainstormed some ways to be active with visitors like going for a walk    Diabetes Complications:  Not discussed today.    Recent health service and resource utilization related to diabetes  "(hyperglycemia, hypoglycemia, etc.):  None    Vitals:  There were no vitals taken for this visit.  Estimated body mass index is 33.67 kg/(m^2) as calculated from the following:    Height as of 5/4/18: 1.689 m (5' 6.5\").    Weight as of 5/4/18: 96.1 kg (211 lb 12.8 oz).   Last 3 BP:   BP Readings from Last 3 Encounters:   05/04/18 110/68   01/12/18 100/56   01/04/18 100/60       History   Smoking Status     Former Smoker     Years: 6.00     Types: Cigarettes     Quit date: 1/1/1983   Smokeless Tobacco     Never Used       Labs:  Lab Results   Component Value Date    A1C 6.3 05/04/2018     Lab Results   Component Value Date     12/05/2017     Lab Results   Component Value Date    LDL  12/05/2017     Cannot estimate LDL when triglyceride exceeds 400 mg/dL     12/05/2017     HDL Cholesterol   Date Value Ref Range Status   12/05/2017 39 (L) >39 mg/dL Final   ]  GFR Estimate   Date Value Ref Range Status   12/05/2017 90 >60 mL/min/1.7m2 Final     Comment:     Non  GFR Calc     GFR Estimate If Black   Date Value Ref Range Status   12/05/2017 >90 >60 mL/min/1.7m2 Final     Comment:      GFR Calc     Lab Results   Component Value Date    CR 0.87 12/05/2017     No results found for: MICROALBUMIN    Health Beliefs and Attitudes:   Patient Activation Measure Survey Score:  IMMANUEL Score (Last Two) 2/18/2013   IMMANUEL Raw Score 42   Activation Score 66   IMMANUEL Level 3       Stage of Change: ACTION (Actively working towards change)    INTERVENTION:    Education provided today on:  AADE Self-Care Behaviors:  Healthy eating  Monitoring   Activity     Opportunities for ongoing education and support in diabetes-self management were discussed.    Pt verbalized understanding of concepts discussed and recommendations provided today.       PLAN:  See Patient Instructions for co-developed, patient-stated behavior change goals.  Meal Plan Recommendation: Increase intake of vegetables by adding veggies as " snacks and with dinner.  Exercise / activity plan: Aim for three times per week swimming. Try to get some walks in on the other days.  Check blood sugars once per day  AVS printed and provided to patient today.    FOLLOW-UP:  Chart routed to referring provider.  One month    Time Spent: 30 minutes  Encounter Type: Individual    Any diabetes medication dose changes were made via the CDE Protocol and Collaborative Practice Agreement with the patient's referring provider. A copy of this encounter was shared with the provider.

## 2018-08-15 ENCOUNTER — DOCUMENTATION ONLY (OUTPATIENT)
Dept: LAB | Facility: CLINIC | Age: 58
End: 2018-08-15

## 2018-08-15 NOTE — PROGRESS NOTES
This patient has overdue labs. A letter was sent on 7/10/2018 and there has been no lab appointment made. If you still want these labs done, please have your care team contact the patient to make a lab appointment. Otherwise, please have the labs discontinued and close the encounter.    Thank you,  [facility]

## 2018-08-17 ENCOUNTER — OFFICE VISIT (OUTPATIENT)
Dept: EDUCATION SERVICES | Facility: CLINIC | Age: 58
End: 2018-08-17
Attending: FAMILY MEDICINE
Payer: MEDICARE

## 2018-08-17 DIAGNOSIS — E11.9 TYPE 2 DIABETES MELLITUS WITHOUT COMPLICATION, WITHOUT LONG-TERM CURRENT USE OF INSULIN (H): Primary | ICD-10-CM

## 2018-08-17 PROCEDURE — G0108 DIAB MANAGE TRN  PER INDIV: HCPCS | Performed by: NUTRITIONIST

## 2018-08-17 NOTE — MR AVS SNAPSHOT
"              After Visit Summary   8/17/2018    Yair Benites    MRN: [id number]           Patient Information     Date Of Birth          1960        Visit Information        Provider Department      8/17/2018 12:30 PM [facility]        Today's Diagnoses     Type 2 diabetes mellitus without complication, without long-term current use of insulin (H)    -  1       Follow-ups after your visit        Your next 10 appointments already scheduled     Sep 14, 2018 12:30 PM CDT   Diabetes Education with Barbara Riddle RD   [facility]  [facility] ([facility])    [address] [place] [id number] [phone]              Who to contact     If you have questions or need follow up information about today's clinic visit or your schedule please contact [facility]  DIABETES EDUCATION directly at [phone].  Normal or non-critical lab and imaging results will be communicated to you by Makers Alleyhart, letter or phone within 4 business days after the clinic has received the results. If you do not hear from us within 7 days, please contact the clinic through SHAPEt or phone. If you have a critical or abnormal lab result, we will notify you by phone as soon as possible.  Submit refill requests through [employer] or call your pharmacy and they will forward the refill request to us. Please allow 3 business days for your refill to be completed.          Additional Information About Your Visit        Makers Alleyhart Information     [employer] lets you send messages to your doctor, view your test results, renew your prescriptions, schedule appointments and more. To sign up, go to [email or web address] . Click on \"Log in\" on the left side of the screen, which will take you to the Welcome page. Then click on \"Sign up Now\" on the right side of the page.     You will be asked to enter the access code listed " below, as well as some personal information. Please follow the directions to create your username and password.     Your access code is: [email or web address]  Expires: 2018 12:23 PM     Your access code will  in 90 days. If you need help or a new code, please call your [place] clinic or [phone].        Care EveryWhere ID     This is your Care EveryWhere ID. This could be used by other organizations to access your [place] medical records  [id number]         Blood Pressure from Last 3 Encounters:   18 110/68   18 100/56   18 100/60    Weight from Last 3 Encounters:   18 96.1 kg (211 lb 12.8 oz)   18 94.3 kg (208 lb)   18 96.6 kg (213 lb)              We Performed the Following     DIABETES EDUCATION - Individual  []        Primary Care Provider Office Phone # Fax #    Radha Villegas [phone] [address] [facility] [address]        Equal Access to Services     [facility]: Hadii aad ku hadasho Sotrina, waaxda luqadaha, qaybta kaalmada anna, stephany ziegler . So [facility] [phone].    ATENCIÓN: Si habla español, tiene a phillips disposición servicios gratuitos de asistencia lingüística. Roxann al [phone].    We comply with applicable federal civil rights laws and [place] laws. We do not discriminate on the basis of race, color, national origin, age, disability, sex, sexual orientation, or gender identity.            Thank you!     Thank you for choosing [facility]  DIABETES EDUCATION  for your care. Our goal is always to provide you with excellent care. Hearing back from our patients is one way we can continue to improve our services. Please take a few minutes to complete the written survey that you may receive in the mail after your visit with us. Thank you!             Your Updated Medication List - Protect others around you: Learn how to safely use, store and throw away your medicines at  www.disposemymeds.org.          This list is accurate as of 8/17/18 11:59 PM.  Always use your most recent med list.                   Brand Name Dispense Instructions for use Diagnosis    ABILIFY 15 MG tablet   Generic drug:  ARIPiprazole      Take 15 mg by mouth daily Reported on 4/17/2017        ACE/ARB/ARNI NOT PRESCRIBED (INTENTIONAL)      ACE & ARB not prescribed due to Other:BP at goal        alcohol swab prep pads     100 each    Use to swab area of injection/reba as directed.    New onset type 2 diabetes mellitus (H)       aspirin 81 MG tablet      Take 1 tablet by mouth daily.        atorvastatin 20 MG tablet    LIPITOR    90 tablet    TAKE 1 TABLET(20 MG) BY MOUTH DAILY    Hyperlipidemia LDL goal <100       benzonatate 200 MG capsule    TESSALON    21 capsule    Take 1 capsule (200 mg) by mouth 3 times daily as needed for cough    Upper respiratory tract infection, unspecified type       blood glucose calibration solution    NO BRAND SPECIFIED    1 Bottle    To accompany: Blood Glucose Monitor Brands: per insurance.    New onset type 2 diabetes mellitus (H)       blood glucose monitoring meter device kit    no brand specified    1 kit    Use to test blood sugar 1 times daily or as directed. Preferred blood glucose meter OR supplies to accompany: Blood Glucose Monitor Brands: per insurance.    New onset type 2 diabetes mellitus (H)       blood glucose monitoring test strip    no brand specified    100 strip    Use to test blood sugar 1 times daily or as directed. To accompany: Blood Glucose Monitor Brands: per insurance.    New onset type 2 diabetes mellitus (H)       clonazePAM 0.5 MG tablet    klonoPIN     Take 1 tablet by mouth At Bedtime.        diphenhydrAMINE HCl (Sleep) 25 MG Tabs     30 tablet    Take 25 mg by mouth nightly as needed    Primary insomnia       FLAX SEED OIL PO      Take  by mouth daily. Uses powder on food.        hydrocortisone 1 % ointment     30 g    Apply sparingly to affected  area three times daily for 14 days.    Rash       indomethacin 50 MG capsule    INDOCIN    30 capsule    Take 1 capsule (50 mg) by mouth 3 times daily as needed for moderate pain    Idiopathic chronic gout of left knee without tophus       Melatonin 10 MG Tabs tablet      Take 10 mg by mouth At Bedtime        metFORMIN 500 MG 24 hr tablet    GLUCOPHAGE-XR    90 tablet    TAKE 1 TABLET(500 MG) BY MOUTH DAILY    Type 2 diabetes mellitus without complication, without long-term current use of insulin (H)       mirtazapine 30 MG tablet    REMERON     Take 30 mg by mouth At Bedtime.        MULTIVITAMIN & MINERAL PO      Take 1 tablet by mouth daily.        order for DME     1 Device    Equipment being ordered:Face mask for CPAP machine size small with tubing and filter. [employer] ([phone])    SANDRA (obstructive sleep apnea)       * OVER-THE-COUNTER      Beta Prostate- Take 1 tablet daily.        * OVER-THE-COUNTER      Allergy med        polyethylene glycol powder    MIRALAX/GLYCOLAX    510 g    MIX AS DIRECTED AND TAKE 17 GRAMS BY MOUTH DAILY    Constipation       thin lancets    NO BRAND SPECIFIED    100 each    Use with lanceting device. To accompany: Blood Glucose Monitor Brands: per insurance.    New onset type 2 diabetes mellitus (H)       triamcinolone 0.1 % cream    KENALOG    30 g    Apply sparingly to affected area three times daily for 14 days (apply to back of left hand).    Rash of hands       vitamin D 1000 units capsule      Take 1 capsule by mouth daily.        * Notice:  This list has 2 medication(s) that are the same as other medications prescribed for you. Read the directions carefully, and ask your doctor or other care provider to review them with you.

## 2018-08-20 ENCOUNTER — TELEPHONE (OUTPATIENT)
Dept: FAMILY MEDICINE | Facility: CLINIC | Age: 58
End: 2018-08-20

## 2018-08-20 NOTE — TELEPHONE ENCOUNTER
Reason for Call:  Order    Detailed comments: Caller would like an order for an ok to see patient for monthly foot care.     Phone Number Patient can be reached at: Other phone number:  [phone] *    Best Time: any    Can we leave a detailed message on this number? YES    Call taken on 8/20/2018 at 3:29 PM by Kaia Carlisle

## 2018-08-21 NOTE — TELEPHONE ENCOUNTER
Called and left message with verbal ok. Call back to the RN hotline if any further questions or concerns.    Andressa Cornelius RN  [facility]

## 2018-08-24 NOTE — PROGRESS NOTES
Diabetes Self Management Training: Follow-up Visit    Yair Benites presents today for education Type 2 diabetes.    He is accompanied by his ILS worker    Patient's diabetes management related comments/concerns: Osmani was getting an error message on his meter recently which turned out to be damaged strips. He had them replaced by the pharmacy. We tried to run a control test today but were getting an error message. The control turned out to be  and I think this may be why we were getting errors as the test worked well with a blood sample. Patient will get a new control solution from the pharmacy today and with run a control test with that. He will call and let me know if he continues to have any problems with his meter.     Patient would like this visit to be focused around the following diabetes-related behaviors and goals: healthy eating and monitoring    ASSESSMENT:  Patient Problem List reviewed for relevant medical history and current medical status.    Current Diabetes Management per Patient:  Taking diabetes medications?   yes:     Diabetes Medication(s)     Biguanides Sig    metFORMIN (GLUCOPHAGE-XR) 500 MG 24 hr tablet TAKE 1 TABLET(500 MG) BY MOUTH DAILY        Patient glucose self monitoring as follows: one time daily.   BG results: fasting glucose- , just two readings above 130 this month at 134 and 142.     BG values are: In goal  Patient's most recent   Lab Results   Component Value Date    A1C 6.3 2018    is not meeting goal of <7.0    Nutrition:  Osmani states he had a burger and fries out to eat yesterday and also had three glasses of lemonade which he thinks is what caused the elevated fasting glucose this morning. He has otherwise been trying to limit sweets and foods like pasta and french fries and ice cream. He still struggles with reducing portions of nachos and cheese, which may help with weight loss. His weight is 206 lbs today and that is maintained from last month. He  "has lost 5 lbs total and his goal is to lose more. Today we focused on portion control in our discussion/education. Discussed how portion control aids in weight loss and discussed what foods are lower calorie and which are high calorie and how calories impact weight.     Physical Activity:    Osmani is doing great with getting to the gym three days per week to swim.  He is contemplating what he can do on other days. He thinks he could try to start using his bike or going for a walk.     Diabetes Complications:  none    Recent health service and resource utilization related to diabetes (hyperglycemia, hypoglycemia, etc.):  None    Vitals:  There were no vitals taken for this visit.  Estimated body mass index is 33.67 kg/(m^2) as calculated from the following:    Height as of 5/4/18: 1.689 m (5' 6.5\").    Weight as of 5/4/18: 96.1 kg (211 lb 12.8 oz).   Last 3 BP:   BP Readings from Last 3 Encounters:   05/04/18 110/68   01/12/18 100/56   01/04/18 100/60       History   Smoking Status     Former Smoker     Years: 6.00     Types: Cigarettes     Quit date: 1/1/1983   Smokeless Tobacco     Never Used       Labs:  Lab Results   Component Value Date    A1C 6.3 05/04/2018     Lab Results   Component Value Date     12/05/2017     Lab Results   Component Value Date    LDL  12/05/2017     Cannot estimate LDL when triglyceride exceeds 400 mg/dL     12/05/2017     HDL Cholesterol   Date Value Ref Range Status   12/05/2017 39 (L) >39 mg/dL Final   ]  GFR Estimate   Date Value Ref Range Status   12/05/2017 90 >60 mL/min/1.7m2 Final     Comment:     Non  GFR Calc     GFR Estimate If Black   Date Value Ref Range Status   12/05/2017 >90 >60 mL/min/1.7m2 Final     Comment:      GFR Calc     Lab Results   Component Value Date    CR 0.87 12/05/2017     No results found for: MICROALBUMIN    Health Beliefs and Attitudes:   Patient Activation Measure Survey Score:  IMMANUEL Score (Last Two) 2/18/2013 "   IMMANUEL Raw Score 42   Activation Score 66   IMMANUEL Level 3       Stage of Change: PREPARATION (Decided to change - considering how)    INTERVENTION:    Education provided today on:  Healthy eating, portion control and weight management  Monitoring and control solution, when to contact CDE and/or  service for meter    Opportunities for ongoing education and support in diabetes-self management were discussed.    Pt verbalized understanding of concepts discussed and recommendations provided today.       PLAN:  See Patient Instructions for co-developed, patient-stated behavior change goals.  Meal Plan Recommendation: work on portion control. Eat more veggies and cut back on chips and cheese (use a small plate).  Exercise / activity plan: great job getting to the gym three times per week to swim. Continue this. Try to start using the bike or going for a walk on non gym days.   Check blood sugars continue to check fasting glucose daily. Contact me if you have any trouble with the control test or you continue to get error messages on your meter.  AVS printed and provided to patient today.    FOLLOW-UP:  Chart routed to referring provider.  One month    Time Spent: 30 minutes  Encounter Type: Individual    Any diabetes medication dose changes were made via the CDE Protocol and Collaborative Practice Agreement with the patient's referring provider. A copy of this encounter was shared with the provider.

## 2018-09-11 ENCOUNTER — OFFICE VISIT (OUTPATIENT)
Dept: FAMILY MEDICINE | Facility: CLINIC | Age: 58
End: 2018-09-11
Payer: MEDICARE

## 2018-09-11 VITALS
TEMPERATURE: 97.6 F | WEIGHT: 207.2 LBS | SYSTOLIC BLOOD PRESSURE: 110 MMHG | HEART RATE: 89 BPM | OXYGEN SATURATION: 94 % | BODY MASS INDEX: 32.52 KG/M2 | RESPIRATION RATE: 18 BRPM | HEIGHT: 67 IN | DIASTOLIC BLOOD PRESSURE: 74 MMHG

## 2018-09-11 DIAGNOSIS — E78.5 HYPERLIPIDEMIA LDL GOAL <100: ICD-10-CM

## 2018-09-11 DIAGNOSIS — E11.9 TYPE 2 DIABETES MELLITUS WITHOUT COMPLICATION, WITHOUT LONG-TERM CURRENT USE OF INSULIN (H): ICD-10-CM

## 2018-09-11 DIAGNOSIS — Z23 NEED FOR PROPHYLACTIC VACCINATION AND INOCULATION AGAINST INFLUENZA: ICD-10-CM

## 2018-09-11 DIAGNOSIS — M79.89 SWELLING OF LEFT HAND: Primary | ICD-10-CM

## 2018-09-11 LAB
BASOPHILS # BLD AUTO: 0 10E9/L (ref 0–0.2)
BASOPHILS NFR BLD AUTO: 0.3 %
DIFFERENTIAL METHOD BLD: ABNORMAL
EOSINOPHIL # BLD AUTO: 0.1 10E9/L (ref 0–0.7)
EOSINOPHIL NFR BLD AUTO: 0.6 %
ERYTHROCYTE [DISTWIDTH] IN BLOOD BY AUTOMATED COUNT: 12.2 % (ref 10–15)
HCT VFR BLD AUTO: 39.4 % (ref 40–53)
HGB BLD-MCNC: 13.6 G/DL (ref 13.3–17.7)
LYMPHOCYTES # BLD AUTO: 1.2 10E9/L (ref 0.8–5.3)
LYMPHOCYTES NFR BLD AUTO: 10.3 %
MCH RBC QN AUTO: 31.9 PG (ref 26.5–33)
MCHC RBC AUTO-ENTMCNC: 34.5 G/DL (ref 31.5–36.5)
MCV RBC AUTO: 93 FL (ref 78–100)
MONOCYTES # BLD AUTO: 1 10E9/L (ref 0–1.3)
MONOCYTES NFR BLD AUTO: 8 %
NEUTROPHILS # BLD AUTO: 9.6 10E9/L (ref 1.6–8.3)
NEUTROPHILS NFR BLD AUTO: 80.8 %
PLATELET # BLD AUTO: 244 10E9/L (ref 150–450)
RBC # BLD AUTO: 4.26 10E12/L (ref 4.4–5.9)
WBC # BLD AUTO: 11.8 10E9/L (ref 4–11)

## 2018-09-11 PROCEDURE — 83721 ASSAY OF BLOOD LIPOPROTEIN: CPT | Performed by: FAMILY MEDICINE

## 2018-09-11 PROCEDURE — 86617 LYME DISEASE ANTIBODY: CPT | Mod: 59 | Performed by: FAMILY MEDICINE

## 2018-09-11 PROCEDURE — 86618 LYME DISEASE ANTIBODY: CPT | Performed by: FAMILY MEDICINE

## 2018-09-11 PROCEDURE — 85025 COMPLETE CBC W/AUTO DIFF WBC: CPT | Performed by: FAMILY MEDICINE

## 2018-09-11 PROCEDURE — 99214 OFFICE O/P EST MOD 30 MIN: CPT | Mod: 25 | Performed by: FAMILY MEDICINE

## 2018-09-11 PROCEDURE — G0008 ADMIN INFLUENZA VIRUS VAC: HCPCS | Performed by: FAMILY MEDICINE

## 2018-09-11 PROCEDURE — 84550 ASSAY OF BLOOD/URIC ACID: CPT | Performed by: FAMILY MEDICINE

## 2018-09-11 PROCEDURE — 99000 SPECIMEN HANDLING OFFICE-LAB: CPT | Performed by: FAMILY MEDICINE

## 2018-09-11 PROCEDURE — 86617 LYME DISEASE ANTIBODY: CPT | Mod: 90 | Performed by: FAMILY MEDICINE

## 2018-09-11 PROCEDURE — 90682 RIV4 VACC RECOMBINANT DNA IM: CPT | Performed by: FAMILY MEDICINE

## 2018-09-11 PROCEDURE — 82565 ASSAY OF CREATININE: CPT | Performed by: FAMILY MEDICINE

## 2018-09-11 PROCEDURE — 36415 COLL VENOUS BLD VENIPUNCTURE: CPT | Performed by: FAMILY MEDICINE

## 2018-09-11 RX ORDER — INDOMETHACIN 25 MG/1
25 CAPSULE ORAL 2 TIMES DAILY WITH MEALS
Qty: 42 CAPSULE | Refills: 1 | Status: SHIPPED | OUTPATIENT
Start: 2018-09-11 | End: 2019-02-19

## 2018-09-11 RX ORDER — CEPHALEXIN 500 MG/1
500 CAPSULE ORAL 4 TIMES DAILY
Qty: 40 CAPSULE | Refills: 0 | Status: SHIPPED | OUTPATIENT
Start: 2018-09-11 | End: 2018-09-21

## 2018-09-11 NOTE — MR AVS SNAPSHOT
After Visit Summary   9/11/2018    Yair Benites    MRN: [id number]           Patient Information     Date Of Birth          1960        Visit Information        Provider Department      9/11/2018 2:20 PM Radha Villegas MD [facility]        Today's Diagnoses     Swelling of left hand    -  1    Type 2 diabetes mellitus without complication, without long-term current use of insulin (H)        Hyperlipidemia LDL goal <100          Care Instructions    [facility]    If you have any questions regarding to your visit please contact your care team:       Team Red:   Clinic Hours Telephone Number   Dr. Cary Latif, NP 7am-7pm  Monday - Thursday   7am-5pm  Fridays  [phone]  (Appointment scheduling available 24/7)   Urgent Care - [place] [facility] - 11am-9pm Monday-Friday Saturday-Sunday- 9am-5pm   [place] - 5pm-9pm Monday-Friday Saturday-Sunday- 9am-5pm  [phone] - [place]  [phone] - [place]       What options do I have for a visit other than an office visit? We offer electronic visits (e-visits) and telephone visits, when medically appropriate.  Please check with your medical insurance to see if these types of visits are covered, as you will be responsible for any charges that are not paid by your insurance.      You can use [email or web address] (secure electronic communication) to access to your chart, send your primary care provider a message, or make an appointment. Ask a team member how to get started.     For a price quote for your services, please call our Consumer Price Line at [phone] or our Imaging Cost estimation line at [phone] (for imaging tests).    SHRUTHI Pruitt MA          Follow-ups after your visit        Follow-up notes from your care team     Return in about 3 days (around 9/14/2018), or see Dr Villegas 3 days.      Your next 10 appointments already scheduled     Sep  "14, 2018 12:30 PM CDT   Diabetes Education with Barbara Riddle RD   [facility], [place],  Diabetes Education ([facility])    [facility] [address] [address] [phone]              Who to contact     If you have questions or need follow up information about today's clinic visit or your schedule please contact [facility] [place] directly at [phone].  Normal or non-critical lab and imaging results will be communicated to you by MyChart, letter or phone within 4 business days after the clinic has received the results. If you do not hear from us within 7 days, please contact the clinic through MyChart or phone. If you have a critical or abnormal lab result, we will notify you by phone as soon as possible.  Submit refill requests through [employer] or call your pharmacy and they will forward the refill request to us. Please allow 3 business days for your refill to be completed.          Additional Information About Your Visit        Care EveryWhere ID     This is your Care EveryWhere ID. This could be used by other organizations to access your [place] medical records  [id number]        Your Vitals Were     Pulse Temperature Respirations Height Pulse Oximetry BMI (Body Mass Index)    89 97.6  F (36.4  C) (Oral) 18 5' 6.5\" (1.689 m) 94% 32.94 kg/m2       Blood Pressure from Last 3 Encounters:   09/11/18 110/74   05/04/18 110/68   01/12/18 100/56    Weight from Last 3 Encounters:   09/11/18 207 lb 3.2 oz (94 kg)   05/04/18 211 lb 12.8 oz (96.1 kg)   01/12/18 208 lb (94.3 kg)              We Performed the Following     Albumin Random Urine Quantitative with Creat Ratio     CBC with platelets differential     Creatinine     LDL cholesterol direct     Lyme Disease Renay with reflex to WB Serum     Uric acid          Today's Medication Changes          These changes are accurate as of 9/11/18  2:55 PM.  If you have any questions, ask your nurse " or doctor.               Start taking these medicines.        Dose/Directions    cephALEXin 500 MG capsule   Commonly known as:  KEFLEX   Used for:  Swelling of left hand   Started by:  Radha Villegas MD        Dose:  500 mg   Take 1 capsule (500 mg) by mouth 4 times daily for 10 days   Quantity:  40 capsule   Refills:  0         These medicines have changed or have updated prescriptions.        Dose/Directions    * indomethacin 50 MG capsule   Commonly known as:  INDOCIN   This may have changed:  Another medication with the same name was added. Make sure you understand how and when to take each.   Used for:  Idiopathic chronic gout of left knee without tophus   Changed by:  Radha Villegas MD        Dose:  50 mg   Take 1 capsule (50 mg) by mouth 3 times daily as needed for moderate pain   Quantity:  30 capsule   Refills:  3       * indomethacin 25 MG capsule   Commonly known as:  INDOCIN   This may have changed:  You were already taking a medication with the same name, and this prescription was added. Make sure you understand how and when to take each.   Used for:  Swelling of left hand   Changed by:  Radha Villegas MD        Dose:  25 mg   Take 1 capsule (25 mg) by mouth 2 times daily (with meals)   Quantity:  42 capsule   Refills:  1       * Notice:  This list has 2 medication(s) that are the same as other medications prescribed for you. Read the directions carefully, and ask your doctor or other care provider to review them with you.         Where to get your medicines      These medications were sent to [id number]  [place] - [address] AT [facility] [address] [place]  [address] [address]     Phone:  [phone]     cephALEXin 500 MG capsule    indomethacin 25 MG capsule                Primary Care Provider Office Phone # Fax #    Radha Villegas [phone] [address]  [place] [address]        Equal Access to Services      [facility]: Hadii aad ku rafael Michaels, waaxda luqadaha, qaybta kaalmada frankojustuslalito, waxbelen arabella haymigue ureñasanjeevjessica ziegler . [facility] [phone].    ATENCIÓN: Si habla español, tiene a phillips disposición servicios gratuitos de asistencia lingüística. Llame al [phone].    We comply with applicable federal civil rights laws and [place] laws. We do not discriminate on the basis of race, color, national origin, age, disability, sex, sexual orientation, or gender identity.            Thank you!     Thank you for choosing [facility]  for your care. Our goal is always to provide you with excellent care. Hearing back from our patients is one way we can continue to improve our services. Please take a few minutes to complete the written survey that you may receive in the mail after your visit with us. Thank you!             Your Updated Medication List - Protect others around you: Learn how to safely use, store and throw away your medicines at www.disposemymeds.org.          This list is accurate as of 9/11/18  2:55 PM.  Always use your most recent med list.                   Brand Name Dispense Instructions for use Diagnosis    ABILIFY 15 MG tablet   Generic drug:  ARIPiprazole      Take 15 mg by mouth daily Reported on 4/17/2017        ACE/ARB/ARNI NOT PRESCRIBED (INTENTIONAL)      ACE & ARB not prescribed due to Other:BP at goal        alcohol swab prep pads     100 each    Use to swab area of injection/reba as directed.    New onset type 2 diabetes mellitus (H)       aspirin 81 MG tablet      Take 1 tablet by mouth daily.        atorvastatin 20 MG tablet    LIPITOR    90 tablet    TAKE 1 TABLET(20 MG) BY MOUTH DAILY    Hyperlipidemia LDL goal <100       blood glucose calibration solution    NO BRAND SPECIFIED    1 Bottle    To accompany: Blood Glucose Monitor Brands: per insurance.    New onset type 2 diabetes mellitus (H)       blood glucose monitoring meter device kit    no brand specified     1 kit    Use to test blood sugar 1 times daily or as directed. Preferred blood glucose meter OR supplies to accompany: Blood Glucose Monitor Brands: per insurance.    New onset type 2 diabetes mellitus (H)       blood glucose monitoring test strip    no brand specified    100 strip    Use to test blood sugar 1 times daily or as directed. To accompany: Blood Glucose Monitor Brands: per insurance.    New onset type 2 diabetes mellitus (H)       cephALEXin 500 MG capsule    KEFLEX    40 capsule    Take 1 capsule (500 mg) by mouth 4 times daily for 10 days    Swelling of left hand       clonazePAM 0.5 MG tablet    klonoPIN     Take 1 tablet by mouth At Bedtime.        FLAX SEED OIL PO      Take  by mouth daily. Uses powder on food.        * indomethacin 50 MG capsule    INDOCIN    30 capsule    Take 1 capsule (50 mg) by mouth 3 times daily as needed for moderate pain    Idiopathic chronic gout of left knee without tophus       * indomethacin 25 MG capsule    INDOCIN    42 capsule    Take 1 capsule (25 mg) by mouth 2 times daily (with meals)    Swelling of left hand       Melatonin 10 MG Tabs tablet      Take 10 mg by mouth At Bedtime        metFORMIN 500 MG 24 hr tablet    GLUCOPHAGE-XR    90 tablet    TAKE 1 TABLET(500 MG) BY MOUTH DAILY    Type 2 diabetes mellitus without complication, without long-term current use of insulin (H)       mirtazapine 30 MG tablet    REMERON     Take 30 mg by mouth At Bedtime.        MULTIVITAMIN & MINERAL PO      Take 1 tablet by mouth daily.        order for DME     1 Device    Equipment being ordered:Face mask for CPAP machine size small with tubing and filter. [facility] ([phone])    SANDRA (obstructive sleep apnea)       * OVER-THE-COUNTER      Beta Prostate- Take 1 tablet daily.        * OVER-THE-COUNTER      Allergy med        polyethylene glycol powder    MIRALAX/GLYCOLAX    510 g    MIX AS DIRECTED AND TAKE 17 GRAMS BY MOUTH DAILY    Constipation       thin  lancets    NO BRAND SPECIFIED    100 each    Use with lanceting device. To accompany: Blood Glucose Monitor Brands: per insurance.    New onset type 2 diabetes mellitus (H)       vitamin D 1000 units capsule      Take 1 capsule by mouth daily.        * Notice:  This list has 4 medication(s) that are the same as other medications prescribed for you. Read the directions carefully, and ask your doctor or other care provider to review them with you.

## 2018-09-11 NOTE — PROGRESS NOTES

## 2018-09-11 NOTE — PROGRESS NOTES
SUBJECTIVE:   Yair Benites is a 57 year old male who presents to clinic today for the following health issues:      Wrist Pain    Onset: Patient states Sunday morning 9/9/2018    Description:   Location: Left Wrist  And hand   Character: Sharp and Dull ache    Intensity: severe    Progression of Symptoms: worse    Accompanying Signs & Symptoms:  Other symptoms: tingling and swelling    History:   Previous similar pain: YES- Patient states he's had tendonitis before.       Precipitating factors:   Trauma or overuse: no     Alleviating factors:  Improved by: nothing    Therapies Tried and outcome: Wrist Brace, Tylenol             Problem list and histories reviewed & adjusted, as indicated.  Additional history: as documented    Patient Active Problem List   Diagnosis     Colon polyp     Traumatic brain injury (H)     IBS (irritable bowel syndrome)     Synovitis and tenosynovitis     Dizziness - light-headed     Primary localized osteoarthrosis, ankle and foot     Obesity     Depression, major     Headache disorder     SANDRA (obstructive sleep apnea)     Chronic gout without tophus, unspecified cause, unspecified site     Hyperlipidemia LDL goal <100     New onset type 2 diabetes mellitus (H)     Type 2 diabetes mellitus without complication, without long-term current use of insulin (H)     Past Surgical History:   Procedure Laterality Date     SURGICAL HISTORY OF -       nose       Social History   Substance Use Topics     Smoking status: Former Smoker     Years: 6.00     Types: Cigarettes     Quit date: 1/1/1983     Smokeless tobacco: Never Used     Alcohol use No     Family History   Problem Relation Age of Onset     C.A.D. Father      MI age 66     HEART DISEASE Father      Depression Brother      Cancer Brother      Brain Tumor     Respiratory Brother      Depression Brother      Gallbladder Disease Brother      Dementia Brother      Cancer Brother      Parkinsonism Brother      Diabetes Maternal Uncle       Asthma No family hx of      Hypertension No family hx of          Current Outpatient Prescriptions   Medication Sig Dispense Refill     ACE/ARB/ARNI NOT PRESCRIBED, INTENTIONAL, ACE & ARB not prescribed due to Other:BP at goal       alcohol swab prep pads Use to swab area of injection/reba as directed. 100 each 3     ARIPiprazole (ABILIFY) 15 MG tablet Take 15 mg by mouth daily Reported on 4/17/2017       aspirin 81 MG tablet Take 1 tablet by mouth daily.       atorvastatin (LIPITOR) 20 MG tablet TAKE 1 TABLET(20 MG) BY MOUTH DAILY 90 tablet 3     blood glucose calibration (NO BRAND SPECIFIED) solution To accompany: Blood Glucose Monitor Brands: per insurance. 1 Bottle 3     blood glucose monitoring (NO BRAND SPECIFIED) meter device kit Use to test blood sugar 1 times daily or as directed. Preferred blood glucose meter OR supplies to accompany: Blood Glucose Monitor Brands: per insurance. 1 kit 0     blood glucose monitoring (NO BRAND SPECIFIED) test strip Use to test blood sugar 1 times daily or as directed. To accompany: Blood Glucose Monitor Brands: per insurance. 100 strip 6     cephALEXin (KEFLEX) 500 MG capsule Take 1 capsule (500 mg) by mouth 4 times daily for 10 days 40 capsule 0     Cholecalciferol (VITAMIN D) 1000 UNIT capsule Take 1 capsule by mouth daily.       ClonAZEPAM (KLONOPIN) 0.5 MG tablet Take 1 tablet by mouth At Bedtime.       Flaxseed, Linseed, (FLAX SEED OIL PO) Take  by mouth daily. Uses powder on food.        indomethacin (INDOCIN) 25 MG capsule Take 1 capsule (25 mg) by mouth 2 times daily (with meals) 42 capsule 1     indomethacin (INDOCIN) 50 MG capsule Take 1 capsule (50 mg) by mouth 3 times daily as needed for moderate pain 30 capsule 3     Melatonin 10 MG TABS tablet Take 10 mg by mouth At Bedtime       metFORMIN (GLUCOPHAGE-XR) 500 MG 24 hr tablet TAKE 1 TABLET(500 MG) BY MOUTH DAILY 90 tablet 0     mirtazapine (REMERON) 30 MG tablet Take 30 mg by mouth At Bedtime.       Multiple  Vitamins-Minerals (MULTIVITAMIN & MINERAL PO) Take 1 tablet by mouth daily.       order for DME Equipment being ordered:Face mask for CPAP machine size small with tubing and filter. [employer] ([phone]) 1 Device 0     OVER-THE-COUNTER Allergy med       OVER-THE-COUNTER Beta Prostate- Take 1 tablet daily.       polyethylene glycol (MIRALAX/GLYCOLAX) powder MIX AS DIRECTED AND TAKE 17 GRAMS BY MOUTH DAILY 510 g 1     thin (NO BRAND SPECIFIED) lancets Use with lanceting device. To accompany: Blood Glucose Monitor Brands: per insurance. 100 each 6     Allergies   Allergen Reactions     Erythromycin Nausea     Imipramine Diarrhea     Imipramine Hcl      Toxic levels     Risperidone      Other reaction(s): Extrapyramidal Side Effect     Erythromycin Rash     Recent Labs   Lab Test  05/04/18   1304  01/12/18   1541  12/05/17   1530  12/23/16   1420  12/05/16   1405  11/18/16   1152  03/23/15   0953  09/22/14   1055   02/18/13   1113   02/03/12   1237   A1C  6.3*   --   7.1*   --   5.9   --   6.2*   --    --   5.8   --    --    LDL   --    --   Cannot estimate LDL when triglyceride exceeds 400 mg/dL  106*   --    --   76  71  67   --   73   --   78   HDL   --    --   39*   --    --   42  39*  43   --   35*   --   28*   TRIG   --    --   492*   --    --   202*  206*  252*   --   283*   --   148   ALT   --    --    --    --    --    --    --   34   --   43   --   40   CR   --    --   0.87  1.12   --    --    --    --    --   0.96   < >   --    GFRESTIMATED   --    --   90  67   --    --    --    --    --   82   < >   --    GFRESTBLACK   --    --   >90  81   --    --    --    --    --   >90   < >   --    POTASSIUM   --    --   4.0  4.0   --    --    --    --    --   4.4   < >   --    TSH   --   1.84   --    --    --    --   2.69   --    < >   --    --   1.47    < > = values in this interval not displayed.      BP Readings from Last 3 Encounters:   09/11/18 110/74   05/04/18 110/68   01/12/18 100/56    Wt  "Readings from Last 3 Encounters:   09/11/18 207 lb 3.2 oz (94 kg)   05/04/18 211 lb 12.8 oz (96.1 kg)   01/12/18 208 lb (94.3 kg)                  Labs reviewed in EPIC    Reviewed and updated as needed this visit by clinical staff       Reviewed and updated as needed this visit by Provider         ROS:  CONSTITUTIONAL: NEGATIVE for fever, chills, change in weight  ENT/MOUTH: NEGATIVE for ear, mouth and throat problems  RESP: NEGATIVE for significant cough or SOB  CV: NEGATIVE for chest pain, palpitations or peripheral edema  MUSCULOSKELETAL: as above  No history of Gout  No trauma    OBJECTIVE:     /74  Pulse 89  Temp 97.6  F (36.4  C) (Oral)  Resp 18  Ht 5' 6.5\" (1.689 m)  Wt 207 lb 3.2 oz (94 kg)  SpO2 94%  BMI 32.94 kg/m2  Body mass index is 32.94 kg/(m^2).  GENERAL: healthy, alert and no distress  RESP: lungs clear to auscultation - no rales, rhonchi or wheezes  CV: regular rate and rhythm, normal S1 S2, no S3 or S4, no murmur, click or rub, no peripheral edema and peripheral pulses strong  MS: Left hand and wrist swollen  Mild erythema  Has tenderness   No point tenderness     Diagnostic Test Results:  none     ASSESSMENT/PLAN:       1. Swelling of left hand  Cellulitis vs Gout vs Inflammation  - CBC with platelets differential  - Lyme Disease Renay with reflex to WB Serum  - Uric acid  - Creatinine  - cephALEXin (KEFLEX) 500 MG capsule; Take 1 capsule (500 mg) by mouth 4 times daily for 10 days  Dispense: 40 capsule; Refill: 0  - indomethacin (INDOCIN) 25 MG capsule; Take 1 capsule (25 mg) by mouth 2 times daily (with meals)  Dispense: 42 capsule; Refill: 1  SEE [facility] care orders  The potential side effects of this medication have been discussed with the patient.  Call if any significant problems with these are experienced.  Follow up 2-3 days-sooner if worse  2. Type 2 diabetes mellitus without complication, without long-term current use of insulin (H)    - Albumin Random Urine Quantitative with " Creat Ratio    3. Hyperlipidemia LDL goal <100  Labs pending   On statin  No muscle aches  - LDL cholesterol direct    4. Need for prophylactic vaccination and inoculation against influenza  Advised   - FLU VACCINE, (RIV4) RECOMBINANT HA  , IM (FluBlok, egg free) [27721]- >18 YRS (FMG recommended  50-64 YRS)  - Vaccine Administration, Initial [93574]    Radha Villegas MD  [facility]

## 2018-09-11 NOTE — LETTER
[address] [address]  [phone]          September 11, 2018    RE:  Yair Benites                                                                                                                                                       [address] [place] [address]            To whom it may concern:    Yair Benites is under my professional care.  He is unable to work this week.  Advised to follow up 2-3 days    Sincerely,        Radha Villegas MD

## 2018-09-11 NOTE — PATIENT INSTRUCTIONS
[facility]    If you have any questions regarding to your visit please contact your care team:       Team Red:   Clinic Hours Telephone Number   Dr. Cary Latif NP 7am-7pm  Monday - Thursday   7am-5pm  Fridays  [phone]  (Appointment scheduling available 24/7)   Urgent Care - [place] and [facility] - 11am-9pm Monday-Friday Saturday-Sunday- 9am-5pm   [place] - 5pm-9pm Monday-Friday Saturday-Sunday- 9am-5pm  [phone] - [place]  [phone] - [place]       What options do I have for a visit other than an office visit? We offer electronic visits (e-visits) and telephone visits, when medically appropriate.  Please check with your medical insurance to see if these types of visits are covered, as you will be responsible for any charges that are not paid by your insurance.      You can use [employer] (secure electronic communication) to access to your chart, send your primary care provider a message, or make an appointment. Ask a team member how to get started.     For a price quote for your services, please call our Consumer Price Line at [phone] or our Imaging Cost estimation line at [phone] (for imaging tests).    SHRUTHI Pruitt MA

## 2018-09-12 ENCOUNTER — TELEPHONE (OUTPATIENT)
Dept: FAMILY MEDICINE | Facility: CLINIC | Age: 58
End: 2018-09-12

## 2018-09-12 LAB
CREAT SERPL-MCNC: 0.88 MG/DL (ref 0.66–1.25)
GFR SERPL CREATININE-BSD FRML MDRD: 88 ML/MIN/1.7M2
LDLC SERPL DIRECT ASSAY-MCNC: 75 MG/DL
URATE SERPL-MCNC: 6.9 MG/DL (ref 3.5–7.2)

## 2018-09-12 NOTE — TELEPHONE ENCOUNTER
Reason for Call: Request for an order or referral:    Order or referral being requested: order for scale nurse 1 time every other week for 60 days and medication management and in house assessment.    Date needed: as soon as possible    Has the patient been seen by the PCP for this problem? YES    Additional comments: ASAP    Phone number Patient can be reached at:  Other phone number:  [phone]*    Best Time:  any    Can we leave a detailed message on this number?  YES    Call taken on 9/12/2018 at 1:48 PM by Susana Ferrari

## 2018-09-13 ENCOUNTER — OFFICE VISIT (OUTPATIENT)
Dept: FAMILY MEDICINE | Facility: CLINIC | Age: 58
End: 2018-09-13
Payer: MEDICARE

## 2018-09-13 VITALS
SYSTOLIC BLOOD PRESSURE: 118 MMHG | HEIGHT: 67 IN | TEMPERATURE: 97.1 F | OXYGEN SATURATION: 96 % | DIASTOLIC BLOOD PRESSURE: 78 MMHG | WEIGHT: 205.2 LBS | RESPIRATION RATE: 16 BRPM | BODY MASS INDEX: 32.21 KG/M2 | HEART RATE: 78 BPM

## 2018-09-13 DIAGNOSIS — M79.89 SWELLING OF LEFT HAND: Primary | ICD-10-CM

## 2018-09-13 PROCEDURE — 99213 OFFICE O/P EST LOW 20 MIN: CPT | Performed by: FAMILY MEDICINE

## 2018-09-13 ASSESSMENT — PAIN SCALES - GENERAL: PAINLEVEL: MODERATE PAIN (4)

## 2018-09-13 NOTE — MR AVS SNAPSHOT
After Visit Summary   9/13/2018    Yair Benites    MRN: [id number]           Patient Information     Date Of Birth          1960        Visit Information        Provider Department      9/13/2018 2:40 PM Radha Villegas MD [facility] Instructions    [facility]    If you have any questions regarding to your visit please contact your care team:       Team Red:   Clinic Hours Telephone Number   Dr. Cary Latif NP 7am-7pm  Monday - Thursday   7am-5pm  Fridays  [phone]  (Appointment scheduling available 24/7)   Urgent Care - [place] - 11am-9pm Monday-Friday Saturday-Sunday- 9am-5pm   [place] - 5pm-9pm Monday-Friday Saturday-Sunday- 9am-5pm  [phone] - [place]  [phone] - [place]       What options do I have for a visit other than an office visit? We offer electronic visits (e-visits) and telephone visits, when medically appropriate.  Please check with your medical insurance to see if these types of visits are covered, as you will be responsible for any charges that are not paid by your insurance.      You can use [employer] (secure electronic communication) to access to your chart, send your primary care provider a message, or make an appointment. Ask a team member how to get started.     For a price quote for your services, please call our Consumer Price Line at [phone] or our Imaging Cost estimation line at [phone] (for imaging tests).    Discharged by Evette Cho MA.            Follow-ups after your visit        Follow-up notes from your care team     Return for Physical Exam, Diabetes.      Your next 10 appointments already scheduled     Sep 14, 2018 12:30 PM CDT   Diabetes Education with Barbara Riddle RD   [facility], [place],  Diabetes Education ([facility])    [address] [place] [address]  "[address]  [facility] [address] [phone]              Who to contact     If you have questions or need follow up information about today's clinic visit or your schedule please contact [facility] directly at [phone].  Normal or non-critical lab and imaging results will be communicated to you by MyChart, letter or phone within 4 business days after the clinic has received the results. If you do not hear from us within 7 days, please contact the clinic through MyChart or phone. If you have a critical or abnormal lab result, we will notify you by phone as soon as possible.  Submit refill requests through [employer] or call your pharmacy and they will forward the refill request to us. Please allow 3 business days for your refill to be completed.          Additional Information About Your Visit        Care EveryWhere ID     This is your Care EveryWhere ID. This could be used by other organizations to access your [facility] medical records  [id number]        Your Vitals Were     Pulse Temperature Respirations Height Pulse Oximetry BMI (Body Mass Index)    78 97.1  F (36.2  C) (Oral) 16 5' 6.5\" (1.689 m) 96% 32.62 kg/m2       Blood Pressure from Last 3 Encounters:   09/13/18 118/78   09/11/18 110/74   05/04/18 110/68    Weight from Last 3 Encounters:   09/13/18 205 lb 3.2 oz (93.1 kg)   09/11/18 207 lb 3.2 oz (94 kg)   05/04/18 211 lb 12.8 oz (96.1 kg)              Today, you had the following     No orders found for display       Primary Care Provider Office Phone # Fax #    Radha Villegas [phone] [address] [facility] [address]        Equal Access to Services     KEYA HAAS : Arlene Michaels, bang johansen, stephany peraza. So [facility] [phone].    ATENCIÓN: Si habla español, tiene a phillips disposición servicios gratuitos de asistencia lingüística. Llame al [phone].    We comply with applicable " federal civil rights laws and [place] laws. We do not discriminate on the basis of race, color, national origin, age, disability, sex, sexual orientation, or gender identity.            Thank you!     Thank you for choosing [facility] [place]  for your care. Our goal is always to provide you with excellent care. Hearing back from our patients is one way we can continue to improve our services. Please take a few minutes to complete the written survey that you may receive in the mail after your visit with us. Thank you!             Your Updated Medication List - Protect others around you: Learn how to safely use, store and throw away your medicines at www.disposemymeds.org.          This list is accurate as of 9/13/18  3:01 PM.  Always use your most recent med list.                   Brand Name Dispense Instructions for use Diagnosis    ABILIFY 15 MG tablet   Generic drug:  ARIPiprazole      Take 15 mg by mouth daily Reported on 4/17/2017        ACE/ARB/ARNI NOT PRESCRIBED (INTENTIONAL)      ACE & ARB not prescribed due to Other:BP at goal        alcohol swab prep pads     100 each    Use to swab area of injection/reba as directed.    New onset type 2 diabetes mellitus (H)       aspirin 81 MG tablet      Take 1 tablet by mouth daily.        atorvastatin 20 MG tablet    LIPITOR    90 tablet    TAKE 1 TABLET(20 MG) BY MOUTH DAILY    Hyperlipidemia LDL goal <100       blood glucose calibration solution    NO BRAND SPECIFIED    1 Bottle    To accompany: Blood Glucose Monitor Brands: per insurance.    New onset type 2 diabetes mellitus (H)       blood glucose monitoring meter device kit    no brand specified    1 kit    Use to test blood sugar 1 times daily or as directed. Preferred blood glucose meter OR supplies to accompany: Blood Glucose Monitor Brands: per insurance.    New onset type 2 diabetes mellitus (H)       blood glucose monitoring test strip    no brand specified    100 strip    Use to test blood  sugar 1 times daily or as directed. To accompany: Blood Glucose Monitor Brands: per insurance.    New onset type 2 diabetes mellitus (H)       cephALEXin 500 MG capsule    KEFLEX    40 capsule    Take 1 capsule (500 mg) by mouth 4 times daily for 10 days    Swelling of left hand       clonazePAM 0.5 MG tablet    klonoPIN     Take 1 tablet by mouth At Bedtime.        FLAX SEED OIL PO      Take  by mouth daily. Uses powder on food.        * indomethacin 50 MG capsule    INDOCIN    30 capsule    Take 1 capsule (50 mg) by mouth 3 times daily as needed for moderate pain    Idiopathic chronic gout of left knee without tophus       * indomethacin 25 MG capsule    INDOCIN    42 capsule    Take 1 capsule (25 mg) by mouth 2 times daily (with meals)    Swelling of left hand       Melatonin 10 MG Tabs tablet      Take 10 mg by mouth At Bedtime        metFORMIN 500 MG 24 hr tablet    GLUCOPHAGE-XR    90 tablet    TAKE 1 TABLET(500 MG) BY MOUTH DAILY    Type 2 diabetes mellitus without complication, without long-term current use of insulin (H)       mirtazapine 30 MG tablet    REMERON     Take 30 mg by mouth At Bedtime.        MULTIVITAMIN & MINERAL PO      Take 1 tablet by mouth daily.        order for DME     1 Device    Equipment being ordered:Face mask for CPAP machine size small with tubing and filter. [facility] ([phone])    SANDRA (obstructive sleep apnea)       * OVER-THE-COUNTER      Beta Prostate- Take 1 tablet daily.        * OVER-THE-COUNTER      Allergy med        polyethylene glycol powder    MIRALAX/GLYCOLAX    510 g    MIX AS DIRECTED AND TAKE 17 GRAMS BY MOUTH DAILY    Constipation       thin lancets    NO BRAND SPECIFIED    100 each    Use with lanceting device. To accompany: Blood Glucose Monitor Brands: per insurance.    New onset type 2 diabetes mellitus (H)       vitamin D 1000 units capsule      Take 1 capsule by mouth daily.        * Notice:  This list has 4 medication(s) that are the  same as other medications prescribed for you. Read the directions carefully, and ask your doctor or other care provider to review them with you.

## 2018-09-13 NOTE — PATIENT INSTRUCTIONS
[facility]    If you have any questions regarding to your visit please contact your care team:       Team Red:   Clinic Hours Telephone Number   Dr. Cary Latif NP 7am-7pm  Monday - Thursday   7am-5pm  Fridays  [phone]  (Appointment scheduling available 24/7)   Urgent Care - [place] and [facility] - 11am-9pm Monday-Friday Saturday-Sunday- 9am-5pm   [place] - 5pm-9pm Monday-Friday Saturday-Sunday- 9am-5pm  [phone] - [place]  [phone] - [place]       What options do I have for a visit other than an office visit? We offer electronic visits (e-visits) and telephone visits, when medically appropriate.  Please check with your medical insurance to see if these types of visits are covered, as you will be responsible for any charges that are not paid by your insurance.      You can use [employer] (secure electronic communication) to access to your chart, send your primary care provider a message, or make an appointment. Ask a team member how to get started.     For a price quote for your services, please call our Consumer Price Line at [phone] or our Imaging Cost estimation line at [phone] (for imaging tests).    Discharged by Evette Cho MA.

## 2018-09-13 NOTE — PROGRESS NOTES
SUBJECTIVE:   Yair Benites is a 58 year old male who presents to clinic today for the following health issues:      Patient here for follow up from 9/11/18, swelling in left hand.  Pt says it is better than before-see Previous Note    Patient would like a refill on the miralax for constipation.    Problem list and histories reviewed & adjusted, as indicated.  Additional history: as documented    Patient Active Problem List   Diagnosis     Colon polyp     Traumatic brain injury (H)     IBS (irritable bowel syndrome)     Synovitis and tenosynovitis     Dizziness - light-headed     Primary localized osteoarthrosis, ankle and foot     Obesity     Depression, major     Headache disorder     SANDRA (obstructive sleep apnea)     Chronic gout without tophus, unspecified cause, unspecified site     Hyperlipidemia LDL goal <100     New onset type 2 diabetes mellitus (H)     Type 2 diabetes mellitus without complication, without long-term current use of insulin (H)     Past Surgical History:   Procedure Laterality Date     SURGICAL HISTORY OF -       nose       Social History   Substance Use Topics     Smoking status: Former Smoker     Years: 6.00     Types: Cigarettes     Quit date: 1/1/1983     Smokeless tobacco: Never Used     Alcohol use No     Family History   Problem Relation Age of Onset     C.A.D. Father      MI age 66     HEART DISEASE Father      Depression Brother      Cancer Brother      Brain Tumor     Respiratory Brother      Depression Brother      Gallbladder Disease Brother      Dementia Brother      Cancer Brother      Parkinsonism Brother      Diabetes Maternal Uncle      Asthma No family hx of      Hypertension No family hx of          Current Outpatient Prescriptions   Medication Sig Dispense Refill     ACE/ARB/ARNI NOT PRESCRIBED, INTENTIONAL, ACE & ARB not prescribed due to Other:BP at goal       alcohol swab prep pads Use to swab area of injection/reba as directed. 100 each 3     ARIPiprazole  (ABILIFY) 15 MG tablet Take 15 mg by mouth daily Reported on 4/17/2017       aspirin 81 MG tablet Take 1 tablet by mouth daily.       atorvastatin (LIPITOR) 20 MG tablet TAKE 1 TABLET(20 MG) BY MOUTH DAILY 90 tablet 3     blood glucose calibration (NO BRAND SPECIFIED) solution To accompany: Blood Glucose Monitor Brands: per insurance. 1 Bottle 3     blood glucose monitoring (NO BRAND SPECIFIED) meter device kit Use to test blood sugar 1 times daily or as directed. Preferred blood glucose meter OR supplies to accompany: Blood Glucose Monitor Brands: per insurance. 1 kit 0     blood glucose monitoring (NO BRAND SPECIFIED) test strip Use to test blood sugar 1 times daily or as directed. To accompany: Blood Glucose Monitor Brands: per insurance. 100 strip 6     cephALEXin (KEFLEX) 500 MG capsule Take 1 capsule (500 mg) by mouth 4 times daily for 10 days 40 capsule 0     Cholecalciferol (VITAMIN D) 1000 UNIT capsule Take 1 capsule by mouth daily.       ClonAZEPAM (KLONOPIN) 0.5 MG tablet Take 1 tablet by mouth At Bedtime.       Flaxseed, Linseed, (FLAX SEED OIL PO) Take  by mouth daily. Uses powder on food.        indomethacin (INDOCIN) 25 MG capsule Take 1 capsule (25 mg) by mouth 2 times daily (with meals) 42 capsule 1     indomethacin (INDOCIN) 50 MG capsule Take 1 capsule (50 mg) by mouth 3 times daily as needed for moderate pain 30 capsule 3     Melatonin 10 MG TABS tablet Take 10 mg by mouth At Bedtime       metFORMIN (GLUCOPHAGE-XR) 500 MG 24 hr tablet TAKE 1 TABLET(500 MG) BY MOUTH DAILY 90 tablet 0     mirtazapine (REMERON) 30 MG tablet Take 30 mg by mouth At Bedtime.       Multiple Vitamins-Minerals (MULTIVITAMIN & MINERAL PO) Take 1 tablet by mouth daily.       order for DME Equipment being ordered:Face mask for CPAP machine size small with tubing and filter. [facility] [facility] ([phone]) 1 Device 0     OVER-THE-COUNTER Allergy med       OVER-THE-COUNTER Beta Prostate- Take 1 tablet daily.        polyethylene glycol (MIRALAX/GLYCOLAX) powder MIX AS DIRECTED AND TAKE 17 GRAMS BY MOUTH DAILY 510 g 1     thin (NO BRAND SPECIFIED) lancets Use with lanceting device. To accompany: Blood Glucose Monitor Brands: per insurance. 100 each 6     Allergies   Allergen Reactions     Erythromycin Nausea     Imipramine Diarrhea     Imipramine Hcl      Toxic levels     Risperidone      Other reaction(s): Extrapyramidal Side Effect     Erythromycin Rash     Recent Labs   Lab Test  09/11/18   1507  05/04/18   1304  01/12/18   1541  12/05/17   1530  12/23/16   1420  12/05/16   1405  11/18/16   1152  03/23/15   0953  09/22/14   1055   02/18/13   1113   02/03/12   1237   A1C   --   6.3*   --   7.1*   --   5.9   --   6.2*   --    --   5.8   --    --    LDL  75   --    --   Cannot estimate LDL when triglyceride exceeds 400 mg/dL  106*   --    --   76  71  67   --   73   --   78   HDL   --    --    --   39*   --    --   42  39*  43   --   35*   --   28*   TRIG   --    --    --   492*   --    --   202*  206*  252*   --   283*   --   148   ALT   --    --    --    --    --    --    --    --   34   --   43   --   40   CR  0.88   --    --   0.87  1.12   --    --    --    --    --   0.96   < >   --    GFRESTIMATED  88   --    --   90  67   --    --    --    --    --   82   < >   --    GFRESTBLACK  >90   --    --   >90  81   --    --    --    --    --   >90   < >   --    POTASSIUM   --    --    --   4.0  4.0   --    --    --    --    --   4.4   < >   --    TSH   --    --   1.84   --    --    --    --   2.69   --    < >   --    --   1.47    < > = values in this interval not displayed.      BP Readings from Last 3 Encounters:   09/13/18 118/78   09/11/18 110/74   05/04/18 110/68    Wt Readings from Last 3 Encounters:   09/13/18 205 lb 3.2 oz (93.1 kg)   09/11/18 207 lb 3.2 oz (94 kg)   05/04/18 211 lb 12.8 oz (96.1 kg)                  Labs reviewed in EPIC    Reviewed and updated as needed this visit by clinical staff  Tobacco  Allergies   "Meds  Med Hx  Surg Hx  Fam Hx  Soc Hx      Reviewed and updated as needed this visit by Provider         ROS:  CONSTITUTIONAL: NEGATIVE for fever, chills, change in weight  RESP: NEGATIVE for significant cough or SOB  CV: NEGATIVE for chest pain, palpitations or peripheral edema  MUSCULOSKELETAL: as above    OBJECTIVE:     /78  Pulse 78  Temp 97.1  F (36.2  C) (Oral)  Resp 16  Ht 5' 6.5\" (1.689 m)  Wt 205 lb 3.2 oz (93.1 kg)  SpO2 96%  BMI 32.62 kg/m2  Body mass index is 32.62 kg/(m^2).  GENERAL: healthy, alert and no distress  Swelling left hand better   Decreased erythema    Diagnostic Test Results:  Results for orders placed or performed in visit on 09/11/18   CBC with platelets differential   Result Value Ref Range    WBC 11.8 (H) 4.0 - 11.0 10e9/L    RBC Count 4.26 (L) 4.4 - 5.9 10e12/L    Hemoglobin 13.6 13.3 - 17.7 g/dL    Hematocrit 39.4 (L) 40.0 - 53.0 %    MCV 93 78 - 100 fl    MCH 31.9 26.5 - 33.0 pg    MCHC 34.5 31.5 - 36.5 g/dL    RDW 12.2 10.0 - 15.0 %    Platelet Count 244 150 - 450 10e9/L    % Neutrophils 80.8 %    % Lymphocytes 10.3 %    % Monocytes 8.0 %    % Eosinophils 0.6 %    % Basophils 0.3 %    Absolute Neutrophil 9.6 (H) 1.6 - 8.3 10e9/L    Absolute Lymphocytes 1.2 0.8 - 5.3 10e9/L    Absolute Monocytes 1.0 0.0 - 1.3 10e9/L    Absolute Eosinophils 0.1 0.0 - 0.7 10e9/L    Absolute Basophils 0.0 0.0 - 0.2 10e9/L    Diff Method Automated Method    Lyme Disease Renay with reflex to WB Serum   Result Value Ref Range    Lyme Disease Antibodies Serum 1.09 (H) 0.00 - 0.89   Uric acid   Result Value Ref Range    Uric Acid 6.9 3.5 - 7.2 mg/dL   LDL cholesterol direct   Result Value Ref Range    LDL Cholesterol Direct 75 <100 mg/dL   Creatinine   Result Value Ref Range    Creatinine 0.88 0.66 - 1.25 mg/dL    GFR Estimate 88 >60 mL/min/1.7m2    GFR Estimate If Black >90 >60 mL/min/1.7m2     Final Lyme's pending   ASSESSMENT/PLAN:     1. Swelling of left hand  Advised finish antibiotics as " prescribed  Lyme's test pending   It has Improved  Follow up 1 week if not better/sooner if worse            Radha Villegas MD  [facility] [place]

## 2018-09-13 NOTE — LETTER
[address] [address]  [phone]          September 13, 2018    RE:  Yair BRO Kamari                                                                                                                                                       [address] [place] [id number]            To whom it may concern:    Yair Benites is under my professional care.He can get help with Nail trimming.    Sincerely,        Radha Villegas MD

## 2018-09-14 ENCOUNTER — ALLIED HEALTH/NURSE VISIT (OUTPATIENT)
Dept: EDUCATION SERVICES | Facility: CLINIC | Age: 58
End: 2018-09-14
Attending: FAMILY MEDICINE
Payer: MEDICARE

## 2018-09-14 DIAGNOSIS — E11.9 DIABETES MELLITUS, TYPE 2 (H): Primary | ICD-10-CM

## 2018-09-14 LAB
B BURGDOR IGG SER QL IB: NEGATIVE
B BURGDOR IGG+IGM SER QL: 1.09 (ref 0–0.89)
B BURGDOR IGM SER QL IB: NEGATIVE

## 2018-09-14 RX ORDER — BLOOD-GLUCOSE CONTROL, NORMAL
EACH MISCELLANEOUS
Qty: 1 BOTTLE | Refills: 3 | Status: SHIPPED | OUTPATIENT
Start: 2018-09-14 | End: 2019-08-13

## 2018-09-14 NOTE — MR AVS SNAPSHOT
After Visit Summary   9/14/2018    Yair Benites    MRN: [id number]           Patient Information     Date Of Birth          1960        Visit Information        Provider Department      9/14/2018 12:30 PM Barbara Riddle RD [facility]  Diabetes Education        Today's Diagnoses     Diabetes mellitus, type 2 (H)    -  1       Follow-ups after your visit        Your next 10 appointments already scheduled     Oct 12, 2018 12:30 PM CDT   Diabetes Education with Barbara Riddle RD   [facility]  Diabetes Education ([facility])    [address] [place] [address] [phone]            Oct 15, 2018  3:00 PM CDT   PHYSICAL with Radha Villegas MD   [facility] ([facility]    [address] [place] [address] [phone]              Who to contact     If you have questions or need follow up information about today's clinic visit or your schedule please contact [facility]  DIABETES EDUCATION directly at [phone].  Normal or non-critical lab and imaging results will be communicated to you by MyChart, letter or phone within 4 business days after the clinic has received the results. If you do not hear from us within 7 days, please contact the clinic through MyChart or phone. If you have a critical or abnormal lab result, we will notify you by phone as soon as possible.  Submit refill requests through [employer] or call your pharmacy and they will forward the refill request to us. Please allow 3 business days for your refill to be completed.          Additional Information About Your Visit        Care EveryWhere ID     This is your Care EveryWhere ID. This could be used by other organizations to access your [facility] medical records  [id number]         Blood Pressure from Last 3 Encounters:   09/13/18 118/78   09/11/18 110/74   05/04/18 110/68    Weight from Last 3  Encounters:   09/13/18 93.1 kg (205 lb 3.2 oz)   09/11/18 94 kg (207 lb 3.2 oz)   05/04/18 96.1 kg (211 lb 12.8 oz)              Today, you had the following     No orders found for display         Today's Medication Changes          These changes are accurate as of 9/14/18  1:34 PM.  If you have any questions, ask your nurse or doctor.               These medicines have changed or have updated prescriptions.        Dose/Directions    * blood glucose calibration solution   Commonly known as:  NO BRAND SPECIFIED   This may have changed:  Another medication with the same name was added. Make sure you understand how and when to take each.   Used for:  New onset type 2 diabetes mellitus (H)        To accompany: Blood Glucose Monitor Brands: per insurance.   Quantity:  1 Bottle   Refills:  3       * blood glucose calibration solution   This may have changed:  You were already taking a medication with the same name, and this prescription was added. Make sure you understand how and when to take each.   Used for:  Diabetes mellitus, type 2 (H)        Use to calibrate blood glucose monitor as needed as directed.   Quantity:  1 Bottle   Refills:  3       * Notice:  This list has 2 medication(s) that are the same as other medications prescribed for you. Read the directions carefully, and ask your doctor or other care provider to review them with you.         Where to get your medicines      These medications were sent to [facility] [id number]  [place], [place] - [address] AT [facility] & [place]  [address] [facility] [address]     Phone:  [phone]     blood glucose calibration solution                Primary Care Provider Office Phone # Fax #    Radha Villegas [phone] [address] [facility] [address]        Equal Access to Services     RUSLAN HAAS AH: Arlene Michaels, bang johansen, stephany peraza  adelitasanjeevjessica ziegler ah. So [facility] [phone].    ATENCIÓN: Si alexander uriarte, tiene a phillips disposición servicios gratuitos de asistencia lingüística. Roxann acuna [phone].    We comply with applicable federal civil rights laws and [place] laws. We do not discriminate on the basis of race, color, national origin, age, disability, sex, sexual orientation, or gender identity.            Thank you!     Thank you for choosing [facility]  DIABETES EDUCATION  for your care. Our goal is always to provide you with excellent care. Hearing back from our patients is one way we can continue to improve our services. Please take a few minutes to complete the written survey that you may receive in the mail after your visit with us. Thank you!             Your Updated Medication List - Protect others around you: Learn how to safely use, store and throw away your medicines at www.disposemymeds.org.          This list is accurate as of 9/14/18  1:34 PM.  Always use your most recent med list.                   Brand Name Dispense Instructions for use Diagnosis    ABILIFY 15 MG tablet   Generic drug:  ARIPiprazole      Take 15 mg by mouth daily Reported on 4/17/2017        ACE/ARB/ARNI NOT PRESCRIBED (INTENTIONAL)      ACE & ARB not prescribed due to Other:BP at goal        alcohol swab prep pads     100 each    Use to swab area of injection/reba as directed.    New onset type 2 diabetes mellitus (H)       aspirin 81 MG tablet      Take 1 tablet by mouth daily.        atorvastatin 20 MG tablet    LIPITOR    90 tablet    TAKE 1 TABLET(20 MG) BY MOUTH DAILY    Hyperlipidemia LDL goal <100       * blood glucose calibration solution    NO BRAND SPECIFIED    1 Bottle    To accompany: Blood Glucose Monitor Brands: per insurance.    New onset type 2 diabetes mellitus (H)       * blood glucose calibration solution     1 Bottle    Use to calibrate blood glucose monitor as needed as directed.    Diabetes mellitus, type 2 (H)       blood glucose  monitoring meter device kit    no brand specified    1 kit    Use to test blood sugar 1 times daily or as directed. Preferred blood glucose meter OR supplies to accompany: Blood Glucose Monitor Brands: per insurance.    New onset type 2 diabetes mellitus (H)       blood glucose monitoring test strip    no brand specified    100 strip    Use to test blood sugar 1 times daily or as directed. To accompany: Blood Glucose Monitor Brands: per insurance.    New onset type 2 diabetes mellitus (H)       cephALEXin 500 MG capsule    KEFLEX    40 capsule    Take 1 capsule (500 mg) by mouth 4 times daily for 10 days    Swelling of left hand       clonazePAM 0.5 MG tablet    klonoPIN     Take 1 tablet by mouth At Bedtime.        FLAX SEED OIL PO      Take  by mouth daily. Uses powder on food.        * indomethacin 50 MG capsule    INDOCIN    30 capsule    Take 1 capsule (50 mg) by mouth 3 times daily as needed for moderate pain    Idiopathic chronic gout of left knee without tophus       * indomethacin 25 MG capsule    INDOCIN    42 capsule    Take 1 capsule (25 mg) by mouth 2 times daily (with meals)    Swelling of left hand       Melatonin 10 MG Tabs tablet      Take 10 mg by mouth At Bedtime        metFORMIN 500 MG 24 hr tablet    GLUCOPHAGE-XR    90 tablet    TAKE 1 TABLET(500 MG) BY MOUTH DAILY    Type 2 diabetes mellitus without complication, without long-term current use of insulin (H)       mirtazapine 30 MG tablet    REMERON     Take 30 mg by mouth At Bedtime.        MULTIVITAMIN & MINERAL PO      Take 1 tablet by mouth daily.        order for DME     1 Device    Equipment being ordered:Face mask for CPAP machine size small with tubing and filter. [employer] [facility] [employer] ([phone])    SANDRA (obstructive sleep apnea)       * OVER-THE-COUNTER      Beta Prostate- Take 1 tablet daily.        * OVER-THE-COUNTER      Allergy med        polyethylene glycol powder    MIRALAX/GLYCOLAX    510 g    MIX AS DIRECTED AND TAKE 17  GRAMS BY MOUTH DAILY    Constipation       thin lancets    NO BRAND SPECIFIED    100 each    Use with lanceting device. To accompany: Blood Glucose Monitor Brands: per insurance.    New onset type 2 diabetes mellitus (H)       vitamin D 1000 units capsule      Take 1 capsule by mouth daily.        * Notice:  This list has 6 medication(s) that are the same as other medications prescribed for you. Read the directions carefully, and ask your doctor or other care provider to review them with you.

## 2018-09-19 VITALS — WEIGHT: 204 LBS | BODY MASS INDEX: 32.43 KG/M2

## 2018-09-20 DIAGNOSIS — K59.00 CONSTIPATION: ICD-10-CM

## 2018-09-20 NOTE — PROGRESS NOTES
"Diabetes Self-Management Education & Support    Diabetes Education Self Management & Training    SUBJECTIVE/OBJECTIVE:  Presents for: Follow-up  Accompanied by: Other  Diabetes education in the past 24mo: Yes  Focus of Visit: Monitoring, Self-care behavioral goal setting, Assistance w/ making life changes, Healthy Eating, Being Active  Diabetes type: Type 2  Disease course: Stable  Transportation concerns: No  Cultural Influences/Ethnic Background:  American    Diabetes Symptoms & Complications    Patient Problem List and Family Medical History reviewed for relevant medical history, current medical status, and diabetes risk factors.    Vitals:  Wt 92.5 kg (204 lb)  BMI 32.43 kg/m2  Estimated body mass index is 32.43 kg/(m^2) as calculated from the following:    Height as of 9/13/18: 1.689 m (5' 6.5\").    Weight as of this encounter: 92.5 kg (204 lb).   Last 3 BP:   BP Readings from Last 3 Encounters:   09/13/18 118/78   09/11/18 110/74   05/04/18 110/68       History   Smoking Status     Former Smoker     Years: 6.00     Types: Cigarettes     Quit date: 1/1/1983   Smokeless Tobacco     Never Used       Labs:  Lab Results   Component Value Date    A1C 6.3 05/04/2018     Lab Results   Component Value Date     12/05/2017     Lab Results   Component Value Date    LDL 75 09/11/2018    LDL  12/05/2017     Cannot estimate LDL when triglyceride exceeds 400 mg/dL     HDL Cholesterol   Date Value Ref Range Status   12/05/2017 39 (L) >39 mg/dL Final   ]  GFR Estimate   Date Value Ref Range Status   09/11/2018 88 >60 mL/min/1.7m2 Final     Comment:     Non  GFR Calc     GFR Estimate If Black   Date Value Ref Range Status   09/11/2018 >90 >60 mL/min/1.7m2 Final     Comment:      GFR Calc     Lab Results   Component Value Date    CR 0.88 09/11/2018     No results found for: MICROALBUMIN    Healthy Eating  Healthy Eating Assessed Today: Yes  Cultural/Yazdanism diet restrictions?: No  Patient " on a regular basis: Eats 3 meals a day  Meal planning: Smaller portions, Avoiding sweets  Meals include: Breakfast, Lunch, Dinner, Snacks  Breakfast - Instant apple cinnamon oatmeal made with almond milk  Snack - none  Lunch - omelet with potato and cheese, applesauce or peanut butter sandwich no jelly on whole wheat bread  Snack - one of the following - 10 almonds, 9 triscuits, 2 oz pepper pippa cheese, banana, apple, pickles  Dinner - yesterday was his birthday so he went out to eat with a friend and had 2 slices pizza and onion rings. The day before at home had turkey sandwich.   Drinks diet tea, almond milk, coffee and water    Being Active  Being Active Assessed Today: Yes  Exercise:: Yes  Days per week of moderate to strenuous exercise (like a brisk walk): 3  How intense was your typical exercise? : Heavy (like jogging or swimming  Barrier to exercise: None  Patient has been maintaining his swimming regimen three times per week and he is enjoying it.  Had a wrist injury last week and was instructed by PCP not to swim for one week. He states he has missed it this week. Plans to get back to it on Monday.    Monitoring  Monitoring Assessed Today: Yes  Did patient bring glucose meter to appointment? : Yes  Blood Glucose Meter: [facility]  Home Glucose (Sugar) Monitorin-2 times per day  Blood glucose trend: No change    Patient is still getting E-1 message with a lot of strips. Called [employer] and they will replace the strips and will also send patient a new meter in 5-7 business days. He has other strips to use in the meantime.     Taking Medications  Diabetes Medication(s)     Biguanides Sig    metFORMIN (GLUCOPHAGE-XR) 500 MG 24 hr tablet TAKE 1 TABLET(500 MG) BY MOUTH DAILY          Taking Medication Assessed Today: Yes  Current Treatments: Oral Agent (monotherapy)  Problems taking diabetes medications regularly?: No  Diabetes medication side effects?: No  Treatment Compliance: All of the time    Problem  Solving  Problem Solving Assessed Today: No    Reducing Risks  Reducing Risks Assessed Today: No    Healthy Coping  Healthy Coping Assessed Today: Yes  Emotional response to diabetes: Confidence diabetes can be controlled, Concern for health and well-being  Informal Support system:: Other  Stage of change: ACTION (Actively working towards change)  Difficulty affording diabetes management supplies?: No  Patient Activation Measure Survey Score:  IMMANUEL Score (Last Two) 2/18/2013   IMMANUEL Raw Score 42   Activation Score 66   IMMANUEL Level 3       ASSESSMENT:  Glucose is well controlled. Fasting glucose is .  His glucose two hours after oatmeal today was 236.  Patient is down another two pounds this month.  His main method for meal planning is portion control, avoiding sweets.         Patient's most recent   Lab Results   Component Value Date    A1C 6.3 05/04/2018    is meeting goal of <7.0    INTERVENTION:   Diabetes knowledge and skills assessment:     Based on learning assessment above, most appropriate setting for further diabetes education would be: Individual setting.    Education provided today on:  AADE Self-Care Behaviors:  Healthy Eating  Being Active  Monitoring    Opportunities for ongoing education and support in diabetes-self management were discussed.    Pt verbalized understanding of concepts discussed and recommendations provided today.       Education Materials Provided:  No new materials provided today    PLAN:  See Patient Instructions for co-developed, patient-stated behavior change goals.  AVS printed and provided to patient today. See Follow-Up section for recommended follow-up.  Continue to use portion control and limit sweets.  Eat more vegetables and cut back on chips and cheese.  Continue to swim three times per week. Use the bike or walk on other days.  Check fasting glucose daily.   Time Spent: 30 minutes  Encounter Type: Individual    Any diabetes medication dose changes were made via the CDE  Protocol and Collaborative Practice Agreement with the patient's referring provider. A copy of this encounter was shared with the provider.

## 2018-09-21 RX ORDER — POLYETHYLENE GLYCOL 3350 17 G/17G
POWDER, FOR SOLUTION ORAL
Qty: 510 G | Refills: 1 | Status: SHIPPED | OUTPATIENT
Start: 2018-09-21 | End: 2018-12-17

## 2018-09-28 DIAGNOSIS — Z53.9 DIAGNOSIS NOT YET DEFINED: Primary | ICD-10-CM

## 2018-09-28 PROCEDURE — G0179 MD RECERTIFICATION HHA PT: HCPCS | Performed by: FAMILY MEDICINE

## 2018-10-01 ENCOUNTER — TELEPHONE (OUTPATIENT)
Dept: FAMILY MEDICINE | Facility: CLINIC | Age: 58
End: 2018-10-01

## 2018-10-01 DIAGNOSIS — M79.642 PAIN OF LEFT HAND: Primary | ICD-10-CM

## 2018-10-01 RX ORDER — NAPROXEN 500 MG/1
500 TABLET ORAL 2 TIMES DAILY PRN
Qty: 30 TABLET | Refills: 1 | Status: SHIPPED | OUTPATIENT
Start: 2018-10-01 | End: 2018-10-31

## 2018-10-01 NOTE — TELEPHONE ENCOUNTER
Prior Authorization Retail Medication Request    Medication/Dose: indomethacin (INDOCIN) 25 MG capsule  ICD code (if different than what is on RX):    Previously Tried and Failed:    Rationale:  Plan does not cover this medication. Please call plan at [phone] to initiate prior authorization or call/fax pharmacy to change medication. Patient ID #: [id number]. Naproxen, nabumetone, diclofenac, sulindac preferred by insurance. Would you like to start PA or change medication?     Insurance Name:    Insurance ID:  [phone]      Pharmacy Information (if different than what is on RX)  Name:   Phone:      Dimitry Ryan CMA on 10/1/2018 at 1:54 PM

## 2018-10-31 DIAGNOSIS — M79.642 PAIN OF LEFT HAND: ICD-10-CM

## 2018-10-31 RX ORDER — NAPROXEN 500 MG/1
TABLET ORAL
Qty: 30 TABLET | Refills: 0 | Status: SHIPPED | OUTPATIENT
Start: 2018-10-31 | End: 2018-11-14

## 2018-10-31 NOTE — TELEPHONE ENCOUNTER
"Routing refill request to provider for review/approval because:  Labs not current:        Requested Prescriptions   Pending Prescriptions Disp Refills     naproxen (NAPROSYN) 500 MG tablet [Pharmacy Med Name: NAPROXEN 500MG TABLETS]  Last Written Prescription Date:  10/1/18  Last Fill Quantity: 30,  # refills: 1   Last office visit: 9/13/2018 with prescribing provider:     Future Office Visit:     30 tablet 0     Sig: TAKE 1 TABLET(500 MG) BY MOUTH TWICE DAILY AS NEEDED FOR MODERATE PAIN    NSAID Medications Failed    10/31/2018  8:16 AM       Failed - Normal ALT on file in past 12 months    Recent Labs   Lab Test  09/22/14   1055   ALT  34            Failed - Normal AST on file in past 12 months    Recent Labs   Lab Test  02/18/13   1113   AST  25            Passed - Blood pressure under 140/90 in past 12 months    BP Readings from Last 3 Encounters:   09/13/18 118/78   09/11/18 110/74   05/04/18 110/68                Passed - Recent (12 mo) or future (30 days) visit within the authorizing provider's specialty    Patient had office visit in the last 12 months or has a visit in the next 30 days with authorizing provider or within the authorizing provider's specialty.  See \"Patient Info\" tab in inbasket, or \"Choose Columns\" in Meds & Orders section of the refill encounter.             Passed - Patient is age 6-64 years       Passed - Normal CBC on file in past 12 months    Recent Labs   Lab Test  09/11/18   1507   WBC  11.8*   RBC  4.26*   HGB  13.6   HCT  39.4*   PLT  244                Passed - Normal serum creatinine on file in past 12 months    Recent Labs   Lab Test  09/11/18   1507   CR  0.88             Yenni Patterson RN - BC      "

## 2018-11-14 ENCOUNTER — TELEPHONE (OUTPATIENT)
Dept: FAMILY MEDICINE | Facility: CLINIC | Age: 58
End: 2018-11-14

## 2018-11-14 DIAGNOSIS — M79.642 PAIN OF LEFT HAND: ICD-10-CM

## 2018-11-14 NOTE — TELEPHONE ENCOUNTER
Called and spoke with Fatmata from home care and gave verbal OK for orders below.     Key Rhodes RN

## 2018-11-14 NOTE — TELEPHONE ENCOUNTER
Reason for Call: Request for an order or referral:    Order or referral being requested: Skilled nursing   1 time every other week for diabetic care    Date needed: as soon as possible    Has the patient been seen by the PCP for this problem? NO    Additional comments: None     Phone number Patient can be reached at:  Other phone number: [phone]     Best Time:  Any     Can we leave a detailed message on this number?  YES    Call taken on 11/14/2018 at 9:59 AM by Jf Lemus

## 2018-11-15 RX ORDER — NAPROXEN 500 MG/1
TABLET ORAL
Qty: 30 TABLET | Refills: 0 | Status: SHIPPED | OUTPATIENT
Start: 2018-11-15 | End: 2018-11-28

## 2018-11-15 NOTE — TELEPHONE ENCOUNTER
"Routing refill request to provider for review/approval because:  Labs not current:        Requested Prescriptions   Pending Prescriptions Disp Refills     naproxen (NAPROSYN) 500 MG tablet [Pharmacy Med Name: NAPROXEN 500MG TABLETS]  Last Written Prescription Date:  10/31/18  Last Fill Quantity: 30,  # refills: 0   Last office visit: 9/13/2018 with prescribing provider:     Future Office Visit:     30 tablet 0     Sig: TAKE 1 TABLET(500 MG) BY MOUTH TWICE DAILY AS NEEDED FOR MODERATE PAIN    NSAID Medications Failed    11/14/2018  8:07 AM       Failed - Normal ALT on file in past 12 months    Recent Labs   Lab Test  09/22/14   1055   ALT  34            Failed - Normal AST on file in past 12 months    Recent Labs   Lab Test  02/18/13   1113   AST  25            Passed - Blood pressure under 140/90 in past 12 months    BP Readings from Last 3 Encounters:   09/13/18 118/78   09/11/18 110/74   05/04/18 110/68                Passed - Recent (12 mo) or future (30 days) visit within the authorizing provider's specialty    Patient had office visit in the last 12 months or has a visit in the next 30 days with authorizing provider or within the authorizing provider's specialty.  See \"Patient Info\" tab in inbasket, or \"Choose Columns\" in Meds & Orders section of the refill encounter.             Passed - Patient is age 6-64 years       Passed - Normal CBC on file in past 12 months    Recent Labs   Lab Test  09/11/18   1507   WBC  11.8*   RBC  4.26*   HGB  13.6   HCT  39.4*   PLT  244                Passed - Normal serum creatinine on file in past 12 months    Recent Labs   Lab Test  09/11/18   1507   CR  0.88             Yenni Patterson RN - BC      "

## 2018-11-21 PROCEDURE — G0179 MD RECERTIFICATION HHA PT: HCPCS | Performed by: FAMILY MEDICINE

## 2018-11-28 DIAGNOSIS — M79.642 PAIN OF LEFT HAND: ICD-10-CM

## 2018-11-28 RX ORDER — NAPROXEN 500 MG/1
TABLET ORAL
Qty: 30 TABLET | Refills: 0 | Status: SHIPPED | OUTPATIENT
Start: 2018-11-28 | End: 2018-12-15

## 2018-11-28 NOTE — TELEPHONE ENCOUNTER
"Routing refill request to provider for review/approval because:  Labs not current:      Requested Prescriptions   Pending Prescriptions Disp Refills     naproxen (NAPROSYN) 500 MG tablet [Pharmacy Med Name: NAPROXEN 500MG TABLETS] 30 tablet 0     Sig: TAKE 1 TABLET(500 MG) BY MOUTH TWICE DAILY AS NEEDED FOR MODERATE PAIN    NSAID Medications Failed    11/28/2018  8:10 AM       Failed - Normal ALT on file in past 12 months    Recent Labs   Lab Test  09/22/14   1055   ALT  34            Failed - Normal AST on file in past 12 months    Recent Labs   Lab Test  02/18/13   1113   AST  25            Passed - Blood pressure under 140/90 in past 12 months    BP Readings from Last 3 Encounters:   09/13/18 118/78   09/11/18 110/74   05/04/18 110/68                Passed - Recent (12 mo) or future (30 days) visit within the authorizing provider's specialty    Patient had office visit in the last 12 months or has a visit in the next 30 days with authorizing provider or within the authorizing provider's specialty.  See \"Patient Info\" tab in inbasket, or \"Choose Columns\" in Meds & Orders section of the refill encounter.             Passed - Patient is age 6-64 years       Passed - Normal CBC on file in past 12 months    Recent Labs   Lab Test  09/11/18   1507   WBC  11.8*   RBC  4.26*   HGB  13.6   HCT  39.4*   PLT  244                Passed - Normal serum creatinine on file in past 12 months    Recent Labs   Lab Test  09/11/18   1507   CR  0.88             Key Rhodes RN            "

## 2018-11-30 ENCOUNTER — ALLIED HEALTH/NURSE VISIT (OUTPATIENT)
Dept: EDUCATION SERVICES | Facility: CLINIC | Age: 58
End: 2018-11-30
Payer: MEDICARE

## 2018-11-30 VITALS — WEIGHT: 203 LBS | BODY MASS INDEX: 32.27 KG/M2

## 2018-11-30 DIAGNOSIS — E11.9 TYPE 2 DIABETES MELLITUS WITHOUT COMPLICATION, WITHOUT LONG-TERM CURRENT USE OF INSULIN (H): Primary | ICD-10-CM

## 2018-11-30 PROCEDURE — G0108 DIAB MANAGE TRN  PER INDIV: HCPCS

## 2018-11-30 NOTE — MR AVS SNAPSHOT
After Visit Summary   11/30/2018    Yair Benites    MRN: [id number]           Patient Information     Date Of Birth          1960        Visit Information        Provider Department      11/30/2018 2:00 PM  DIABETIC ED RESOURCE [facility]        Care Instructions    E-4 Error means that you didn't get enough blood on your strip    Eat one serving of vegetables at lunch time    Start walking 3 times/week    Maylin Laguerre, RD, LD, CDE                Follow-ups after your visit        Your next 10 appointments already scheduled     Jan 04, 2019  2:00 PM CST   Diabetes Education with  DIABETIC ED RESOURCE   [facility] ([facility])    [address] [address] [phone]              Who to contact     If you have questions or need follow up information about today's clinic visit or your schedule please contact [facility] directly at [phone].  Normal or non-critical lab and imaging results will be communicated to you by MyChart, letter or phone within 4 business days after the clinic has received the results. If you do not hear from us within 7 days, please contact the clinic through [employer] or phone. If you have a critical or abnormal lab result, we will notify you by phone as soon as possible.  Submit refill requests through [employer] or call your pharmacy and they will forward the refill request to us. Please allow 3 business days for your refill to be completed.          Additional Information About Your Visit        Care EveryWhere ID     This is your Care EveryWhere ID. This could be used by other organizations to access your [facility] medical records  [id number]        Your Vitals Were     BMI (Body Mass Index)                   32.27 kg/m2            Blood Pressure from Last 3 Encounters:   09/13/18 118/78   09/11/18 110/74   05/04/18 110/68    Weight from Last 3  Encounters:   11/30/18 92.1 kg (203 lb)   09/19/18 92.5 kg (204 lb)   09/13/18 93.1 kg (205 lb 3.2 oz)              Today, you had the following     No orders found for display       Primary Care Provider Office Phone # Fax #    Radha Villegas [phone] [address] [address]        Equal Access to Services     [facility] : Hadii aad ku hadasho Soomaali, waaxda luqadaha, qaybta kaalmada adeegyada, waxay idiin hayaan adeeg kharash ladeborah . So [facility] [phone].    ATENCIÓN: Si alexander uriarte, tiene a phillips disposición servicios gratuitos de asistencia lingüística. Llame al [phone].    We comply with applicable federal civil rights laws and [place] laws. We do not discriminate on the basis of race, color, national origin, age, disability, sex, sexual orientation, or gender identity.            Thank you!     Thank you for choosing [facility]  for your care. Our goal is always to provide you with excellent care. Hearing back from our patients is one way we can continue to improve our services. Please take a few minutes to complete the written survey that you may receive in the mail after your visit with us. Thank you!             Your Updated Medication List - Protect others around you: Learn how to safely use, store and throw away your medicines at www.disposemymeds.org.          This list is accurate as of 11/30/18  2:39 PM.  Always use your most recent med list.                   Brand Name Dispense Instructions for use Diagnosis    ABILIFY 15 MG tablet   Generic drug:  ARIPiprazole      Take 15 mg by mouth daily Reported on 4/17/2017        ACE/ARB/ARNI NOT PRESCRIBED    INTENTIONAL     ACE & ARB not prescribed due to Other:BP at goal        alcohol swab prep pads     100 each    Use to swab area of injection/reba as directed.    New onset type 2 diabetes mellitus (H)       aspirin 81 MG tablet    ASA     Take 1 tablet by mouth daily.         atorvastatin 20 MG tablet    LIPITOR    90 tablet    TAKE 1 TABLET(20 MG) BY MOUTH DAILY    Hyperlipidemia LDL goal <100       * blood glucose calibration solution    NO BRAND SPECIFIED    1 Bottle    To accompany: Blood Glucose Monitor Brands: per insurance.    New onset type 2 diabetes mellitus (H)       * blood glucose calibration solution     1 Bottle    Use to calibrate blood glucose monitor as needed as directed.    Diabetes mellitus, type 2 (H)       blood glucose monitoring meter device kit    NO BRAND SPECIFIED    1 kit    Use to test blood sugar 1 times daily or as directed. Preferred blood glucose meter OR supplies to accompany: Blood Glucose Monitor Brands: per insurance.    New onset type 2 diabetes mellitus (H)       blood glucose monitoring test strip    NO BRAND SPECIFIED    100 strip    Use to test blood sugar 1 times daily or as directed. To accompany: Blood Glucose Monitor Brands: per insurance.    New onset type 2 diabetes mellitus (H)       clonazePAM 0.5 MG tablet    klonoPIN     Take 1 tablet by mouth At Bedtime.        FLAX SEED OIL PO      Take  by mouth daily. Uses powder on food.        indomethacin 25 MG capsule    INDOCIN    42 capsule    Take 1 capsule (25 mg) by mouth 2 times daily (with meals)    Swelling of left hand       Melatonin 10 MG Tabs tablet      Take 10 mg by mouth At Bedtime        metFORMIN 500 MG 24 hr tablet    GLUCOPHAGE-XR    90 tablet    TAKE 1 TABLET(500 MG) BY MOUTH DAILY    Type 2 diabetes mellitus without complication, without long-term current use of insulin (H)       mirtazapine 30 MG tablet    REMERON     Take 30 mg by mouth At Bedtime.        MULTIVITAMIN & MINERAL PO      Take 1 tablet by mouth daily.        naproxen 500 MG tablet    NAPROSYN    30 tablet    TAKE 1 TABLET(500 MG) BY MOUTH TWICE DAILY AS NEEDED FOR MODERATE PAIN    Pain of left hand       order for DME     1 Device    Equipment being ordered:Face mask for CPAP machine size small with tubing  and filter. [email or web address] ([phone])    SANDRA (obstructive sleep apnea)       * OVER-THE-COUNTER      Beta Prostate- Take 1 tablet daily.        * OVER-THE-COUNTER      Allergy med        polyethylene glycol powder    MIRALAX/GLYCOLAX    510 g    MIX AS DIRECTED AND TAKE 17 GRAMS BY MOUTH DAILY    Constipation       thin lancets    NO BRAND SPECIFIED    100 each    Use with lanceting device. To accompany: Blood Glucose Monitor Brands: per insurance.    New onset type 2 diabetes mellitus (H)       vitamin D 1000 units capsule      Take 1 capsule by mouth daily.        * Notice:  This list has 4 medication(s) that are the same as other medications prescribed for you. Read the directions carefully, and ask your doctor or other care provider to review them with you.

## 2018-11-30 NOTE — PROGRESS NOTES
"  Diabetes Self-Management Education & Support    Diabetes Education Self Management & Training    SUBJECTIVE/OBJECTIVE:  Presents for: Follow-up  Accompanied by: Other  Diabetes education in the past 24mo: Yes  Focus of Visit: Monitoring, Self-care behavioral goal setting, Assistance w/ making life changes, Healthy Eating, Being Active  Diabetes type: Type 2  Disease course: Stable  Transportation concerns: No  Cultural Influences/Ethnic Background:  American    Diabetes Symptoms & Complications   Patient Problem List and Family Medical History reviewed for relevant medical history, current medical status, and diabetes risk factors.    Vitals:  Wt 92.1 kg (203 lb)  BMI 32.27 kg/m2  Estimated body mass index is 32.27 kg/(m^2) as calculated from the following:    Height as of 9/13/18: 1.689 m (5' 6.5\").    Weight as of this encounter: 92.1 kg (203 lb).   Last 3 BP:   BP Readings from Last 3 Encounters:   09/13/18 118/78   09/11/18 110/74   05/04/18 110/68       History   Smoking Status     Former Smoker     Years: 6.00     Types: Cigarettes     Quit date: 1/1/1983   Smokeless Tobacco     Never Used       Labs:  Lab Results   Component Value Date    A1C 6.3 05/04/2018     Lab Results   Component Value Date     12/05/2017     Lab Results   Component Value Date    LDL 75 09/11/2018    LDL  12/05/2017     Cannot estimate LDL when triglyceride exceeds 400 mg/dL     HDL Cholesterol   Date Value Ref Range Status   12/05/2017 39 (L) >39 mg/dL Final   ]  GFR Estimate   Date Value Ref Range Status   09/11/2018 88 >60 mL/min/1.7m2 Final     Comment:     Non  GFR Calc     GFR Estimate If Black   Date Value Ref Range Status   09/11/2018 >90 >60 mL/min/1.7m2 Final     Comment:      GFR Calc     Lab Results   Component Value Date    CR 0.88 09/11/2018     No results found for: MICROALBUMIN    Healthy Eating  Healthy Eating Assessed Today: Yes  Cultural/Church diet restrictions?: No  Meal " planning: Smaller portions, Avoiding sweets  Meals include: Breakfast, Lunch, Dinner, Snacks  Breakfast: 2 packets of Oatmeal- apple cinnamon made with almond milk   Lunch: 1 smart balance peanut butter sandiwch OR turkey sandwich with cheese and coffee with creamer   Dinner: Chicken chow mein  Snacks: cheese with some chips   Beverages: Water, Coffee (V8 - low sodium)  Has patient met with a dietitian in the past?: Yes    Being Active  Being Active Assessed Today: Yes  Exercise:: Currently not exercising  Days per week of moderate to strenuous exercise (like a brisk walk): 0  Barrier to exercise: None    Monitoring  Monitoring Assessed Today: Yes  Did patient bring glucose meter to appointment? : Yes  Blood Glucose Meter: Accu-check  Home Glucose (Sugar) Monitorin-2 times per day  Blood glucose trend: No change      All blood sugars in range      Taking Medications  Diabetes Medication(s)     Biguanides Sig    metFORMIN (GLUCOPHAGE-XR) 500 MG 24 hr tablet TAKE 1 TABLET(500 MG) BY MOUTH DAILY        Taking Medication Assessed Today: Yes  Current Treatments: Oral Agent (monotherapy)  Problems taking diabetes medications regularly?: No  Diabetes medication side effects?: No  Treatment Compliance: All of the time    Problem Solving  Problem Solving Assessed Today: No    Reducing Risks  Reducing Risks Assessed Today: No    Healthy Coping  Healthy Coping Assessed Today: Yes  Emotional response to diabetes: Confidence diabetes can be controlled, Concern for health and well-being  Informal Support system:: Other  Stage of change: ACTION (Actively working towards change)  Difficulty affording diabetes management supplies?: No  Support resources: None  Patient Activation Measure Survey Score:  IMMANUEL Score (Last Two) 2013   IMMANUEL Raw Score 42   Activation Score 66   IMMANUEL Level 3       ASSESSMENT:  All of Janels blood sugars are in range, he mentions that he has not been exercising anymore.    Goals Addressed as of  11/30/2018 at 3:08 PM        Patient Stated      Being Active (pt-stated)     Added 11/30/18 by Maylin Laguerre RD           My Goal: I will walk 3 times/week [facility]    What I need to meet my goal: get to the mall to walk    I plan to meet my goal by this date: next diabetes education visit          Healthy Eating (pt-stated)     Added 11/30/18 by Maylin Laguerre RD           My Goal: I will eat 1 serving of vegetables at lunch    What I need to meet my goal: buy vegetables at the grocery store    I plan to meet my goal by this date: next diabetes education visit              Patient's most recent   Lab Results   Component Value Date    A1C 6.3 05/04/2018    is meeting goal of <7.0    INTERVENTION:   Diabetes knowledge and skills assessment:     Patient is knowledgeable in diabetes management concepts related to: none at this time    Patient needs further education on the following diabetes management concepts: Healthy Eating, Being Active, Monitoring, Taking Medication, Problem Solving, Reducing Risks and Healthy Coping    Based on learning assessment above, most appropriate setting for further diabetes education would be: Individual setting.    Education provided today on:  AADE Self-Care Behaviors:  Healthy Eating: consistency in amount, composition, and timing of food intake, weight reduction, portion control and plate planning method  Being Active: relationship to blood glucose and describe appropriate activity program  Monitoring: proper technique, individual blood glucose targets, frequency of monitoring and discussed error messages on meter    Opportunities for ongoing education and support in diabetes-self management were discussed.    Pt verbalized understanding of concepts discussed and recommendations provided today.       Education Materials Provided:  My Plate Planner    PLAN:  See Patient Instructions for co-developed, patient-stated behavior change goals.  AVS printed and provided to  patient today. See Follow-Up section for recommended follow-up.    Maylin Laguerre RD, LD, CDE  Time Spent: 30 minutes  Encounter Type: Individual    Any diabetes medication dose changes were made via the CDE Protocol and Collaborative Practice Agreement with the patient's referring provider. A copy of this encounter was shared with the provider.

## 2018-11-30 NOTE — PATIENT INSTRUCTIONS
E-4 Error means that you didn't get enough blood on your strip    Eat one serving of vegetables at lunch time    Start walking 3 times/week    Maylin Laguerre, RD, LD, CDE

## 2018-12-15 DIAGNOSIS — M79.642 PAIN OF LEFT HAND: ICD-10-CM

## 2018-12-17 DIAGNOSIS — K59.00 CONSTIPATION: ICD-10-CM

## 2018-12-17 RX ORDER — NAPROXEN 500 MG/1
TABLET ORAL
Qty: 30 TABLET | Refills: 0 | Status: SHIPPED | OUTPATIENT
Start: 2018-12-17 | End: 2019-01-17

## 2018-12-17 NOTE — TELEPHONE ENCOUNTER
"Pending Prescriptions:                       Disp   Refills    naproxen (NAPROSYN) 500 MG tablet [Pharma*30 tab*0            Sig: TAKE 1 TABLET(500 MG) BY MOUTH TWICE DAILY AS           NEEDED FOR MODERATE PAIN    Failed [facility] refill protocol, see below:    Requested Prescriptions   Pending Prescriptions Disp Refills     naproxen (NAPROSYN) 500 MG tablet [Pharmacy Med Name: NAPROXEN 500MG TABLETS] 30 tablet 0     Sig: TAKE 1 TABLET(500 MG) BY MOUTH TWICE DAILY AS NEEDED FOR MODERATE PAIN    NSAID Medications Failed - 12/17/2018  8:09 AM       Failed - Normal ALT on file in past 12 months    Recent Labs   Lab Test 09/22/14  1055   ALT 34            Failed - Normal AST on file in past 12 months    Recent Labs   Lab Test 02/18/13  1113   AST 25            Passed - Blood pressure under 140/90 in past 12 months    BP Readings from Last 3 Encounters:   09/13/18 118/78   09/11/18 110/74   05/04/18 110/68                Passed - Recent (12 mo) or future (30 days) visit within the authorizing provider's specialty    Patient had office visit in the last 12 months or has a visit in the next 30 days with authorizing provider or within the authorizing provider's specialty.  See \"Patient Info\" tab in inbasket, or \"Choose Columns\" in Meds & Orders section of the refill encounter.             Passed - Patient is age 6-64 years       Passed - Normal CBC on file in past 12 months    Recent Labs   Lab Test 09/11/18  1507   WBC 11.8*   RBC 4.26*   HGB 13.6   HCT 39.4*                   Passed - Normal serum creatinine on file in past 12 months    Recent Labs   Lab Test 09/11/18  1507   CR 0.88             Maylin Mak RN    "

## 2018-12-19 RX ORDER — POLYETHYLENE GLYCOL 3350 17 G/17G
POWDER, FOR SOLUTION ORAL
Qty: 510 G | Refills: 1 | Status: SHIPPED | OUTPATIENT
Start: 2018-12-19 | End: 2019-03-11

## 2019-01-04 ENCOUNTER — ALLIED HEALTH/NURSE VISIT (OUTPATIENT)
Dept: EDUCATION SERVICES | Facility: CLINIC | Age: 59
End: 2019-01-04
Payer: MEDICARE

## 2019-01-04 VITALS — BODY MASS INDEX: 32.59 KG/M2 | WEIGHT: 205 LBS

## 2019-01-04 DIAGNOSIS — E11.9 TYPE 2 DIABETES MELLITUS WITHOUT COMPLICATION, WITHOUT LONG-TERM CURRENT USE OF INSULIN (H): Primary | ICD-10-CM

## 2019-01-04 PROCEDURE — 99207 ZZC NO CHARGE LOS: CPT

## 2019-01-04 NOTE — PROGRESS NOTES
"Diabetes Self-Management Education & Support    Diabetes Education Self Management & Training    SUBJECTIVE/OBJECTIVE:  Presents for: Follow-up  Accompanied by: Other, Self  Diabetes education in the past 24mo: Yes  Focus of Visit: Monitoring, Self-care behavioral goal setting, Assistance w/ making life changes, Healthy Eating, Being Active  Diabetes type: Type 2  Disease course: Stable  Transportation concerns: No  Other concerns:: Literacy barrier, Cognitive impairment  Cultural Influences/Ethnic Background:  American    Diabetes Symptoms & Complications   Patient Problem List and Family Medical History reviewed for relevant medical history, current medical status, and diabetes risk factors.    Vitals:  Wt 93 kg (205 lb)   BMI 32.59 kg/m    Estimated body mass index is 32.59 kg/m  as calculated from the following:    Height as of 9/13/18: 1.689 m (5' 6.5\").    Weight as of this encounter: 93 kg (205 lb).   Last 3 BP:   BP Readings from Last 3 Encounters:   09/13/18 118/78   09/11/18 110/74   05/04/18 110/68       History   Smoking Status     Former Smoker     Years: 6.00     Types: Cigarettes     Quit date: 1/1/1983   Smokeless Tobacco     Never Used       Labs:  Lab Results   Component Value Date    A1C 6.3 05/04/2018     Lab Results   Component Value Date     12/05/2017     Lab Results   Component Value Date    LDL 75 09/11/2018    LDL  12/05/2017     Cannot estimate LDL when triglyceride exceeds 400 mg/dL     HDL Cholesterol   Date Value Ref Range Status   12/05/2017 39 (L) >39 mg/dL Final   ]  GFR Estimate   Date Value Ref Range Status   09/11/2018 88 >60 mL/min/1.7m2 Final     Comment:     Non  GFR Calc     GFR Estimate If Black   Date Value Ref Range Status   09/11/2018 >90 >60 mL/min/1.7m2 Final     Comment:      GFR Calc     Lab Results   Component Value Date    CR 0.88 09/11/2018     No results found for: MICROALBUMIN    Healthy Eating  Healthy Eating Assessed " Today: Yes  Cultural/Protestant diet restrictions?: No  Meal planning: Smaller portions, Avoiding sweets  Meals include: Breakfast, Lunch, Dinner, Snacks  Breakfast: 2 packets of Oatmeal- apple cinnamon made with almond milk   Lunch: 1 smart balance peanut butter sandiwch with ava kassidy bread OR turkey sandwich with cheese and coffee with creamer   Dinner: 2 cheese tolitos and an omelet  Snacks: cheese with some chips   Beverages: Water, Coffee(V8 - low sodium)  Has patient met with a dietitian in the past?: Yes    Being Active  Being Active Assessed Today: Yes  Exercise:: Currently not exercising  Barrier to exercise: None    Monitoring  Monitoring Assessed Today: Yes  Did patient bring glucose meter to appointment? : Yes  Blood Glucose Meter: Accu-check  Home Glucose (Sugar) Monitorin-2 times per day  Blood glucose trend: No change        Taking Medications  Diabetes Medication(s)     Biguanides Sig    metFORMIN (GLUCOPHAGE-XR) 500 MG 24 hr tablet TAKE 1 TABLET(500 MG) BY MOUTH DAILY          Taking Medication Assessed Today: Yes  Current Treatments: Oral Agent (monotherapy)  Problems taking diabetes medications regularly?: No  Diabetes medication side effects?: No  Treatment Compliance: All of the time    Problem Solving  Problem Solving Assessed Today: No    Reducing Risks  Reducing Risks Assessed Today: No    Healthy Coping  Healthy Coping Assessed Today: Yes  Emotional response to diabetes: Confidence diabetes can be controlled, Concern for health and well-being  Informal Support system:: Other  Stage of change: ACTION (Actively working towards change)  Difficulty affording diabetes management supplies?: No  Support resources: None  Patient Activation Measure Survey Score:  IMMANUEL Score (Last Two) 2013   IMMANUEL Raw Score 42   Activation Score 66   IMMANUEL Level 3       ASSESSMENT:  BG looking great.     Goals Addressed as of 2019 at 2:16 PM                 Today       Patient Stated      Being Active  (pt-stated)   0%    Added 11/30/18 by Maylin Laguerre RD     My Goal: I will walk or swim 1-2 times/week    What I need to meet my goal: get to the mall to walk or swim at the [employer]    I plan to meet my goal by this date: next diabetes education visit          Healthy Eating (pt-stated)   20%    Added 11/30/18 by Maylin Laguerre RD     My Goal: I will eat 1 serving of vegetables at lunch    What I need to meet my goal: buy vegetables at the grocery store    I plan to meet my goal by this date: next diabetes education visit            Patient's most recent   Lab Results   Component Value Date    A1C 6.3 05/04/2018    is meeting goal of <7.0    INTERVENTION:   Diabetes knowledge and skills assessment:     Patient is knowledgeable in diabetes management concepts related to: none    Patient needs further education on the following diabetes management concepts: Healthy Eating, Being Active, Monitoring, Taking Medication, Problem Solving, Reducing Risks and Healthy Coping    Based on learning assessment above, most appropriate setting for further diabetes education would be: Individual setting.    Education provided today on:  AADE Self-Care Behaviors:  Healthy Eating: consistency in amount, composition, and timing of food intake, weight reduction and portion control  Being Active: encouraged activity    Opportunities for ongoing education and support in diabetes-self management were discussed.    Pt verbalized understanding of concepts discussed and recommendations provided today.       Education Materials Provided:  No new materials provided today    PLAN:  Patient doesn't want to schedule appointment more than 2 months out, even though BG are very good.   See Patient Instructions for co-developed, patient-stated behavior change goals.  AVS printed and provided to patient today. See Follow-Up section for recommended follow-up.    Maylin Laguerre RD, LD, CDE  Time Spent: 15 minutes  Encounter Type:  Individual    Any diabetes medication dose changes were made via the CDE Protocol and Collaborative Practice Agreement with the patient's referring provider. A copy of this encounter was shared with the provider.

## 2019-01-10 ENCOUNTER — TELEPHONE (OUTPATIENT)
Dept: FAMILY MEDICINE | Facility: CLINIC | Age: 59
End: 2019-01-10

## 2019-01-10 NOTE — TELEPHONE ENCOUNTER
Called Ramya. Gave verbal ok for orders requested per RN protocol.    Andressa Cornelius RN  [facility]

## 2019-01-10 NOTE — TELEPHONE ENCOUNTER
Reason for Call:  Other call back    Detailed comments: Ramya is calling to get nursing orders for patient for once a week for 9 weeks with 2 PRN, Please call and advise thank you     Phone Number Patient can be reached at: Other phone number:  [phone]     Best Time: any    Can we leave a detailed message on this number? YES    Call taken on 1/10/2019 at 9:03 AM by Dafne Singletary

## 2019-01-17 DIAGNOSIS — M79.642 PAIN OF LEFT HAND: ICD-10-CM

## 2019-01-17 RX ORDER — NAPROXEN 500 MG/1
TABLET ORAL
Qty: 30 TABLET | Refills: 0 | Status: SHIPPED | OUTPATIENT
Start: 2019-01-17 | End: 2019-11-19

## 2019-01-17 NOTE — TELEPHONE ENCOUNTER
"Requested Prescriptions   Pending Prescriptions Disp Refills     naproxen (NAPROSYN) 500 MG tablet [Pharmacy Med Name: NAPROXEN 500MG TABLETS] 30 tablet 0     Sig: TAKE 1 TABLET(500 MG) BY MOUTH TWICE DAILY AS NEEDED FOR MODERATE PAIN    NSAID Medications Failed - 1/17/2019  8:00 AM       Failed - Normal ALT on file in past 12 months    Recent Labs   Lab Test 09/22/14  1055   ALT 34            Failed - Normal AST on file in past 12 months    Recent Labs   Lab Test 02/18/13  1113   AST 25            Failed - Recent (12 mo) or future (30 days) visit within the authorizing provider's specialty    Patient had office visit in the last 12 months or has a visit in the next 30 days with authorizing provider or within the authorizing provider's specialty.  See \"Patient Info\" tab in inbasket, or \"Choose Columns\" in Meds & Orders section of the refill encounter.             Passed - Blood pressure under 140/90 in past 12 months    BP Readings from Last 3 Encounters:   09/13/18 118/78   09/11/18 110/74   05/04/18 110/68                Passed - Patient is age 6-64 years       Passed - Normal CBC on file in past 12 months    Recent Labs   Lab Test 09/11/18  1507   WBC 11.8*   RBC 4.26*   HGB 13.6   HCT 39.4*                   Passed - Medication is active on med list       Passed - Normal serum creatinine on file in past 12 months    Recent Labs   Lab Test 09/11/18  1507   CR 0.88             Routing refill request to provider for review/approval because:  Labs not current:  AST and ALT    Susan Gómez RN          "

## 2019-01-29 DIAGNOSIS — Z53.9 DIAGNOSIS NOT YET DEFINED: Primary | ICD-10-CM

## 2019-01-29 PROCEDURE — G0179 MD RECERTIFICATION HHA PT: HCPCS | Performed by: FAMILY MEDICINE

## 2019-02-04 DIAGNOSIS — E11.9 NEW ONSET TYPE 2 DIABETES MELLITUS (H): ICD-10-CM

## 2019-02-19 ENCOUNTER — OFFICE VISIT (OUTPATIENT)
Dept: FAMILY MEDICINE | Facility: CLINIC | Age: 59
End: 2019-02-19
Payer: MEDICARE

## 2019-02-19 ENCOUNTER — ANCILLARY PROCEDURE (OUTPATIENT)
Dept: GENERAL RADIOLOGY | Facility: CLINIC | Age: 59
End: 2019-02-19
Attending: FAMILY MEDICINE
Payer: MEDICARE

## 2019-02-19 VITALS
WEIGHT: 204 LBS | TEMPERATURE: 97.4 F | HEIGHT: 67 IN | DIASTOLIC BLOOD PRESSURE: 77 MMHG | SYSTOLIC BLOOD PRESSURE: 116 MMHG | OXYGEN SATURATION: 93 % | BODY MASS INDEX: 32.02 KG/M2 | HEART RATE: 92 BPM

## 2019-02-19 DIAGNOSIS — M79.642 PAIN OF LEFT HAND: ICD-10-CM

## 2019-02-19 DIAGNOSIS — S60.222A CONTUSION OF LEFT HAND, INITIAL ENCOUNTER: Primary | ICD-10-CM

## 2019-02-19 PROCEDURE — 99213 OFFICE O/P EST LOW 20 MIN: CPT | Performed by: FAMILY MEDICINE

## 2019-02-19 PROCEDURE — 73130 X-RAY EXAM OF HAND: CPT | Mod: LT

## 2019-02-19 RX ORDER — DICLOFENAC SODIUM 75 MG/1
TABLET, DELAYED RELEASE ORAL
Qty: 40 TABLET | Refills: 0 | Status: SHIPPED | OUTPATIENT
Start: 2019-02-19 | End: 2019-09-25

## 2019-02-19 ASSESSMENT — PAIN SCALES - GENERAL: PAINLEVEL: EXTREME PAIN (8)

## 2019-02-19 ASSESSMENT — MIFFLIN-ST. JEOR: SCORE: 1686.03

## 2019-02-19 NOTE — LETTER
February 19, 2019      Yair Benites  [address] [place] [id number]        To Whom It May Concern:    Yair Benites  was seen on 02/19/2019.  Please excuse him  until 02/26/2019 due to injury.  Return to work on 02/26/2019    Sincerely,        Tawana Vogel MD

## 2019-02-19 NOTE — PROGRESS NOTES
SUBJECTIVE:   Yair Benites is a 58 year old male who presents to clinic today for the following health issues:  Patient comes in today reporting that he fell on  on his left hand.  He reports since that time is been swollen and tender mostly on the dorsum aspect.  He has no numbness no change in with his finger he is able to move his finger, he has no pain with his wrist area elbow or shoulder.  Denies any other injury.    Patient reports he is right-hand dominant.  Fall: left wrist pain      Duration: 19    Description (location/character/radiation): Left wrist/hand    Intensity:  8/10    Accompanying signs and symptoms: Swollen, painful, aching    History (similar episodes/previous evaluation): None    Precipitating or alleviating factors: movement    Therapies tried and outcome: Nothing     Problem list and histories reviewed & adjusted, as indicated.  Additional history: as documented    Patient Active Problem List   Diagnosis     Colon polyp     Traumatic brain injury (H)     IBS (irritable bowel syndrome)     Synovitis and tenosynovitis     Dizziness - light-headed     Primary localized osteoarthrosis, ankle and foot     Obesity     Depression, major     Headache disorder     SANDRA (obstructive sleep apnea)     Chronic gout without tophus, unspecified cause, unspecified site     Hyperlipidemia LDL goal <100     New onset type 2 diabetes mellitus (H)     Type 2 diabetes mellitus without complication, without long-term current use of insulin (H)     Past Surgical History:   Procedure Laterality Date     SURGICAL HISTORY OF -       nose       Social History     Tobacco Use     Smoking status: Former Smoker     Years: 6.00     Types: Cigarettes     Last attempt to quit: 1983     Years since quittin.1     Smokeless tobacco: Never Used   Substance Use Topics     Alcohol use: No     Family History   Problem Relation Age of Onset     C.A.D. Father         MI age 66     Heart Disease Father       Depression Brother      Cancer Brother         Brain Tumor     Respiratory Brother      Depression Brother      Gallbladder Disease Brother      Dementia Brother      Cancer Brother      Parkinsonism Brother      Diabetes Maternal Uncle      Asthma No family hx of      Hypertension No family hx of          Current Outpatient Medications   Medication Sig Dispense Refill     ACE/ARB/ARNI NOT PRESCRIBED, INTENTIONAL, ACE & ARB not prescribed due to Other:BP at goal       alcohol swab prep pads Use to swab area of injection/reba as directed. 100 each 3     ARIPiprazole (ABILIFY) 15 MG tablet Take 15 mg by mouth daily Reported on 4/17/2017       aspirin 81 MG tablet Take 1 tablet by mouth daily.       atorvastatin (LIPITOR) 20 MG tablet TAKE 1 TABLET(20 MG) BY MOUTH DAILY 90 tablet 3     blood glucose (ACCU-CHEK SMARTVIEW) test strip USE TO TEST ONCE DAILY 100 strip 0     blood glucose calibration (ACCU-CHEK SMARTVIEW CONTROL) solution Use to calibrate blood glucose monitor as needed as directed. 1 Bottle 3     blood glucose calibration (NO BRAND SPECIFIED) solution To accompany: Blood Glucose Monitor Brands: per insurance. 1 Bottle 3     blood glucose monitoring (NO BRAND SPECIFIED) meter device kit Use to test blood sugar 1 times daily or as directed. Preferred blood glucose meter OR supplies to accompany: Blood Glucose Monitor Brands: per insurance. 1 kit 0     Cholecalciferol (VITAMIN D) 1000 UNIT capsule Take 1 capsule by mouth daily.       ClonAZEPAM (KLONOPIN) 0.5 MG tablet Take 1 tablet by mouth At Bedtime.       diclofenac (VOLTAREN) 75 MG EC tablet One tab bid with food for 2 weeks, then bid as needed 40 tablet 0     Flaxseed, Linseed, (FLAX SEED OIL PO) Take  by mouth daily. Uses powder on food.        Melatonin 10 MG TABS tablet Take 10 mg by mouth At Bedtime       metFORMIN (GLUCOPHAGE-XR) 500 MG 24 hr tablet TAKE 1 TABLET(500 MG) BY MOUTH DAILY 90 tablet 0     mirtazapine (REMERON) 30 MG tablet Take 30  "mg by mouth At Bedtime.       Multiple Vitamins-Minerals (MULTIVITAMIN & MINERAL PO) Take 1 tablet by mouth daily.       naproxen (NAPROSYN) 500 MG tablet TAKE 1 TABLET(500 MG) BY MOUTH TWICE DAILY AS NEEDED FOR MODERATE PAIN 30 tablet 0     order for DME Equipment being ordered:Face mask for CPAP machine size small with tubing and filter. [employer] Medical Equipment ([phone]) 1 Device 0     OVER-THE-COUNTER Allergy med       OVER-THE-COUNTER Beta Prostate- Take 1 tablet daily.       polyethylene glycol (MIRALAX/GLYCOLAX) powder MIX AS DIRECTED AND TAKE 17 GRAMS BY MOUTH DAILY 510 g 1     thin (NO BRAND SPECIFIED) lancets Use with lanceting device. To accompany: Blood Glucose Monitor Brands: per insurance. 100 each 6     Allergies   Allergen Reactions     Erythromycin Nausea     Imipramine Diarrhea     Imipramine Hcl      Toxic levels     Risperidone      Other reaction(s): Extrapyramidal Side Effect     Erythromycin Rash       Reviewed and updated as needed this visit by clinical staff  Allergies  Meds       Reviewed and updated as needed this visit by Provider         ROS:  Constitutional, HEENT, cardiovascular, pulmonary, gi and gu systems are negative, except as otherwise noted.    OBJECTIVE:     /77 (BP Location: Right arm, Patient Position: Chair, Cuff Size: Adult Small)   Pulse 92   Temp 97.4  F (36.3  C) (Oral)   Ht 1.689 m (5' 6.5\")   Wt 92.5 kg (204 lb)   SpO2 93%   BMI 32.43 kg/m    Body mass index is 32.43 kg/m .  GENERAL APPEARANCE: healthy, alert and no distress  ORTHO: Wrist Exam: WRIST: Left  Inspection: no swelling  Palpation: Tender: NON TENDER:   Non-tender: all  Range of Motion: normal  Strength: no deficits    ELBOW:  elbow exam was normal, full extension, flexion.    Hand/Finger Exam: Inspection:Long Finger:  slight swelling  Tender: Metacarpals:  3rd metacarpal  Non-tender: All Normal, remain  Range of Motion Long Finger:   flexion MCP   decreased, extension MCP   " decreased  Strength: decreased    Diagnostic Test Results:  Xray - was negative for acute finding           Final report still pending    ASSESSMENT/PLAN:       ICD-10-CM    1. Contusion of left hand, initial encounter S60.222A    2. Pain of left hand M79.642 XR Hand Left G/E 3 Views     diclofenac (VOLTAREN) 75 MG EC tablet     I reviewed his x-ray results with him.  I told him I do not see any acute finding.  And I told him once we get the final x-ray report will be notified with the final result.    In the meantime, he was advised with supportive care.  He was advised on elevation, ice given prescription for diclofenac use with food twice daily for the next 2 weeks.  In addition, he was given a note to be off given a note to be off work until next Tuesday.    Follow-up as needed  Patient Instructions     Patient Education     Hand Contusion  You have a contusion. This is also called a bruise. There is swelling and some bleeding under the skin, but no broken bones. This injury generally takes a few days to a few weeks to heal.  During that time, the bruise will typically change in color from reddish, to purple-blue, to greenish-yellow, then to yellow-brown.  Home care    Elevate the hand to reduce pain and swelling. As much as possible, sit or lie down with the hand raised about the level of your heart. This is especially important during the first 48 hours.    Ice the hand to help reduce pain and swelling. Wrap a cold source (ice pack or ice cubes in a plastic bag) in a thin towel. Apply to the bruised area for 20 minutes every 1 to 2 hours the first day. Continue this 3 to 4 times a day until the pain and swelling goes away.    Unless another medicine was prescribed, you can take acetaminophen, ibuprofen, or naproxen to control pain. (If you have chronic liver or kidney disease or ever had a stomach ulcer or gastrointestinal bleeding, talk with your doctor before using these medicines.)  Follow up  Follow up  with your healthcare provider or our staff as advised. Call if you are not improving within 1 to 2 weeks.  When to seek medical advice   Call your healthcare provider right away if you have any of the following:    Increased pain or swelling    Arm becomes cold, blue, numb or tingly    Signs of infection: Warmth, drainage, or increased redness or pain around the bruise    Inability to move the injured hand     Frequent bruising for unknown reasons  Date Last Reviewed: 2/1/2017 2000-2018 [employer]. [address] [place] [address]. All rights reserved. This information is not intended as a substitute for professional medical care. Always follow your healthcare professional's instructions.               Tawana Vogel MD  [facility]

## 2019-02-19 NOTE — PATIENT INSTRUCTIONS
Patient Education     Hand Contusion  You have a contusion. This is also called a bruise. There is swelling and some bleeding under the skin, but no broken bones. This injury generally takes a few days to a few weeks to heal.  During that time, the bruise will typically change in color from reddish, to purple-blue, to greenish-yellow, then to yellow-brown.  Home care    Elevate the hand to reduce pain and swelling. As much as possible, sit or lie down with the hand raised about the level of your heart. This is especially important during the first 48 hours.    Ice the hand to help reduce pain and swelling. Wrap a cold source (ice pack or ice cubes in a plastic bag) in a thin towel. Apply to the bruised area for 20 minutes every 1 to 2 hours the first day. Continue this 3 to 4 times a day until the pain and swelling goes away.    Unless another medicine was prescribed, you can take acetaminophen, ibuprofen, or naproxen to control pain. (If you have chronic liver or kidney disease or ever had a stomach ulcer or gastrointestinal bleeding, talk with your doctor before using these medicines.)  Follow up  Follow up with your healthcare provider or our staff as advised. Call if you are not improving within 1 to 2 weeks.  When to seek medical advice   Call your healthcare provider right away if you have any of the following:    Increased pain or swelling    Arm becomes cold, blue, numb or tingly    Signs of infection: Warmth, drainage, or increased redness or pain around the bruise    Inability to move the injured hand     Frequent bruising for unknown reasons  Date Last Reviewed: 2/1/2017 2000-2018 [employer]. [address] [place] [address], [place] [address]. All rights reserved. This information is not intended as a substitute for professional medical care. Always follow your healthcare professional's instructions.

## 2019-03-04 DIAGNOSIS — E11.9 TYPE 2 DIABETES MELLITUS WITHOUT COMPLICATION, WITHOUT LONG-TERM CURRENT USE OF INSULIN (H): ICD-10-CM

## 2019-03-05 RX ORDER — METFORMIN HCL 500 MG
TABLET, EXTENDED RELEASE 24 HR ORAL
Qty: 30 TABLET | Refills: 0 | OUTPATIENT
Start: 2019-03-05

## 2019-03-05 NOTE — TELEPHONE ENCOUNTER
Will call after 9 to verify duplicate    metFORMIN (GLUCOPHAGE-XR) 500 MG 24 hr tablet 30 tablet 0 3/1/2019  No   Sig - Route: Take 1 tablet (500 mg) by mouth daily appt required for further refills - Oral   Sent to pharmacy as: metFORMIN (GLUCOPHAGE-XR) 500 MG 24 hr tablet   Class: E-Prescribe   Order: [id number]   E-Prescribing Status: Receipt confirmed by pharmacy (3/1/2019  1:00 PM CST)

## 2019-03-07 ENCOUNTER — TELEPHONE (OUTPATIENT)
Dept: FAMILY MEDICINE | Facility: CLINIC | Age: 59
End: 2019-03-07

## 2019-03-07 NOTE — TELEPHONE ENCOUNTER
Called Ramya with the verbal ok for orders requested per RN protocol. Closing encounter. No further action needed.    Andressa Cornelius RN  [facility]

## 2019-03-07 NOTE — TELEPHONE ENCOUNTER
Reason for call:  Other   Patient called regarding (reason for call): call back  Additional comments: Orders to continue seeing patient 1 x every other week and 2 prns for missed or emergent.    Phone number to reach patient:  Other phone number:  Ramya [phone]*    Best Time:  ASAP    Can we leave a detailed message on this number?  YES

## 2019-03-11 DIAGNOSIS — K59.00 CONSTIPATION: ICD-10-CM

## 2019-03-11 RX ORDER — POLYETHYLENE GLYCOL 3350 17 G/17G
POWDER, FOR SOLUTION ORAL
Qty: 510 G | Refills: 1 | Status: SHIPPED | OUTPATIENT
Start: 2019-03-11 | End: 2019-05-29

## 2019-03-13 DIAGNOSIS — Z53.9 DIAGNOSIS NOT YET DEFINED: Primary | ICD-10-CM

## 2019-03-13 PROCEDURE — G0179 MD RECERTIFICATION HHA PT: HCPCS | Performed by: FAMILY MEDICINE

## 2019-03-15 ENCOUNTER — ALLIED HEALTH/NURSE VISIT (OUTPATIENT)
Dept: EDUCATION SERVICES | Facility: CLINIC | Age: 59
End: 2019-03-15
Payer: MEDICARE

## 2019-03-15 VITALS — BODY MASS INDEX: 32.19 KG/M2 | WEIGHT: 202.5 LBS

## 2019-03-15 DIAGNOSIS — E11.9 NEW ONSET TYPE 2 DIABETES MELLITUS (H): Primary | ICD-10-CM

## 2019-03-15 PROCEDURE — G0108 DIAB MANAGE TRN  PER INDIV: HCPCS

## 2019-03-15 NOTE — PROGRESS NOTES
"Diabetes Self-Management Education & Support    Diabetes Education Self Management & Training    SUBJECTIVE/OBJECTIVE:  Presents for: Follow-up  Accompanied by: Other, Self  Diabetes education in the past 24mo: Yes  Focus of Visit: Monitoring, Self-care behavioral goal setting, Assistance w/ making life changes, Healthy Eating, Being Active  Diabetes type: Type 2  Disease course: Stable  Diabetes management related comments/concerns: Osmani is proud of his weight loss  Transportation concerns: No  Other concerns:: Cognitive impairment  Cultural Influences/Ethnic Background:  American    Diabetes Symptoms & Complications   Patient Problem List and Family Medical History reviewed for relevant medical history, current medical status, and diabetes risk factors.    Vitals:  Wt 91.9 kg (202 lb 8 oz)   BMI 32.19 kg/m    Estimated body mass index is 32.19 kg/m  as calculated from the following:    Height as of 2/19/19: 1.689 m (5' 6.5\").    Weight as of this encounter: 91.9 kg (202 lb 8 oz).   Last 3 BP:   BP Readings from Last 3 Encounters:   02/19/19 116/77   09/13/18 118/78   09/11/18 110/74       History   Smoking Status     Former Smoker     Years: 6.00     Types: Cigarettes     Quit date: 1/1/1983   Smokeless Tobacco     Never Used       Labs:  Lab Results   Component Value Date    A1C 6.3 05/04/2018     Lab Results   Component Value Date     12/05/2017     Lab Results   Component Value Date    LDL 75 09/11/2018    LDL  12/05/2017     Cannot estimate LDL when triglyceride exceeds 400 mg/dL     HDL Cholesterol   Date Value Ref Range Status   12/05/2017 39 (L) >39 mg/dL Final   ]  GFR Estimate   Date Value Ref Range Status   09/11/2018 88 >60 mL/min/1.7m2 Final     Comment:     Non  GFR Calc     GFR Estimate If Black   Date Value Ref Range Status   09/11/2018 >90 >60 mL/min/1.7m2 Final     Comment:      GFR Calc     Lab Results   Component Value Date    CR 0.88 09/11/2018     No " results found for: MICROALBUMIN    Healthy Eating  Healthy Eating Assessed Today: Yes  Cultural/Synagogue diet restrictions?: No  Meal planning: Smaller portions, Avoiding sweets  Meals include: Breakfast, Lunch, Dinner, Snacks  Breakfast: 2 packets of Oatmeal- apple cinnamon made with almond milk   Lunch: 1 smart balance peanut butter sandiwch with whole wheat bread OR turkey sandwich with cheese and coffee with creamer   Dinner: Chicken chow mein OR Davanis pizza  Snacks: wheat crackers - he doesn't eat cheese anymore  Beverages: Water, Coffee(V8 - low sodium)  Has patient met with a dietitian in the past?: Yes    Being Active  Being Active Assessed Today: Yes  Exercise:: Currently not exercising(He did go for a couple of walks but he fell and hurt his hand so he will wait until the ice melts to go again. )  Barrier to exercise: None    Monitoring  Monitoring Assessed Today: Yes  Did patient bring glucose meter to appointment? : Yes  Blood Glucose Meter: Accu-check  Home Glucose (Sugar) Monitorin-2 times per day  Blood glucose trend: No change        Taking Medications  Diabetes Medication(s)     Biguanides       metFORMIN (GLUCOPHAGE-XR) 500 MG 24 hr tablet    Take 1 tablet (500 mg) by mouth daily appt required for further refills          Taking Medication Assessed Today: Yes  Current Treatments: Oral Agent (monotherapy)  Problems taking diabetes medications regularly?: No  Diabetes medication side effects?: No  Treatment Compliance: All of the time    Problem Solving  Problem Solving Assessed Today: No    Reducing Risks  Reducing Risks Assessed Today: No    Healthy Coping  Healthy Coping Assessed Today: Yes  Emotional response to diabetes: Confidence diabetes can be controlled, Concern for health and well-being  Informal Support system:: Other  Stage of change: ACTION (Actively working towards change)  Difficulty affording diabetes management supplies?: No  Support resources: None  Patient Activation  Measure Survey Score:  IMMANUEL Score (Last Two) 2/18/2013   IMMANUEL Raw Score 42   Activation Score 66   IMMANUEL Level 3       ASSESSMENT:  All BG in goal.      Goals Addressed as of 3/15/2019 at 2:24 PM                 Today    1/4/19       Patient Stated      Being Active (pt-stated)   0%  0%    Added 11/30/18 by Maylin Laguerre RD     My Goal: I will walk or swim 1-2 times/week    What I need to meet my goal: get to the mall to walk or swim at the [employer]    I plan to meet my goal by this date: next diabetes education visit          Healthy Eating (pt-stated)   0%  20%    Added 11/30/18 by Maylin Laguerre RD     My Goal: I will eat 1 serving of vegetables at lunch    What I need to meet my goal: buy vegetables at the grocery store    I plan to meet my goal by this date: next diabetes education visit            Patient's most recent   Lab Results   Component Value Date    A1C 6.3 05/04/2018    is meeting goal of <7.0    INTERVENTION:   Discussion:  Discussed Yair's goals and strategies to reach them. We discussed his blood sugars, commended patient on reducing portions    Diabetes knowledge and skills assessment:     Patient is knowledgeable in diabetes management concepts related to: none at this time    Patient needs further education on the following diabetes management concepts: Healthy Eating, Being Active, Monitoring, Taking Medication, Problem Solving, Reducing Risks and Healthy Coping    Based on learning assessment above, most appropriate setting for further diabetes education would be: Individual setting.    Education provided today on:  AADE Self-Care Behaviors:  Healthy Eating: carbohydrate counting, consistency in amount, composition, and timing of food intake, weight reduction and portion control  Being Active: relationship to blood glucose, describe appropriate activity program and precautions to take    Opportunities for ongoing education and support in diabetes-self management were  discussed.    Pt verbalized understanding of concepts discussed and recommendations provided today.       Education Materials Provided:  No new materials provided today    PLAN:  See Patient Instructions for co-developed, patient-stated behavior change goals.  AVS printed and provided to patient today. See Follow-Up section for recommended follow-up.    Maylin Laguerre RD, LD, CDE  Time Spent: 30 minutes  Encounter Type: Individual    Any diabetes medication dose changes were made via the CDE Protocol and Collaborative Practice Agreement with the patient's referring provider. A copy of this encounter was shared with the provider.

## 2019-03-15 NOTE — PATIENT INSTRUCTIONS
Diabetes Support Resources:  Websites: American Association of Diabetes Educators Participant Resources: www.diabeteseducator.org/living-with-diabetes   American Diabetes Association: www.diabetes.org  Diabetes Together 2 Goal: http://bhzshnrj5ygra.org     Bring blood glucose meter and logbook with you to all doctor and follow-up appointments.    Diabetes Education Telephone Visit Follow-up:    We realize your time is valuable and your health is important! We offer a convenient Telephone Visit follow up! It s a quick way to check in for a medication dose adjustment without having to come back to clinic as soon.    Telephone Visits are often covered by insurance. Please check with your insurance plan to see if this type of visit is covered. If not, the cost is less expensive than an office visit:      Up to 10 minutes (Code 35753): $30    11-20 minutes (Code 81462): $59    More than 20 minutes (Code 62383): $85    Talk with your Diabetes Educator if you want to learn more.      [facility]:  For Your Diabetes Education and Nutrition Appointments Call:  [phone]   For Diabetes Education or Nutrition Related Questions:   Phone: [phone]  E-mail: [email or web address]  Fax: [phone]   If you need a medication refill please contact your pharmacy. Please allow 3 business days for your refills to be completed.  Maylin Laguerre, MAIRA, LD, CDE

## 2019-03-22 ENCOUNTER — DOCUMENTATION ONLY (OUTPATIENT)
Dept: SLEEP MEDICINE | Facility: CLINIC | Age: 59
End: 2019-03-22
Payer: MEDICARE

## 2019-03-22 NOTE — PROGRESS NOTES
Non-[facility] patient came to [place] for mask fitting appointment on March 22, 2019. Patient requested to switch masks because he wanted to try a new one; sometimes his Andrei Respironics Verna View mask goes up above his mouth while sleeping.  Patient tried on the following masks: [employer] Airfit F20 Full Face mask (Medium), [employer] Dreamwear Full Face mask (Medium), [employer] Simplus Full Face mask (Medium).  Patient selected a [employer] Mask name: Simplus Full Face mask size Medium.  Patient also received heated tubing, reusable filter and water chamber.

## 2019-03-25 ENCOUNTER — OFFICE VISIT (OUTPATIENT)
Dept: FAMILY MEDICINE | Facility: CLINIC | Age: 59
End: 2019-03-25
Payer: MEDICARE

## 2019-03-25 VITALS
SYSTOLIC BLOOD PRESSURE: 112 MMHG | RESPIRATION RATE: 16 BRPM | BODY MASS INDEX: 32.18 KG/M2 | TEMPERATURE: 96.9 F | HEIGHT: 67 IN | DIASTOLIC BLOOD PRESSURE: 68 MMHG | OXYGEN SATURATION: 97 % | HEART RATE: 92 BPM | WEIGHT: 205 LBS

## 2019-03-25 DIAGNOSIS — Z12.5 SCREENING FOR PROSTATE CANCER: ICD-10-CM

## 2019-03-25 DIAGNOSIS — Z00.01 ENCOUNTER FOR ROUTINE ADULT PHYSICAL EXAM WITH ABNORMAL FINDINGS: ICD-10-CM

## 2019-03-25 DIAGNOSIS — E66.09 CLASS 1 OBESITY DUE TO EXCESS CALORIES WITH SERIOUS COMORBIDITY AND BODY MASS INDEX (BMI) OF 33.0 TO 33.9 IN ADULT: ICD-10-CM

## 2019-03-25 DIAGNOSIS — E11.9 TYPE 2 DIABETES MELLITUS WITHOUT COMPLICATION, WITHOUT LONG-TERM CURRENT USE OF INSULIN (H): ICD-10-CM

## 2019-03-25 DIAGNOSIS — G47.33 OSA (OBSTRUCTIVE SLEEP APNEA): ICD-10-CM

## 2019-03-25 DIAGNOSIS — F32.9 MAJOR DEPRESSIVE DISORDER WITH SINGLE EPISODE, REMISSION STATUS UNSPECIFIED: ICD-10-CM

## 2019-03-25 DIAGNOSIS — E78.5 HYPERLIPIDEMIA LDL GOAL <100: ICD-10-CM

## 2019-03-25 DIAGNOSIS — E66.811 CLASS 1 OBESITY DUE TO EXCESS CALORIES WITH SERIOUS COMORBIDITY AND BODY MASS INDEX (BMI) OF 33.0 TO 33.9 IN ADULT: ICD-10-CM

## 2019-03-25 DIAGNOSIS — Z23 NEED FOR PROPHYLACTIC VACCINATION AND INOCULATION AGAINST INFLUENZA: Primary | ICD-10-CM

## 2019-03-25 DIAGNOSIS — K63.5 POLYP OF COLON, UNSPECIFIED PART OF COLON, UNSPECIFIED TYPE: ICD-10-CM

## 2019-03-25 LAB
ANION GAP SERPL CALCULATED.3IONS-SCNC: 11 MMOL/L (ref 3–14)
BUN SERPL-MCNC: 11 MG/DL (ref 7–30)
CALCIUM SERPL-MCNC: 9.5 MG/DL (ref 8.5–10.1)
CHLORIDE SERPL-SCNC: 104 MMOL/L (ref 94–109)
CO2 SERPL-SCNC: 26 MMOL/L (ref 20–32)
CREAT SERPL-MCNC: 0.92 MG/DL (ref 0.66–1.25)
CREAT UR-MCNC: 33 MG/DL
GFR SERPL CREATININE-BSD FRML MDRD: 89 ML/MIN/{1.73_M2}
GLUCOSE SERPL-MCNC: 113 MG/DL (ref 70–99)
HBA1C MFR BLD: 6 % (ref 0–5.6)
MICROALBUMIN UR-MCNC: <5 MG/L
MICROALBUMIN/CREAT UR: NORMAL MG/G CR (ref 0–17)
POTASSIUM SERPL-SCNC: 3.7 MMOL/L (ref 3.4–5.3)
PSA SERPL-ACNC: 0.6 UG/L (ref 0–4)
SODIUM SERPL-SCNC: 141 MMOL/L (ref 133–144)

## 2019-03-25 PROCEDURE — G0008 ADMIN INFLUENZA VIRUS VAC: HCPCS | Performed by: FAMILY MEDICINE

## 2019-03-25 PROCEDURE — 99213 OFFICE O/P EST LOW 20 MIN: CPT | Mod: 25 | Performed by: FAMILY MEDICINE

## 2019-03-25 PROCEDURE — 80048 BASIC METABOLIC PNL TOTAL CA: CPT | Performed by: FAMILY MEDICINE

## 2019-03-25 PROCEDURE — G0103 PSA SCREENING: HCPCS | Performed by: FAMILY MEDICINE

## 2019-03-25 PROCEDURE — 90682 RIV4 VACC RECOMBINANT DNA IM: CPT | Performed by: FAMILY MEDICINE

## 2019-03-25 PROCEDURE — 99396 PREV VISIT EST AGE 40-64: CPT | Mod: 25 | Performed by: FAMILY MEDICINE

## 2019-03-25 PROCEDURE — 83036 HEMOGLOBIN GLYCOSYLATED A1C: CPT | Performed by: FAMILY MEDICINE

## 2019-03-25 PROCEDURE — 36415 COLL VENOUS BLD VENIPUNCTURE: CPT | Performed by: FAMILY MEDICINE

## 2019-03-25 PROCEDURE — 82043 UR ALBUMIN QUANTITATIVE: CPT | Performed by: FAMILY MEDICINE

## 2019-03-25 RX ORDER — ZINC GLUCONATE 50 MG
50 TABLET ORAL DAILY
COMMUNITY
End: 2019-08-16

## 2019-03-25 ASSESSMENT — PATIENT HEALTH QUESTIONNAIRE - PHQ9: SUM OF ALL RESPONSES TO PHQ QUESTIONS 1-9: 0

## 2019-03-25 ASSESSMENT — MIFFLIN-ST. JEOR: SCORE: 1690.56

## 2019-03-25 NOTE — TELEPHONE ENCOUNTER
"Routing refill request to provider for review/approval because:  Labs not current:        Requested Prescriptions   Pending Prescriptions Disp Refills     metFORMIN (GLUCOPHAGE-XR) 500 MG 24 hr tablet [Pharmacy Med Name: METFORMIN ER 500MG 24HR TABS]  Last Written Prescription Date:  3/1/19  Last Fill Quantity: 30,  # refills: 0   Last office visit: 3/25/2019 with prescribing provider:     Future Office Visit:     90 tablet 0     Sig: TAKE 1 TABLET BY MOUTH DAILY    Biguanide Agents Failed - 3/25/2019  6:51 PM       Failed - Patient has had a Microalbumin in the past 15 mos.    No lab results found.         Passed - Blood pressure less than 140/90 in past 6 months    BP Readings from Last 3 Encounters:   03/25/19 112/68   02/19/19 116/77   09/13/18 118/78                Passed - Patient has documented LDL within the past 12 mos.    Recent Labs   Lab Test 09/11/18  1507   LDL 75            Passed - Patient is age 10 or older       Passed - Patient has documented A1c within the specified period of time.    If HgbA1C is 8 or greater, it needs to be on file within the past 3 months.  If less than 8, must be on file within the past 6 months.     Recent Labs   Lab Test 03/25/19  1424   A1C 6.0*            Passed - Patient's CR is NOT>1.4 OR Patient's EGFR is NOT<45 within past 12 mos.    Recent Labs   Lab Test 09/11/18  1507   GFRESTIMATED 88   GFRESTBLACK >90       Recent Labs   Lab Test 09/11/18  1507   CR 0.88            Passed - Patient does NOT have a diagnosis of CHF.       Passed - Medication is active on med list       Passed - Recent (6 mo) or future (30 days) visit within the authorizing provider's specialty    Patient had office visit in the last 6 months or has a visit in the next 30 days with authorizing provider or within the authorizing provider's specialty.  See \"Patient Info\" tab in inbasket, or \"Choose Columns\" in Meds & Orders section of the refill encounter.            Yenni Patterson RN - [facility]      "

## 2019-03-25 NOTE — PROGRESS NOTES
Injectable Influenza Immunization Documentation    1.  Is the person to be vaccinated sick today?   No    2. Does the person to be vaccinated have an allergy to a component   of the vaccine?   No  Egg Allergy Algorithm Link    3. Has the person to be vaccinated ever had a serious reaction   to influenza vaccine in the past?   No    4. Has the person to be vaccinated ever had Guillain-Barré syndrome?   No    Form completed by SRHUTHI Pruitt CMA ([facility])

## 2019-03-25 NOTE — PROGRESS NOTES
SUBJECTIVE:   CC: Yair Benites is an 58 year old male who presents for preventive health visit.     Healthy Habits:    Do you get at least three servings of calcium containing foods daily (dairy, green leafy vegetables, etc.)? yes    Amount of exercise or daily activities, outside of work: None    Problems taking medications regularly No    Medication side effects: No    Have you had an eye exam in the past two years? yes    Do you see a dentist twice per year? no    Do you have sleep apnea, excessive snoring or daytime drowsiness?yes      Today's PHQ-2 Score:   PHQ-2 (  [employer]) 3/25/2019 2018   Q1: Little interest or pleasure in doing things 0 0   Q2: Feeling down, depressed or hopeless 0 1   PHQ-2 Score 0 1     Abuse: Current or Past(Physical, Sexual or Emotional)- No  Do you feel safe in your environment? Yes    Social History     Tobacco Use     Smoking status: Former Smoker     Years: 6.00     Types: Cigarettes     Last attempt to quit: 1983     Years since quittin.2     Smokeless tobacco: Never Used   Substance Use Topics     Alcohol use: No     If you drink alcohol do you typically have >3 drinks per day or >7 drinks per week? No                      Last PSA:   PSA   Date Value Ref Range Status   2018 1.02 0 - 4 ug/L Final     Comment:     Assay Method:  Chemiluminescence using Siemens Vista analyzer       Reviewed orders with patient. Reviewed health maintenance and updated orders accordingly - Yes  Labs reviewed in EPIC  BP Readings from Last 3 Encounters:   19 112/68   19 116/77   18 118/78    Wt Readings from Last 3 Encounters:   19 93 kg (205 lb)   03/15/19 91.9 kg (202 lb 8 oz)   19 92.5 kg (204 lb)                  Patient Active Problem List   Diagnosis     Colon polyp     Traumatic brain injury (H)     IBS (irritable bowel syndrome)     Synovitis and tenosynovitis     Dizziness - light-headed     Primary localized osteoarthrosis, ankle and  foot     Obesity     Depression, major     Headache disorder     SANDRA (obstructive sleep apnea)     Chronic gout without tophus, unspecified cause, unspecified site     Hyperlipidemia LDL goal <100     Type 2 diabetes mellitus without complication, without long-term current use of insulin (H)     Past Surgical History:   Procedure Laterality Date     SURGICAL HISTORY OF -       nose       Social History     Tobacco Use     Smoking status: Former Smoker     Years: 6.00     Types: Cigarettes     Last attempt to quit: 1983     Years since quittin.2     Smokeless tobacco: Never Used   Substance Use Topics     Alcohol use: No     Family History   Problem Relation Age of Onset     C.A.D. Father         MI age 66     Heart Disease Father      Depression Brother      Cancer Brother         Brain Tumor     Respiratory Brother      Depression Brother      Gallbladder Disease Brother      Dementia Brother      Cancer Brother      Parkinsonism Brother      Diabetes Maternal Uncle      Asthma No family hx of      Hypertension No family hx of          Current Outpatient Medications   Medication Sig Dispense Refill     ACE/ARB/ARNI NOT PRESCRIBED, INTENTIONAL, ACE & ARB not prescribed due to Other:BP at goal       alcohol swab prep pads Use to swab area of injection/reba as directed. 100 each 3     ARIPiprazole (ABILIFY) 15 MG tablet Take 15 mg by mouth daily Reported on 2017       aspirin 81 MG tablet Take 1 tablet by mouth daily.       atorvastatin (LIPITOR) 20 MG tablet TAKE 1 TABLET(20 MG) BY MOUTH DAILY 90 tablet 3     blood glucose (ACCU-CHEK SMARTVIEW) test strip USE TO TEST ONCE DAILY 100 strip 0     blood glucose calibration (ACCU-CHEK SMARTVIEW CONTROL) solution Use to calibrate blood glucose monitor as needed as directed. 1 Bottle 3     blood glucose calibration (NO BRAND SPECIFIED) solution To accompany: Blood Glucose Monitor Brands: per insurance. 1 Bottle 3     blood glucose monitoring (NO BRAND  SPECIFIED) meter device kit Use to test blood sugar 1 times daily or as directed. Preferred blood glucose meter OR supplies to accompany: Blood Glucose Monitor Brands: per insurance. 1 kit 0     Cholecalciferol (VITAMIN D) 1000 UNIT capsule Take 1 capsule by mouth daily.       ClonAZEPAM (KLONOPIN) 0.5 MG tablet Take 1 tablet by mouth At Bedtime.       diclofenac (VOLTAREN) 75 MG EC tablet One tab bid with food for 2 weeks, then bid as needed 40 tablet 0     Melatonin 10 MG TABS tablet Take 10 mg by mouth At Bedtime       metFORMIN (GLUCOPHAGE-XR) 500 MG 24 hr tablet Take 1 tablet (500 mg) by mouth daily appt required for further refills 30 tablet 0     mirtazapine (REMERON) 30 MG tablet Take 30 mg by mouth At Bedtime.       Multiple Vitamins-Minerals (MULTIVITAMIN & MINERAL PO) Take 1 tablet by mouth daily.       naproxen (NAPROSYN) 500 MG tablet TAKE 1 TABLET(500 MG) BY MOUTH TWICE DAILY AS NEEDED FOR MODERATE PAIN 30 tablet 0     order for DME Equipment being ordered:Face mask for CPAP machine size small with tubing and filter. [facility] ([phone]) 1 Device 0     OVER-THE-COUNTER Allergy med       OVER-THE-COUNTER Beta Prostate- Take 1 tablet daily.       polyethylene glycol (MIRALAX/GLYCOLAX) powder MIX AS DIRECTED AND TAKE 17 GRAMS BY MOUTH DAILY 510 g 1     thin (NO BRAND SPECIFIED) lancets Use with lanceting device. To accompany: Blood Glucose Monitor Brands: per insurance. 100 each 6     zinc gluconate 50 MG tablet Take 50 mg by mouth daily       Flaxseed, Linseed, (FLAX SEED OIL PO) Take  by mouth daily. Uses powder on food.        Allergies   Allergen Reactions     Erythromycin Nausea     Imipramine Diarrhea     Imipramine Hcl      Toxic levels     Risperidone      Other reaction(s): Extrapyramidal Side Effect     Erythromycin Rash     Recent Labs   Lab Test 03/25/19  1424 09/11/18  1507 05/04/18  1304 01/12/18  1541 12/05/17  1530 12/23/16  1420  11/18/16  1152 03/23/15  0953  09/22/14  1055  02/18/13  1113  02/03/12  1237   A1C 6.0*  --  6.3*  --  7.1*  --    < >  --  6.2*  --   --  5.8  --   --    LDL  --  75  --   --  Cannot estimate LDL when triglyceride exceeds 400 mg/dL  106*  --   --  76 71 67  --  73  --  78   HDL  --   --   --   --  39*  --   --  42 39* 43  --  35*  --  28*   TRIG  --   --   --   --  492*  --   --  202* 206* 252*  --  283*  --  148   ALT  --   --   --   --   --   --   --   --   --  34  --  43  --  40   CR  --  0.88  --   --  0.87 1.12  --   --   --   --   --  0.96   < >  --    GFRESTIMATED  --  88  --   --  90 67  --   --   --   --   --  82   < >  --    GFRESTBLACK  --  >90  --   --  >90 81  --   --   --   --   --  >90   < >  --    POTASSIUM  --   --   --   --  4.0 4.0  --   --   --   --   --  4.4   < >  --    TSH  --   --   --  1.84  --   --   --   --  2.69  --    < >  --   --  1.47    < > = values in this interval not displayed.        Reviewed and updated as needed this visit by clinical staff  Tobacco  Allergies  Meds  Problems  Med Hx  Surg Hx  Fam Hx  Soc Hx        Diabetes Follow-up      Patient is checking blood sugars: 130-140    Diabetic concerns: None     Symptoms of hypoglycemia (low blood sugar): none     Paresthesias (numbness or burning in feet) or sores: No     Date of last diabetic eye exam: advised annually    BP Readings from Last 2 Encounters:   03/25/19 112/68   02/19/19 116/77     Hemoglobin A1C (%)   Date Value   03/25/2019 6.0 (H)   05/04/2018 6.3 (H)     LDL Cholesterol Calculated (mg/dL)   Date Value   12/05/2017     Cannot estimate LDL when triglyceride exceeds 400 mg/dL   11/18/2016 76     LDL Cholesterol Direct (mg/dL)   Date Value   09/11/2018 75   12/05/2017 106 (H)       Diabetes Management Resources  Hyperlipidemia Follow-Up      Rate your low fat/cholesterol diet?: good    Taking statin?  No    Other lipid medications/supplements?:  none    Depression Followup    Status since last visit: Stable -sees Psych    See PHQ-9  for current symptoms.  Other associated symptoms: None    Complicating factors:   Significant life event:  No   Current substance abuse:  None  Anxiety or Panic symptoms:  No    PHQ 12/5/2017 1/5/2018 5/4/2018   PHQ-9 Total Score 4 2 2   Q9: Suicide Ideation Not at all Not at all Not at all     In the past two weeks have you had thoughts of suicide or self-harm?  No.    Do you have concerns about your personal safety or the safety of others?   No  PHQ-9  English  PHQ-9   Any Language  Suicide Assessment Five-step Evaluation and Treatment (SAFE-T)    Amount of exercise or physical activity: None    Problems taking medications regularly: No    Medication side effects: none    Diet: diabetic        Reviewed and updated as needed this visit by Provider  Problems        Past Medical History:   Diagnosis Date     Colon polyp      Depression, major      Elevated glucose      Headache disorder 6/15/2011     hyperlipidemia ldl goal < 130      IBS (irritable bowel syndrome)      Increased BMI      Obesity      Traumatic brain injury (H)     MOM in accident when pregnant with pt      Past Surgical History:   Procedure Laterality Date     SURGICAL HISTORY OF -       nose       ROS:  CONSTITUTIONAL: NEGATIVE for fever, chills, change in weight  INTEGUMENTARY/SKIN: NEGATIVE for worrisome rashes, moles or lesions  EYES: NEGATIVE for vision changes or irritation  ENT: NEGATIVE for ear, mouth and throat problems  RESP: NEGATIVE for significant cough or SOB  CV: NEGATIVE for chest pain, palpitations or peripheral edema  GI: NEGATIVE for nausea, abdominal pain, heartburn, or change in bowel habits   male: negative for dysuria, hematuria, decreased urinary stream, erectile dysfunction, urethral discharge  MUSCULOSKELETAL: NEGATIVE for significant arthralgias or myalgia  NEURO: NEGATIVE for weakness, dizziness or paresthesias  PSYCHIATRIC: NEGATIVE for changes in mood or affect    OBJECTIVE:   /68   Pulse 92   Temp 96.9  F  "(36.1  C) (Tympanic)   Resp 16   Ht 1.689 m (5' 6.5\")   Wt 93 kg (205 lb)   SpO2 97%   BMI 32.59 kg/m    EXAM:  GENERAL: healthy, alert and no distress  EYES: Eyes grossly normal to inspection, PERRL and conjunctivae and sclerae normal  HENT: ear canals and TM's normal, nose and mouth without ulcers or lesions  NECK: no adenopathy, no asymmetry, masses, or scars and thyroid normal to palpation  RESP: lungs clear to auscultation - no rales, rhonchi or wheezes  CV: regular rate and rhythm, normal S1 S2, no S3 or S4, no murmur, click or rub, no peripheral edema and peripheral pulses strong  ABDOMEN: soft, nontender, no hepatosplenomegaly, no masses and bowel sounds normal  MS: no gross musculoskeletal defects noted, no edema  SKIN: no suspicious lesions or rashes  NEURO: Normal strength and tone, mentation intact and speech normal  PSYCH: mentation appears normal    Diagnostic Test Results:  Pending     ASSESSMENT/PLAN:   1. Encounter for routine adult physical exam with abnormal findings  Normal     2. Type 2 diabetes mellitus without complication, without long-term current use of insulin (H)  Labs pending   Will make recommendations when labs are back  - BASIC METABOLIC PANEL  - HEMOGLOBIN A1C  Advised annual eye exam  And dental check q 6 months    3. SANDRA (obstructive sleep apnea)  On cpap and doing well    4. Class 1 obesity due to excess calories with serious comorbidity and body mass index (BMI) of 33.0 to 33.9 in adult  Low josé miguel diet/Exercise 150 minutes a week    5. Polyp of colon, unspecified part of colon, unspecified type  UTD with colonoscopy    6. Major depressive disorder with single episode, remission status unspecified  Stable   Sees Psychiatrist and doing well    7. Hyperlipidemia LDL goal <100  Stable -at goal    8. Screening for prostate cancer  Advised   - PSA, screen    COUNSELING:  Reviewed preventive health counseling, as reflected in patient instructions       Regular exercise       Healthy " "diet/nutrition       Vision screening       Hearing screening       Colon cancer screening       Prostate cancer screening       The 10-year ASCVD risk score (Nabil ESTRELLA Jr., et al., 2013) is: 13.7%    Values used to calculate the score:      Age: 58 years      Sex: Male      Is Non- : No      Diabetic: Yes      Tobacco smoker: No      Systolic Blood Pressure: 112 mmHg      Is BP treated: No      HDL Cholesterol: 39 mg/dL      Total Cholesterol: 177 mg/dL       Advance Care Planning    BP Readings from Last 1 Encounters:   03/25/19 112/68     Estimated body mass index is 32.59 kg/m  as calculated from the following:    Height as of this encounter: 1.689 m (5' 6.5\").    Weight as of this encounter: 93 kg (205 lb).      Weight management plan: Discussed healthy diet and exercise guidelines     reports that he quit smoking about 36 years ago. His smoking use included cigarettes. He quit after 6.00 years of use. he has never used smokeless tobacco.      Counseling Resources:  ATP IV Guidelines  Pooled Cohorts Equation Calculator  FRAX Risk Assessment  ICSI Preventive Guidelines  Dietary Guidelines for Americans, 2010  USDA's MyPlate  ASA Prophylaxis  Lung CA Screening    Radha Villegas MD  [facility]  "

## 2019-03-26 RX ORDER — METFORMIN HCL 500 MG
TABLET, EXTENDED RELEASE 24 HR ORAL
Qty: 90 TABLET | Refills: 0 | Status: SHIPPED | OUTPATIENT
Start: 2019-03-26 | End: 2019-06-22

## 2019-04-02 DIAGNOSIS — E11.9 NEW ONSET TYPE 2 DIABETES MELLITUS (H): ICD-10-CM

## 2019-04-02 RX ORDER — LANCETS
EACH MISCELLANEOUS
Qty: 102 EACH | Refills: 1 | Status: SHIPPED | OUTPATIENT
Start: 2019-04-02 | End: 2019-08-13

## 2019-04-29 ENCOUNTER — OFFICE VISIT (OUTPATIENT)
Dept: FAMILY MEDICINE | Facility: CLINIC | Age: 59
End: 2019-04-29
Payer: MEDICARE

## 2019-04-29 VITALS
DIASTOLIC BLOOD PRESSURE: 68 MMHG | WEIGHT: 203 LBS | SYSTOLIC BLOOD PRESSURE: 130 MMHG | TEMPERATURE: 97 F | RESPIRATION RATE: 16 BRPM | HEIGHT: 67 IN | OXYGEN SATURATION: 98 % | HEART RATE: 77 BPM | BODY MASS INDEX: 31.86 KG/M2

## 2019-04-29 DIAGNOSIS — H61.22 IMPACTED CERUMEN OF LEFT EAR: Primary | ICD-10-CM

## 2019-04-29 PROCEDURE — 99213 OFFICE O/P EST LOW 20 MIN: CPT | Performed by: FAMILY MEDICINE

## 2019-04-29 ASSESSMENT — MIFFLIN-ST. JEOR: SCORE: 1681.49

## 2019-04-29 NOTE — PROGRESS NOTES
SUBJECTIVE:   Yair Benites is a 58 year old male who presents to clinic today for the following   health issues:      Patient here today to have ears cleaned out. States both ears have been plugged for about a month.  No Runny nose or sore Throat    Additional history: as documented    Reviewed  and updated as needed this visit by clinical staff  Tobacco  Allergies  Meds  Med Hx  Surg Hx  Fam Hx  Soc Hx        Reviewed and updated as needed this visit by Provider         Patient Active Problem List   Diagnosis     Colon polyp     Traumatic brain injury (H)     IBS (irritable bowel syndrome)     Synovitis and tenosynovitis     Dizziness - light-headed     Primary localized osteoarthrosis, ankle and foot     Obesity     Depression, major     Headache disorder     SANDRA (obstructive sleep apnea)     Chronic gout without tophus, unspecified cause, unspecified site     Hyperlipidemia LDL goal <100     Type 2 diabetes mellitus without complication, without long-term current use of insulin (H)     Past Surgical History:   Procedure Laterality Date     SURGICAL HISTORY OF -       nose       Social History     Tobacco Use     Smoking status: Former Smoker     Years: 6.00     Types: Cigarettes     Last attempt to quit: 1983     Years since quittin.3     Smokeless tobacco: Never Used   Substance Use Topics     Alcohol use: No     Family History   Problem Relation Age of Onset     C.A.D. Father         MI age 66     Heart Disease Father      Depression Brother      Cancer Brother         Brain Tumor     Respiratory Brother      Depression Brother      Gallbladder Disease Brother      Dementia Brother      Cancer Brother      Parkinsonism Brother      Diabetes Maternal Uncle      Asthma No family hx of      Hypertension No family hx of          Current Outpatient Medications   Medication Sig Dispense Refill     ACE/ARB/ARNI NOT PRESCRIBED, INTENTIONAL, ACE & ARB not prescribed due to Other:BP at goal        alcohol swab prep pads Use to swab area of injection/reba as directed. 100 each 3     ARIPiprazole (ABILIFY) 15 MG tablet Take 15 mg by mouth daily Reported on 4/17/2017       aspirin 81 MG tablet Take 1 tablet by mouth daily.       atorvastatin (LIPITOR) 20 MG tablet TAKE 1 TABLET(20 MG) BY MOUTH DAILY 90 tablet 3     blood glucose (ACCU-CHEK SMARTVIEW) test strip USE TO TEST ONCE DAILY 100 strip 0     blood glucose calibration (ACCU-CHEK SMARTVIEW CONTROL) solution Use to calibrate blood glucose monitor as needed as directed. 1 Bottle 3     blood glucose calibration (NO BRAND SPECIFIED) solution To accompany: Blood Glucose Monitor Brands: per insurance. 1 Bottle 3     blood glucose monitoring (ACCU-CHEK FASTCLIX) lancets USE AS DIRECTED 102 each 1     blood glucose monitoring (NO BRAND SPECIFIED) meter device kit Use to test blood sugar 1 times daily or as directed. Preferred blood glucose meter OR supplies to accompany: Blood Glucose Monitor Brands: per insurance. 1 kit 0     Cholecalciferol (VITAMIN D) 1000 UNIT capsule Take 1 capsule by mouth daily.       ClonAZEPAM (KLONOPIN) 0.5 MG tablet Take 1 tablet by mouth At Bedtime.       diclofenac (VOLTAREN) 75 MG EC tablet One tab bid with food for 2 weeks, then bid as needed 40 tablet 0     Flaxseed, Linseed, (FLAX SEED OIL PO) Take  by mouth daily. Uses powder on food.        Melatonin 10 MG TABS tablet Take 10 mg by mouth At Bedtime       metFORMIN (GLUCOPHAGE-XR) 500 MG 24 hr tablet TAKE 1 TABLET BY MOUTH DAILY 90 tablet 0     mirtazapine (REMERON) 30 MG tablet Take 30 mg by mouth At Bedtime.       Multiple Vitamins-Minerals (MULTIVITAMIN & MINERAL PO) Take 1 tablet by mouth daily.       naproxen (NAPROSYN) 500 MG tablet TAKE 1 TABLET(500 MG) BY MOUTH TWICE DAILY AS NEEDED FOR MODERATE PAIN 30 tablet 0     order for DME Equipment being ordered:Face mask for CPAP machine size small with tubing and filter. [employer] ([phone]) 1 Device 0      "OVER-THE-COUNTER Allergy med       OVER-THE-COUNTER Beta Prostate- Take 1 tablet daily.       polyethylene glycol (MIRALAX/GLYCOLAX) powder MIX AS DIRECTED AND TAKE 17 GRAMS BY MOUTH DAILY 510 g 1     zinc gluconate 50 MG tablet Take 50 mg by mouth daily       Allergies   Allergen Reactions     Erythromycin Nausea     Imipramine Diarrhea     Imipramine Hcl      Toxic levels     Risperidone      Other reaction(s): Extrapyramidal Side Effect     Erythromycin Rash     Recent Labs   Lab Test 03/25/19  1424 09/11/18  1507 05/04/18  1304 01/12/18  1541 12/05/17  1530  11/18/16  1152 03/23/15  0953 09/22/14  1055  02/18/13  1113 02/03/12  1237   A1C 6.0*  --  6.3*  --  7.1*   < >  --  6.2*  --   --  5.8  --    LDL  --  75  --   --  Cannot estimate LDL when triglyceride exceeds 400 mg/dL  106*  --  76 71 67  --  73 78   HDL  --   --   --   --  39*  --  42 39* 43  --  35* 28*   TRIG  --   --   --   --  492*  --  202* 206* 252*  --  283* 148   ALT  --   --   --   --   --   --   --   --  34  --  43 40   CR 0.92 0.88  --   --  0.87   < >  --   --   --   --  0.96  --    GFRESTIMATED 89 88  --   --  90   < >  --   --   --   --  82  --    GFRESTBLACK >90 >90  --   --  >90   < >  --   --   --   --  >90  --    POTASSIUM 3.7  --   --   --  4.0   < >  --   --   --   --  4.4  --    TSH  --   --   --  1.84  --   --   --  2.69  --    < >  --  1.47    < > = values in this interval not displayed.      BP Readings from Last 3 Encounters:   04/29/19 130/68   03/25/19 112/68   02/19/19 116/77    Wt Readings from Last 3 Encounters:   04/29/19 92.1 kg (203 lb)   03/25/19 93 kg (205 lb)   03/15/19 91.9 kg (202 lb 8 oz)                  Labs reviewed in EPIC    ROS:  Rest of the ROS is Negative except see above and Problem list [stable]      OBJECTIVE:     /68   Pulse 77   Temp 97  F (36.1  C) (Oral)   Resp 16   Ht 1.689 m (5' 6.5\")   Wt 92.1 kg (203 lb)   SpO2 98%   BMI 32.27 kg/m    Body mass index is 32.27 kg/m .  GENERAL: " healthy, alert and no distress  HENT: normal cephalic/atraumatic, nose and mouth without ulcers or lesions, oropharynx clear, oral mucous membranes moist and left ear canal wax  Right TM is normal   RESP: lungs clear to auscultation - no rales, rhonchi or wheezes    Diagnostic Test Results:  none     ASSESSMENT/PLAN:       1. Impacted cerumen of left ear  Left ear wash done by MA  Pt feels better  Follow up PRN   TM are clear  Radha Villegas MD  [facility]

## 2019-05-06 DIAGNOSIS — E11.9 NEW ONSET TYPE 2 DIABETES MELLITUS (H): ICD-10-CM

## 2019-05-07 ENCOUNTER — TELEPHONE (OUTPATIENT)
Dept: FAMILY MEDICINE | Facility: CLINIC | Age: 59
End: 2019-05-07

## 2019-05-15 ENCOUNTER — TELEPHONE (OUTPATIENT)
Dept: FAMILY MEDICINE | Facility: CLINIC | Age: 59
End: 2019-05-15

## 2019-05-15 NOTE — TELEPHONE ENCOUNTER
Reason for Call:  Home Health Care    Sheila with [facility] called regarding (reason for call): orders     Orders are needed for this patient. Skilled nursing    Skilled Nursing: Verbal orders for continued skilled nursing    Pt Provider: Bakari    Phone Number Homecare Nurse can be reached at: [phone]    Can we leave a detailed message on this number? YES    Phone number patient can be reached at: NA    Best Time: ASAP    Call taken on 5/15/2019 at 3:46 PM by Leidy Gomez  Need verbal for continuing skilled nursing

## 2019-05-15 NOTE — TELEPHONE ENCOUNTER
Called and spoke with Sheila from [facility] and gave verbal OK for orders below.     Key Rhodes RN

## 2019-05-20 DIAGNOSIS — Z53.9 DIAGNOSIS NOT YET DEFINED: Primary | ICD-10-CM

## 2019-05-20 PROCEDURE — G0179 MD RECERTIFICATION HHA PT: HCPCS | Performed by: FAMILY MEDICINE

## 2019-05-29 DIAGNOSIS — K59.00 CONSTIPATION: ICD-10-CM

## 2019-05-30 DIAGNOSIS — E78.5 HYPERLIPIDEMIA LDL GOAL <100: ICD-10-CM

## 2019-05-31 RX ORDER — POLYETHYLENE GLYCOL 3350 17 G/17G
POWDER, FOR SOLUTION ORAL
Qty: 510 G | Refills: 0 | Status: SHIPPED | OUTPATIENT
Start: 2019-05-31 | End: 2019-07-08

## 2019-05-31 RX ORDER — ATORVASTATIN CALCIUM 20 MG/1
TABLET, FILM COATED ORAL
Qty: 90 TABLET | Refills: 0 | Status: SHIPPED | OUTPATIENT
Start: 2019-05-31 | End: 2019-08-30

## 2019-05-31 NOTE — TELEPHONE ENCOUNTER
Pending Prescriptions:                       Disp   Refills    polyethylene glycol (MIRALAX/GLYCOLAX) po*510 g  0            Sig: MIX AS DIRECTED AND TAKE 17 GRAMS BY MOUTH DAILY    Medication filled per [facility] protocol.     Ulisses Sweenye RN....5/31/2019 10:33 AM

## 2019-06-01 NOTE — TELEPHONE ENCOUNTER
Prescription approved per [facility] Refill Protocol.  Katt Martini RN

## 2019-06-22 DIAGNOSIS — E11.9 TYPE 2 DIABETES MELLITUS WITHOUT COMPLICATION, WITHOUT LONG-TERM CURRENT USE OF INSULIN (H): ICD-10-CM

## 2019-06-24 RX ORDER — METFORMIN HCL 500 MG
TABLET, EXTENDED RELEASE 24 HR ORAL
Qty: 90 TABLET | Refills: 1 | Status: SHIPPED | OUTPATIENT
Start: 2019-06-24 | End: 2019-11-13

## 2019-07-08 DIAGNOSIS — K59.00 CONSTIPATION: ICD-10-CM

## 2019-07-09 ENCOUNTER — TELEPHONE (OUTPATIENT)
Dept: FAMILY MEDICINE | Facility: CLINIC | Age: 59
End: 2019-07-09

## 2019-07-09 RX ORDER — POLYETHYLENE GLYCOL 3350 17 G/17G
POWDER, FOR SOLUTION ORAL
Qty: 510 G | Refills: 3 | Status: SHIPPED | OUTPATIENT
Start: 2019-07-09 | End: 2019-11-19

## 2019-07-09 NOTE — TELEPHONE ENCOUNTER
Reason for call:  Home Healthcare Reason for Call:  Home Health Care    Gabby  with [facility] called regarding (reason for call): verbal orders    Orders are needed for this patient.      Skilled Nursing: Skilled nursing 1 time a week for 2 weeks for 60 days.     Pt Provider: Dr. Villegas     Phone Number Homecare Nurse can be reached at: [phone]    Can we leave a detailed message on this number? YES

## 2019-07-09 NOTE — TELEPHONE ENCOUNTER
Called and spoke with Gabby from [facility] and gave verbal OK for orders below.     Key Rhodes RN

## 2019-07-23 RX ORDER — LORATADINE 10 MG/1
10 TABLET ORAL DAILY
COMMUNITY
Start: 2019-07-23 | End: 2023-12-12

## 2019-07-23 NOTE — TELEPHONE ENCOUNTER
Reviewed [facility] med list and reconciled against Epic med list    Discrepancies noted-  Listed on HC MAR, not on Epic.  Acetaminophen 1,000 mg QID PRN  Ibuprofen 400 mg, take q8h PRN - on HC MAR, not on Epic  Indocin 50 mg TID PRN - listed on HC MAR, not on Epic    Listed on Epic, not on HC  Klonopin 0.5 mg tablet  Flaxseed, Linseed, take daily, use powder on food  Melatonin - listed as 10 mg on Epic, 5 mg on HC MAR    Routing to provider-  Please review Plan of Care AND med list, make any changes appropriate, and sign plan of care to be faxed back to [facility]    Key Ramos RN

## 2019-07-24 NOTE — TELEPHONE ENCOUNTER
Called and spoke with [facility] to get patient's nurse's number.  Reports his nurse is Sheila & transferred to her number.   Phone rang and then hung up. No VM available.  Try again later.    Key Ramos RN

## 2019-07-31 ENCOUNTER — TELEPHONE (OUTPATIENT)
Dept: FAMILY MEDICINE | Facility: CLINIC | Age: 59
End: 2019-07-31

## 2019-07-31 NOTE — TELEPHONE ENCOUNTER
Reason for Call:  Other call back    Detailed comments: Shelia from [facility] home, calling regarding a medication review for patient. Please call back to advise     Phone Number Patient can be reached at: Other phone number:  [phone]    Best Time: any    Can we leave a detailed message on this number? YES    Call taken on 7/31/2019 at 3:15 PM by Antonia Egan

## 2019-07-31 NOTE — TELEPHONE ENCOUNTER
Gabby returned call to clinic & another encounter was opened.   Her # is [phone]    Key Ramos RN

## 2019-07-31 NOTE — TELEPHONE ENCOUNTER
Discrepancies noted-  Listed on HC MAR, not on Epic.  Acetaminophen 1,000 mg QID PRN - rarely uses OK to add to Epic MAR?  Ibuprofen 400 mg, take q8h PRN - rarely uses OK to add to Epic MAR?  Melatonin - listed as 10 mg on Epic, 5 mg. Patient has been taking 5 mg, MAR updated    Listed on Epic, not on HC  Klonopin 0.5 mg tablet - not taking, discontinued a couple years ago (removed from MAR)  Flaxseed, Linseed, take daily, use powder on food - Nurse is unsure, is going to ask patient next time she sees him and call back    Key Ramos RN

## 2019-07-31 NOTE — TELEPHONE ENCOUNTER
Called and spoke with Gabby but she was unable to go over medications at this time because she was driving.   She stated she would call back around noon today.   Please transfer to RN Hotline [phone] if she returns call.     Key Ramos RN

## 2019-08-08 NOTE — TELEPHONE ENCOUNTER
Please advise regarding questions in Red then re-route to RN triage pool.    Andressa Cornelius RN  [facility]

## 2019-08-12 NOTE — TELEPHONE ENCOUNTER
Left message for Sheila from [facility] to call RN hotline [phone].   Is patient taking Flaxseed/Linseed powder daily? - listed on Epic, not on HC  Is patient taking Tamsulosin 0.4 mg, 1 capsule daily? - listed on HC, not on Epic, was discontinued 12/23/16    Key Ramos RN

## 2019-08-13 ENCOUNTER — TELEPHONE (OUTPATIENT)
Dept: FAMILY MEDICINE | Facility: CLINIC | Age: 59
End: 2019-08-13

## 2019-08-13 DIAGNOSIS — E11.9 TYPE 2 DIABETES MELLITUS WITHOUT COMPLICATION, WITHOUT LONG-TERM CURRENT USE OF INSULIN (H): Primary | ICD-10-CM

## 2019-08-13 RX ORDER — BLOOD-GLUCOSE CONTROL, NORMAL
EACH MISCELLANEOUS
Qty: 2 EACH | Refills: 1 | Status: SHIPPED | OUTPATIENT
Start: 2019-08-13 | End: 2021-02-16

## 2019-08-13 NOTE — TELEPHONE ENCOUNTER
Huddled with Dr. Villegas. Place diabetic education referral.   Referral placed.   Patient notified & # given to schedule. Verbalized understanding & no further questions.    Key Ramos RN

## 2019-08-13 NOTE — TELEPHONE ENCOUNTER
Called and spoke with patient. Patient is requesting a BG sensor so he doesn't have to poke his finger so much. States it's a lot of work and his fingers hurt.   Last A1C 3/25/19 - 6.0    Key Ramos RN

## 2019-08-13 NOTE — TELEPHONE ENCOUNTER
Reason for Call:  Other call back    Detailed comments: Patient wants to get a new  blood glucose monitoring. He states current one isn't working       Phone Number Patient can be reached at: Home number on file [phone] (home)    Best Time: any    Can we leave a detailed message on this number? YES    Call taken on 8/13/2019 at 2:50 PM by Himanshu Mccloud

## 2019-08-14 RX ORDER — ACETAMINOPHEN 500 MG
1000 TABLET ORAL 4 TIMES DAILY PRN
COMMUNITY
Start: 2019-08-14 | End: 2021-11-04

## 2019-08-14 RX ORDER — IBUPROFEN 400 MG/1
400 TABLET, FILM COATED ORAL EVERY 8 HOURS PRN
COMMUNITY
Start: 2019-08-14 | End: 2021-11-04

## 2019-08-15 ENCOUNTER — TELEPHONE (OUTPATIENT)
Dept: FAMILY MEDICINE | Facility: CLINIC | Age: 59
End: 2019-08-15

## 2019-08-15 NOTE — TELEPHONE ENCOUNTER
Reason for Call:  Other prescription    Detailed comments:  Patient calling. He would like to discuss the medications he is taking.  (zinc) please call.     Phone Number Patient can be reached at: Home number on file [phone] (home)    Best Time:  Any     Can we leave a detailed message on this number? YES    Call taken on 8/15/2019 at 3:16 PM by Antonia Egan

## 2019-08-15 NOTE — TELEPHONE ENCOUNTER
Patient reports he heard that Zinc can cause fatigue. He is taking a multivitamin that contains Zinc and a Zinc 50mg tablet. Patient feels very tired in the morning and has a hard time waking up and is wondering if the Zinc can cause this?  Elisha Glez RN

## 2019-08-15 NOTE — TELEPHONE ENCOUNTER
Reason for Call:  Form, our goal is to have forms completed with 72 hours, however, some forms may require a visit or additional information.    Type of letter, form or note:  Home Health Certification    Who is the form from?: Home care    Where did the form come from: form was faxed in    What clinic location was the form placed at?: [facility]    Where the form was placed: Given to MA/RN    What number is listed as a contact on the form?: [phone]       Etta Gutierrez  [employer],       Call taken on 8/15/2019 at 1:50 PM by Etta Gutierrez

## 2019-08-16 NOTE — TELEPHONE ENCOUNTER
Tylenol & ibuprofen added to MAR.   See 8/15/19 encounter - patient stopped Zinc. Removed from MAR.     Left 2nd message for [facility] nurse to call RN Hotline [phone].  Is patient taking Flaxseed/Linseed powder?  Is patient taking Tamsulosin? Discontinued in 2016 from Epic.   Patient discontinued Zinc today. Need to remove from  MAR.     Key Ramos RN

## 2019-08-16 NOTE — TELEPHONE ENCOUNTER
Called and spoke with wife as patient is sleeping.   Advised her it's OK to discontinue Zinc 50 mg and Multivitamin to see if it helps. She states he takes the MV that includes folic acid. Advised her that patient can just stop the Zinc and continue to MV. She agreed. No further questions.   Removed from MAR.     Key Ramos RN

## 2019-08-16 NOTE — TELEPHONE ENCOUNTER
Huddled with Dr. Villegas. Multivitamin and zinc should not cause fatigue but it is OK to stop both of these medications and see if symptoms improve.    Key Ramos RN

## 2019-08-23 NOTE — TELEPHONE ENCOUNTER
Received another copy of HC request form.   Faxed back to [facility] to return call to RN Hotline.     Key Ramos RN

## 2019-08-27 NOTE — TELEPHONE ENCOUNTER
Received another request. Faxed back for 2nd time for HC nurse to call [phone] to review medications.     Key Ramos RN

## 2019-08-29 NOTE — TELEPHONE ENCOUNTER
Per Gabby, patient is not taking Flaxseed/Linseed powder and is not taking Tamsulosin  She will also remove the Zinc from her list    Med lists updated  Please fax back once signed by provider    Sohail Melvin RN

## 2019-08-29 NOTE — TELEPHONE ENCOUNTER
Left message for Gabby to call back to the RN hotline.   Is patient taking Flaxseed/Linseed powder?  Is patient taking Tamsulosin? Discontinued in 2016 from Epic.     Andressa Cornelius RN  [facility]

## 2019-08-30 DIAGNOSIS — E78.5 HYPERLIPIDEMIA LDL GOAL <100: ICD-10-CM

## 2019-08-30 DIAGNOSIS — Z53.9 DIAGNOSIS NOT YET DEFINED: Primary | ICD-10-CM

## 2019-08-30 PROCEDURE — G0179 MD RECERTIFICATION HHA PT: HCPCS | Performed by: FAMILY MEDICINE

## 2019-09-03 RX ORDER — ATORVASTATIN CALCIUM 20 MG/1
TABLET, FILM COATED ORAL
Qty: 90 TABLET | Refills: 0 | Status: SHIPPED | OUTPATIENT
Start: 2019-09-03 | End: 2019-11-19

## 2019-09-16 ENCOUNTER — TELEPHONE (OUTPATIENT)
Dept: FAMILY MEDICINE | Facility: CLINIC | Age: 59
End: 2019-09-16

## 2019-09-16 NOTE — TELEPHONE ENCOUNTER
Called and left detailed message for Sheila from [facility] and gave verbal OK for orders below.   Advised to call RN Hotline if questions.    Key Ramos RN

## 2019-09-17 NOTE — TELEPHONE ENCOUNTER
Reason for Call:  Form, our goal is to have forms completed with 72 hours, however, some forms may require a visit or additional information.    Type of letter, form or note:  Home Health Certification    Who is the form from?: Home care ([facility])    Where did the form come from: form was faxed in    What clinic location was the form placed at?: [facility]    Where the form was placed: Given to MA/RN    What number is listed as a contact on the form?: [phone]       Additional comments:     Call taken on 9/17/2019 at 4:44 PM by Tasha Marcus

## 2019-09-17 NOTE — TELEPHONE ENCOUNTER
Left message for Sheila from [facility] to call RN Hotline [phone].    Reviewed [facility] med list and reconciled against Epic med list    Discrepancies noted-  Diclofenac (Voltaren) 75 mg EC tablet - listed on Epic, not on HC - is he taking?  Ingrezza 40-80 mg daily - listed on HC, not on Epic - is he taking? Who prescribes this?    Key Ramos RN

## 2019-09-25 DIAGNOSIS — Z53.9 DIAGNOSIS NOT YET DEFINED: Primary | ICD-10-CM

## 2019-09-25 PROCEDURE — G0179 MD RECERTIFICATION HHA PT: HCPCS | Performed by: FAMILY MEDICINE

## 2019-09-25 NOTE — TELEPHONE ENCOUNTER
Called and spoke with Sheila.   She reports patient is not taking Voltaren and this was prescribed 2/2019 and is completed. Rx removed from MAR.     Reports patient was prescribed Ingrezza 40-80 mg daily by psychiatrist for tongue movement. Reports he is supposed to be taking it but reports patient doesn't do well on it and has been refusing to take it. They are waiting for further instruction from psychiatrist on next steps/medication. Rx added to MAR until further notice.    Med rec completed - needs to be signed by PCP and faxed back to HC.    Key Ramos RN

## 2019-09-30 ENCOUNTER — OFFICE VISIT (OUTPATIENT)
Dept: FAMILY MEDICINE | Facility: CLINIC | Age: 59
End: 2019-09-30
Payer: MEDICARE

## 2019-09-30 VITALS
SYSTOLIC BLOOD PRESSURE: 124 MMHG | BODY MASS INDEX: 32.59 KG/M2 | OXYGEN SATURATION: 98 % | WEIGHT: 205 LBS | TEMPERATURE: 97.4 F | DIASTOLIC BLOOD PRESSURE: 68 MMHG | HEART RATE: 90 BPM

## 2019-09-30 DIAGNOSIS — H61.23 EXCESSIVE CERUMEN IN BOTH EAR CANALS: ICD-10-CM

## 2019-09-30 DIAGNOSIS — H93.8X3 PLUGGED FEELING IN EAR, BILATERAL: Primary | ICD-10-CM

## 2019-09-30 DIAGNOSIS — Z23 NEED FOR PROPHYLACTIC VACCINATION AND INOCULATION AGAINST INFLUENZA: ICD-10-CM

## 2019-09-30 PROCEDURE — 90682 RIV4 VACC RECOMBINANT DNA IM: CPT | Performed by: FAMILY MEDICINE

## 2019-09-30 PROCEDURE — G0008 ADMIN INFLUENZA VIRUS VAC: HCPCS | Performed by: FAMILY MEDICINE

## 2019-09-30 PROCEDURE — 99212 OFFICE O/P EST SF 10 MIN: CPT | Mod: 25 | Performed by: FAMILY MEDICINE

## 2019-09-30 ASSESSMENT — PATIENT HEALTH QUESTIONNAIRE - PHQ9: SUM OF ALL RESPONSES TO PHQ QUESTIONS 1-9: 1

## 2019-09-30 NOTE — PROGRESS NOTES
Subjective     Yair Benites is a 59 year old male who presents to clinic today for the following health issues:    HPI   Chief Complaint   Patient presents with     Plugged Ears     3 week, bilateral     Health Maintenance     phq9, a1c, advance care planning, lipid, eye exam, flu shot     Imm/Inj     Flu Shot      Here to have era checked due to plugging   Has had ear wax several times and usually has to have his ears flushed.  He has no ear pain,      DM2    Blood sugars good and has follow up    Review of Systems   Constitutional, cardiovascular, pulmonary, gi and gu systems are negative, except as otherwise noted.      Objective    /68   Pulse 90   Temp 97.4  F (36.3  C) (Oral)   Wt 93 kg (205 lb)   SpO2 98%   BMI 32.59 kg/m    Body mass index is 32.59 kg/m .  Physical Exam   GENERAL: healthy, alert and no distress  HEENT: Wax in ear canals  RESP: lungs clear to auscultation - no rales, rhonchi or wheezes  CV: regular rate and rhythm, no murmur, click or rub  Diagnostic Test Results:  Labs reviewed in Epic      Assessment & Plan     Yair was seen today for plugged ears, health maintenance and imm/inj.    Diagnoses and all orders for this visit:    Plugged feeling in ear, bilateral  -     REMOVE IMPACTED CERUMEN    Excessive cerumen in both ear canals  -     REMOVE IMPACTED CERUMEN    Need for prophylactic vaccination and inoculation against influenza  -     INFLUENZA QUAD, RECOMBINANT, P-FREE (RIV4) (FLUBLOCK) [03171]  -     ADMIN INFLUENZA (For MEDICARE Patients ONLY) []      Return in about 1 month (around 10/30/2019) for Routine Visit.    Alvin Johnson MD  [facility]

## 2019-10-03 NOTE — TELEPHONE ENCOUNTER
"See [facility] form scanned in Epic 9-27-19.  Not sure if this was faxed back or not (as no \"faxed\" stamp is on it).  Form printed and refaxed to [phone].  Tasha Marcus,     "

## 2019-11-07 ENCOUNTER — TELEPHONE (OUTPATIENT)
Dept: FAMILY MEDICINE | Facility: CLINIC | Age: 59
End: 2019-11-07

## 2019-11-07 NOTE — TELEPHONE ENCOUNTER
Called and left detailed message with verbal ok for orders requested per RN protocol.    Regarding the zinc, please see encounter dated 8/15/19. Did pt's fatigue symptoms improve when he was off of the multivitamin with zinc, or did they stay the same? Await call back from Sheila to RN hotline.    Andressa Cornelius RN  [facility]

## 2019-11-07 NOTE — TELEPHONE ENCOUNTER
Reason for Call: Request for an order or referral:    Order or referral being requested: Skilled Nursing     Date needed: as soon as possible    Has the patient been seen by the PCP for this problem? Not Applicable    Additional comments:     x1 every other week for 60 days , medication set up and general health assessment     Patient is wondering if can take Zinc again.     Please call to advise.     Phone number Patient can be reached at:  Other phone number:  [phone]    Best Time:  Any     Can we leave a detailed message on this number?  YES    Call taken on 11/7/2019 at 3:41 PM by Melinda Scott

## 2019-11-12 RX ORDER — ZINC GLUCONATE 50 MG
50 TABLET ORAL DAILY
COMMUNITY
Start: 2019-11-12

## 2019-11-12 NOTE — TELEPHONE ENCOUNTER
Spoke with HC nurse, patient has been taking zinc for over a week now and has not had any symptoms, he does take it at night now.  Per 8/15/19 encounter patient was informed it should not cause fatigue okay to hold.  Advised HC nurse it is okay for patient to restart if no fatigue symptoms after restarting.  Added Zinc back to med list.   Elisha Glez RN

## 2019-11-13 ENCOUNTER — TELEPHONE (OUTPATIENT)
Dept: FAMILY MEDICINE | Facility: CLINIC | Age: 59
End: 2019-11-13

## 2019-11-13 NOTE — PROGRESS NOTES
"SUBJECTIVE:   CC: Yair Benites is an 59 year old male who presents for preventative health visit.     HPI  {Add if <65 person on Medicare  - Required Questions (Optional):446049}  {Outside tests to abstract? :760411}    {additional problems to add (Optional):209349}    Today's PHQ-2 Score:   PHQ-2 (  [employer]) 3/25/2019   Q1: Little interest or pleasure in doing things 0   Q2: Feeling down, depressed or hopeless 0   PHQ-2 Score 0       Abuse: Current or Past(Physical, Sexual or Emotional)- { :231086}  Do you feel safe in your environment? { :858608}    Have you ever done Advance Care Planning? (For example, a Health Directive, POLST, or a discussion with a medical provider or your loved ones about your wishes): { :888551}    Social History     Tobacco Use     Smoking status: Former Smoker     Years: 6.00     Types: Cigarettes     Last attempt to quit: 1983     Years since quittin.8     Smokeless tobacco: Never Used   Substance Use Topics     Alcohol use: No     {Rooming Staff- Complete this question if Prescreen response is not shown below for today's visit. If you drink alcohol do you typically have >3 drinks per day or >7 drinks per week? (Optional):491035}    Alcohol Use 2016   Prescreen: >3 drinks/day or >7 drinks/week? The patient does not drink >3 drinks per day nor >7 drinks per week.   {add AUDIT responses (Optional) (A score of 7 for adult men is an indication of hazardous drinking; a score of 8 or more is an indication of an alcohol use disorder.  A score of 7 or more for adult women is an indication of hazardous drinking or an alchohol use disorder):090118}    Last PSA:   PSA   Date Value Ref Range Status   2019 0.60 0 - 4 ug/L Final     Comment:     Assay Method:  Chemiluminescence using Siemens Vista analyzer       Reviewed orders with patient. Reviewed health maintenance and updated orders accordingly - { :923592::\"Yes\"}  {Chronicprobdata (optional):655909}    Reviewed and " "updated as needed this visit by clinical staff         Reviewed and updated as needed this visit by Provider        {HISTORY OPTIONS (Optional):012594}    Review of Systems  {MALE ROS (Optional):336007::\"CONSTITUTIONAL: NEGATIVE for fever, chills, change in weight\",\"INTEGUMENTARY/SKIN: NEGATIVE for worrisome rashes, moles or lesions\",\"EYES: NEGATIVE for vision changes or irritation\",\"ENT: NEGATIVE for ear, mouth and throat problems\",\"RESP: NEGATIVE for significant cough or SOB\",\"CV: NEGATIVE for chest pain, palpitations or peripheral edema\",\"GI: NEGATIVE for nausea, abdominal pain, heartburn, or change in bowel habits\",\" male: negative for dysuria, hematuria, decreased urinary stream, erectile dysfunction, urethral discharge\",\"MUSCULOSKELETAL: NEGATIVE for significant arthralgias or myalgia\",\"NEURO: NEGATIVE for weakness, dizziness or paresthesias\",\"PSYCHIATRIC: NEGATIVE for changes in mood or affect\"}    OBJECTIVE:   There were no vitals taken for this visit.    Physical Exam  {Exam Choices (Optional):162511}    {Diagnostic Test Results (Optional):694478::\"Diagnostic Test Results:\",\"Labs reviewed in Epic\"}    ASSESSMENT/PLAN:   {Diag Picklist:813616}    COUNSELING:   {MALE COUNSELING MESSAGES:405373::\"Reviewed preventive health counseling, as reflected in patient instructions\"}    Estimated body mass index is 32.59 kg/m  as calculated from the following:    Height as of 4/29/19: 1.689 m (5' 6.5\").    Weight as of 9/30/19: 93 kg (205 lb).     {Weight Management Plan (ACO) Complete if BMI is abnormal-  Ages 18-64  BMI >24.9.  Age 65+ with BMI <23 or >30 (Optional):678949}     reports that he quit smoking about 36 years ago. His smoking use included cigarettes. He quit after 6.00 years of use. He has never used smokeless tobacco.  {Tobacco Cessation -- Complete if patient is a smoker (Optional):441402}    Counseling Resources:  ATP IV Guidelines  Pooled Cohorts Equation Calculator  FRAX Risk Assessment  ICSI " Preventive Guidelines  Dietary Guidelines for Americans, 2010  USDA's MyPlate  ASA Prophylaxis  Lung CA Screening    Radha Villegas MD  [facility]

## 2019-11-13 NOTE — PROGRESS NOTES
"SUBJECTIVE:   Yair Benites is a 59 year old male who presents for Preventive Visit.  Are you in the first 12 months of your Medicare coverage?  No    Healthy Habits:    In general, how would you rate your overall health?  Good    Frequency of exercise:  None    Do you usually eat at least 4 servings of fruit and vegetables a day, include whole grains    & fiber and avoid regularly eating high fat or \"junk\" foods?  Yes    Taking medications regularly:  Yes    Barriers to taking medications:  None    Medication side effects:  None    Ability to successfully perform activities of daily living:  No assistance needed    Home Safety:  No safety concerns identified    Hearing Impairment:  No hearing concerns    In the past 6 months, have you been bothered by leaking of urine?  No    In general, how would you rate your overall mental or emotional health?  Good      PHQ-2 Total Score:    Additional concerns today:  No    Do you feel safe in your environment? Yes    Have you ever done Advance Care Planning? (For example, a Health Directive, POLST, or a discussion with a medical provider or your loved ones about your wishes): Yes, patient states has an Advance Care Planning document and will bring a copy to the clinic.    Fall risk  Fallen 2 or more times in the past year?: No  Any fall with injury in the past year?: No    Cognitive Screening -Pt has  TBI  And doing well  1) Repeat 3 items (Leader, Season, Table)    2) Clock draw: Normal   3) 3 item recall:Recalls 3 objects  Results: 3 items recalled: COGNITIVE IMPAIRMENT LESS LIKELY    Mini-CogTM Copyright SWAPNA Limon. Licensed by the author for use in [facility]; reprinted with permission ([email or web address]). All rights reserved.      Do you have sleep apnea, excessive snoring or daytime drowsiness?: yes, snoring     Reviewed and updated as needed this visit by clinical staff  Tobacco  Allergies  Meds  Problems  Med Hx  Surg Hx  Fam Hx  Soc Hx      "     Reviewed and updated as needed this visit by Provider  Tobacco  Allergies  Meds  Problems  Med Hx  Surg Hx  Fam Hx        Social History     Tobacco Use     Smoking status: Former Smoker     Years: 6.00     Types: Cigarettes     Last attempt to quit: 1983     Years since quittin.9     Smokeless tobacco: Never Used   Substance Use Topics     Alcohol use: No     Diabetes Follow-up    How often are you checking your blood sugar? A few times a week  What time of day are you checking your blood sugars (select all that apply)?  Before meals  Have you had any blood sugars above 200?  No  Have you had any blood sugars below 70?  No    What symptoms do you notice when your blood sugar is low?  Doing well-has had no Hypoglycemia    What concerns do you have today about your diabetes? None     Do you have any of these symptoms? (Select all that apply)  No numbness or tingling in feet.  No redness, sores or blisters on feet.  No complaints of excessive thirst.  No reports of blurry vision.  No significant changes to weight.     Have you had a diabetic eye exam in the last 12 months? No    BP Readings from Last 2 Encounters:   19 129/79   19 124/68     Hemoglobin A1C (%)   Date Value   2019 5.8 (H)   2019 6.0 (H)     LDL Cholesterol Calculated (mg/dL)   Date Value   2017     Cannot estimate LDL when triglyceride exceeds 400 mg/dL   2016 76     LDL Cholesterol Direct (mg/dL)   Date Value   2018 75   2017 106 (H)       Diabetes Management Resources    Hyperlipidemia Follow-Up      Are you having any of the following symptoms? (Select all that apply)  No complaints of shortness of breath, chest pain or pressure.  No increased sweating or nausea with activity.  No left-sided neck or arm pain.  No complaints of pain in calves when walking 1-2 blocks.    Are you regularly taking any medication or supplement to lower your cholesterol?   Yes- statins    Are you having  muscle aches or other side effects that you think could be caused by your cholesterol lowering medication?  No    Depression and Anxiety Follow-Up    How are you doing with your depression since your last visit? Pt sees psych    How are you doing with your anxiety since your last visit?  Stable     Are you having other symptoms that might be associated with depression or anxiety? No    Have you had a significant life event? No     Do you have any concerns with your use of alcohol or other drugs? No    Social History     Tobacco Use     Smoking status: Former Smoker     Years: 6.00     Types: Cigarettes     Last attempt to quit: 1983     Years since quittin.9     Smokeless tobacco: Never Used   Substance Use Topics     Alcohol use: No     Drug use: No     PHQ 2018 3/25/2019 2019   PHQ-9 Total Score 2 0 1   Q9: Thoughts of better off dead/self-harm past 2 weeks Not at all Not at all Not at all     No flowsheet data found.     In the past two weeks have you had thoughts of suicide or self-harm?  No.        Suicide Assessment Five-step Evaluation and Treatment (SAFE-T)    If you drink alcohol do you typically have >3 drinks per day or >7 drinks per week? No    No flowsheet data found.    Current providers sharing in care for this patient include:   Patient Care Team:  Radha Villegas MD as PCP - General  Radha Villegas MD as Assigned PCP  Maylin Laguerre RD as Diabetes Educator (Dietitian, Registered)    The following health maintenance items are reviewed in Epic and correct as of today:  Health Maintenance   Topic Date Due     ADVANCE CARE PLANNING  1960     LIPID  2018     DIABETIC FOOT EXAM  2019     EYE EXAM  08/15/2019     A1C  2019     TSH W/FREE T4 REFLEX  2020     MEDICARE ANNUAL WELLNESS VISIT  2020     BMP  2020     MICROALBUMIN  2020     PHQ-9  2020     DTAP/TDAP/TD IMMUNIZATION (3 - Td) 2021     COLONOSCOPY  2022      HEPATITIS C SCREENING  Completed     HIV SCREENING  Completed     DEPRESSION ACTION PLAN  Completed     INFLUENZA VACCINE  Completed     PNEUMOCOCCAL IMMUNIZATION 19-64 MEDIUM RISK  Completed     ZOSTER IMMUNIZATION  Completed     IPV IMMUNIZATION  Aged Out     MENINGITIS IMMUNIZATION  Aged Out       Lab work is in process  Labs reviewed in EPIC  BP Readings from Last 3 Encounters:   19 129/79   19 124/68   19 130/68    Wt Readings from Last 3 Encounters:   19 93.4 kg (205 lb 12.8 oz)   11/15/19 93.4 kg (206 lb)   19 93 kg (205 lb)                  Patient Active Problem List   Diagnosis     Colon polyp     Traumatic brain injury (H)     IBS (irritable bowel syndrome)     Synovitis and tenosynovitis     Dizziness - light-headed     Primary localized osteoarthrosis, ankle and foot     Obesity     Depression, major     SANDRA (obstructive sleep apnea)     Chronic gout without tophus, unspecified cause, unspecified site     Hyperlipidemia LDL goal <100     Type 2 diabetes mellitus without complication, without long-term current use of insulin (H)     Past Surgical History:   Procedure Laterality Date     SURGICAL HISTORY OF -       nose       Social History     Tobacco Use     Smoking status: Former Smoker     Years: 6.00     Types: Cigarettes     Last attempt to quit: 1983     Years since quittin.9     Smokeless tobacco: Never Used   Substance Use Topics     Alcohol use: No     Family History   Problem Relation Age of Onset     C.A.D. Father         MI age 66     Heart Disease Father      Depression Brother      Cancer Brother         Brain Tumor     Respiratory Brother      Depression Brother      Gallbladder Disease Brother      Dementia Brother      Cancer Brother      Parkinsonism Brother      Diabetes Maternal Uncle      Asthma No family hx of      Hypertension No family hx of          Current Outpatient Medications   Medication Sig Dispense Refill     ACE/ARB/ARNI NOT  PRESCRIBED, INTENTIONAL, ACE & ARB not prescribed due to Other:BP at goal       acetaminophen (TYLENOL) 500 MG tablet Take 2 tablets (1,000 mg) by mouth 4 times daily as needed for mild pain       alcohol swab prep pads Use to swab area of injection/reba as directed. 100 each 3     ARIPiprazole (ABILIFY) 15 MG tablet Take 15 mg by mouth daily Reported on 4/17/2017       aspirin 81 MG tablet Take 1 tablet by mouth daily.       atorvastatin (LIPITOR) 20 MG tablet Take 1 tablet (20 mg) by mouth daily 90 tablet 3     blood glucose (ACCU-CHEK SMARTVIEW) test strip USE TO TEST ONCE DAILY 100 strip 3     blood glucose (NO BRAND SPECIFIED) lancets standard Use to test blood sugar 1 time daily or as directed. 100 each 1     blood glucose (NO BRAND SPECIFIED) test strip Use to test blood sugar 1 time daily or as directed. 100 each 1     blood glucose calibration (FREESTYLE CONTROL) solution Use to calibrate blood glucose monitor as needed as directed. 2 each 1     blood glucose monitoring (NO BRAND SPECIFIED) meter device kit Use to test blood sugar 1 time daily or as directed. 1 kit 0     Cholecalciferol (VITAMIN D) 1000 UNIT capsule Take 1 capsule by mouth daily.       Continuous Blood Gluc Sensor (FREESTYLE AVRIL 14 DAY SENSOR) MISC 1 each every 14 days 2 each 11     ibuprofen (ADVIL/MOTRIN) 400 MG tablet Take 1 tablet (400 mg) by mouth every 8 hours as needed for moderate pain       loratadine (CLARITIN) 10 MG tablet Take 1 tablet (10 mg) by mouth daily       melatonin 5 MG tablet Take 1 tablet (5 mg) by mouth nightly as needed for sleep       metFORMIN (GLUCOPHAGE-XR) 500 MG 24 hr tablet Take 1 tablet (500 mg) by mouth daily 90 tablet 1     mirtazapine (REMERON) 30 MG tablet Take 30 mg by mouth At Bedtime.       Multiple Vitamins-Minerals (MULTIVITAMIN & MINERAL PO) Take 1 tablet by mouth daily.       order for DME Equipment being ordered:Face mask for CPAP machine size small with tubing and filter. [facility]  Equipment ([phone]) 1 Device 0     OVER-THE-COUNTER Beta Prostate- Take 1 tablet daily.       polyethylene glycol (MIRALAX/GLYCOLAX) powder MIX AS DIRECTED AND TAKE 17 GRAMS BY MOUTH DAILY 510 g 3     Valbenazine Tosylate (INGREZZA) 40 & 80 MG CPPK Take 40-80 mg by mouth daily       zinc gluconate 50 MG tablet Take 1 tablet (50 mg) by mouth daily       Allergies   Allergen Reactions     Erythromycin Nausea     Imipramine Diarrhea     Imipramine Hcl      Toxic levels     Risperidone      Other reaction(s): Extrapyramidal Side Effect     Erythromycin Rash     Recent Labs   Lab Test 11/19/19  1627 03/25/19  1424 09/11/18  1507 05/04/18  1304 01/12/18  1541 12/05/17  1530  11/18/16  1152 03/23/15  0953 09/22/14  1055  02/18/13  1113  02/03/12  1237   A1C 5.8* 6.0*  --  6.3*  --  7.1*   < >  --  6.2*  --   --  5.8   < >  --    LDL  --   --  75  --   --  Cannot estimate LDL when triglyceride exceeds 400 mg/dL  106*  --  76 71 67  --  73  --  78   HDL  --   --   --   --   --  39*  --  42 39* 43  --  35*  --  28*   TRIG  --   --   --   --   --  492*  --  202* 206* 252*  --  283*  --  148   ALT  --   --   --   --   --   --   --   --   --  34  --  43  --  40   CR  --  0.92 0.88  --   --  0.87   < >  --   --   --   --  0.96  --   --    GFRESTIMATED  --  89 88  --   --  90   < >  --   --   --   --  82  --   --    GFRESTBLACK  --  >90 >90  --   --  >90   < >  --   --   --   --  >90  --   --    POTASSIUM  --  3.7  --   --   --  4.0   < >  --   --   --   --  4.4  --   --    TSH  --   --   --   --  1.84  --   --   --  2.69  --    < >  --   --  1.47    < > = values in this interval not displayed.          Review of Systems  CONSTITUTIONAL: NEGATIVE for fever, chills, change in weight  INTEGUMENTARY/SKIN: NEGATIVE for worrisome rashes, moles or lesions  EYES: NEGATIVE for vision changes or irritation  ENT/MOUTH: NEGATIVE for ear, mouth and throat problems  RESP: NEGATIVE for significant cough or SOB  BREAST: NEGATIVE for  "masses, tenderness or discharge  CV: NEGATIVE for chest pain, palpitations or peripheral edema  GI: NEGATIVE for nausea, abdominal pain, heartburn, or change in bowel habits  : NEGATIVE for frequency, dysuria, or hematuria  MUSCULOSKELETAL: NEGATIVE for significant arthralgias or myalgia  NEURO: NEGATIVE for weakness, dizziness or paresthesias  ENDOCRINE: NEGATIVE for temperature intolerance, skin/hair changes  HEME: NEGATIVE for bleeding problems  PSYCHIATRIC: NEGATIVE for changes in mood or affect    OBJECTIVE:   /79   Pulse 81   Temp 98.4  F (36.9  C) (Oral)   Resp 16   Ht 1.689 m (5' 6.5\")   Wt 93.4 kg (205 lb 12.8 oz)   SpO2 96%   BMI 32.72 kg/m   Estimated body mass index is 32.72 kg/m  as calculated from the following:    Height as of this encounter: 1.689 m (5' 6.5\").    Weight as of this encounter: 93.4 kg (205 lb 12.8 oz).  Physical Exam  GENERAL: alert and no distress  EYES: Eyes grossly normal to inspection, PERRL and conjunctivae and sclerae normal  HENT: ear canals and TM's normal, nose and mouth without ulcers or lesions  NECK: no adenopathy, no asymmetry, masses, or scars and thyroid normal to palpation  RESP: lungs clear to auscultation - no rales, rhonchi or wheezes  CV: regular rate and rhythm, normal S1 S2, no S3 or S4, no murmur, click or rub, no peripheral edema and peripheral pulses strong  ABDOMEN: soft, nontender, no hepatosplenomegaly, no masses and bowel sounds normal  MS: no gross musculoskeletal defects noted, no edema  SKIN: no suspicious lesions or rashes  NEURO: Normal strength and tone, mentation intact and speech normal  PSYCH: mentation appears normal, affect normal/bright    Diagnostic Test Results:  Labs reviewed in Epic  Results for orders placed or performed in visit on 11/19/19 (from the past 24 hour(s))   HEMOGLOBIN A1C   Result Value Ref Range    Hemoglobin A1C 5.8 (H) 0 - 5.6 %       ASSESSMENT / PLAN:   1. Encounter for routine adult physical exam with " "abnormal findings      2. Type 2 diabetes mellitus without complication, without long-term current use of insulin (H)  controlled  - Lipid panel reflex to direct LDL Fasting  - HEMOGLOBIN A1C  - metFORMIN (GLUCOPHAGE-XR) 500 MG 24 hr tablet; Take 1 tablet (500 mg) by mouth daily  Dispense: 90 tablet; Refill: 1  - Basic metabolic panel  - FOOT EXAM  - RetinaVue Eye Scan    3. Major depressive disorder with single episode, remission status unspecified  Stable -sees Psych    4. Traumatic brain injury, without loss of consciousness, sequela (H)  Stable     5. Polyp of colon, unspecified part of colon, unspecified type  UTD with screen    6. SANDRA (obstructive sleep apnea)  Doing well on cppap    7. Hyperlipidemia LDL goal <100  Stable   - atorvastatin (LIPITOR) 20 MG tablet; Take 1 tablet (20 mg) by mouth daily  Dispense: 90 tablet; Refill: 3    8. Chronic gout without tophus, unspecified cause, unspecified site  Pending   Stable   - Uric acid    9. Chronic constipation  refilled  - polyethylene glycol (MIRALAX/GLYCOLAX) powder; MIX AS DIRECTED AND TAKE 17 GRAMS BY MOUTH DAILY  Dispense: 510 g; Refill: 3    10. Screening for prostate cancer    - PSA, screen    COUNSELING:  Reviewed preventive health counseling, as reflected in patient instructions       Regular exercise       Healthy diet/nutrition       Vision screening       Hearing screening       Dental care       Bladder control       Colon cancer screening       The 10-year ASCVD risk score (Nabil ESTRELLA Jr., et al., 2013) is: 18.6%    Values used to calculate the score:      Age: 59 years      Sex: Male      Is Non- : No      Diabetic: Yes      Tobacco smoker: No      Systolic Blood Pressure: 129 mmHg      Is BP treated: No      HDL Cholesterol: 39 mg/dL      Total Cholesterol: 177 mg/dL    Estimated body mass index is 32.72 kg/m  as calculated from the following:    Height as of this encounter: 1.689 m (5' 6.5\").    Weight as of this encounter: " 93.4 kg (205 lb 12.8 oz).    Weight management plan: Discussed healthy diet and exercise guidelines     reports that he quit smoking about 36 years ago. His smoking use included cigarettes. He quit after 6.00 years of use. He has never used smokeless tobacco.      Appropriate preventive services were discussed with this patient, including applicable screening as appropriate for cardiovascular disease, diabetes, osteopenia/osteoporosis, and glaucoma.  As appropriate for age/gender, discussed screening for colorectal cancer, prostate cancer, breast cancer, and cervical cancer. Checklist reviewing preventive services available has been given to the patient.    Reviewed patients plan of care and provided an AVS. The Complex Care Plan (for patients with higher acuity and needing more deliberate coordination of services) for Yair meets the Care Plan requirement. This Care Plan has been established and reviewed with the Patient.    Counseling Resources:  ATP IV Guidelines  Pooled Cohorts Equation Calculator  Breast Cancer Risk Calculator  FRAX Risk Assessment  ICSI Preventive Guidelines  Dietary Guidelines for Americans, 2010  USDA's MyPlate  ASA Prophylaxis  Lung CA Screening    Radha Villegas MD  [facility]    Identified Health Risks:

## 2019-11-13 NOTE — TELEPHONE ENCOUNTER
Fax to be reviewed by the provider Home Care Plan of Care Certification     Who is the it from?[facility] Certification and Plan of Care 11/7/2019 - 1/5/2020  This was faxed to [facility] [place]   Where was the fax placed? Team RN's desk  What number is listed as a contact on the fax?     [facility]  [address], [address]  [place] [address]  Phone: [phone]  Fax: [phone]    Please fax to above    Additional comments:     Next 5 appointments (look out 90 days)    Nov 19, 2019  4:20 PM CST  PHYSICAL with Radha Villegas MD  [facility] [place] ([facility] [place] [address]  [place] [address] [phone]

## 2019-11-13 NOTE — TELEPHONE ENCOUNTER
Left message for Sheila from [facility] to call RN hotline [phone].      Reviewed [facility] med list and reconciled against Epic med list    Discrepancies noted-  Prostate Health Complex - 1 tablet every night, listed on , not on Epic    Key Ramos RN

## 2019-11-15 ENCOUNTER — ALLIED HEALTH/NURSE VISIT (OUTPATIENT)
Dept: EDUCATION SERVICES | Facility: CLINIC | Age: 59
End: 2019-11-15
Payer: MEDICARE

## 2019-11-15 VITALS — WEIGHT: 206 LBS | BODY MASS INDEX: 32.75 KG/M2

## 2019-11-15 DIAGNOSIS — E11.9 TYPE 2 DIABETES MELLITUS WITHOUT COMPLICATION, WITHOUT LONG-TERM CURRENT USE OF INSULIN (H): Primary | ICD-10-CM

## 2019-11-15 PROCEDURE — G0108 DIAB MANAGE TRN  PER INDIV: HCPCS

## 2019-11-15 RX ORDER — FLASH GLUCOSE SENSOR
1 KIT MISCELLANEOUS
Qty: 2 EACH | Refills: 11 | Status: SHIPPED | OUTPATIENT
Start: 2019-11-15 | End: 2021-11-04

## 2019-11-15 RX ORDER — FLASH GLUCOSE SCANNING READER
1 EACH MISCELLANEOUS ONCE
Qty: 1 DEVICE | Refills: 0 | Status: SHIPPED | OUTPATIENT
Start: 2019-11-15 | End: 2019-11-15

## 2019-11-15 NOTE — PATIENT INSTRUCTIONS
Diabetes Support Resources:  Websites: American Association of Diabetes Educators Participant Resources: www.diabeteseducator.org/living-with-diabetes   American Diabetes Association: www.diabetes.org  Diabetes Together 2 Goal: http://zmmbvbnv0mklq.org     Bring blood glucose meter and logbook with you to all doctor and follow-up appointments.    Diabetes Education Telephone Visit Follow-up:    We realize your time is valuable and your health is important! We offer a convenient Telephone Visit follow up! It s a quick way to check in for a medication dose adjustment without having to come back to clinic as soon.    Telephone Visits are often covered by insurance. Please check with your insurance plan to see if this type of visit is covered. If not, the cost is less expensive than an office visit:      Up to 10 minutes (Code 05127): $30    11-20 minutes (Code 16603): $59    More than 20 minutes (Code 62968): $85    Talk with your Diabetes Educator if you want to learn more.      [facility]:  For Your Diabetes Education and Nutrition Appointments Call:  [phone]   For Diabetes Education or Nutrition Related Questions:   Phone: [phone]  E-mail: [email or web address]  Fax: [phone]   If you need a medication refill please contact your pharmacy. Please allow 3 business days for your refills to be completed.  Maylin Laguerre, MAIRA, LD, CDE

## 2019-11-15 NOTE — PROGRESS NOTES
"Diabetes Self-Management Education & Support    Diabetes Education Self Management & Training    SUBJECTIVE/OBJECTIVE:  Presents for: Follow-up  Accompanied by: Self, Other(Worker )  Diabetes education in the past 24mo: Yes  Focus of Visit: Monitoring, Healthy Eating  Diabetes type: Type 2  Disease course: Stable  Diabetes management related comments/concerns: Yair would like to get the Araceli Chuguobang CGM  Other concerns:: None  Cultural Influences/Ethnic Background:  American    Diabetes Symptoms & Complications  Blurred vision: No  Fatigue: No  Neuropathy: No  Foot ulcerations: No  Polydipsia: No  Polyphagia: No  Polyuria: No  Visual change: No  Weakness: No  Weight loss: No  Slow healing wounds: No  Recent Infection(s): No  Symptom course: Stable  Autonomic neuropathy: No  CVA: No  Heart disease: No  Nephropathy: No  Peripheral neuropathy: No  Peripheral Vascular Disease: No  Retinopathy: No  Sexual dysfunction: No    Patient Problem List and Family Medical History reviewed for relevant medical history, current medical status, and diabetes risk factors.    Vitals:  Wt 93.4 kg (206 lb)   BMI 32.75 kg/m    Estimated body mass index is 32.75 kg/m  as calculated from the following:    Height as of 4/29/19: 1.689 m (5' 6.5\").    Weight as of this encounter: 93.4 kg (206 lb).   Last 3 BP:   BP Readings from Last 3 Encounters:   09/30/19 124/68   04/29/19 130/68   03/25/19 112/68       History   Smoking Status     Former Smoker     Years: 6.00     Types: Cigarettes     Quit date: 1/1/1983   Smokeless Tobacco     Never Used       Labs:  Lab Results   Component Value Date    A1C 6.0 03/25/2019     Lab Results   Component Value Date     03/25/2019     Lab Results   Component Value Date    LDL 75 09/11/2018    LDL  12/05/2017     Cannot estimate LDL when triglyceride exceeds 400 mg/dL     HDL Cholesterol   Date Value Ref Range Status   12/05/2017 39 (L) >39 mg/dL Final   ]  GFR Estimate   Date Value Ref Range " Status   2019 89 >60 mL/min/[1.73_m2] Final     Comment:     Non  GFR Calc  Starting 2018, serum creatinine based estimated GFR (eGFR) will be   calculated using the Chronic Kidney Disease Epidemiology Collaboration   (CKD-EPI) equation.       GFR Estimate If Black   Date Value Ref Range Status   2019 >90 >60 mL/min/[1.73_m2] Final     Comment:      GFR Calc  Starting 2018, serum creatinine based estimated GFR (eGFR) will be   calculated using the Chronic Kidney Disease Epidemiology Collaboration   (CKD-EPI) equation.       Lab Results   Component Value Date    CR 0.92 2019     No results found for: MICROALBUMIN    Healthy Eating  Healthy Eating Assessed Today: Yes  Patient on a regular basis: Eats 3 meals a day  Meals include: Breakfast, Lunch, Dinner  Breakfast: 2 packets of apple cinnamon oatmeal   Lunch: 1/2 sandwich with crackers and cheese   Dinner: [employer] - 1 whole wheat 6 inch tuna fish with pepperjack cheese and vegetables   Snacks: 1/2 sandwich at break, carrots with ranch   Beverages: Water, Coffee(almond milk, V8, cream in coffee)    Being Active  Being Active Assessed Today: Yes  Exercise:: Currently not exercising(Walking with friend)    Monitoring  Monitoring Assessed Today: Yes  Did patient bring glucose meter to appointment? : Yes  Blood Glucose Meter: ContourNext  Home Glucose (Sugar) Monitorin-2 times per day    Date Breakfast    Before   11/15 122    107    109    122   11/10 119    137    114    105    105    120    116   11/3 118    125    120   10/30 109     Taking Medications  Diabetes Medication(s)     Biguanides       metFORMIN (GLUCOPHAGE-XR) 500 MG 24 hr tablet    TAKE 1 TABLET BY MOUTH DAILY.          Taking Medication Assessed Today: Yes  Current Treatments: Oral Agent (monotherapy)  Problems taking diabetes medications regularly?: No  Diabetes medication side effects?:  No  Treatment Compliance: All of the time    Problem Solving  Problem Solving Assessed Today: No    Reducing Risks  Reducing Risks Assessed Today: No    Healthy Coping  Healthy Coping Assessed Today: No  Patient Activation Measure Survey Score:  IMMANUEL Score (Last Two) 2/18/2013   IMMANUEL Raw Score 42   Activation Score 66   IMMANUEL Level 3       ASSESSMENT:  Yair states that his blood sugars have been within goal, with the exception of a couple days. He has been trying to stay away from sugar. Today we discussed the importance of balanced diet, consistent carbohydrates. Yair is interested in the Araceli Freestyle CGM so he doesn't have to poke his finder every day, will send to pharmacy to determine coverage.     Patient's most recent   Lab Results   Component Value Date    A1C 6.0 03/25/2019    is meeting goal of <7.0    INTERVENTION:   Diabetes knowledge and skills assessment:     Patient is knowledgeable in diabetes management concepts related to: Monitoring    Patient needs further education on the following diabetes management concepts: Healthy Eating, Being Active, Monitoring, Taking Medication, Problem Solving, Reducing Risks and Healthy Coping    Based on learning assessment above, most appropriate setting for further diabetes education would be: Individual setting.    Education provided today on:  AADE Self-Care Behaviors:  Diabetes Pathophysiology  Healthy Eating: carbohydrate counting, consistency in amount, composition, and timing of food intake, portion control and plate planning method  Monitoring: purpose and Discussed Araceli Freestyle CGM    Opportunities for ongoing education and support in diabetes-self management were discussed.    Pt verbalized understanding of concepts discussed and recommendations provided today.       Education Materials Provided:  Carbohydrate Counting and My Plate Planner    PLAN:  Start Araceli Freestyle CGM    See Patient Instructions for co-developed, patient-stated behavior  change goals.  AVS printed and provided to patient today. See Follow-Up section for recommended follow-up.    Maylin Laguerre RD, LD, CDE  Time Spent: 45 minutes  Encounter Type: Individual    Any diabetes medication dose changes were made via the CDE Protocol and Collaborative Practice Agreement with the patient's referring provider. A copy of this encounter was shared with the provider.

## 2019-11-19 ENCOUNTER — OFFICE VISIT (OUTPATIENT)
Dept: FAMILY MEDICINE | Facility: CLINIC | Age: 59
End: 2019-11-19
Payer: MEDICARE

## 2019-11-19 ENCOUNTER — TRANSFERRED RECORDS (OUTPATIENT)
Dept: MULTI SPECIALTY CLINIC | Facility: CLINIC | Age: 59
End: 2019-11-19

## 2019-11-19 VITALS
HEART RATE: 81 BPM | SYSTOLIC BLOOD PRESSURE: 129 MMHG | RESPIRATION RATE: 16 BRPM | DIASTOLIC BLOOD PRESSURE: 79 MMHG | HEIGHT: 67 IN | BODY MASS INDEX: 32.3 KG/M2 | OXYGEN SATURATION: 96 % | TEMPERATURE: 98.4 F | WEIGHT: 205.8 LBS

## 2019-11-19 DIAGNOSIS — E11.9 TYPE 2 DIABETES MELLITUS WITHOUT COMPLICATION, WITHOUT LONG-TERM CURRENT USE OF INSULIN (H): ICD-10-CM

## 2019-11-19 DIAGNOSIS — G47.33 OSA (OBSTRUCTIVE SLEEP APNEA): ICD-10-CM

## 2019-11-19 DIAGNOSIS — Z00.01 ENCOUNTER FOR ROUTINE ADULT PHYSICAL EXAM WITH ABNORMAL FINDINGS: Primary | ICD-10-CM

## 2019-11-19 DIAGNOSIS — K63.5 POLYP OF COLON, UNSPECIFIED PART OF COLON, UNSPECIFIED TYPE: ICD-10-CM

## 2019-11-19 DIAGNOSIS — S06.9X0S TRAUMATIC BRAIN INJURY, WITHOUT LOSS OF CONSCIOUSNESS, SEQUELA (H): ICD-10-CM

## 2019-11-19 DIAGNOSIS — F32.9 MAJOR DEPRESSIVE DISORDER WITH SINGLE EPISODE, REMISSION STATUS UNSPECIFIED: ICD-10-CM

## 2019-11-19 DIAGNOSIS — K59.09 CHRONIC CONSTIPATION: ICD-10-CM

## 2019-11-19 DIAGNOSIS — Z12.5 SCREENING FOR PROSTATE CANCER: ICD-10-CM

## 2019-11-19 DIAGNOSIS — M1A.9XX0 CHRONIC GOUT WITHOUT TOPHUS, UNSPECIFIED CAUSE, UNSPECIFIED SITE: ICD-10-CM

## 2019-11-19 DIAGNOSIS — E11.9 TYPE 2 DIABETES MELLITUS WITHOUT COMPLICATION, WITHOUT LONG-TERM CURRENT USE OF INSULIN (H): Primary | ICD-10-CM

## 2019-11-19 DIAGNOSIS — E78.5 HYPERLIPIDEMIA LDL GOAL <100: ICD-10-CM

## 2019-11-19 LAB
HBA1C MFR BLD: 5.8 % (ref 0–5.6)
RETINOPATHY: NEGATIVE

## 2019-11-19 PROCEDURE — 84550 ASSAY OF BLOOD/URIC ACID: CPT | Performed by: FAMILY MEDICINE

## 2019-11-19 PROCEDURE — 83036 HEMOGLOBIN GLYCOSYLATED A1C: CPT | Performed by: FAMILY MEDICINE

## 2019-11-19 PROCEDURE — 92250 FUNDUS PHOTOGRAPHY W/I&R: CPT | Mod: 26 | Performed by: OPTOMETRIST

## 2019-11-19 PROCEDURE — G0438 PPPS, INITIAL VISIT: HCPCS | Performed by: FAMILY MEDICINE

## 2019-11-19 PROCEDURE — 99207 C FOOT EXAM  NO CHARGE: CPT | Mod: 25 | Performed by: FAMILY MEDICINE

## 2019-11-19 PROCEDURE — 99213 OFFICE O/P EST LOW 20 MIN: CPT | Mod: 25 | Performed by: FAMILY MEDICINE

## 2019-11-19 PROCEDURE — 36415 COLL VENOUS BLD VENIPUNCTURE: CPT | Performed by: FAMILY MEDICINE

## 2019-11-19 PROCEDURE — 80048 BASIC METABOLIC PNL TOTAL CA: CPT | Performed by: FAMILY MEDICINE

## 2019-11-19 PROCEDURE — 80061 LIPID PANEL: CPT | Performed by: FAMILY MEDICINE

## 2019-11-19 PROCEDURE — G0103 PSA SCREENING: HCPCS | Performed by: FAMILY MEDICINE

## 2019-11-19 PROCEDURE — 92250 FUNDUS PHOTOGRAPHY W/I&R: CPT | Mod: TC | Performed by: FAMILY MEDICINE

## 2019-11-19 RX ORDER — POLYETHYLENE GLYCOL 3350 17 G/17G
POWDER, FOR SOLUTION ORAL
Qty: 510 G | Refills: 3 | Status: SHIPPED | OUTPATIENT
Start: 2019-11-19 | End: 2020-06-23

## 2019-11-19 RX ORDER — METFORMIN HCL 500 MG
500 TABLET, EXTENDED RELEASE 24 HR ORAL DAILY
Qty: 90 TABLET | Refills: 1 | Status: SHIPPED | OUTPATIENT
Start: 2019-11-19 | End: 2020-01-13

## 2019-11-19 RX ORDER — MIRTAZAPINE 30 MG/1
30 TABLET, FILM COATED ORAL AT BEDTIME
Status: CANCELLED | OUTPATIENT
Start: 2019-11-19

## 2019-11-19 RX ORDER — ATORVASTATIN CALCIUM 20 MG/1
20 TABLET, FILM COATED ORAL DAILY
Qty: 90 TABLET | Refills: 3 | Status: SHIPPED | OUTPATIENT
Start: 2019-11-19 | End: 2020-07-21

## 2019-11-19 ASSESSMENT — ACTIVITIES OF DAILY LIVING (ADL): CURRENT_FUNCTION: NO ASSISTANCE NEEDED

## 2019-11-19 ASSESSMENT — MIFFLIN-ST. JEOR: SCORE: 1689.19

## 2019-11-19 NOTE — LETTER
[facility]  [address] [place] [address]    November 22, 2019    Yair Benites  [address]  [place] [address]          Dear Yair,    Eye test-No Diabetic Retinopathy   Cholesterol is Good   Diabetic test is Good   Take care    Enclosed is a copy of your results.     Results for orders placed or performed in visit on 11/19/19   Lipid panel reflex to direct LDL Fasting     Status: Abnormal   Result Value Ref Range    Cholesterol 146 <200 mg/dL    Triglycerides 310 (H) <150 mg/dL    HDL Cholesterol 38 (L) >39 mg/dL    LDL Cholesterol Calculated 46 <100 mg/dL    Non HDL Cholesterol 108 <130 mg/dL   HEMOGLOBIN A1C     Status: Abnormal   Result Value Ref Range    Hemoglobin A1C 5.8 (H) 0 - 5.6 %   Basic metabolic panel     Status: Abnormal   Result Value Ref Range    Sodium 141 133 - 144 mmol/L    Potassium 4.0 3.4 - 5.3 mmol/L    Chloride 108 94 - 109 mmol/L    Carbon Dioxide 27 20 - 32 mmol/L    Anion Gap 6 3 - 14 mmol/L    Glucose 152 (H) 70 - 99 mg/dL    Urea Nitrogen 13 7 - 30 mg/dL    Creatinine 0.88 0.66 - 1.25 mg/dL    GFR Estimate >90 >60 mL/min/[1.73_m2]    GFR Estimate If Black >90 >60 mL/min/[1.73_m2]    Calcium 9.2 8.5 - 10.1 mg/dL   PSA, screen     Status: None   Result Value Ref Range    PSA 0.53 0 - 4 ug/L   Uric acid     Status: Abnormal   Result Value Ref Range    Uric Acid 8.1 (H) 3.5 - 7.2 mg/dL       If you have any questions or concerns, please call myself or my nurse at [phone].      Sincerely,        Radha Villegas MD/rita

## 2019-11-19 NOTE — LETTER
[facility]  [address] [place] [address]    November 29, 2019    Yair BRO Crosser  [address]  [place] [address]          Dear Yair,    Cholesterol is Good   Uric acid is Borderline High   Please send Him a gout diet   You can consider medicines   We can discuss at Your next visit   Electrolytes and kidney tests are normal    Enclosed is a copy of your results.     Results for orders placed or performed in visit on 11/19/19   Lipid panel reflex to direct LDL Fasting     Status: Abnormal   Result Value Ref Range    Cholesterol 146 <200 mg/dL    Triglycerides 310 (H) <150 mg/dL    HDL Cholesterol 38 (L) >39 mg/dL    LDL Cholesterol Calculated 46 <100 mg/dL    Non HDL Cholesterol 108 <130 mg/dL   HEMOGLOBIN A1C     Status: Abnormal   Result Value Ref Range    Hemoglobin A1C 5.8 (H) 0 - 5.6 %   Basic metabolic panel     Status: Abnormal   Result Value Ref Range    Sodium 141 133 - 144 mmol/L    Potassium 4.0 3.4 - 5.3 mmol/L    Chloride 108 94 - 109 mmol/L    Carbon Dioxide 27 20 - 32 mmol/L    Anion Gap 6 3 - 14 mmol/L    Glucose 152 (H) 70 - 99 mg/dL    Urea Nitrogen 13 7 - 30 mg/dL    Creatinine 0.88 0.66 - 1.25 mg/dL    GFR Estimate >90 >60 mL/min/[1.73_m2]    GFR Estimate If Black >90 >60 mL/min/[1.73_m2]    Calcium 9.2 8.5 - 10.1 mg/dL   PSA, screen     Status: None   Result Value Ref Range    PSA 0.53 0 - 4 ug/L   Uric acid     Status: Abnormal   Result Value Ref Range    Uric Acid 8.1 (H) 3.5 - 7.2 mg/dL       If you have any questions or concerns, please call myself or my nurse at [phone].      Sincerely,        Radha Villegas MD/rita

## 2019-11-20 LAB
ANION GAP SERPL CALCULATED.3IONS-SCNC: 6 MMOL/L (ref 3–14)
BUN SERPL-MCNC: 13 MG/DL (ref 7–30)
CALCIUM SERPL-MCNC: 9.2 MG/DL (ref 8.5–10.1)
CHLORIDE SERPL-SCNC: 108 MMOL/L (ref 94–109)
CHOLEST SERPL-MCNC: 146 MG/DL
CO2 SERPL-SCNC: 27 MMOL/L (ref 20–32)
CREAT SERPL-MCNC: 0.88 MG/DL (ref 0.66–1.25)
GFR SERPL CREATININE-BSD FRML MDRD: >90 ML/MIN/{1.73_M2}
GLUCOSE SERPL-MCNC: 152 MG/DL (ref 70–99)
HDLC SERPL-MCNC: 38 MG/DL
LDLC SERPL CALC-MCNC: 46 MG/DL
NONHDLC SERPL-MCNC: 108 MG/DL
POTASSIUM SERPL-SCNC: 4 MMOL/L (ref 3.4–5.3)
PSA SERPL-ACNC: 0.53 UG/L (ref 0–4)
SODIUM SERPL-SCNC: 141 MMOL/L (ref 133–144)
TRIGL SERPL-MCNC: 310 MG/DL
URATE SERPL-MCNC: 8.1 MG/DL (ref 3.5–7.2)

## 2019-11-22 NOTE — TELEPHONE ENCOUNTER
Attempted to contact HC twice with no return call.   Please have PCP sign and fax back to HC.     Key Ramos RN

## 2019-11-26 DIAGNOSIS — Z53.9 DIAGNOSIS NOT YET DEFINED: Primary | ICD-10-CM

## 2019-11-26 PROCEDURE — G0179 MD RECERTIFICATION HHA PT: HCPCS | Performed by: FAMILY MEDICINE

## 2019-11-27 NOTE — TELEPHONE ENCOUNTER
Confirmed faxed back signed copy of the Home Care Certification. A copy has been sent to be added to the chart. Idalmis Mohamud,

## 2019-12-03 NOTE — TELEPHONE ENCOUNTER
Sheila returned call to RN Hotline. She states her phone has been acting up and she just received VMs. She confirms that patient is taking Prostate Health Complex. Added to LEONA Ramos RN

## 2020-01-06 ENCOUNTER — TELEPHONE (OUTPATIENT)
Dept: FAMILY MEDICINE | Facility: CLINIC | Age: 60
End: 2020-01-06

## 2020-01-06 NOTE — TELEPHONE ENCOUNTER
Reason for call:  Order     Order or referral being requested: Skilled Nursing    Reason for request: per HC     Date needed: as soon as possible    Has the patient been seen by the PCP for this problem? Not Applicable    Additional comments:     x1 every 2 weeks for 60 days     Phone number to reach patient:  Other phone number:  [phone]    Best Time:  Any     Can we leave a detailed message on this number?  YES

## 2020-01-06 NOTE — TELEPHONE ENCOUNTER
Called and left detailed message Sheila from [facility] and gave verbal OK for orders below.   Advised to call RN Hotline [phone] if questions.     Key Ramos RN

## 2020-01-10 DIAGNOSIS — E11.9 TYPE 2 DIABETES MELLITUS WITHOUT COMPLICATION, WITHOUT LONG-TERM CURRENT USE OF INSULIN (H): ICD-10-CM

## 2020-01-13 RX ORDER — METFORMIN HCL 500 MG
TABLET, EXTENDED RELEASE 24 HR ORAL
Qty: 90 TABLET | Refills: 1 | Status: SHIPPED | OUTPATIENT
Start: 2020-01-13 | End: 2020-07-15

## 2020-01-17 RX ORDER — OLANZAPINE 5 MG/1
5 TABLET ORAL AT BEDTIME
COMMUNITY
Start: 2019-12-23 | End: 2021-11-04

## 2020-01-17 NOTE — TELEPHONE ENCOUNTER
Reviewed [facility] med list and reconciled against Epic med list  OK for provider to sign & fax back to HC.    Key Ramos RN

## 2020-01-17 NOTE — TELEPHONE ENCOUNTER
Fax to be reviewed by the provider Home Care Plan of Care Certification     Who is the it from? [facility] Certification and Plan 1/6/2020 - 3/5/2020  This was faxed to [facility]   Where was the fax placed? Team RN's desk  What number is listed as a contact on the fax?     [address], [address]  [place] [address]  Phone: [phone]  Fax: [phone]    Please fax to above    Additional comments:

## 2020-01-20 DIAGNOSIS — Z53.9 DIAGNOSIS NOT YET DEFINED: Primary | ICD-10-CM

## 2020-01-20 PROCEDURE — G0179 MD RECERTIFICATION HHA PT: HCPCS | Performed by: FAMILY MEDICINE

## 2020-03-02 ENCOUNTER — TELEPHONE (OUTPATIENT)
Dept: FAMILY MEDICINE | Facility: CLINIC | Age: 60
End: 2020-03-02

## 2020-03-02 ENCOUNTER — MEDICAL CORRESPONDENCE (OUTPATIENT)
Dept: HEALTH INFORMATION MANAGEMENT | Facility: CLINIC | Age: 60
End: 2020-03-02

## 2020-03-02 NOTE — TELEPHONE ENCOUNTER
Form came in via fax to be completed by the provider: Certification of Medical Necessity Form for Medicare covered Diabetic Supplies    Who is the form from?    [facility][id number] - [place] - [address] AT [address] [place]  This was faxed to [facility]  Where was the form placed? Providers bin  What number is listed as a contact on the form?     [facility][id number] - [place] - [address] AT [address] [place]  [address] [address]  Phone: [phone] Fax: [phone]    [facility]  Fax: [phone] Phone: [phone]    Type of letter, form or note: medical     Please return the phone encounter when the form/order has been completed. Thank you.

## 2020-03-02 NOTE — TELEPHONE ENCOUNTER
Reason for call:  Home Healthcare Reason for Call:  Home Health Care    Sheila with [facility] called regarding (reason for call): Orders    Orders are needed for this patient.       Skilled Nursin time a week for every other week for 60 days    Pt Provider: Dr. Villegas    Phone Number Homecare Nurse can be reached at: [phone]    Can we leave a detailed message on this number? YES

## 2020-03-03 NOTE — TELEPHONE ENCOUNTER
The Form has been completed by the provider, confirmed faxed to the fax number on the form and listed below and a copy has been sent to be added to the chart. Idalmis Mohamud,

## 2020-03-19 ENCOUNTER — TRANSFERRED RECORDS (OUTPATIENT)
Dept: HEALTH INFORMATION MANAGEMENT | Facility: CLINIC | Age: 60
End: 2020-03-19

## 2020-03-19 ENCOUNTER — NURSE TRIAGE (OUTPATIENT)
Dept: FAMILY MEDICINE | Facility: CLINIC | Age: 60
End: 2020-03-19
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
## 2020-03-19 NOTE — TELEPHONE ENCOUNTER
Reason for call:  Symptom   Symptom or request: Battery in rectum    Duration (how long have symptoms been present): 2 days now  Have you been treated for this before? ?    Additional comments: Please call to advise    Phone number to reach patient:  Home number on file [phone] (home)    Best Time:  any    Can we leave a detailed message on this number?  NO    Travel screening: Not Applicable

## 2020-04-03 DIAGNOSIS — Z53.9 DIAGNOSIS NOT YET DEFINED: Primary | ICD-10-CM

## 2020-04-03 PROCEDURE — G0179 MD RECERTIFICATION HHA PT: HCPCS | Performed by: FAMILY MEDICINE

## 2020-04-14 ENCOUNTER — TELEPHONE (OUTPATIENT)
Dept: FAMILY MEDICINE | Facility: CLINIC | Age: 60
End: 2020-04-14

## 2020-04-14 NOTE — TELEPHONE ENCOUNTER
Reason for call:  Other   Patient called regarding (reason for call): call back  Additional comments: Patient calling regarding ears if I heard correctly, please call to advise.     Phone number to reach patient:  Cell number on file:    Telephone Information:   Mobile [phone]       Best Time:  Any     Can we leave a detailed message on this number?  YES    Travel screening: Not Applicable

## 2020-04-14 NOTE — TELEPHONE ENCOUNTER
Left a message to get more information.       No message reaching out to patient for anything. Please get more information from the patient on what more would be needed. If patient didn't need anything this encounter can be closed.     Idalmis Mohamud,

## 2020-04-15 NOTE — TELEPHONE ENCOUNTER
Called patient and left VM to call clinic. Okay to speak to anyone on red team.  Andressa PETERS CMA ([facility])

## 2020-04-20 ENCOUNTER — TELEPHONE (OUTPATIENT)
Dept: FAMILY MEDICINE | Facility: CLINIC | Age: 60
End: 2020-04-20

## 2020-04-21 ENCOUNTER — VIRTUAL VISIT (OUTPATIENT)
Dept: FAMILY MEDICINE | Facility: CLINIC | Age: 60
End: 2020-04-21
Payer: MEDICARE

## 2020-04-21 DIAGNOSIS — H93.8X3 SENSATION OF PLUGGED EAR ON BOTH SIDES: Primary | ICD-10-CM

## 2020-04-21 PROCEDURE — G2012 BRIEF CHECK IN BY MD/QHP: HCPCS | Performed by: FAMILY MEDICINE

## 2020-04-21 NOTE — PROGRESS NOTES
"Yair Benites is a 59 year old male who is being evaluated via a billable telephone visit.      The patient has been notified of following:     \"This telephone visit will be conducted via a call between you and your physician/provider. We have found that certain health care needs can be provided without the need for a physical exam.  This service lets us provide the care you need with a short phone conversation.  If a prescription is necessary we can send it directly to your pharmacy.  If lab work is needed we can place an order for that and you can then stop by our lab to have the test done at a later time.    Telephone visits are billed at different rates depending on your insurance coverage. During this emergency period, for some insurers they may be billed the same as an in-person visit.  Please reach out to your insurance provider with any questions.      2:29 PM-start Visit  2:36 PM-end        If during the course of the call the physician/provider feels a telephone visit is not appropriate, you will not be charged for this service.\"    Patient has given verbal consent for Telephone visit?  Yes    How would you like to obtain your AVS? Mail a copy    Subjective     Yair Benites is a 59 year old male who presents to clinic today for the following health issues:    HPI   Patient states his ears have been plugged for about 3 weeks. Patient tried OTC, but unsuccesful..    Ears have been clogged   Pt has had cerumen Impaction  Pt hs been Using Debrox OTC  No Runny Nose         Patient Active Problem List   Diagnosis     Colon polyp     Traumatic brain injury (H)     IBS (irritable bowel syndrome)     Synovitis and tenosynovitis     Dizziness - light-headed     Primary localized osteoarthrosis, ankle and foot     Obesity     Depression, major     SANDRA (obstructive sleep apnea)     Chronic gout without tophus, unspecified cause, unspecified site     Hyperlipidemia LDL goal <100     Type 2 diabetes mellitus " without complication, without long-term current use of insulin (H)     Past Surgical History:   Procedure Laterality Date     SURGICAL HISTORY OF -       nose       Social History     Tobacco Use     Smoking status: Former Smoker     Years: 6.00     Types: Cigarettes     Last attempt to quit: 1983     Years since quittin.3     Smokeless tobacco: Never Used   Substance Use Topics     Alcohol use: No     Family History   Problem Relation Age of Onset     C.A.D. Father         MI age 66     Heart Disease Father      Depression Brother      Cancer Brother         Brain Tumor     Respiratory Brother      Depression Brother      Gallbladder Disease Brother      Dementia Brother      Cancer Brother      Parkinsonism Brother      Diabetes Maternal Uncle      Asthma No family hx of      Hypertension No family hx of          Current Outpatient Medications   Medication Sig Dispense Refill     ACE/ARB/ARNI NOT PRESCRIBED, INTENTIONAL, ACE & ARB not prescribed due to Other:BP at goal       acetaminophen (TYLENOL) 500 MG tablet Take 2 tablets (1,000 mg) by mouth 4 times daily as needed for mild pain       alcohol swab prep pads Use to swab area of injection/reba as directed. 100 each 3     ARIPiprazole (ABILIFY) 15 MG tablet Take 15 mg by mouth daily Reported on 2017       aspirin 81 MG tablet Take 1 tablet by mouth daily.       atorvastatin (LIPITOR) 20 MG tablet Take 1 tablet (20 mg) by mouth daily 90 tablet 3     blood glucose (ACCU-CHEK SMARTVIEW) test strip USE TO TEST ONCE DAILY 100 strip 3     blood glucose (NO BRAND SPECIFIED) lancets standard Use to test blood sugar 1 time daily or as directed. 100 each 1     blood glucose (NO BRAND SPECIFIED) test strip Use to test blood sugar 1 time daily or as directed. 100 each 1     blood glucose calibration (FREESTYLE CONTROL) solution Use to calibrate blood glucose monitor as needed as directed. 2 each 1     blood glucose monitoring (NO BRAND SPECIFIED) meter device  kit Use to test blood sugar 1 time daily or as directed. 1 kit 0     Cholecalciferol (VITAMIN D) 1000 UNIT capsule Take 1 capsule by mouth daily.       Continuous Blood Gluc Sensor (FREESTYLE AVRIL 14 DAY SENSOR) MIS 1 each every 14 days 2 each 11     ibuprofen (ADVIL/MOTRIN) 400 MG tablet Take 1 tablet (400 mg) by mouth every 8 hours as needed for moderate pain       loratadine (CLARITIN) 10 MG tablet Take 1 tablet (10 mg) by mouth daily       melatonin 5 MG tablet Take 1 tablet (5 mg) by mouth nightly as needed for sleep       metFORMIN (GLUCOPHAGE-XR) 500 MG 24 hr tablet TAKE 1 TABLET BY MOUTH DAILY. 90 tablet 1     mirtazapine (REMERON) 30 MG tablet Take 30 mg by mouth At Bedtime.       Multiple Vitamins-Minerals (MULTIVITAMIN & MINERAL PO) Take 1 tablet by mouth daily.       OLANZapine (ZYPREXA) 5 MG tablet Take 5 mg by mouth At Bedtime       order for DME Equipment being ordered:Face mask for CPAP machine size small with tubing and filter. [employer] ([phone]) 1 Device 0     OVER-THE-COUNTER Beta Prostate- Take 1 tablet daily.       polyethylene glycol (MIRALAX/GLYCOLAX) powder MIX AS DIRECTED AND TAKE 17 GRAMS BY MOUTH DAILY 510 g 3     UNABLE TO FIND MEDICATION NAME: Prostate Health Complex - 1 tablet every night       Valbenazine Tosylate (INGREZZA) 40 & 80 MG CPPK Take 40-80 mg by mouth daily       zinc gluconate 50 MG tablet Take 1 tablet (50 mg) by mouth daily       Allergies   Allergen Reactions     Erythromycin Nausea     Imipramine Diarrhea     Imipramine Hcl      Toxic levels     Risperidone      Other reaction(s): Extrapyramidal Side Effect     Erythromycin Rash     Recent Labs   Lab Test 11/19/19  1627 03/25/19  1424 09/11/18  1507 05/04/18  1304 01/12/18  1541 12/05/17  1530  11/18/16  1152 03/23/15  0953 09/22/14  1055  02/18/13  1113   A1C 5.8* 6.0*  --  6.3*  --  7.1*   < >  --  6.2*  --   --  5.8   LDL 46  --  75  --   --  Cannot estimate LDL when triglyceride exceeds 400  mg/dL  106*  --  76 71 67  --  73   HDL 38*  --   --   --   --  39*  --  42 39* 43  --  35*   TRIG 310*  --   --   --   --  492*  --  202* 206* 252*  --  283*   ALT  --   --   --   --   --   --   --   --   --  34  --  43   CR 0.88 0.92 0.88  --   --  0.87   < >  --   --   --   --  0.96   GFRESTIMATED >90 89 88  --   --  90   < >  --   --   --   --  82   GFRESTBLACK >90 >90 >90  --   --  >90   < >  --   --   --   --  >90   POTASSIUM 4.0 3.7  --   --   --  4.0   < >  --   --   --   --  4.4   TSH  --   --   --   --  1.84  --   --   --  2.69  --    < >  --     < > = values in this interval not displayed.      BP Readings from Last 3 Encounters:   11/19/19 129/79   09/30/19 124/68   04/29/19 130/68    Wt Readings from Last 3 Encounters:   11/19/19 93.4 kg (205 lb 12.8 oz)   11/15/19 93.4 kg (206 lb)   09/30/19 93 kg (205 lb)                    Reviewed and updated as needed this visit by Provider         Review of Systems   ROS COMP: CONSTITUTIONAL: NEGATIVE for fever, chills, change in weight  ENT/MOUTH: NEGATIVE for ear, mouth and throat problems  ENT/MOUTH: as above  RESP: NEGATIVE for significant cough or SOB       Objective   Reported vitals:  There were no vitals taken for this visit.   alert and no distress  PSYCH: Alert and oriented times 3; coherent speech, normal   rate and volume, able to articulate logical thoughts, able   to abstract reason, no tangential thoughts, no hallucinations   or delusions  His affect is normal  RESP: No cough, no audible wheezing, able to talk in full sentences  Remainder of exam unable to be completed due to telephone visits    Diagnostic Test Results:  Labs reviewed in Epic  none         Assessment/Plan:  1. Sensation of plugged ear on both sides  Pt has History of cerumen Impaction  Advised use Cerumenex or Debrox and suction  Follow up 1 week if not better/sooner if worse              Phone call duration:  As above minutes    Radha Villegas MD

## 2020-04-21 NOTE — PATIENT INSTRUCTIONS
Patient Education       Earwax, Home Treatment    Everyone produces earwax from the lining of the ear canal. It serves to lubricate and protect the ear. The wax that forms in the canal naturally moves toward the outside of the ear and falls out. Sometimes the ear canal may contain too much wax. This can cause a blockage and loss of hearing. Directions are given below for home treatment.  Home care  If your doctor has advised you to remove a wax blockage yourself, follow these directions:    Unless a medicine was prescribed, you may use an over-the-counter product made for clearing earwax. These contain carbamide peroxide. Lie down with the blocked ear facing upward. Apply one dropper full of medicine and wait a few minutes. Grasp the outer ear and wiggle it to help the solution enter the canal.    Lean over a sink or basin with the blocked ear facing downward. Use a bulb syringe filled with warm (not hot or cold) water to rinse the ear several times. Use gentle pressure only.    If you are having trouble draining the water out of your ear canal, put a few drops of rubbing alcohol (isopropyl alcohol) into the ear canal. This will help remove the remaining water.    Repeat this procedure once a day for up to three days, or until your hearing is back to normal. Do not use this treatment for more than three days in a row.  Don ts    Don t use cold water to rinse the ear. This will make you dizzy.    Don t perform this procedure if you have an ear infection.    Don t perform this procedure if you have a ruptured eardrum.    Don t use cotton swabs, matches, hairpins, keys, or other objects to  clean  the ear canal. This can cause infection of the ear canal or rupture the eardrum. Because of their size and shape, cotton swabs can push earwax deeper into the ear canal instead of removing it.  Follow-up care  Follow up with your health care provider if you are not improving after three cleaning attempts, or as  advised.  When to seek medical advice  Call your health care provider right away if any of these occur:    Worsening ear pain    Fever of 101 F (38.3 C) or higher, or as directed by your health care provider    Hearing does not return to normal after three days of treatment    Fluid drainage or bleeding from the ear canal    Swelling, redness, or tenderness of the outer ear    Headache, neck pain, or stiff neck    1574-1819 [employer]. [address] [place] [address], [place] [id number]. All rights reserved. This information is not intended as a substitute for professional medical care. Always follow your healthcare professional's instructions.

## 2020-04-27 ENCOUNTER — TELEPHONE (OUTPATIENT)
Dept: FAMILY MEDICINE | Facility: CLINIC | Age: 60
End: 2020-04-27

## 2020-04-27 PROBLEM — E11.9 TYPE 2 DIABETES MELLITUS WITHOUT COMPLICATION, WITHOUT LONG-TERM CURRENT USE OF INSULIN (H): Status: ACTIVE | Noted: 2018-05-04

## 2020-04-27 NOTE — LETTER
[facility]  [address] [place] [address]  [place] [address]  [phone]        April 27, 2020      Yair Benites  [address] [place] [address]  [place] [address]      Dear Yair,    As your health care provider, I am concerned that your age and/or underlying medical condition puts you at particularly high risk for serious complications should you contract a COVID-19 infection.     Specifically, you have the following conditions/diagnoses, included as high risk conditions per the Centers for Disease Control and Prevention at CDC.gov.     Diabetes     COVID-19 is a new disease and there is limited information regarding risk factors for severe disease. Based on currently available information and clinical expertise, older adults and people of any age who have serious underlying medical conditions might be at higher risk for severe illness from COVID-19.     It is my medical opinion that you should avoid close contact with others and work from home if possible during this COVID-19 pandemic.     Radha Villegas MD    [facility]

## 2020-04-27 NOTE — LETTER
[facility]  [address] [place] [address]  [place] [address]  [phone]        April 27, 2020      Yair Beintes  [address] [place] [address]  [place] [address]      Dear Yair,    As your health care provider, I am concerned that your age and/or underlying medical condition puts you at particularly high risk for serious complications should you contract a COVID-19 infection.     Specifically, you have the following conditions/diagnoses, included as high risk conditions per the Centers for Disease Control and Prevention at CDC.gov.     ***  ***    COVID-19 is a new disease and there is limited information regarding risk factors for severe disease. Based on currently available information and clinical expertise, older adults and people of any age who have serious underlying medical conditions might be at higher risk for severe illness from COVID-19.     It is my medical opinion that you should avoid close contact with others and work from home if possible during this COVID-19 pandemic.     Radha Villegas MD    [facility]

## 2020-04-27 NOTE — TELEPHONE ENCOUNTER
Reason for call:  Other     Patient called regarding (reason for call): call back    Additional comments: Patient is calling wondering when he can go back to work, please call to advise.     Phone number to reach patient:  Home number on file [phone] (home) or cell [phone]    Best Time:  Any     Can we leave a detailed message on this number?  YES    Travel screening: Not Applicable

## 2020-04-27 NOTE — TELEPHONE ENCOUNTER
Pt's questions for provider are highlighted in red.   Called patient. Per chart review, there is no documentation of patient being instructed to stay home or quarantine. Pt had virtual visit with Bakari 04/21. Pt does not have COVID-19 symptoms. Spoke with patient. He states that he has diabetes and asked Is it easier to get coronavirus with diabetes? He is wondering when he should go back to work? Pt states that he has been avoiding going out in public, touching cart handles etc.

## 2020-04-27 NOTE — TELEPHONE ENCOUNTER
Called and spoke with patient's spouse who has authorization to discuss PHI on file. Relayed message from provider and she verbalized understanding and indicated that she will relay message to patient. Writer asked that she have pt call if he has any questions. She states that pt would like a letter with his risk. It can be mailed to their home address on file.   Cued letter (COVID-19 RISK FACTOR VERIFICATION FOR EMPLOYER).

## 2020-04-27 NOTE — TELEPHONE ENCOUNTER
Those with diabetes are at higher risk for complications.  Does he want a letter generated with his risk?  We cannot advise whether he should go back to work.

## 2020-05-02 DIAGNOSIS — Z53.9 DIAGNOSIS NOT YET DEFINED: Primary | ICD-10-CM

## 2020-05-02 PROCEDURE — G0179 MD RECERTIFICATION HHA PT: HCPCS | Performed by: FAMILY MEDICINE

## 2020-06-10 ENCOUNTER — TELEPHONE (OUTPATIENT)
Dept: FAMILY MEDICINE | Facility: CLINIC | Age: 60
End: 2020-06-10

## 2020-06-10 NOTE — TELEPHONE ENCOUNTER
Reason for Call:  Form, our goal is to have forms completed with 72 hours, however, some forms may require a visit or additional information.    Type of letter, form or note:  Medical Provider's Approval    Who is the form from?: Patient    Where did the form come from: Patient or family brought in       What clinic location was the form placed at?: [facility]    Where the form was placed: Dr. Villegas's Box/Folder    What number is listed as a contact on the form?: fax to [phone]       Additional comments: fax completed form    Call taken on 6/10/2020 at 11:54 AM by Cait Parker

## 2020-06-10 NOTE — TELEPHONE ENCOUNTER
Medical Provider's Approval (Return to work authorization) from [facility] received and given to Dr. Villegas to sign.  Form is for patient to return to their day program. Tasha Marcus,

## 2020-06-11 NOTE — TELEPHONE ENCOUNTER
Form completed and signed by Dr. Villegas and faxed to [facility] at [phone].  Patient called and informed.  Tasha Marcus,

## 2020-06-22 DIAGNOSIS — K59.09 CHRONIC CONSTIPATION: ICD-10-CM

## 2020-06-23 RX ORDER — POLYETHYLENE GLYCOL 3350 17 G/17G
POWDER, FOR SOLUTION ORAL
Qty: 510 G | Refills: 2 | Status: SHIPPED | OUTPATIENT
Start: 2020-06-23 | End: 2021-01-08

## 2020-06-30 ENCOUNTER — TELEPHONE (OUTPATIENT)
Dept: FAMILY MEDICINE | Facility: CLINIC | Age: 60
End: 2020-06-30

## 2020-06-30 NOTE — TELEPHONE ENCOUNTER
Reason for call:  Order   Order or referral being requested: Skilled Nursing   Reason for request: per [facility]   Date needed: as soon as possible  Has the patient been seen by the PCP for this problem? Not Applicable    Additional comments:     Every other week for 9 weeks to cover medication management and diabetes.   2 PRN if needed     Phone number to reach patient:  Other phone number:  [phone]    Best Time:  Any     Can we leave a detailed message on this number?  YES    Travel screening: Not Applicable

## 2020-06-30 NOTE — TELEPHONE ENCOUNTER
Called and spoke with Sadie from [facility] and gave verbal OK for orders below.     Key Ramos RN

## 2020-07-14 DIAGNOSIS — E11.9 TYPE 2 DIABETES MELLITUS WITHOUT COMPLICATION, WITHOUT LONG-TERM CURRENT USE OF INSULIN (H): ICD-10-CM

## 2020-07-14 NOTE — LETTER
July 15, 2020      Yair Benites  [address] [place] [id number]            Your provider has sent a 30 day yanet refill of metFORMIN (GLUCOPHAGE-XR) 500 MG 24 hr tablet. You are due for an appointment for further refills. Appointment options could include: an in person office visit, telephone visit or Evisit through [facility]ohbet. Please contact the clinic to schedule an appointment for further refills.      Sincerely,       [facility] [place] / [place]

## 2020-07-15 DIAGNOSIS — E11.9 TYPE 2 DIABETES MELLITUS WITHOUT COMPLICATION, WITHOUT LONG-TERM CURRENT USE OF INSULIN (H): ICD-10-CM

## 2020-07-15 RX ORDER — METFORMIN HCL 500 MG
TABLET, EXTENDED RELEASE 24 HR ORAL
Qty: 90 TABLET | Refills: 0 | Status: SHIPPED | OUTPATIENT
Start: 2020-07-15 | End: 2020-07-21

## 2020-07-15 RX ORDER — METFORMIN HCL 500 MG
TABLET, EXTENDED RELEASE 24 HR ORAL
Qty: 30 TABLET | Refills: 0 | Status: SHIPPED | OUTPATIENT
Start: 2020-07-15 | End: 2020-07-15

## 2020-07-15 NOTE — TELEPHONE ENCOUNTER
"Rx was sent for 30 days. Requesting 90 day supply.     Requested Prescriptions   Pending Prescriptions Disp Refills     metFORMIN (GLUCOPHAGE-XR) 500 MG 24 hr tablet [Pharmacy Med Name: METFORMIN ER 500MG 24HR TABS] 90 tablet      Sig: TAKE 1 TABLET(500 MG) BY MOUTH DAILY       Biguanide Agents Failed - 7/15/2020  9:31 AM        Failed - Patient has documented A1c within the specified period of time.     If HgbA1C is 8 or greater, it needs to be on file within the past 3 months.  If less than 8, must be on file within the past 6 months.     Recent Labs   Lab Test 11/19/19  1627   A1C 5.8*             Passed - Patient is age 10 or older        Passed - Patient's CR is NOT>1.4 OR Patient's EGFR is NOT<45 within past 12 mos.     Recent Labs   Lab Test 11/19/19  1627   GFRESTIMATED >90   GFRESTBLACK >90       Recent Labs   Lab Test 11/19/19  1627   CR 0.88             Passed - Patient does NOT have a diagnosis of CHF.        Passed - Medication is active on med list        Passed - Recent (6 mo) or future (30 days) visit within the authorizing provider's specialty     Patient had office visit in the last 6 months or has a visit in the next 30 days with authorizing provider or within the authorizing provider's specialty.  See \"Patient Info\" tab in inbasket, or \"Choose Columns\" in Meds & Orders section of the refill encounter.               Key Ramos RN  "

## 2020-07-15 NOTE — TELEPHONE ENCOUNTER
Radha Villegas MD  Fz Team Red Just now (1:07 PM)      Call follow up Diabetes next month with me   Routing comment

## 2020-07-21 ENCOUNTER — VIRTUAL VISIT (OUTPATIENT)
Dept: FAMILY MEDICINE | Facility: CLINIC | Age: 60
End: 2020-07-21
Payer: MEDICARE

## 2020-07-21 DIAGNOSIS — E11.9 TYPE 2 DIABETES MELLITUS WITHOUT COMPLICATION, WITHOUT LONG-TERM CURRENT USE OF INSULIN (H): ICD-10-CM

## 2020-07-21 DIAGNOSIS — E66.09 CLASS 1 OBESITY DUE TO EXCESS CALORIES WITH SERIOUS COMORBIDITY AND BODY MASS INDEX (BMI) OF 33.0 TO 33.9 IN ADULT: ICD-10-CM

## 2020-07-21 DIAGNOSIS — E66.811 CLASS 1 OBESITY DUE TO EXCESS CALORIES WITH SERIOUS COMORBIDITY AND BODY MASS INDEX (BMI) OF 33.0 TO 33.9 IN ADULT: ICD-10-CM

## 2020-07-21 DIAGNOSIS — F32.9 MAJOR DEPRESSIVE DISORDER WITH SINGLE EPISODE, REMISSION STATUS UNSPECIFIED: Primary | ICD-10-CM

## 2020-07-21 DIAGNOSIS — G47.33 OSA (OBSTRUCTIVE SLEEP APNEA): ICD-10-CM

## 2020-07-21 DIAGNOSIS — E78.5 HYPERLIPIDEMIA LDL GOAL <100: ICD-10-CM

## 2020-07-21 PROCEDURE — 99442 ZZC PHYSICIAN TELEPHONE EVALUATION 11-20 MIN: CPT | Performed by: FAMILY MEDICINE

## 2020-07-21 RX ORDER — METFORMIN HCL 500 MG
500 TABLET, EXTENDED RELEASE 24 HR ORAL DAILY
Qty: 90 TABLET | Refills: 1 | Status: SHIPPED | OUTPATIENT
Start: 2020-07-21 | End: 2021-02-16

## 2020-07-21 RX ORDER — ATORVASTATIN CALCIUM 20 MG/1
20 TABLET, FILM COATED ORAL DAILY
Qty: 90 TABLET | Refills: 3 | Status: SHIPPED | OUTPATIENT
Start: 2020-07-21 | End: 2021-09-16

## 2020-07-21 ASSESSMENT — PATIENT HEALTH QUESTIONNAIRE - PHQ9: SUM OF ALL RESPONSES TO PHQ QUESTIONS 1-9: 3

## 2020-07-21 NOTE — PROGRESS NOTES
"Yair Benites is a 59 year old male who is being evaluated via a billable telephone visit.      The patient has been notified of following:     \"This telephone visit will be conducted via a call between you and your physician/provider. We have found that certain health care needs can be provided without the need for a physical exam.  This service lets us provide the care you need with a short phone conversation.  If a prescription is necessary we can send it directly to your pharmacy.  If lab work is needed we can place an order for that and you can then stop by our lab to have the test done at a later time.    Telephone visits are billed at different rates depending on your insurance coverage. During this emergency period, for some insurers they may be billed the same as an in-person visit.  Please reach out to your insurance provider with any questions.    If during the course of the call the physician/provider feels a telephone visit is not appropriate, you will not be charged for this service.\"    Patient has given verbal consent for Telephone visit?  Yes    What phone number would you like to be contacted at? [phone]    How would you like to obtain your AVS? Mail a copy  2:30 PM -start visit  Subjective     Yair Benites is a 59 year old male who presents via phone visit today for the following health issues:    HPI         Medication Followup of Atorvastatin 20 mg    Taking Medication as prescribed: yes    Side Effects:  None    Medication Helping Symptoms:  yes     Diabetes Follow-up      How often are you checking your blood sugar?131 average    What concerns do you have today about your diabetes? None     Do you have any of these symptoms? (Select all that apply)  No numbness or tingling in feet.  No redness, sores or blisters on feet.  No complaints of excessive thirst.  No reports of blurry vision.  No significant changes to weight.    Have you had a diabetic eye exam in the last 12 months? " No          Hyperlipidemia Follow-Up      Are you regularly taking any medication or supplement to lower your cholesterol?   Yes- statins    Are you having muscle aches or other side effects that you think could be caused by your cholesterol lowering medication?  No       BP Readings from Last 2 Encounters:   19 129/79   19 124/68     Hemoglobin A1C (%)   Date Value   2019 5.8 (H)   2019 6.0 (H)     LDL Cholesterol Calculated (mg/dL)   Date Value   2019 46   2017     Cannot estimate LDL when triglyceride exceeds 400 mg/dL     LDL Cholesterol Direct (mg/dL)   Date Value   2018 75   2017 106 (H)         How many servings of fruits and vegetables do you eat daily?  4 or more    On average, how many sweetened beverages do you drink each day (Examples: soda, juice, sweet tea, etc.  Do NOT count diet or artificially sweetened beverages)?   0    How many days per week do you exercise enough to make your heart beat faster? 3 or less    How many minutes a day do you exercise enough to make your heart beat faster? 9 or less  How many days per week do you miss taking your medication? 0  Depression Followup    How are you doing with your depression since your last visit? Stable     Are you having other symptoms that might be associated with depression? No    Have you had a significant life event?  No     Are you feeling anxious or having panic attacks?   No    Do you have any concerns with your use of alcohol or other drugs? No    Social History     Tobacco Use     Smoking status: Former Smoker     Years: 6.00     Types: Cigarettes     Last attempt to quit: 1983     Years since quittin.5     Smokeless tobacco: Never Used   Substance Use Topics     Alcohol use: No     Drug use: No     PHQ 3/25/2019 2019 2020   PHQ-9 Total Score 0 1 3   Q9: Thoughts of better off dead/self-harm past 2 weeks Not at all Not at all Not at all     No flowsheet data found.  Last PHQ-9  2020   1.  Little interest or pleasure in doing things 0   2.  Feeling down, depressed, or hopeless 0   3.  Trouble falling or staying asleep, or sleeping too much 0   4.  Feeling tired or having little energy 2   5.  Poor appetite or overeating 1   6.  Feeling bad about yourself 0   7.  Trouble concentrating 0   8.  Moving slowly or restless 0   Q9: Thoughts of better off dead/self-harm past 2 weeks 0   PHQ-9 Total Score 3   Difficulty at work, home, or with people Somewhat difficult     No flowsheet data found.      Patient Active Problem List   Diagnosis     Colon polyp     Traumatic brain injury (H)     IBS (irritable bowel syndrome)     Synovitis and tenosynovitis     Dizziness - light-headed     Primary localized osteoarthrosis, ankle and foot     Obesity     Depression, major     SANDRA (obstructive sleep apnea)     Chronic gout without tophus, unspecified cause, unspecified site     Hyperlipidemia LDL goal <100     Type 2 diabetes mellitus without complication, without long-term current use of insulin (H)     Past Surgical History:   Procedure Laterality Date     SURGICAL HISTORY OF -       nose       Social History     Tobacco Use     Smoking status: Former Smoker     Years: 6.00     Types: Cigarettes     Last attempt to quit: 1983     Years since quittin.5     Smokeless tobacco: Never Used   Substance Use Topics     Alcohol use: No     Family History   Problem Relation Age of Onset     C.A.D. Father         MI age 66     Heart Disease Father      Depression Brother      Cancer Brother         Brain Tumor     Respiratory Brother      Depression Brother      Gallbladder Disease Brother      Dementia Brother      Cancer Brother      Parkinsonism Brother      Diabetes Maternal Uncle      Asthma No family hx of      Hypertension No family hx of          Current Outpatient Medications   Medication Sig Dispense Refill     ACE/ARB/ARNI NOT PRESCRIBED, INTENTIONAL, ACE & ARB not prescribed due to  Other:BP at goal       acetaminophen (TYLENOL) 500 MG tablet Take 2 tablets (1,000 mg) by mouth 4 times daily as needed for mild pain       alcohol swab prep pads Use to swab area of injection/reba as directed. 100 each 3     ARIPiprazole (ABILIFY) 15 MG tablet Take 15 mg by mouth daily Reported on 4/17/2017       aspirin 81 MG tablet Take 1 tablet by mouth daily.       atorvastatin (LIPITOR) 20 MG tablet Take 1 tablet (20 mg) by mouth daily 90 tablet 3     blood glucose (ACCU-CHEK SMARTVIEW) test strip USE TO TEST ONCE DAILY 100 strip 3     blood glucose (NO BRAND SPECIFIED) lancets standard Use to test blood sugar 1 time daily or as directed. 100 each 1     blood glucose (NO BRAND SPECIFIED) test strip Use to test blood sugar 1 time daily or as directed. 100 each 1     blood glucose calibration (FREESTYLE CONTROL) solution Use to calibrate blood glucose monitor as needed as directed. 2 each 1     blood glucose monitoring (NO BRAND SPECIFIED) meter device kit Use to test blood sugar 1 time daily or as directed. 1 kit 0     Cholecalciferol (VITAMIN D) 1000 UNIT capsule Take 1 capsule by mouth daily.       Continuous Blood Gluc Sensor (FREESTYLE AVRIL 14 DAY SENSOR) MISC 1 each every 14 days 2 each 11     ibuprofen (ADVIL/MOTRIN) 400 MG tablet Take 1 tablet (400 mg) by mouth every 8 hours as needed for moderate pain       loratadine (CLARITIN) 10 MG tablet Take 1 tablet (10 mg) by mouth daily       melatonin 5 MG tablet Take 1 tablet (5 mg) by mouth nightly as needed for sleep       metFORMIN (GLUCOPHAGE-XR) 500 MG 24 hr tablet TAKE 1 TABLET(500 MG) BY MOUTH DAILY 90 tablet 0     mirtazapine (REMERON) 30 MG tablet Take 30 mg by mouth At Bedtime.       Multiple Vitamins-Minerals (MULTIVITAMIN & MINERAL PO) Take 1 tablet by mouth daily.       OLANZapine (ZYPREXA) 5 MG tablet Take 5 mg by mouth At Bedtime       order for DME Equipment being ordered:Face mask for CPAP machine size small with tubing and filter. [place]  [facility] ([phone]) 1 Device 0     OVER-THE-COUNTER Beta Prostate- Take 1 tablet daily.       polyethylene glycol (MIRALAX) 17 GM/SCOOP powder MIX AS DIRECTED AND TAKE 17 GRAMS IN LIQUID BY MOUTH DAILY 510 g 2     UNABLE TO FIND MEDICATION NAME: Prostate Health Complex - 1 tablet every night       zinc gluconate 50 MG tablet Take 1 tablet (50 mg) by mouth daily       metFORMIN (GLUCOPHAGE-XR) 500 MG 24 hr tablet TAKE 1 TABLET(500 MG) BY MOUTH DAILY (Patient not taking: Reported on 7/21/2020) 90 tablet 0     Valbenazine Tosylate (INGREZZA) 40 & 80 MG CPPK Take 40-80 mg by mouth daily       Allergies   Allergen Reactions     Erythromycin Nausea     Imipramine Diarrhea     Imipramine Hcl      Toxic levels     Risperidone      Other reaction(s): Extrapyramidal Side Effect     Erythromycin Rash     Recent Labs   Lab Test 11/19/19  1627 03/25/19  1424 09/11/18  1507 05/04/18  1304 01/12/18  1541 12/05/17  1530  11/18/16  1152 03/23/15  0953 09/22/14  1055  02/18/13  1113   A1C 5.8* 6.0*  --  6.3*  --  7.1*   < >  --  6.2*  --   --  5.8   LDL 46  --  75  --   --  Cannot estimate LDL when triglyceride exceeds 400 mg/dL  106*  --  76 71 67  --  73   HDL 38*  --   --   --   --  39*  --  42 39* 43  --  35*   TRIG 310*  --   --   --   --  492*  --  202* 206* 252*  --  283*   ALT  --   --   --   --   --   --   --   --   --  34  --  43   CR 0.88 0.92 0.88  --   --  0.87   < >  --   --   --   --  0.96   GFRESTIMATED >90 89 88  --   --  90   < >  --   --   --   --  82   GFRESTBLACK >90 >90 >90  --   --  >90   < >  --   --   --   --  >90   POTASSIUM 4.0 3.7  --   --   --  4.0   < >  --   --   --   --  4.4   TSH  --   --   --   --  1.84  --   --   --  2.69  --    < >  --     < > = values in this interval not displayed.      BP Readings from Last 3 Encounters:   11/19/19 129/79   09/30/19 124/68   04/29/19 130/68    Wt Readings from Last 3 Encounters:   11/19/19 93.4 kg (205 lb 12.8 oz)   11/15/19 93.4 kg (206 lb)    09/30/19 93 kg (205 lb)                    Reviewed and updated as needed this visit by Provider         Review of Systems   CONSTITUTIONAL: NEGATIVE for fever, chills, change in weight  ENT/MOUTH: NEGATIVE for ear, mouth and throat problems  RESP: NEGATIVE for significant cough or SOB  CV: NEGATIVE for chest pain, palpitations or peripheral edema  NEURO: NEGATIVE for weakness, dizziness or paresthesias  PSYCHIATRIC: NEGATIVE for changes in mood or affect       Objective   Reported vitals:  There were no vitals taken for this visit.   healthy, alert and no distress  PSYCH: Alert and oriented times 3; coherent speech, normal   rate and volume, able to articulate logical thoughts, able   to abstract reason, no tangential thoughts, no hallucinations   or delusions  His affect is normal  RESP: No cough, no audible wheezing, able to talk in full sentences  Remainder of exam unable to be completed due to telephone visits    Diagnostic Test Results:  Labs reviewed in Epic  Pending         Assessment/Plan:    1. Type 2 diabetes mellitus without complication, without long-term current use of insulin (H)  Advised to make lab appointment   - metFORMIN (GLUCOPHAGE-XR) 500 MG 24 hr tablet; Take 1 tablet (500 mg) by mouth daily  Dispense: 90 tablet; Refill: 1  - Lipid panel reflex to direct LDL Fasting; Future  - Hemoglobin A1c; Future  - Albumin Random Urine Quantitative with Creat Ratio; Future  - Basic metabolic panel; Future    2. Hyperlipidemia LDL goal <100  Labs pending   - atorvastatin (LIPITOR) 20 MG tablet; Take 1 tablet (20 mg) by mouth daily  Dispense: 90 tablet; Refill: 3  - Lipid panel reflex to direct LDL Fasting; Future    3. Major depressive disorder with single episode, remission status unspecified  Stable     4. Class 1 obesity due to excess calories with serious comorbidity and body mass index (BMI) of 33.0 to 33.9 in adult  Low josé miguel diet/Exercise    5. SANDRA (obstructive sleep apnea)  On cpap  Follow up 3  months  2:41 PM   Phone call duration:  As above minutes    Radha Villegas MD

## 2020-07-31 DIAGNOSIS — Z53.9 DIAGNOSIS NOT YET DEFINED: Primary | ICD-10-CM

## 2020-07-31 PROCEDURE — G0179 MD RECERTIFICATION HHA PT: HCPCS | Performed by: FAMILY MEDICINE

## 2020-08-18 ENCOUNTER — TELEPHONE (OUTPATIENT)
Dept: FAMILY MEDICINE | Facility: CLINIC | Age: 60
End: 2020-08-18

## 2020-08-18 NOTE — TELEPHONE ENCOUNTER
Form came in via fax to be completed by the provider: [employer] forms     Who is the form from? Rogerio Buckley (if other please explain)  This was faxed to [facility]  Where was the form placed? Given to physician  What number is listed as a contact on the form?  Rogerio Buckley  Phone: [phone]    Please fax to [phone]    Forms are at the providers desk. Idalmis Mohamud,    ambulatory

## 2020-08-19 NOTE — TELEPHONE ENCOUNTER
"Per  \"please schedule virtual visit\".    I called and spoke with Andressa Buckley from [facility] and informed her of this.  She said patient would not be able to do a video visit (no computer or smart phone), but a telephone visit he could do.    Telephone visit scheduled for Thursday, August 27th at 12:20 p.m. Tasha Marcus,     "

## 2020-08-27 ENCOUNTER — VIRTUAL VISIT (OUTPATIENT)
Dept: FAMILY MEDICINE | Facility: CLINIC | Age: 60
End: 2020-08-27
Payer: MEDICARE

## 2020-08-27 ENCOUNTER — TELEPHONE (OUTPATIENT)
Dept: FAMILY MEDICINE | Facility: CLINIC | Age: 60
End: 2020-08-27

## 2020-08-27 DIAGNOSIS — E11.9 TYPE 2 DIABETES MELLITUS WITHOUT COMPLICATION, WITHOUT LONG-TERM CURRENT USE OF INSULIN (H): Primary | ICD-10-CM

## 2020-08-27 DIAGNOSIS — E78.5 HYPERLIPIDEMIA LDL GOAL <100: ICD-10-CM

## 2020-08-27 DIAGNOSIS — G47.33 OSA (OBSTRUCTIVE SLEEP APNEA): ICD-10-CM

## 2020-08-27 DIAGNOSIS — S06.9X0S TRAUMATIC BRAIN INJURY, WITHOUT LOSS OF CONSCIOUSNESS, SEQUELA (H): ICD-10-CM

## 2020-08-27 PROCEDURE — 99442 ZZC PHYSICIAN TELEPHONE EVALUATION 11-20 MIN: CPT | Performed by: FAMILY MEDICINE

## 2020-08-27 NOTE — TELEPHONE ENCOUNTER
Reason for call:  Other   Patient called regarding (reason for call): appointment  Additional comments: Patient had appointment for today and states he is returning the call. Call to advise. Thank you.    Phone number to reach patient:  Home number on file [phone] (home)    Best Time:  soon    Can we leave a detailed message on this number?  YES    Travel screening: Not Applicable

## 2020-08-27 NOTE — TELEPHONE ENCOUNTER
Patient already had telephone visit with Dr. Villegas today.       Yeimy HAWKINS CMA ([facility])

## 2020-08-27 NOTE — PROGRESS NOTES
"Yair Benites is a 59 year old male who is being evaluated via a billable telephone visit.      The patient has been notified of following:     \"This telephone visit will be conducted via a call between you and your physician/provider. We have found that certain health care needs can be provided without the need for a physical exam.  This service lets us provide the care you need with a short phone conversation.  If a prescription is necessary we can send it directly to your pharmacy.  If lab work is needed we can place an order for that and you can then stop by our lab to have the test done at a later time.    Telephone visits are billed at different rates depending on your insurance coverage. During this emergency period, for some insurers they may be billed the same as an in-person visit.  Please reach out to your insurance provider with any questions.    If during the course of the call the physician/provider feels a telephone visit is not appropriate, you will not be charged for this service.\"    Patient has given verbal consent for Telephone visit?  Yes    What phone number would you like to be contacted at? [phone]    How would you like to obtain your AVS? Mail a copy  1:36 PM  Start visit  Subjective     Yair Benites is a 59 year old male who presents via phone visit today for the following health issues:    HPI  Chief Complaint   Patient presents with     Forms     Recheck Medication     Naproxen     Pt does not Drive in winter   Needs forms for [employer]  Pt  Does need the forms  Medication Followup of Naproxen    Taking Medication as prescribed: yes    Side Effects:  None    Medication Helping Symptoms:  yes             Hyperlipidemia Follow-Up      Are you regularly taking any medication or supplement to lower your cholesterol?   Yes- sttains    Are you having muscle aches or other side effects that you think could be caused by your cholesterol lowering medication?  No    Hypertension " Follow-up      Do you check your blood pressure regularly outside of the clinic? No     Are you following a low salt diet? No    Are your blood pressures ever more than 140 on the top number (systolic) OR more   than 90 on the bottom number (diastolic), for example 140/90? No    BP Readings from Last 2 Encounters:   11/19/19 129/79   09/30/19 124/68     Hemoglobin A1C (%)   Date Value   11/19/2019 5.8 (H)   03/25/2019 6.0 (H)     LDL Cholesterol Calculated (mg/dL)   Date Value   11/19/2019 46   12/05/2017     Cannot estimate LDL when triglyceride exceeds 400 mg/dL     LDL Cholesterol Direct (mg/dL)   Date Value   09/11/2018 75   12/05/2017 106 (H)       Review of Systems   Rest of the ROS is Negative except see above and Problem list [stable]         Objective          Vitals:  No vitals were obtained today due to virtual visit.    alert and no distress  PSYCH: Alert and oriented times 3; coherent speech, normal   rate and volume, able to articulate logical thoughts, able   to abstract reason, no tangential thoughts, no hallucinations   or delusions  His affect is normal  RESP: No cough, no audible wheezing, able to talk in full sentences  Remainder of exam unable to be completed due to telephone visits            Assessment/Plan:    Assessment & Plan       ICD-10-CM    1. Type 2 diabetes mellitus without complication, without long-term current use of insulin (H)  E11.9 EYE ADULT REFERRAL     Lipid panel reflex to direct LDL Fasting     Basic metabolic panel     Albumin Random Urine Quantitative with Creat Ratio     Hemoglobin A1c     CANCELED: Albumin Random Urine Quantitative with Creat Ratio     CANCELED: Basic metabolic panel   2. Hyperlipidemia LDL goal <100  E78.5    3. SANDRA (obstructive sleep apnea)  G47.33    4. Traumatic brain injury, without loss of consciousness, sequela (H)  S06.9X0S      Pt will bring forms for [employer]   he has been advised to make appointment for Diabetic check and lab    1: 47  PM   No follow-ups on file.    Radha Villegas MD  [facility] [place]    Phone call duration:  11 minutes

## 2020-08-29 DIAGNOSIS — E11.9 TYPE 2 DIABETES MELLITUS WITHOUT COMPLICATION, WITHOUT LONG-TERM CURRENT USE OF INSULIN (H): ICD-10-CM

## 2020-08-31 ENCOUNTER — TELEPHONE (OUTPATIENT)
Dept: FAMILY MEDICINE | Facility: CLINIC | Age: 60
End: 2020-08-31

## 2020-08-31 RX ORDER — LANCETS
EACH MISCELLANEOUS
Qty: 100 EACH | Refills: 1 | Status: SHIPPED | OUTPATIENT
Start: 2020-08-31 | End: 2021-06-14

## 2020-08-31 NOTE — TELEPHONE ENCOUNTER
Pt's wife called stating that pt was given a referral for a Diabetic eye exam - wife states that he just had an eye exam at [facility] about a month ago. So he does not need that referral. Eye services has removed that referral so that pt will not get called again.

## 2020-09-01 ENCOUNTER — TELEPHONE (OUTPATIENT)
Dept: FAMILY MEDICINE | Facility: CLINIC | Age: 60
End: 2020-09-01

## 2020-09-01 NOTE — TELEPHONE ENCOUNTER
Called and left detailed message for Yeimy  from  and gave verbal OK for orders below. Advised to call RN Hotline if questions.    Key Ramos RN

## 2020-09-01 NOTE — TELEPHONE ENCOUNTER
Reason for call:  Home Healthcare Reason for Call:  Home Health Care        Orders are needed for this patient. verbal    PT: na    OT: mariela    Skilled Nursing: yes re certification one visit every other week for the next 9 weeks and 3 PRN     Pt Provider: Bakari    Phone Sarah Homecare Nurse can be reached at: [phone]    Can we leave a detailed message on this number? YES    Phone number patient can be reached at: na    Best Time: any    Call taken on 9/1/2020 at 4:20 PM by Kaia Carlisle

## 2020-09-04 DIAGNOSIS — Z53.9 DIAGNOSIS NOT YET DEFINED: Primary | ICD-10-CM

## 2020-09-04 PROCEDURE — G0179 MD RECERTIFICATION HHA PT: HCPCS | Performed by: FAMILY MEDICINE

## 2020-09-30 ENCOUNTER — NURSE TRIAGE (OUTPATIENT)
Dept: NURSING | Facility: CLINIC | Age: 60
End: 2020-09-30

## 2020-10-01 NOTE — TELEPHONE ENCOUNTER
Wife calling- consent on file.   Says he is trying to sleep but he feels his muscles twitching in his left leg.  It has been occurring for the last hour.    No difficulty breathing.  No weakness.  No pain.  No swelling.  No redness.    Triaged to disposition of Home Care.  Advised to call back if symptoms persist or worsen.    Audrey Morton RN  Triage Nurse Advisor    COVID 19 Nurse Triage Plan/Patient Instructions    Please be aware that novel coronavirus (COVID-19) may be circulating in the community. If you develop symptoms such as fever, cough, or SOB or if you have concerns about the presence of another infection including coronavirus (COVID-19), please contact your health care provider or visit [email or web address].     Disposition/Instructions    Home care recommended. Follow home care protocol based instructions.    Thank you for taking steps to prevent the spread of this virus.  o Limit your contact with others.  o Wear a simple mask to cover your cough.  o Wash your hands well and often.    Resources    [facility]: About COVID-19: [email or web address]    CDC: What to Do If You're Sick: www.cdc.gov/coronavirus/2019-ncov/about/steps-when-sick.html    CDC: Ending Home Isolation: www.cdc.gov/coronavirus/2019-ncov/hcp/disposition-in-home-patients.html     CDC: Caring for Someone: www.cdc.gov/coronavirus/2019-ncov/if-you-are-sick/care-for-someone.html     [facility]: Interim Guidance for Hospital Discharge to Home: www.health.[facility].mn.us/diseases/coronavirus/hcp/hospdischarge.pdf    [facility] clinical trials (COVID-19 research studies): clinicalaffairs.[facility]/n-clinical-trials     Below are the COVID-19 hotlines at the [facility] ([facility]). Interpreters are available.   o For health questions: Call 228-857-2089 or 1-853.125.3882 (7 a.m. to 7 p.m.)  o For questions about schools and childcare: Call 972-338-5745 or 1-435.611.2837 (7 a.m. to 7 p.m.)     Additional  Information    Negative: Difficult to awaken or acting confused (e.g., disoriented, slurred speech)    Negative: Sounds like a life-threatening emergency to the triager    Negative: Abnormal twitch, blinking, tic, or spasm of eyelid(s)    Negative: Sounds like a seizure (generalized or focal)    Negative: Alcohol withdrawal suspected    Negative: Substance abuse or withdrawal suspected    Negative: [1] Shivering or chills AND [2] fever    Negative: [1] Shivering or chills AND [2] cold exposure (R/O hypothermia)    Negative: Face, arm, or leg weakness    Normal muscle jerks while falling asleep    Protocols used: MUSCLE JERKS - TICS - [id number]

## 2020-10-06 NOTE — TELEPHONE ENCOUNTER
Called and spoke to patient. Patient stated that muscle twitching have improved and no appointment is needed.  Sharon Ryan CMA

## 2020-10-28 ENCOUNTER — TELEPHONE (OUTPATIENT)
Dept: FAMILY MEDICINE | Facility: CLINIC | Age: 60
End: 2020-10-28

## 2020-10-28 NOTE — TELEPHONE ENCOUNTER
Called Yeimy. Left detailed VM giving verbal home care orders as requested. Asked for call back at [phone] if she has any additional questions.    Tyesha Blanco RN

## 2020-10-28 NOTE — TELEPHONE ENCOUNTER
Reason for Call:  Other call back    Detailed comments: home care is calling to request orders for recert for home care: 1w every other wk for 9wks with 2 PRNs. Please call to discuss. Thank you.    Phone Number Patient can be reached at: [phone]    Best Time:     Can we leave a detailed message on this number? YES    Call taken on 10/28/2020 at 8:09 AM by Makayla Ryan

## 2020-10-30 DIAGNOSIS — Z53.9 DIAGNOSIS NOT YET DEFINED: Primary | ICD-10-CM

## 2020-10-30 PROCEDURE — G0179 MD RECERTIFICATION HHA PT: HCPCS | Performed by: FAMILY MEDICINE

## 2020-12-03 ENCOUNTER — VIRTUAL VISIT (OUTPATIENT)
Dept: FAMILY MEDICINE | Facility: CLINIC | Age: 60
End: 2020-12-03
Payer: MEDICARE

## 2020-12-03 DIAGNOSIS — J02.9 ACUTE PHARYNGITIS, UNSPECIFIED ETIOLOGY: Primary | ICD-10-CM

## 2020-12-03 PROCEDURE — 99441 PR PHYSICIAN TELEPHONE EVALUATION 5-10 MIN: CPT | Mod: 95 | Performed by: PHYSICIAN ASSISTANT

## 2020-12-03 RX ORDER — GINSENG 100 MG
CAPSULE ORAL DAILY
COMMUNITY
End: 2021-02-16

## 2020-12-03 NOTE — PROGRESS NOTES
"Yair Benites is a 60 year old male who is being evaluated via a billable telephone visit.      The patient has been notified of following:     \"This telephone visit will be conducted via a call between you and your physician/provider. We have found that certain health care needs can be provided without the need for a physical exam.  This service lets us provide the care you need with a short phone conversation.  If a prescription is necessary we can send it directly to your pharmacy.  If lab work is needed we can place an order for that and you can then stop by our lab to have the test done at a later time.    Telephone visits are billed at different rates depending on your insurance coverage. During this emergency period, for some insurers they may be billed the same as an in-person visit.  Please reach out to your insurance provider with any questions.    If during the course of the call the physician/provider feels a telephone visit is not appropriate, you will not be charged for this service.\"    Patient has given verbal consent for Telephone visit?  Yes    What phone number would you like to be contacted at? [phone]    How would you like to obtain your AVS? Mail a copy    Subjective     Yair Benites is a 60 year old male who presents via phone visit today for the following health issues:    HPI     Acute Illness  Acute illness concerns: Sore throat  Onset/Duration: This morning and better now. Temp 97.7  Symptoms:  Fever: no  Chills/Sweats: no  Headache (location?): no  Sinus Pressure: no  Conjunctivitis:  no  Ear Pain: no  Rhinorrhea: YES- Little bit  Congestion: no  Sore Throat: YES- This morning  Cough: no  Wheeze: no  Decreased Appetite: no  Nausea: no  Vomiting: no  Diarrhea: no  Dysuria/Freq.: no  Dysuria or Hematuria: no  Fatigue/Achiness: no  Sick/Strep Exposure: no  Therapies tried and outcome: None    No sick exposure. No covid.   Has allergies and states this sore throat is similar to " when he has had allergy issues in the past. States his sore throat is completely gone.      Review of Systems   Constitutional, HEENT, cardiovascular, pulmonary, gi and gu systems are negative, except as otherwise noted.       Objective          Vitals:  No vitals were obtained today due to virtual visit.    healthy, alert and no distress  PSYCH: Alert and oriented times 3; coherent speech, normal   rate and volume, able to articulate logical thoughts, able   to abstract reason, no tangential thoughts, no hallucinations   or delusions  His affect is normal  RESP: No cough, no audible wheezing, able to talk in full sentences  Remainder of exam unable to be completed due to telephone visits          Assessment/Plan:    Assessment & Plan     Acute pharyngitis, unspecified etiology  Very short presentation of a sore throat for a few hours. Resolved on its own. Patient reporting no sore throat now. I think he wanted to ensure he didn't need to be tested for covid - which I wouldn't recommend as he is completely asymptomatic within a few hours of developing symptoms. He will let us know if symptoms return.           Return if symptoms worsen or fail to improve.    Paola Cabrera PA-C  [facility]    Phone call duration:  5 minutes

## 2020-12-05 ENCOUNTER — NURSE TRIAGE (OUTPATIENT)
Dept: NURSING | Facility: CLINIC | Age: 60
End: 2020-12-05

## 2020-12-06 NOTE — TELEPHONE ENCOUNTER
Caller upset that scheduling made an appointment for fasting labs and OV  at 1220 and would not give him an earlier appointment; is DM and cannot fast that long   Caller advised that he can have his blood drawn in early a.m. and return for OV later in day and that is what is often done   caller understands and will now comply; call sent back to scheduling   Mili Dowell RN  FNA       Reason for Disposition    Requesting regular office appointment    Protocols used: INFORMATION ONLY CALL-A-AH

## 2020-12-20 DIAGNOSIS — E11.9 TYPE 2 DIABETES MELLITUS WITHOUT COMPLICATION, WITHOUT LONG-TERM CURRENT USE OF INSULIN (H): Primary | ICD-10-CM

## 2020-12-20 NOTE — TELEPHONE ENCOUNTER
Prescription approved per [facility] Refill Protocol.    Ulisses Sweeney RN....12/20/2020 3:36 PM

## 2020-12-29 ENCOUNTER — TELEPHONE (OUTPATIENT)
Dept: FAMILY MEDICINE | Facility: CLINIC | Age: 60
End: 2020-12-29

## 2020-12-29 NOTE — TELEPHONE ENCOUNTER
Reason for Call:  Other call back    Detailed comments: Yeimy calling to request re certification for skilled nursing for home care 1 chintan eper week for 9 weeks with 2 PRN's.     Phone Number Patient can be reached at: Other phone number:  [phone]    Best Time: Any    Can we leave a detailed message on this number? YES    Call taken on 12/29/2020 at 11:00 AM by Kristofer Butts

## 2020-12-29 NOTE — TELEPHONE ENCOUNTER
Called Yeimy and left detailed VM giving verbal home care orders requested. Requested call back at [phone] if she has further questions.    Tyesha Blanco RN

## 2020-12-30 DIAGNOSIS — Z53.9 DIAGNOSIS NOT YET DEFINED: Primary | ICD-10-CM

## 2020-12-30 PROCEDURE — G0179 MD RECERTIFICATION HHA PT: HCPCS | Performed by: FAMILY MEDICINE

## 2021-01-06 DIAGNOSIS — K59.09 CHRONIC CONSTIPATION: ICD-10-CM

## 2021-01-06 DIAGNOSIS — Z53.9 DIAGNOSIS NOT YET DEFINED: Primary | ICD-10-CM

## 2021-01-08 RX ORDER — POLYETHYLENE GLYCOL 3350 17 G/17G
POWDER, FOR SOLUTION ORAL
Qty: 510 G | Refills: 2 | Status: SHIPPED | OUTPATIENT
Start: 2021-01-08 | End: 2021-07-01

## 2021-02-01 ENCOUNTER — TELEPHONE (OUTPATIENT)
Dept: FAMILY MEDICINE | Facility: CLINIC | Age: 61
End: 2021-02-01

## 2021-02-01 DIAGNOSIS — G47.33 OSA (OBSTRUCTIVE SLEEP APNEA): Primary | ICD-10-CM

## 2021-02-01 NOTE — TELEPHONE ENCOUNTER
Reason for Call:  Other prescription    Detailed comments: Rula is calling in today in regards to patients c-pap machine wondering if they can get an order to get a new machine and new supplies. Rula is asking that you fax the order to [phone]. Thank you    Phone Number Patient can be reached at: Cell number on file:    Telephone Information:   Mobile [phone]       Best Time: anytime    Can we leave a detailed message on this number? YES    Call taken on 2/1/2021 at 2:25 PM by Andressa Colin

## 2021-02-02 NOTE — TELEPHONE ENCOUNTER
DME (Durable Medical Equipment) Orders and Documentation  No orders of the defined types were placed in this encounter.     The patient was assessed and it was determined the patient is in need of the following listed DME Supplies/Equipment. Please complete supporting documentation below to demonstrate medical necessity.

## 2021-02-02 NOTE — TELEPHONE ENCOUNTER
DME (Durable Medical Equipment) Orders and Documentation  Orders Placed This Encounter   Procedures     Miscellaneous DME Order      The patient was assessed and it was determined the patient is in need of the following listed DME Supplies/Equipment. Please complete supporting documentation below to demonstrate medical necessity.      DME All Other Item(s) Documentation    List reason for need and supporting documentation for medical necessity below for each DME item.     1. ***

## 2021-02-10 DIAGNOSIS — E11.9 TYPE 2 DIABETES MELLITUS WITHOUT COMPLICATION, WITHOUT LONG-TERM CURRENT USE OF INSULIN (H): ICD-10-CM

## 2021-02-13 ENCOUNTER — NURSE TRIAGE (OUTPATIENT)
Dept: NURSING | Facility: CLINIC | Age: 61
End: 2021-02-13

## 2021-02-13 NOTE — TELEPHONE ENCOUNTER
Pt calling in saying he will be out of his metformin this Monday 2/15    metformin (GLUCOPHAGE-XR) 500 MG 24 hr tablet  Route: Take 1 tablet (500 mg) by mouth daily     [id number] - [place], [place]   [address]   [place] [address] [phone]    Called -spoke to pharmacist Michael   Who said they have all ready sent request to clinic   And they should have his metformin for him on Monday     Pt called back and given the above information     Protocol and care advice reviewed  Caller states understanding of the recommended disposition  Advised to call back if further questions or concerns    Uzair Soni , RN / [facility]    Reason for Disposition    Caller has urgent medication question about med that PCP prescribed and triager unable to answer question    Protocols used: MEDICATION QUESTION CALL-A-

## 2021-02-16 RX ORDER — METFORMIN HCL 500 MG
TABLET, EXTENDED RELEASE 24 HR ORAL
Qty: 30 TABLET | Refills: 0 | Status: SHIPPED | OUTPATIENT
Start: 2021-02-16 | End: 2021-02-22

## 2021-02-16 NOTE — TELEPHONE ENCOUNTER
Signed Prescriptions:                        Disp   Refills    metFORMIN (GLUCOPHAGE-XR) 500 MG 24 hr tab*30 tab*0        Sig: TAKE 1 TABLET BY MOUTH EVERY DAY  Authorizing Provider: DEANGELO BERRIOS

## 2021-02-19 ENCOUNTER — TELEPHONE (OUTPATIENT)
Dept: FAMILY MEDICINE | Facility: CLINIC | Age: 61
End: 2021-02-19

## 2021-02-19 DIAGNOSIS — G47.33 OSA (OBSTRUCTIVE SLEEP APNEA): Primary | ICD-10-CM

## 2021-02-19 NOTE — TELEPHONE ENCOUNTER
Pt needs rx called in to showroom for new C-Pap and mask. Pt uses these every night and needs something sent over asap.    [facility]   Fax- [phone]  Numbers to call if needed- [phone]

## 2021-02-23 NOTE — TELEPHONE ENCOUNTER
Patient states they did not receive the machine. Will need machine, new masks and tubing faxed

## 2021-03-01 ENCOUNTER — TELEPHONE (OUTPATIENT)
Dept: FAMILY MEDICINE | Facility: CLINIC | Age: 61
End: 2021-03-01

## 2021-03-01 NOTE — TELEPHONE ENCOUNTER
Reason for Call: Request for an order or referral:    Order or referral being requested: verbal orders for skilled nursing services one time every other week for seven weeks, two prn visits for medication changes requiring a nurse set up    Date needed: as soon as possible    Can we leave a detailed message on this number?  YES    Call taken on 3/1/2021 at 9:05 AM by Tasha Marcus

## 2021-03-02 ENCOUNTER — OFFICE VISIT (OUTPATIENT)
Dept: FAMILY MEDICINE | Facility: CLINIC | Age: 61
End: 2021-03-02
Payer: MEDICARE

## 2021-03-02 ENCOUNTER — MEDICAL CORRESPONDENCE (OUTPATIENT)
Dept: HEALTH INFORMATION MANAGEMENT | Facility: CLINIC | Age: 61
End: 2021-03-02

## 2021-03-02 VITALS
TEMPERATURE: 98.2 F | DIASTOLIC BLOOD PRESSURE: 78 MMHG | HEART RATE: 87 BPM | RESPIRATION RATE: 16 BRPM | SYSTOLIC BLOOD PRESSURE: 134 MMHG | WEIGHT: 205 LBS | OXYGEN SATURATION: 96 % | HEIGHT: 67 IN | BODY MASS INDEX: 32.18 KG/M2

## 2021-03-02 DIAGNOSIS — E11.9 TYPE 2 DIABETES MELLITUS WITHOUT COMPLICATION, WITHOUT LONG-TERM CURRENT USE OF INSULIN (H): ICD-10-CM

## 2021-03-02 DIAGNOSIS — Z23 NEED FOR PROPHYLACTIC VACCINATION AND INOCULATION AGAINST INFLUENZA: Primary | ICD-10-CM

## 2021-03-02 DIAGNOSIS — G47.33 OSA (OBSTRUCTIVE SLEEP APNEA): ICD-10-CM

## 2021-03-02 DIAGNOSIS — E78.5 HYPERLIPIDEMIA LDL GOAL <100: ICD-10-CM

## 2021-03-02 LAB
ANION GAP SERPL CALCULATED.3IONS-SCNC: 4 MMOL/L (ref 3–14)
BUN SERPL-MCNC: 13 MG/DL (ref 7–30)
CALCIUM SERPL-MCNC: 9.4 MG/DL (ref 8.5–10.1)
CHLORIDE SERPL-SCNC: 103 MMOL/L (ref 94–109)
CHOLEST SERPL-MCNC: 156 MG/DL
CO2 SERPL-SCNC: 30 MMOL/L (ref 20–32)
CREAT SERPL-MCNC: 0.89 MG/DL (ref 0.66–1.25)
CREAT UR-MCNC: 18 MG/DL
GFR SERPL CREATININE-BSD FRML MDRD: >90 ML/MIN/{1.73_M2}
GLUCOSE SERPL-MCNC: 219 MG/DL (ref 70–99)
HBA1C MFR BLD: 6.4 % (ref 0–5.6)
HDLC SERPL-MCNC: 38 MG/DL
LDLC SERPL CALC-MCNC: 56 MG/DL
MICROALBUMIN UR-MCNC: <5 MG/L
MICROALBUMIN/CREAT UR: NORMAL MG/G CR (ref 0–17)
NONHDLC SERPL-MCNC: 118 MG/DL
POTASSIUM SERPL-SCNC: 3.6 MMOL/L (ref 3.4–5.3)
SODIUM SERPL-SCNC: 137 MMOL/L (ref 133–144)
TRIGL SERPL-MCNC: 311 MG/DL

## 2021-03-02 PROCEDURE — 80048 BASIC METABOLIC PNL TOTAL CA: CPT | Performed by: FAMILY MEDICINE

## 2021-03-02 PROCEDURE — 82043 UR ALBUMIN QUANTITATIVE: CPT | Performed by: FAMILY MEDICINE

## 2021-03-02 PROCEDURE — 83036 HEMOGLOBIN GLYCOSYLATED A1C: CPT | Performed by: FAMILY MEDICINE

## 2021-03-02 PROCEDURE — 90682 RIV4 VACC RECOMBINANT DNA IM: CPT | Performed by: FAMILY MEDICINE

## 2021-03-02 PROCEDURE — 80061 LIPID PANEL: CPT | Performed by: FAMILY MEDICINE

## 2021-03-02 PROCEDURE — 36415 COLL VENOUS BLD VENIPUNCTURE: CPT | Performed by: FAMILY MEDICINE

## 2021-03-02 PROCEDURE — 99207 PR FOOT EXAM NO CHARGE: CPT | Performed by: FAMILY MEDICINE

## 2021-03-02 PROCEDURE — G0008 ADMIN INFLUENZA VIRUS VAC: HCPCS | Performed by: FAMILY MEDICINE

## 2021-03-02 PROCEDURE — 99214 OFFICE O/P EST MOD 30 MIN: CPT | Mod: 25 | Performed by: FAMILY MEDICINE

## 2021-03-02 RX ORDER — METFORMIN HCL 500 MG
500 TABLET, EXTENDED RELEASE 24 HR ORAL DAILY
Qty: 90 TABLET | Refills: 1 | Status: SHIPPED | OUTPATIENT
Start: 2021-03-02 | End: 2021-10-05

## 2021-03-02 RX ORDER — METFORMIN HCL 500 MG
500 TABLET, EXTENDED RELEASE 24 HR ORAL DAILY
Qty: 30 TABLET | Refills: 0 | Status: SHIPPED | OUTPATIENT
Start: 2021-03-02 | End: 2021-03-02

## 2021-03-02 ASSESSMENT — PAIN SCALES - GENERAL: PAINLEVEL: NO PAIN (0)

## 2021-03-02 ASSESSMENT — PATIENT HEALTH QUESTIONNAIRE - PHQ9: SUM OF ALL RESPONSES TO PHQ QUESTIONS 1-9: 1

## 2021-03-02 ASSESSMENT — MIFFLIN-ST. JEOR: SCORE: 1680.56

## 2021-03-02 NOTE — PROGRESS NOTES
"    Assessment & Plan     Type 2 diabetes mellitus without complication, without long-term current use of insulin (H)  CONTROLLED  - Hemoglobin A1c  - Basic metabolic panel  - Lipid panel reflex to direct LDL Non-fasting  - FOOT EXAM  - Albumin Random Urine Quantitative with Creat Ratio  - metFORMIN (GLUCOPHAGE-XR) 500 MG 24 hr tablet; Take 1 tablet (500 mg) by mouth daily    Hyperlipidemia LDL goal <100  AT GOAL  - Lipid panel reflex to direct LDL Non-fasting    SANDRA (obstructive sleep apnea)  WANTS A CPAP MACHINE  - Miscellaneous Order for DME - ONLY FOR DME     Routing comment        BMI:   Estimated body mass index is 32.59 kg/m  as calculated from the following:    Height as of this encounter: 1.689 m (5' 6.5\").    Weight as of this encounter: 93 kg (205 lb).   Weight management plan: Discussed healthy diet and exercise guidelines    Work on weight loss  Regular exercise    Return in about 3 months (around 6/2/2021) for Physical Exam.    Radha Villegas MD  [facility] [place] Mazariegos is a 60 year old who presents for the following health issuesHistory of Present Illness       Diabetes:   He presents for follow up of diabetes.  He is checking home blood glucose one time daily. He checks blood glucose before meals.  Blood glucose is never over 200 and never under 70. He has no concerns regarding his diabetes at this time.  He is not experiencing numbness or burning in feet, excessive thirst, blurry vision, weight changes or redness, sores or blisters on feet. The patient has not had a diabetic eye exam in the last 12 months.         Hyperlipidemia:  He presents for follow up of hyperlipidemia.  He is taking medication to lower cholesterol. He is not having myalgia or other side effects to statin medications.    He eats 2-3 servings of fruits and vegetables daily.He consumes 2 sweetened beverage(s) daily. He exercises with enough effort to increase his heart rate 3 or less days per week. " "  He is taking medications regularly.     Review of Systems   CONSTITUTIONAL: NEGATIVE for fever, chills, change in weight  ENT/MOUTH: NEGATIVE for ear, mouth and throat problems  RESP: NEGATIVE for significant cough or SOB  CV: NEGATIVE for chest pain, palpitations or peripheral edema  GI: NEGATIVE for nausea, abdominal pain, heartburn, or change in bowel habits  ROS otherwise negative      Objective    /78   Pulse 87   Temp 98.2  F (36.8  C) (Oral)   Resp 16   Ht 1.689 m (5' 6.5\")   Wt 93 kg (205 lb)   SpO2 96%   BMI 32.59 kg/m    Body mass index is 32.59 kg/m .  Physical Exam   GENERAL: healthy, alert and no distress  NECK: no adenopathy, no asymmetry, masses, or scars and thyroid normal to palpation  RESP: lungs clear to auscultation - no rales, rhonchi or wheezes  CV: regular rate and rhythm, normal S1 S2, no S3 or S4, no murmur, click or rub, no peripheral edema and peripheral pulses strong  ABDOMEN: soft, nontender, no hepatosplenomegaly, no masses and bowel sounds normal  MS: no gross musculoskeletal defects noted, no edema  Diabetic foot exam: normal DP and PT pulses, no trophic changes or ulcerative lesions and normal sensory exam    Office Visit on 11/19/2019   Component Date Value Ref Range Status     Cholesterol 11/19/2019 146  <200 mg/dL Final     Triglycerides 11/19/2019 310* <150 mg/dL Final    Comment: Borderline high:  150-199 mg/dl  High:             200-499 mg/dl  Very high:       >499 mg/dl  Fasting specimen       HDL Cholesterol 11/19/2019 38* >39 mg/dL Final     LDL Cholesterol Calculated 11/19/2019 46  <100 mg/dL Final    Desirable:       <100 mg/dl     Non HDL Cholesterol 11/19/2019 108  <130 mg/dL Final     Hemoglobin A1C 11/19/2019 5.8* 0 - 5.6 % Final    Comment: Normal <5.7% Prediabetes 5.7-6.4%  Diabetes 6.5% or higher - adopted from ADA   consensus guidelines.       Sodium 11/19/2019 141  133 - 144 mmol/L Final     Potassium 11/19/2019 4.0  3.4 - 5.3 mmol/L Final     " Chloride 11/19/2019 108  94 - 109 mmol/L Final     Carbon Dioxide 11/19/2019 27  20 - 32 mmol/L Final     Anion Gap 11/19/2019 6  3 - 14 mmol/L Final     Glucose 11/19/2019 152* 70 - 99 mg/dL Final    Fasting specimen     Urea Nitrogen 11/19/2019 13  7 - 30 mg/dL Final     Creatinine 11/19/2019 0.88  0.66 - 1.25 mg/dL Final     GFR Estimate 11/19/2019 >90  >60 mL/min/[1.73_m2] Final    Comment: Non  GFR Calc  Starting 12/18/2018, serum creatinine based estimated GFR (eGFR) will be   calculated using the Chronic Kidney Disease Epidemiology Collaboration   (CKD-EPI) equation.       GFR Estimate If Black 11/19/2019 >90  >60 mL/min/[1.73_m2] Final    Comment:  GFR Calc  Starting 12/18/2018, serum creatinine based estimated GFR (eGFR) will be   calculated using the Chronic Kidney Disease Epidemiology Collaboration   (CKD-EPI) equation.       Calcium 11/19/2019 9.2  8.5 - 10.1 mg/dL Final     PSA 11/19/2019 0.53  0 - 4 ug/L Final    Assay Method:  Chemiluminescence using Siemens Vista analyzer     Uric Acid 11/19/2019 8.1* 3.5 - 7.2 mg/dL Final     Results for orders placed or performed in visit on 03/02/21   Hemoglobin A1c     Status: Abnormal   Result Value Ref Range    Hemoglobin A1C 6.4 (H) 0 - 5.6 %

## 2021-03-02 NOTE — TELEPHONE ENCOUNTER
Omar from  [employer] called regarding DME for CPAP.  The DME that was received is inadequate.    Will need new DME written and faxed to her to include:    Needs to list all supplies, (machine, mask, tubing, filters)    Needs to state heated tubing.  Mask needs to specify full or nasal (patient was using full face the last time they sent supplies).    Filters, water chamber.    Also needs [facility] codes.  She said there should be a smart set that can be used to pull this information up.    Please call Omar at [phone], with any questions.     Tasha Marcus,

## 2021-03-02 NOTE — PATIENT INSTRUCTIONS
We are working hard to begin vaccinating more people against COVID-19. Currently, we are only vaccinating individuals age 70 and older and Phase 1a workers - healthcare workers who are unable to do their job remotely. Vaccine availability is very limited.    If you are 70 or older, or a healthcare worker who is unable to do your job remotely, please log in to [employer] using this link to see if we have an open appointment and schedule an appointment.  If there are no appointments left, you will be unable to schedule and need to check back later.  If you are a healthcare worker, you will be asked to provide proof of employment at your appointment. If you cannot, you will be turned away.    Vaccine appointments are being added as they become available. Please check your [employer] account frequently for availability. If you have technical difficulty using [employer], call [phone] for assistance.    You can learn more about the [facility]'s phased approach to administering the vaccine, with details on each phase, [email or web address].health.[facility].[place]/diseases/coronavirus/vaccine/plan.html.      As vaccine supply increases and we are able to open appointments to more groups, we will share that information on our website [email or web address]. Check this website to stay up to date on COVID-19 vaccination information.

## 2021-03-02 NOTE — TELEPHONE ENCOUNTER
DME (Durable Medical Equipment) Orders and Documentation  Orders Placed This Encounter   Procedures     Miscellaneous Order for DME - ONLY FOR DME      The patient was assessed and it was determined the patient is in need of the following listed DME Supplies/Equipment. Please complete supporting documentation below to demonstrate medical necessity.      DME All Other Item(s) Documentation    List reason for need and supporting documentation for medical necessity below for each DME item.     1. Sleep apnea

## 2021-03-02 NOTE — TELEPHONE ENCOUNTER
I will need the answer to all questions-Please call-I need to know all the settings he is on  Please check-I have started the smart set

## 2021-03-03 NOTE — TELEPHONE ENCOUNTER
"Pt's sleep study was completed on 05/06/2013 (per chart review). Per sleep study report, \"CPAP titration was done from 6.0 to 10.0 cm H2O. The pt responded well to the pressure of 10.0 cm H2O.\" Spoke with pt's spouse, Rula and she was unsure what minimum and maximum setting are for pt's CPAP. Writer put 6 for CPAP minimum and 10 for CPAP maximum. Please review and alter if needed. Confirmed with Rula that patient wears full face mask. Per sleep medicine OV notes 07/22/2013: AHI is 23. DME order is ready to be signed after review.     LAVELLE Johnson RN  [facility], [place]    "

## 2021-03-04 ENCOUNTER — MEDICAL CORRESPONDENCE (OUTPATIENT)
Dept: HEALTH INFORMATION MANAGEMENT | Facility: CLINIC | Age: 61
End: 2021-03-04

## 2021-03-10 ENCOUNTER — MEDICAL CORRESPONDENCE (OUTPATIENT)
Dept: HEALTH INFORMATION MANAGEMENT | Facility: CLINIC | Age: 61
End: 2021-03-10

## 2021-03-10 ENCOUNTER — TELEPHONE (OUTPATIENT)
Dept: FAMILY MEDICINE | Facility: CLINIC | Age: 61
End: 2021-03-10

## 2021-03-10 DIAGNOSIS — Z53.9 DIAGNOSIS NOT YET DEFINED: Primary | ICD-10-CM

## 2021-03-10 PROCEDURE — G0179 MD RECERTIFICATION HHA PT: HCPCS | Performed by: FAMILY MEDICINE

## 2021-03-10 NOTE — TELEPHONE ENCOUNTER
Reason for Call:  Other Letter    Detailed comments: C pap machine needs to be updated with new tube and mask, current is 5 years old. Patient still uses every night. The Care team at the showroom needs a letter stating he still needs the machine and uses it every night. Please fax to [phone] Att to Cpap team    Phone Number Patient can be reached at: Home number on file [phone] (home)    Best Time: Any    Can we leave a detailed message on this number? YES    Call taken on 3/10/2021 at 2:09 PM by Nisa Dimas

## 2021-03-10 NOTE — LETTER
March 10, 2021    RE:  Yair Benites                                                                                                                     [address] [place] [id number]      [phone]      To whom it may concern:    Yair BRO [place]ad[place] (1960) is under my professional care for sleep apnea, C pap machine needs to be updated with new tube and mask, current is 5 years old. Patient still uses every night.      Sincerely,        Radha Villegas MD

## 2021-03-11 NOTE — TELEPHONE ENCOUNTER
The letter, last office note and the DME order have all been faxed to the provided fax number. Idalmis Mohamud,

## 2021-03-15 DIAGNOSIS — E11.9 TYPE 2 DIABETES MELLITUS WITHOUT COMPLICATION, WITHOUT LONG-TERM CURRENT USE OF INSULIN (H): Primary | ICD-10-CM

## 2021-03-18 ENCOUNTER — TELEPHONE (OUTPATIENT)
Dept: FAMILY MEDICINE | Facility: CLINIC | Age: 61
End: 2021-03-18

## 2021-03-18 NOTE — TELEPHONE ENCOUNTER
Diabetes Education Scheduling Outreach #1:    Call to patient to schedule. Left message with phone number to call to schedule.    Plan for 2nd outreach attempt within 1 week.    [facility]  Diabetes and Nutrition Scheduling

## 2021-03-24 ENCOUNTER — IMMUNIZATION (OUTPATIENT)
Dept: NURSING | Facility: CLINIC | Age: 61
End: 2021-03-24
Payer: MEDICARE

## 2021-03-24 PROCEDURE — 91300 PR COVID VAC PFIZER DIL RECON 30 MCG/0.3 ML IM: CPT

## 2021-03-24 PROCEDURE — 0001A PR COVID VAC PFIZER DIL RECON 30 MCG/0.3 ML IM: CPT

## 2021-04-14 ENCOUNTER — OFFICE VISIT (OUTPATIENT)
Dept: NURSING | Facility: CLINIC | Age: 61
End: 2021-04-14
Attending: FAMILY MEDICINE
Payer: MEDICARE

## 2021-04-14 PROCEDURE — 0002A PR COVID VAC PFIZER DIL RECON 30 MCG/0.3 ML IM: CPT

## 2021-04-14 PROCEDURE — 91300 PR COVID VAC PFIZER DIL RECON 30 MCG/0.3 ML IM: CPT

## 2021-04-15 NOTE — TELEPHONE ENCOUNTER
Diabetes Education Scheduling Outreach #2:    Call to patient to schedule. Left message with phone number to call to schedule.    Sparkle Crenshaw  [place] [facility]  Diabetes and Nutrition Scheduling

## 2021-04-26 ENCOUNTER — TELEPHONE (OUTPATIENT)
Dept: FAMILY MEDICINE | Facility: CLINIC | Age: 61
End: 2021-04-26

## 2021-04-26 NOTE — TELEPHONE ENCOUNTER
Reason for Call: Request for an order or referral:    Order or referral being requested: verbal orders for skilled nursing one time every other week for 8 weeks, for medication management set up, 3 prn visits for any medication changes requiring set up.    Date needed: as soon as possible    Can we leave a detailed message on this number?  YES    Call taken on 4/26/2021 at 12:31 PM by Tasha Marcus

## 2021-04-27 ENCOUNTER — OFFICE VISIT (OUTPATIENT)
Dept: FAMILY MEDICINE | Facility: CLINIC | Age: 61
End: 2021-04-27
Payer: MEDICARE

## 2021-04-27 VITALS
DIASTOLIC BLOOD PRESSURE: 81 MMHG | HEIGHT: 66 IN | TEMPERATURE: 98.9 F | HEART RATE: 105 BPM | WEIGHT: 202.8 LBS | BODY MASS INDEX: 32.59 KG/M2 | SYSTOLIC BLOOD PRESSURE: 123 MMHG

## 2021-04-27 DIAGNOSIS — Z00.00 ENCOUNTER FOR MEDICARE ANNUAL WELLNESS EXAM: Primary | ICD-10-CM

## 2021-04-27 DIAGNOSIS — G47.33 OSA (OBSTRUCTIVE SLEEP APNEA): ICD-10-CM

## 2021-04-27 DIAGNOSIS — Z12.5 SCREENING FOR PROSTATE CANCER: ICD-10-CM

## 2021-04-27 DIAGNOSIS — E66.811 CLASS 1 OBESITY DUE TO EXCESS CALORIES WITH SERIOUS COMORBIDITY AND BODY MASS INDEX (BMI) OF 33.0 TO 33.9 IN ADULT: ICD-10-CM

## 2021-04-27 DIAGNOSIS — E78.5 HYPERLIPIDEMIA LDL GOAL <100: ICD-10-CM

## 2021-04-27 DIAGNOSIS — S06.9X0S TRAUMATIC BRAIN INJURY, WITHOUT LOSS OF CONSCIOUSNESS, SEQUELA (H): ICD-10-CM

## 2021-04-27 DIAGNOSIS — E66.09 CLASS 1 OBESITY DUE TO EXCESS CALORIES WITH SERIOUS COMORBIDITY AND BODY MASS INDEX (BMI) OF 33.0 TO 33.9 IN ADULT: ICD-10-CM

## 2021-04-27 DIAGNOSIS — E11.9 TYPE 2 DIABETES MELLITUS WITHOUT COMPLICATION, WITHOUT LONG-TERM CURRENT USE OF INSULIN (H): ICD-10-CM

## 2021-04-27 LAB — PSA SERPL-ACNC: 0.49 UG/L (ref 0–4)

## 2021-04-27 PROCEDURE — G0439 PPPS, SUBSEQ VISIT: HCPCS | Performed by: FAMILY MEDICINE

## 2021-04-27 PROCEDURE — 36415 COLL VENOUS BLD VENIPUNCTURE: CPT | Performed by: FAMILY MEDICINE

## 2021-04-27 PROCEDURE — G0103 PSA SCREENING: HCPCS | Performed by: FAMILY MEDICINE

## 2021-04-27 RX ORDER — AMOXICILLIN 500 MG/1
CAPSULE ORAL
COMMUNITY
Start: 2021-04-23 | End: 2021-11-04

## 2021-04-27 ASSESSMENT — ENCOUNTER SYMPTOMS
DYSURIA: 0
FEVER: 0
SHORTNESS OF BREATH: 0
DIZZINESS: 0
HEARTBURN: 0
DIARRHEA: 0
JOINT SWELLING: 1
ABDOMINAL PAIN: 0
MYALGIAS: 1
PALPITATIONS: 0
FREQUENCY: 1
NERVOUS/ANXIOUS: 1
HEMATURIA: 0
CONSTIPATION: 0
HEMATOCHEZIA: 0
EYE PAIN: 0
COUGH: 0
HEADACHES: 0
NAUSEA: 0
WEAKNESS: 0
CHILLS: 0
SORE THROAT: 0
ARTHRALGIAS: 1

## 2021-04-27 ASSESSMENT — ACTIVITIES OF DAILY LIVING (ADL)
CURRENT_FUNCTION: BATHING REQUIRES ASSISTANCE
CURRENT_FUNCTION: TELEPHONE REQUIRES ASSISTANCE

## 2021-04-27 ASSESSMENT — MIFFLIN-ST. JEOR: SCORE: 1655.51

## 2021-04-27 NOTE — TELEPHONE ENCOUNTER
Left detailed message on Home care RN's identified voice mail.  Gave okay for verbal orders for Home care Skilled Nursing as requested.    Per [facility] protocol/Policies: Genesis Larose, RN for Dr. Villegas  Patient who has bee seen by [facility] primary care provider within the past 2 years (3/2/21).      Advised patient to call [phone] with additional question or concerns.

## 2021-04-27 NOTE — PATIENT INSTRUCTIONS
Patient Education   Personalized Prevention Plan  You are due for the preventive services outlined below.  Your care team is available to assist you in scheduling these services.  If you have already completed any of these items, please share that information with your care team to update in your medical record.  Health Maintenance Due   Topic Date Due     ANNUAL REVIEW OF HM ORDERS  Never done     Eye Exam  08/15/2019

## 2021-04-27 NOTE — LETTER
April 29, 2021      Yair A Kamari  [address] [place] [id number]        Dear ,    We are writing to inform you of your test results.    Prostate test is normal       Resulted Orders   PSA, screen   Result Value Ref Range    PSA 0.49 0 - 4 ug/L      Comment:      Assay Method:  Chemiluminescence using Siemens Vista analyzer       If you have any questions or concerns, please call the clinic at the number listed above.       Sincerely,      Radha Villegas MD/salinas

## 2021-04-27 NOTE — PROGRESS NOTES
"SUBJECTIVE:   Yair Benites is a 60 year old male who presents for Preventive Visit.      Patient has been advised of split billing requirements and indicates understanding: Yes   Are you in the first 12 months of your Medicare coverage?  No    Healthy Habits:     In general, how would you rate your overall health?  Good    Frequency of exercise:  None    Do you usually eat at least 4 servings of fruit and vegetables a day, include whole grains    & fiber and avoid regularly eating high fat or \"junk\" foods?  Yes    Taking medications regularly:  Yes    Barriers to taking medications:  None    Medication side effects:  None    Ability to successfully perform activities of daily living:  Telephone requires assistance and bathing requires assistance    Home Safety:  Lack of grab bars in the bathroom    Hearing Impairment:  Need to ask people to speak up or repeat themselves and difficulty understanding speech on the telephone    In the past 6 months, have you been bothered by leaking of urine?  No    In general, how would you rate your overall mental or emotional health?  Good      PHQ-2 Total Score: 0    Additional concerns today:  Yes    Patient would like a new CPAP machine since his is over 7 years    Do you feel safe in your environment? Yes    Have you ever done Advance Care Planning? (For example, a Health Directive, POLST, or a discussion with a medical provider or your loved ones about your wishes): Yes, patient states has an Advance Care Planning document and will bring a copy to the clinic.       Fall risk  Fallen 2 or more times in the past year?: No  Any fall with injury in the past year?: No    Cognitive Screening   1) Repeat 3 items (Leader, Season, Table)    2) Clock draw: NORMAL  3) 3 item recall: Recalls 1 object   Results: NORMAL clock, 1-2 items recalled: COGNITIVE IMPAIRMENT LESS LIKELY    Mini-CogTM Copyright SWAPNA Limon. Licensed by the author for use in [facility]; reprinted with " permission (richard@[facility]). All rights reserved.      Do you have sleep apnea, excessive snoring or daytime drowsiness?: yes, sleep apnea    Reviewed and updated as needed this visit by clinical staff  Tobacco  Allergies  Meds   Med Hx  Surg Hx  Fam Hx  Soc Hx        Reviewed and updated as needed this visit by Provider                Social History     Tobacco Use     Smoking status: Former Smoker     Years: 6.00     Types: Cigarettes     Quit date: 1983     Years since quittin.3     Smokeless tobacco: Never Used   Substance Use Topics     Alcohol use: No         Alcohol Use 2021   Prescreen: >3 drinks/day or >7 drinks/week? No   Prescreen: >3 drinks/day or >7 drinks/week? -       Pt seen last Month for all his medical Issues    Current providers sharing in care for this patient include:   Patient Care Team:  Radha Villegas MD as PCP - General  Radha Villegas MD as Assigned PCP  Maylin Laguerre RD as Diabetes Educator (Dietitian, Registered)    The following health maintenance items are reviewed in Epic and correct as of today:  Health Maintenance Due   Topic Date Due     ANNUAL REVIEW OF HM ORDERS  Never done     EYE EXAM  08/15/2019     Lab work is in process  Labs reviewed in EPIC  BP Readings from Last 3 Encounters:   21 123/81   21 134/78   19 129/79    Wt Readings from Last 3 Encounters:   21 92 kg (202 lb 12.8 oz)   21 93 kg (205 lb)   19 93.4 kg (205 lb 12.8 oz)                  Patient Active Problem List   Diagnosis     Colon polyp     Traumatic brain injury (H)     IBS (irritable bowel syndrome)     Synovitis and tenosynovitis     Dizziness - light-headed     Primary localized osteoarthrosis, ankle and foot     Obesity     Depression, major     SANDRA (obstructive sleep apnea)     Chronic gout without tophus, unspecified cause, unspecified site     Hyperlipidemia LDL goal <100     Type 2 diabetes mellitus without complication, without long-term current  use of insulin (H)     Past Surgical History:   Procedure Laterality Date     SURGICAL HISTORY OF -       nose       Social History     Tobacco Use     Smoking status: Former Smoker     Years: 6.00     Types: Cigarettes     Quit date: 1983     Years since quittin.3     Smokeless tobacco: Never Used   Substance Use Topics     Alcohol use: No     Family History   Problem Relation Age of Onset     C.A.D. Father         MI age 66     Heart Disease Father      Depression Brother      Cancer Brother         Brain Tumor     Respiratory Brother      Depression Brother      Gallbladder Disease Brother      Dementia Brother      Cancer Brother      Parkinsonism Brother      Diabetes Maternal Uncle      Asthma No family hx of      Hypertension No family hx of          Current Outpatient Medications   Medication Sig Dispense Refill     ACE/ARB/ARNI NOT PRESCRIBED, INTENTIONAL, ACE & ARB not prescribed due to Other:BP at goal       acetaminophen (TYLENOL) 500 MG tablet Take 2 tablets (1,000 mg) by mouth 4 times daily as needed for mild pain       amoxicillin (AMOXIL) 500 MG capsule TAKE 1 CAPSULE BY MOUTH EVERY 8 HOURS UNTIL GONE       ARIPiprazole (ABILIFY) 15 MG tablet Take 15 mg by mouth daily Reported on 2017       aspirin 81 MG tablet Take 1 tablet by mouth daily.       atorvastatin (LIPITOR) 20 MG tablet Take 1 tablet (20 mg) by mouth daily 90 tablet 3     Cholecalciferol (VITAMIN D) 1000 UNIT capsule Take 1 capsule by mouth daily.       Continuous Blood Gluc Sensor (FREESTYLE AVRIL 14 DAY SENSOR) MIS 1 each every 14 days 2 each 11     CONTOUR NEXT TEST test strip TEST ONCE DAILY 100 strip 3     melatonin 5 MG tablet Take 1 tablet (5 mg) by mouth nightly as needed for sleep       metFORMIN (GLUCOPHAGE-XR) 500 MG 24 hr tablet Take 1 tablet (500 mg) by mouth daily 90 tablet 1     Microlet Lancets MISC USE AS DIRECTED TO TEST BLOOD SUGAR ONCE DAILY 100 each 1     mirtazapine (REMERON) 30 MG tablet Take 30 mg by  mouth At Bedtime.       Multiple Vitamins-Minerals (MULTIVITAMIN & MINERAL PO) Take 1 tablet by mouth daily.       order for DME Equipment being ordered:Face mask for CPAP machine size small with tubing and filter. [facility] ([phone]) 1 Device 0     polyethylene glycol (MIRALAX) 17 GM/Dose powder MIX 17 GRAMS IN LIQUID AND TAKE BY MOUTH DAILY 510 g 2     UNABLE TO FIND MEDICATION NAME: Prostate Health Complex - 1 tablet every night       Valbenazine Tosylate (INGREZZA) 40 & 80 MG CPPK Take 40-80 mg by mouth daily       VITAMIN E PO Take 180 mg by mouth daily       zinc gluconate 50 MG tablet Take 1 tablet (50 mg) by mouth daily       ibuprofen (ADVIL/MOTRIN) 400 MG tablet Take 1 tablet (400 mg) by mouth every 8 hours as needed for moderate pain       loratadine (CLARITIN) 10 MG tablet Take 1 tablet (10 mg) by mouth daily       OLANZapine (ZYPREXA) 5 MG tablet Take 5 mg by mouth At Bedtime       OVER-THE-COUNTER Beta Prostate- Take 1 tablet daily.       Allergies   Allergen Reactions     Erythromycin Nausea     Imipramine Diarrhea     Imipramine Hcl      Toxic levels     Risperidone      Other reaction(s): Extrapyramidal Side Effect     Erythromycin Rash     Recent Labs   Lab Test 03/02/21  1523 11/19/19  1627 03/25/19  1424 09/11/18  1507 01/12/18  1541 01/12/18  1541 12/05/17  1530 03/23/15  0953 03/23/15  0953 09/22/14  1055 02/18/13  1113 02/18/13  1113   A1C 6.4* 5.8* 6.0*  --    < >  --  7.1*   < > 6.2*  --   --  5.8   LDL 56 46  --  75  --   --  Cannot estimate LDL when triglyceride exceeds 400 mg/dL  106*   < > 71 67  --  73   HDL 38* 38*  --   --   --   --  39*   < > 39* 43  --  35*   TRIG 311* 310*  --   --   --   --  492*   < > 206* 252*  --  283*   ALT  --   --   --   --   --   --   --   --   --  34  --  43   CR 0.89 0.88 0.92 0.88  --   --  0.87   < >  --   --   --  0.96   GFRESTIMATED >90 >90 89 88  --   --  90   < >  --   --   --  82   GFRESTBLACK >90 >90 >90 >90  --   --  >90    "< >  --   --   --  >90   POTASSIUM 3.6 4.0 3.7  --   --   --  4.0   < >  --   --   --  4.4   TSH  --   --   --   --   --  1.84  --   --  2.69  --    < >  --     < > = values in this interval not displayed.        Any new diagnosis of family breast, ovarian, or bowel cancer? No    FSH-7: No flowsheet data found.    .  Pertinent mammograms are reviewed under the imaging tab.    Review of Systems   Constitutional: Negative for chills and fever.   HENT: Negative for ear pain, hearing loss and sore throat. Congestion: nasal congestion.    Eyes: Negative for pain and visual disturbance.   Respiratory: Negative for cough and shortness of breath.    Cardiovascular: Negative for chest pain, palpitations and peripheral edema.   Gastrointestinal: Negative for abdominal pain, constipation, diarrhea, heartburn, hematochezia and nausea.   Genitourinary: Positive for frequency. Negative for discharge, dysuria, genital sores, hematuria, impotence and urgency.   Musculoskeletal: Positive for arthralgias, joint swelling and myalgias.   Skin: Negative for rash.   Neurological: Negative for dizziness, weakness and headaches.   Psychiatric/Behavioral: Negative for mood changes. The patient is nervous/anxious.    Rest of the ROS is Negative except see above and Problem list [stable]    OBJECTIVE:   /81 (BP Location: Right arm, Patient Position: Chair, Cuff Size: Adult Regular)   Pulse 105   Temp 98.9  F (37.2  C) (Oral)   Ht 1.665 m (5' 5.55\")   Wt 92 kg (202 lb 12.8 oz)   BMI 33.18 kg/m   Estimated body mass index is 33.18 kg/m  as calculated from the following:    Height as of this encounter: 1.665 m (5' 5.55\").    Weight as of this encounter: 92 kg (202 lb 12.8 oz).  Physical Exam  GENERAL: healthy, alert and no distress  EYES: Eyes grossly normal to inspection, PERRL and conjunctivae and sclerae normal  HENT: ear canals and TM's normal, nose and mouth without ulcers or lesions  NECK: no adenopathy, no asymmetry, masses, or " scars and thyroid normal to palpation  RESP: lungs clear to auscultation - no rales, rhonchi or wheezes  CV: regular rate and rhythm, normal S1 S2, no S3 or S4, no murmur, click or rub, no peripheral edema and peripheral pulses strong  ABDOMEN: soft, nontender, no hepatosplenomegaly, no masses and bowel sounds normal  MS: no gross musculoskeletal defects noted, no edema  SKIN: no suspicious lesions or rashes  NEURO: Normal strength and tone, mentation intact and speech normal  PSYCH: mentation appears normal, affect normal/bright  Diabetic foot exam: normal DP and PT pulses, no trophic changes or ulcerative lesions and normal sensory exam    Diagnostic Test Results:  Labs reviewed in Epic  Reviewed labs    ASSESSMENT / PLAN:   1. Encounter for Medicare annual wellness exam      2. Hyperlipidemia LDL goal <100  Stable     3. Type 2 diabetes mellitus without complication, without long-term current use of insulin (H)  Stable   Advised Eye Exam  Pt states [facility]  - EYE ADULT REFERRAL; Future    4. Traumatic brain injury, without loss of consciousness, sequela (H)  Stable     5. SANDRA (obstructive sleep apnea)  DME done  - Miscellaneous Order for DME - ONLY FOR DME  - Miscellaneous Order for DME - ONLY FOR DME    6. Class 1 obesity due to excess calories with serious comorbidity and body mass index (BMI) of 33.0 to 33.9 in adult  Low josé miguel diet    7. Screening for prostate cancer  Discussed   - PSA, screen  Pt uses his CPAP machine every night with benefits  He needs a new machine with masks and Tube  Patient has been advised of split billing requirements and indicates understanding: handouts  COUNSELING:  Reviewed preventive health counseling, as reflected in patient instructions       Regular exercise       Healthy diet/nutrition       Vision screening       Hearing screening       Dental care       Bladder control       Colon cancer screening       Prostate cancer screening       The 10-year ASCVD risk  "score (Nabil ESTRELLA Jr., et al., 2013) is: 17.1%    Values used to calculate the score:      Age: 60 years      Sex: Male      Is Non- : No      Diabetic: Yes      Tobacco smoker: No      Systolic Blood Pressure: 123 mmHg      Is BP treated: No      HDL Cholesterol: 38 mg/dL      Total Cholesterol: 156 mg/dL    Estimated body mass index is 33.18 kg/m  as calculated from the following:    Height as of this encounter: 1.665 m (5' 5.55\").    Weight as of this encounter: 92 kg (202 lb 12.8 oz).    Weight management plan: Discussed healthy diet and exercise guidelines    He reports that he quit smoking about 38 years ago. His smoking use included cigarettes. He quit after 6.00 years of use. He has never used smokeless tobacco.      Appropriate preventive services were discussed with this patient, including applicable screening as appropriate for cardiovascular disease, diabetes, osteopenia/osteoporosis, and glaucoma.  As appropriate for age/gender, discussed screening for colorectal cancer, prostate cancer, breast cancer, and cervical cancer. Checklist reviewing preventive services available has been given to the patient.    Reviewed patients plan of care and provided an AVS. The Intermediate Care Plan ( asthma action plan, low back pain action plan, and migraine action plan) for Yair meets the Care Plan requirement. This Care Plan has been established and reviewed with the Patient.    Counseling Resources:  ATP IV Guidelines  Pooled Cohorts Equation Calculator  Breast Cancer Risk Calculator  Breast Cancer: Medication to Reduce Risk  FRAX Risk Assessment  ICSI Preventive Guidelines  Dietary Guidelines for Americans, 2010  [employer]'s MyPlate  ASA Prophylaxis  Lung CA Screening    Radha Villegas MD  [facility]    Identified Health Risks:  "

## 2021-04-30 DIAGNOSIS — Z53.9 DIAGNOSIS NOT YET DEFINED: Primary | ICD-10-CM

## 2021-04-30 PROCEDURE — G0179 MD RECERTIFICATION HHA PT: HCPCS | Performed by: FAMILY MEDICINE

## 2021-05-03 ENCOUNTER — MEDICAL CORRESPONDENCE (OUTPATIENT)
Dept: HEALTH INFORMATION MANAGEMENT | Facility: CLINIC | Age: 61
End: 2021-05-03

## 2021-05-11 ENCOUNTER — TELEPHONE (OUTPATIENT)
Dept: SLEEP MEDICINE | Facility: CLINIC | Age: 61
End: 2021-05-11

## 2021-05-14 ENCOUNTER — TELEPHONE (OUTPATIENT)
Dept: SLEEP MEDICINE | Facility: CLINIC | Age: 61
End: 2021-05-14

## 2021-05-17 ENCOUNTER — MEDICAL CORRESPONDENCE (OUTPATIENT)
Dept: HEALTH INFORMATION MANAGEMENT | Facility: CLINIC | Age: 61
End: 2021-05-17

## 2021-06-14 DIAGNOSIS — E11.9 TYPE 2 DIABETES MELLITUS WITHOUT COMPLICATION, WITHOUT LONG-TERM CURRENT USE OF INSULIN (H): ICD-10-CM

## 2021-06-14 RX ORDER — LANCETS
EACH MISCELLANEOUS
Qty: 100 EACH | Refills: 1 | Status: SHIPPED | OUTPATIENT
Start: 2021-06-14 | End: 2023-12-12

## 2021-06-14 NOTE — TELEPHONE ENCOUNTER
Reason for Call:  Medication or medication refill:    Do you use a [facility]?  Name of the pharmacy and phone number for the current request:  [facility] [phone]    Name of the medication requested: microlet, [facility] be [facility] [address], wont be covered by insurance unless it is called in    Other request: also wondering how often to come in for diabetes chk up     Can we leave a detailed message on this number? YES    Phone number patient can be reached at: Home number on file [phone] (home)    Best Time: any     Call taken on 6/14/2021 at 1:59 PM by Hortensia Cat

## 2021-06-14 NOTE — TELEPHONE ENCOUNTER
Received call from pharmacist at [facility]. She stated that she received a refill for Microlet Lancets, however patient actually needs a refill for Microlet Lancing device. Pended.  Routing to PCP to please review and sign.     LAVELLE Johnson RN  [facility], [place]

## 2021-06-14 NOTE — TELEPHONE ENCOUNTER
Refill sent for microlancets. Per LOV with Dr. Villegas, pt to return in 6 months (around 10/27/21) for diabetes. Please notify.

## 2021-06-22 ENCOUNTER — MEDICAL CORRESPONDENCE (OUTPATIENT)
Dept: HEALTH INFORMATION MANAGEMENT | Facility: CLINIC | Age: 61
End: 2021-06-22

## 2021-06-22 NOTE — TELEPHONE ENCOUNTER
The Form has been completed by the provider, confirmed faxed to the fax number on the form. Idalmis Mohamud,

## 2021-06-25 ENCOUNTER — TELEPHONE (OUTPATIENT)
Dept: FAMILY MEDICINE | Facility: CLINIC | Age: 61
End: 2021-06-25

## 2021-06-25 NOTE — TELEPHONE ENCOUNTER
Verbal orders for given for skilled nursing  1 x every other week x 9 weeks, with 3 prn for med changes.     Patient seen 4/26/21 for wellness exam.     Veena Lagunas RN

## 2021-07-08 DIAGNOSIS — Z53.9 DIAGNOSIS NOT YET DEFINED: Primary | ICD-10-CM

## 2021-07-08 PROCEDURE — G0179 MD RECERTIFICATION HHA PT: HCPCS | Performed by: FAMILY MEDICINE

## 2021-07-19 PROBLEM — E11.9 TYPE 2 DIABETES MELLITUS WITHOUT COMPLICATION, WITHOUT LONG-TERM CURRENT USE OF INSULIN (H): Chronic | Status: ACTIVE | Noted: 2018-05-04

## 2021-07-19 PROBLEM — E78.5 HYPERLIPIDEMIA LDL GOAL <100: Chronic | Status: ACTIVE | Noted: 2018-01-12

## 2021-07-29 ENCOUNTER — TELEPHONE (OUTPATIENT)
Dept: FAMILY MEDICINE | Facility: CLINIC | Age: 61
End: 2021-07-29

## 2021-07-29 DIAGNOSIS — G47.33 OSA (OBSTRUCTIVE SLEEP APNEA): Primary | ICD-10-CM

## 2021-07-29 NOTE — TELEPHONE ENCOUNTER
Rula, pt's wife, called with concerns since pt's CPAP was recalled. They just received a letter about the recall.    They are wondering what they should do since the pt stops breathing at night and snores. [employer] has not given any updates on when they will have a solution.    Joyce SOLITARIO RN, BSN  [facility]

## 2021-07-30 NOTE — TELEPHONE ENCOUNTER
Called and spoke    Patient was told to stop using this.     They are unsure if replacement parts are needed or if the whole machine is needed.    Rula is going to contact the DME/ Medical Supply to find out what part of the recall his machine is being recalled. She then is going to check with insurance if they will cover a new machine if it is needed due to the recall. A new one would be over $600.    This is not within budget at this time, for them.    Once more information is available advocacy support letter can be addressed.    Awaiting to hear back from the patient and his wife.    Idalmis Mohamud,

## 2021-07-30 NOTE — TELEPHONE ENCOUNTER
Pt has registered his CPAP with the company for recall, they do not have an update when new supplies will be available.    Pt/wife is asking for PCP recommendations what to do in the mean time. They have concerns about him not using a CPAP at night.    Joyce SOLITRAIO RN, BSN  [facility]

## 2021-08-05 ENCOUNTER — TELEPHONE (OUTPATIENT)
Dept: FAMILY MEDICINE | Facility: CLINIC | Age: 61
End: 2021-08-05
Payer: MEDICARE

## 2021-08-05 DIAGNOSIS — Z53.9 ERRONEOUS ENCOUNTER--DISREGARD: Primary | ICD-10-CM

## 2021-08-05 NOTE — TELEPHONE ENCOUNTER
Patient's wife  Rula reports that the medical supply company they have been using does not have a CPAP machine available.     She states that due to lack of sleep, her  has been unable to return to work.     I instructed Rula to contact other medical supply stores in the area to see if they can assist them. She said that she does not have access to a computer so I provided her the phone numbers for 4 or 5 vendors in the area.     Rula verbalized a good understanding and said she will start calling around. Once she has found a CPAP machine covered by her 's plan, she will call back.     Jacqueline Stewart RN BSN  [facility]

## 2021-08-05 NOTE — TELEPHONE ENCOUNTER
"Received call from patient's wife, Rula. She states that a replacement CPAP would be 850$ - insurance will not cover. She is wondering if patient can continue to use the re-called CPAP machine with a \"bacterial filter\" attached. Of note, she has already contacted medical supply company and insurance company (see below).    LAVELLE Johnson RN  [facility], [place]    "

## 2021-08-10 NOTE — TELEPHONE ENCOUNTER
Called [phone]    Recall of [employer] Respironics CPAP, Bi-level PAP Devices   and Continuous and Non-Continuous Ventilators     FDA has not approved fixing or replacement.    They will reach out to the patient in the way they registered with [facility].    No replacement device are going to be sent out yet. And they have no ETA when they will hear from the FDA on the recall.    No more information can be provided. Idalmis Mohamud,

## 2021-08-11 NOTE — TELEPHONE ENCOUNTER
Called and spoke with Rula.    She was given the message below.     She is unaware of his sleep doctor. Please place a referral.    Idalmis Mohamud,

## 2021-08-12 NOTE — TELEPHONE ENCOUNTER
Referral for:     Sleep Clinic  [address] [address]  Phone: [phone]  Fax: [phone]    Called Rula and let her know the referral was place. She was given the contact number. Idalmis Mohamud,

## 2021-08-16 ENCOUNTER — TELEPHONE (OUTPATIENT)
Dept: SLEEP MEDICINE | Facility: CLINIC | Age: 61
End: 2021-08-16

## 2021-08-16 NOTE — TELEPHONE ENCOUNTER
Reason for Call:  Other call back    Detailed comments: recalled machine, they have questions    Phone Number Patient can be reached at: Home number on file [phone] (home)    Best Time: early afternoon    Can we leave a detailed message on this number? YES    Call taken on 8/16/2021 at 1:43 PM by Evelina Tan

## 2021-08-17 NOTE — TELEPHONE ENCOUNTER
Spoke with patient and his wife.  He does not want to use the device.  He would like to purchase a device out of pocket and is looking at where he would like to get the device.  He will contact us when they decide where they will get device from.

## 2021-08-21 ENCOUNTER — MEDICAL CORRESPONDENCE (OUTPATIENT)
Dept: HEALTH INFORMATION MANAGEMENT | Facility: CLINIC | Age: 61
End: 2021-08-21

## 2021-08-23 ENCOUNTER — TELEPHONE (OUTPATIENT)
Dept: FAMILY MEDICINE | Facility: CLINIC | Age: 61
End: 2021-08-23

## 2021-08-23 NOTE — TELEPHONE ENCOUNTER
Received call from home care nurse with [facility]. She is calling because pt is up for re-certification and she is requesting a verbal order for SN visit once a week every other week for 9 weeks with 2 PRN visits. Verbal order given as requested.     Tyesha Blanco RN

## 2021-08-24 ENCOUNTER — TELEPHONE (OUTPATIENT)
Dept: FAMILY MEDICINE | Facility: CLINIC | Age: 61
End: 2021-08-24

## 2021-08-24 NOTE — TELEPHONE ENCOUNTER
Patient's wife called to request that Dr. Villegas fax the orders with patient's settings to [facility] because other supply place doesn't have his kind. Please contact patient's wife at [phone]  Sparkle Prajapati RN on 8/24/2021 at 4:19 PM

## 2021-08-25 ENCOUNTER — DOCUMENTATION ONLY (OUTPATIENT)
Dept: SLEEP MEDICINE | Facility: CLINIC | Age: 61
End: 2021-08-25
Payer: MEDICARE

## 2021-08-25 NOTE — TELEPHONE ENCOUNTER
Please call  I do not Know what his sleep apnea settings are  Please make appointment with sleep MD as discussed   Or Triage to check what his settings are and let me Know

## 2021-08-27 ENCOUNTER — DOCUMENTATION ONLY (OUTPATIENT)
Dept: SLEEP MEDICINE | Facility: CLINIC | Age: 61
End: 2021-08-27

## 2021-08-27 ENCOUNTER — TELEPHONE (OUTPATIENT)
Dept: FAMILY MEDICINE | Facility: CLINIC | Age: 61
End: 2021-08-27

## 2021-08-27 DIAGNOSIS — Z53.9 DIAGNOSIS NOT YET DEFINED: Primary | ICD-10-CM

## 2021-08-27 PROCEDURE — G0179 MD RECERTIFICATION HHA PT: HCPCS | Performed by: FAMILY MEDICINE

## 2021-08-27 NOTE — TELEPHONE ENCOUNTER
Called and spoke with the patient's spouse.  Patient's spouse would like PCP to know that patient has using the CPAP every night before the recall.    Needs a prescription for the CPAP and settings to [facility].

## 2021-08-27 NOTE — TELEPHONE ENCOUNTER
Fax to be reviewed by the provider Home Care Plan of Care Certification     Who is the it from? [facility] 8/28/2021 - 10/26/021  This was faxed to [facility]   Where was the fax placed? Provider's desk to sign  What number is listed as a contact on the fax?   [facility]   Phone: [phone] Fax: [phone]    Please fax to above    Additional comments:

## 2021-08-27 NOTE — PROGRESS NOTES
RECEIVED CALL FROM [facility] ASKING [facility]  FAXED ALL DOCUMENTS AND SCANNED INTO OUTGOING FAXES IN [place]

## 2021-08-30 DIAGNOSIS — Z53.9 DIAGNOSIS NOT YET DEFINED: Primary | ICD-10-CM

## 2021-08-30 PROCEDURE — G0179 MD RECERTIFICATION HHA PT: HCPCS | Performed by: FAMILY MEDICINE

## 2021-08-31 ENCOUNTER — MEDICAL CORRESPONDENCE (OUTPATIENT)
Dept: HEALTH INFORMATION MANAGEMENT | Facility: CLINIC | Age: 61
End: 2021-08-31

## 2021-09-07 DIAGNOSIS — Z79.899 DRUG THERAPY: Primary | ICD-10-CM

## 2021-09-14 DIAGNOSIS — E78.5 HYPERLIPIDEMIA LDL GOAL <100: ICD-10-CM

## 2021-09-15 ENCOUNTER — VIRTUAL VISIT (OUTPATIENT)
Dept: FAMILY MEDICINE | Facility: CLINIC | Age: 61
End: 2021-09-15
Payer: MEDICARE

## 2021-09-15 DIAGNOSIS — J02.9 SORE THROAT: Primary | ICD-10-CM

## 2021-09-15 PROCEDURE — 99441 PR PHYSICIAN TELEPHONE EVALUATION 5-10 MIN: CPT | Mod: 95 | Performed by: INTERNAL MEDICINE

## 2021-09-15 NOTE — PROGRESS NOTES
Yair is a 61 year old who is being evaluated via a billable telephone visit.      What phone number would you like to be contacted at? [phone]  How would you like to obtain your AVS? Mail a copy    Assessment & Plan     Sore throat   Onset today. COVID vaccinated. Diabetic status.  Patient desires COVID test. Ordered.  Discussed if worsening symptoms and negative COVID, should be evaluated for other possible causes.   If experiencing significant sob/cp, ER.  Patient agreeable to plan.  - Symptomatic COVID-19 Virus (Coronavirus) by PCR Nose      Return if symptoms worsen or fail to improve.    Laisha Mark DO  [facility]    Subjective   Yair is a 61 year old who presents for the following health issues    HPI     PCP: Bakari    Sore throat. Started when he got up today.   No cp/sob  Mild sinus congestion.   Mild cough. Dry  No fevers/chills; no known contacts.  Did go to [employer], not sure if caught something there.  Mild sore throat.   Diabetic. Requests COVID testing.  Vaccinated for COVID.      Review of Systems   Constitutional, HEENT, cardiovascular, pulmonary, gi and gu systems are negative, except as otherwise noted.      Objective           Vitals:  No vitals were obtained today due to virtual visit.    Physical Exam   GEN: No acute distress  RESP: No audible increased work of breathing. Patient speaking in full sentences without distress.  PSYCH: pleasant  Exam otherwise limited due to virtual platform              Phone call duration: 10 minutes

## 2021-09-16 ENCOUNTER — TELEPHONE (OUTPATIENT)
Dept: FAMILY MEDICINE | Facility: CLINIC | Age: 61
End: 2021-09-16

## 2021-09-16 ENCOUNTER — LAB (OUTPATIENT)
Dept: URGENT CARE | Facility: URGENT CARE | Age: 61
End: 2021-09-16
Attending: INTERNAL MEDICINE
Payer: MEDICARE

## 2021-09-16 DIAGNOSIS — J02.9 SORE THROAT: ICD-10-CM

## 2021-09-16 PROCEDURE — U0003 INFECTIOUS AGENT DETECTION BY NUCLEIC ACID (DNA OR RNA); SEVERE ACUTE RESPIRATORY SYNDROME CORONAVIRUS 2 (SARS-COV-2) (CORONAVIRUS DISEASE [COVID-19]), AMPLIFIED PROBE TECHNIQUE, MAKING USE OF HIGH THROUGHPUT TECHNOLOGIES AS DESCRIBED BY CMS-2020-01-R: HCPCS

## 2021-09-16 PROCEDURE — U0005 INFEC AGEN DETEC AMPLI PROBE: HCPCS

## 2021-09-16 RX ORDER — ATORVASTATIN CALCIUM 20 MG/1
20 TABLET, FILM COATED ORAL DAILY
Qty: 90 TABLET | Refills: 0 | Status: SHIPPED | OUTPATIENT
Start: 2021-09-16 | End: 2021-12-15

## 2021-09-16 NOTE — TELEPHONE ENCOUNTER
Patient needs a printed copy of his test results (when resulted) to take to work (if negative) - needs before Monday. Patient advised test is still in process.    Please Print Covid-19 results for patient to    And notify patient when he can  patient at #  [phone]  Sparkle Prajapati RN on 9/16/2021 at 3:14 PM

## 2021-09-16 NOTE — TELEPHONE ENCOUNTER
Patient he is looking for his COVID results.  He needs it before tomorrow.      Nursing action:  Patient was advised that the rough turn around time for COVID results is about 36 hours.  From there results are handled by a central team. Patient verbalizes good understanding, agrees with plan and states she needs no further support. Vivian Casiano R.N.

## 2021-09-17 ENCOUNTER — TELEPHONE (OUTPATIENT)
Dept: FAMILY MEDICINE | Facility: CLINIC | Age: 61
End: 2021-09-17

## 2021-09-17 LAB — SARS-COV-2 RNA RESP QL NAA+PROBE: NEGATIVE

## 2021-09-17 NOTE — TELEPHONE ENCOUNTER
Patient called the clinic inquiring about his COVID test results.  Patient was informed of the provider's recommendations.  Patient would like to come to the clinic and  the hard copy result.    Laisha Mark DO   9/17/2021  2:38 PM CDT Back to Top        Please call patient and relay COVID test is negative     Team:  Please print the results and provide them to patient.

## 2021-09-17 NOTE — TELEPHONE ENCOUNTER
Coronavirus (COVID-19) Notification     Reason for call  Patient requesting results     Lab Result    Lab test 2019-nCoV rRt-PCR in process        RN Recommendations/Instructions per [facility]  Continue quarantee and following instructions until you receive the results     Please Contact your PCP clinic or return to the Emergency department if your:    Symptoms worsen or other concerning symptom's.     Patient informed that if test for COVID19 is POSITIVE,  you will receive a call typically within 48 hours from the test date (date lab collected).  If NEGATIVE result, you will receive a letter in the mail or [employer].      Linda Garcia LPN

## 2021-09-20 NOTE — TELEPHONE ENCOUNTER
Left a message for Yair Horta at [phone].     The letter is ready for  at the [facility]  after 10:00 am September 20, 2021.    Idalmis Mohamud,

## 2021-09-20 NOTE — TELEPHONE ENCOUNTER
Negative COVID results letter has been printed (9/18/2021)    Letter was placed at the . Idalmis Mohamud,

## 2021-09-30 DIAGNOSIS — Z00.00 ROUTINE HEALTH MAINTENANCE: Primary | ICD-10-CM

## 2021-10-05 DIAGNOSIS — E11.9 TYPE 2 DIABETES MELLITUS WITHOUT COMPLICATION, WITHOUT LONG-TERM CURRENT USE OF INSULIN (H): ICD-10-CM

## 2021-10-05 NOTE — TELEPHONE ENCOUNTER
Routing refill request to provider for review/approval because:  Labs not current:  A1c    Hemoglobin A1C   Date Value Ref Range Status   03/02/2021 6.4 (H) 0 - 5.6 % Final     Comment:     Normal <5.7% Prediabetes 5.7-6.4%  Diabetes 6.5% or higher - adopted from ADA   consensus guidelines.

## 2021-10-05 NOTE — TELEPHONE ENCOUNTER
"Requested Prescriptions   Pending Prescriptions Disp Refills     metFORMIN (GLUCOPHAGE-XR) 500 MG 24 hr tablet [Pharmacy Med Name: METFORMIN ER 500MG 24HR TABS] 90 tablet      Sig: TAKE 1 TABLET BY MOUTH EVERY DAY       Biguanide Agents Failed - 10/5/2021  2:47 PM        Failed - Patient has documented A1c within the specified period of time.     If HgbA1C is 8 or greater, it needs to be on file within the past 3 months.  If less than 8, must be on file within the past 6 months.     Recent Labs   Lab Test 03/02/21  1523   A1C 6.4*             Passed - Patient is age 10 or older        Passed - Patient's CR is NOT>1.4 OR Patient's EGFR is NOT<45 within past 12 mos.     Recent Labs   Lab Test 03/02/21  1523   GFRESTIMATED >90   GFRESTBLACK >90       Recent Labs   Lab Test 03/02/21  1523   CR 0.89             Passed - Patient does NOT have a diagnosis of CHF.        Passed - Medication is active on med list        Passed - Recent (6 mo) or future (30 days) visit within the authorizing provider's specialty     Patient had office visit in the last 6 months or has a visit in the next 30 days with authorizing provider or within the authorizing provider's specialty.  See \"Patient Info\" tab in inbasket, or \"Choose Columns\" in Meds & Orders section of the refill encounter.                 "

## 2021-10-06 RX ORDER — METFORMIN HCL 500 MG
TABLET, EXTENDED RELEASE 24 HR ORAL
Qty: 90 TABLET | Refills: 0 | Status: SHIPPED | OUTPATIENT
Start: 2021-10-06 | End: 2021-11-04

## 2021-10-12 NOTE — TELEPHONE ENCOUNTER
Called and informed pt that his medication was sent in, however he is do for a DM recheck.    Radha Lacy MA

## 2021-10-15 ENCOUNTER — NURSE TRIAGE (OUTPATIENT)
Dept: FAMILY MEDICINE | Facility: CLINIC | Age: 61
End: 2021-10-15

## 2021-10-15 NOTE — TELEPHONE ENCOUNTER
"Nurse Triage SBAR    Situation: Patient calling to report elevated blood sugar this morning.     Background: LOV: 9/15/2021     Assessment: Patient states his fasting blood sugar this morning was 201. Denies any other symptoms. Has not rechecked it since then.  (See information below for more triage details)    Recommendation: Home care. Number given for diabetic education.     Next 5 appointments (look out 90 days)    Nov 04, 2021  2:40 PM  Office Visit with Radha Villegas MD  [facility] ([place] [id number] [facility] [address]  [phone]         Protocol Recommended Disposition: Telephone advice - continue to check BS daily.     Patient stated an understanding and agreed with plan.     Holli Mcneil RN  [facility]         Reason for Disposition    Blood glucose  mg/dL (3.9 -13.3 mmol/L)    Answer Assessment - Initial Assessment Questions  1. BLOOD GLUCOSE: \"What is your blood glucose level?\"       201 - fasting this morning    2. ONSET: \"When did you check the blood glucose?\"      This morning    3. USUAL RANGE: \"What is your glucose level usually?\" (e.g., usual fasting morning value, usual evening value)      n/a    4. KETONES: \"Do you check for ketones (urine or blood test strips)?\" If yes, ask: \"What does the test show now?\"       No    5. TYPE 1 or 2:  \"Do you know what type of diabetes you have?\"  (e.g., Type 1, Type 2, Gestational; doesn't know)       Type 2    6. INSULIN: \"Do you take insulin?\" \"What type of insulin(s) do you use? What is the mode of delivery? (syringe, pen; injection or pump)?\"       No    7. DIABETES PILLS: \"Do you take any pills for your diabetes?\" If yes, ask: \"Have you missed taking any pills recently?\"      Yes - metformin 500 mg daily    8. OTHER SYMPTOMS: \"Do you have any symptoms?\" (e.g., fever, frequent urination, difficulty breathing, dizziness, weakness, vomiting)      No    9. PREGNANCY: " "\"Is there any chance you are pregnant?\" \"When was your last menstrual period?\"      n/a    Protocols used: DIABETES - HIGH BLOOD SUGAR-A-OH    "

## 2021-10-19 ENCOUNTER — LAB (OUTPATIENT)
Dept: LAB | Facility: CLINIC | Age: 61
End: 2021-10-19
Payer: MEDICARE

## 2021-10-19 DIAGNOSIS — Z00.00 ROUTINE HEALTH MAINTENANCE: ICD-10-CM

## 2021-10-19 DIAGNOSIS — Z79.899 DRUG THERAPY: ICD-10-CM

## 2021-10-19 LAB
CHOLEST SERPL-MCNC: 169 MG/DL
FASTING STATUS PATIENT QL REPORTED: YES
FASTING STATUS PATIENT QL REPORTED: YES
GLUCOSE BLD-MCNC: 155 MG/DL (ref 70–99)
HBA1C MFR BLD: 7.2 % (ref 0–5.6)
HDLC SERPL-MCNC: 38 MG/DL
LDLC SERPL CALC-MCNC: 67 MG/DL
NONHDLC SERPL-MCNC: 131 MG/DL
PROLACTIN SERPL-MCNC: 8 UG/L (ref 2–18)
TRIGL SERPL-MCNC: 322 MG/DL

## 2021-10-19 PROCEDURE — 82947 ASSAY GLUCOSE BLOOD QUANT: CPT

## 2021-10-19 PROCEDURE — 83036 HEMOGLOBIN GLYCOSYLATED A1C: CPT

## 2021-10-19 PROCEDURE — 80061 LIPID PANEL: CPT

## 2021-10-19 PROCEDURE — 84146 ASSAY OF PROLACTIN: CPT

## 2021-10-19 PROCEDURE — 36415 COLL VENOUS BLD VENIPUNCTURE: CPT

## 2021-10-25 ENCOUNTER — TELEPHONE (OUTPATIENT)
Dept: FAMILY MEDICINE | Facility: CLINIC | Age: 61
End: 2021-10-25

## 2021-10-25 NOTE — TELEPHONE ENCOUNTER
RN   With [facility] calling for continuation of orders:    SN 1 x every otter week x 7 weeks starting 11/7, 2 prn    Diabetic management and med set up    Verbal given for requested orders    Veena Lagunas RN

## 2021-11-03 DIAGNOSIS — Z53.9 DIAGNOSIS NOT YET DEFINED: Primary | ICD-10-CM

## 2021-11-03 PROCEDURE — G0179 MD RECERTIFICATION HHA PT: HCPCS | Performed by: FAMILY MEDICINE

## 2021-11-04 ENCOUNTER — OFFICE VISIT (OUTPATIENT)
Dept: FAMILY MEDICINE | Facility: CLINIC | Age: 61
End: 2021-11-04
Payer: MEDICARE

## 2021-11-04 ENCOUNTER — TELEPHONE (OUTPATIENT)
Dept: FAMILY MEDICINE | Facility: CLINIC | Age: 61
End: 2021-11-04

## 2021-11-04 ENCOUNTER — MEDICAL CORRESPONDENCE (OUTPATIENT)
Dept: HEALTH INFORMATION MANAGEMENT | Facility: CLINIC | Age: 61
End: 2021-11-04

## 2021-11-04 VITALS
WEIGHT: 208 LBS | HEART RATE: 85 BPM | OXYGEN SATURATION: 97 % | SYSTOLIC BLOOD PRESSURE: 153 MMHG | DIASTOLIC BLOOD PRESSURE: 83 MMHG | BODY MASS INDEX: 34.03 KG/M2 | TEMPERATURE: 97.9 F

## 2021-11-04 DIAGNOSIS — E78.5 HYPERLIPIDEMIA LDL GOAL <70: ICD-10-CM

## 2021-11-04 DIAGNOSIS — G47.33 OSA (OBSTRUCTIVE SLEEP APNEA): Chronic | ICD-10-CM

## 2021-11-04 DIAGNOSIS — F32.9 MAJOR DEPRESSIVE DISORDER WITH SINGLE EPISODE, REMISSION STATUS UNSPECIFIED: ICD-10-CM

## 2021-11-04 DIAGNOSIS — I10 HYPERTENSION GOAL BP (BLOOD PRESSURE) < 140/90: ICD-10-CM

## 2021-11-04 DIAGNOSIS — E66.811 CLASS 1 OBESITY DUE TO EXCESS CALORIES WITH SERIOUS COMORBIDITY AND BODY MASS INDEX (BMI) OF 33.0 TO 33.9 IN ADULT: Chronic | ICD-10-CM

## 2021-11-04 DIAGNOSIS — Z53.9 DIAGNOSIS NOT YET DEFINED: Primary | ICD-10-CM

## 2021-11-04 DIAGNOSIS — E11.9 TYPE 2 DIABETES MELLITUS WITHOUT COMPLICATION, WITHOUT LONG-TERM CURRENT USE OF INSULIN (H): Primary | Chronic | ICD-10-CM

## 2021-11-04 DIAGNOSIS — F20.0 PARANOID SCHIZOPHRENIA, CHRONIC CONDITION WITH ACUTE EXACERBATION (H): ICD-10-CM

## 2021-11-04 DIAGNOSIS — E66.09 CLASS 1 OBESITY DUE TO EXCESS CALORIES WITH SERIOUS COMORBIDITY AND BODY MASS INDEX (BMI) OF 33.0 TO 33.9 IN ADULT: Chronic | ICD-10-CM

## 2021-11-04 PROCEDURE — 99214 OFFICE O/P EST MOD 30 MIN: CPT | Performed by: FAMILY MEDICINE

## 2021-11-04 RX ORDER — LOSARTAN POTASSIUM 50 MG/1
50 TABLET ORAL DAILY
Qty: 30 TABLET | Refills: 0 | Status: SHIPPED | OUTPATIENT
Start: 2021-11-04 | End: 2021-11-08

## 2021-11-04 RX ORDER — METFORMIN HCL 500 MG
500 TABLET, EXTENDED RELEASE 24 HR ORAL DAILY
Qty: 90 TABLET | Refills: 1 | Status: SHIPPED | OUTPATIENT
Start: 2021-11-04 | End: 2022-05-03

## 2021-11-04 ASSESSMENT — PATIENT HEALTH QUESTIONNAIRE - PHQ9: SUM OF ALL RESPONSES TO PHQ QUESTIONS 1-9: 1

## 2021-11-04 NOTE — TELEPHONE ENCOUNTER
Fax to be reviewed by the provider Home Care Plan of Care Certification     Who is the it from? [facility] Cert 10/27/2021 - 12/25/2021  This was faxed to [facility]   Where was the fax placed? Provider's desk to sign  What number is listed as a contact on the fax?     [facility]   Phone: [phone] Fax: [phone]    Please fax to above    Additional comments:

## 2021-11-04 NOTE — PROGRESS NOTES
"  Assessment & Plan     Type 2 diabetes mellitus without complication, without long-term current use of insulin (H)  Stable   - metFORMIN (GLUCOPHAGE-XR) 500 MG 24 hr tablet; Take 1 tablet (500 mg) by mouth daily  SANDRA (obstructive sleep apnea)- severe (AHI 45)  On cppa    Class 1 obesity due to excess calories with serious comorbidity and body mass index (BMI) of 33.0 to 33.9 in adult  Low josé miguel diet/exercise discussed     Paranoid schizophrenia, chronic condition with acute exacerbation (H)  Stable     Major depressive disorder with single episode, remission status unspecified  Stable     Hyperlipidemia LDL goal <70  On statins    Hypertension goal BP (blood pressure) < 140/90  Advised   SEE EPIC care orders  The potential side effects of this medication have been discussed with the patient.  Call if any significant problems with these are experienced.    - losartan (COZAAR) 50 MG tablet; Take 1 tablet (50 mg) by mouth daily  SEE [facility] care orders  The potential side effects of this medication have been discussed with the patient.  Call if any significant problems with these are experienced.  Follow up 2 weeks  Review of the result(s) of each unique test - labs  Diagnosis or treatment significantly limited by social determinants of health - see problem list  Ordering of each unique test  Prescription drug management  :[id number]}     BMI:   Estimated body mass index is 34.03 kg/m  as calculated from the following:    Height as of 4/27/21: 1.665 m (5' 5.55\").    Weight as of this encounter: 94.3 kg (208 lb).   Weight management plan: Discussed healthy diet and exercise guidelines    Work on weight loss  Regular exercise    Return in about 2 weeks (around 11/18/2021) for recheck/Please schedule appointment, BP Recheck.    Radha Villegas MD  [facility] [place] M[place] is a 61 year old who presents for the following health issues    HPI     Medication Followup of all meds    Taking Medication as " prescribed: yes    Side Effects:  None    Medication Helping Symptoms:  yes         Diabetes Follow-up    How often are you checking your blood sugar? One time daily  What time of day are you checking your blood sugars (select all that apply)?  Before meals  Have you had any blood sugars above 200?  No  Have you had any blood sugars below 70?  No    What symptoms do you notice when your blood sugar is low?  None    What concerns do you have today about your diabetes? None     Do you have any of these symptoms? (Select all that apply)  No numbness or tingling in feet.  No redness, sores or blisters on feet.  No complaints of excessive thirst.  No reports of blurry vision.  No significant changes to weight.    Have you had a diabetic eye exam in the last 12 months? Yes-  Location: see [facility]        BP Readings from Last 2 Encounters:   11/04/21 (!) 153/83   04/27/21 123/81     Hemoglobin A1C (%)   Date Value   10/19/2021 7.2 (H)   03/02/2021 6.4 (H)   11/19/2019 5.8 (H)     LDL Cholesterol Calculated (mg/dL)   Date Value   10/19/2021 67   03/02/2021 56   11/19/2019 46         Hyperlipidemia Follow-Up      Are you regularly taking any medication or supplement to lower your cholesterol?   Yes- statins    Are you having muscle aches or other side effects that you think could be caused by your cholesterol lowering medication?  No        How many servings of fruits and vegetables do you eat daily?  4 or more    On average, how many sweetened beverages do you drink each day (Examples: soda, juice, sweet tea, etc.  Do NOT count diet or artificially sweetened beverages)?   0    How many days per week do you exercise enough to make your heart beat faster? 3 or less    How many minutes a day do you exercise enough to make your heart beat faster? 9 or less    How many days per week do you miss taking your medication? 0        Review of Systems   CONSTITUTIONAL: NEGATIVE for fever, chills, change in weight  ENT/MOUTH: NEGATIVE for  ear, mouth and throat problems  RESP: NEGATIVE for significant cough or SOB  CV: NEGATIVE for chest pain, palpitations or peripheral edema  GI: NEGATIVE for nausea, abdominal pain, heartburn, or change in bowel habits  ROS otherwise negative      Objective    BP (!) 153/83 (BP Location: Right arm, Patient Position: Sitting, Cuff Size: Adult Regular)   Pulse 85   Temp 97.9  F (36.6  C) (Oral)   Wt 94.3 kg (208 lb)   SpO2 97%   BMI 34.03 kg/m    Body mass index is 34.03 kg/m .  Physical Exam   GENERAL: healthy, alert and no distress  NECK: no adenopathy, no asymmetry, masses, or scars and thyroid normal to palpation  RESP: lungs clear to auscultation - no rales, rhonchi or wheezes  CV: regular rate and rhythm, normal S1 S2, no S3 or S4, no murmur, click or rub, no peripheral edema and peripheral pulses strong  ABDOMEN: soft, nontender, no hepatosplenomegaly, no masses and bowel sounds normal  MS: no gross musculoskeletal defects noted, no edema  PSYCH: mentation appears normal, affect normal/bright  Diabetic foot exam: normal DP and PT pulses, no trophic changes or ulcerative lesions and normal sensory exam    Lab on 10/19/2021   Component Date Value Ref Range Status     Cholesterol 10/19/2021 169  <200 mg/dL Final     Triglycerides 10/19/2021 322* <150 mg/dL Final     Direct Measure HDL 10/19/2021 38* >=40 mg/dL Final     LDL Cholesterol Calculated 10/19/2021 67  <=100 mg/dL Final     Non HDL Cholesterol 10/19/2021 131* <130 mg/dL Final     Patient Fasting > 8hrs? 10/19/2021 Yes   Final     Hemoglobin A1C 10/19/2021 7.2* 0.0 - 5.6 % Final    Normal <5.7%   Prediabetes 5.7-6.4%    Diabetes 6.5% or higher     Note: Adopted from ADA consensus guidelines.     Glucose 10/19/2021 155* 70 - 99 mg/dL Final     Patient Fasting > 8hrs? 10/19/2021 Yes   Final     Prolactin 10/19/2021 8  2 - 18 ug/L Final    Female (non pregnant):  1-9 years: 2-18 ng/mL  10 years and older: 3-27 ng/mL    Pregnant female:  8-347  ng/mL    Male:  1 y and older: 2-18 ng/mL    Reference range not established on patients under 1 year of age.

## 2021-11-22 ENCOUNTER — TRANSFERRED RECORDS (OUTPATIENT)
Dept: HEALTH INFORMATION MANAGEMENT | Facility: CLINIC | Age: 61
End: 2021-11-22
Payer: MEDICARE

## 2021-11-22 ENCOUNTER — MEDICAL CORRESPONDENCE (OUTPATIENT)
Dept: HEALTH INFORMATION MANAGEMENT | Facility: CLINIC | Age: 61
End: 2021-11-22
Payer: MEDICARE

## 2021-12-01 ENCOUNTER — TELEPHONE (OUTPATIENT)
Dept: FAMILY MEDICINE | Facility: CLINIC | Age: 61
End: 2021-12-01
Payer: MEDICARE

## 2021-12-01 NOTE — TELEPHONE ENCOUNTER
Reason for Call:  SPENCER    Detailed comments: Wife would like you to know that they are faxing over a month of blood pressure readings done by Home health nurse. Can return call after reviewing the results. Thank you.    Phone Number Patient can be reached at:[phone]      Best Time: any    Can we leave a detailed message on this number? YES    Call taken on 12/1/2021 at 3:55 PM by Kaia Carlisle

## 2021-12-01 NOTE — TELEPHONE ENCOUNTER
Patient states that he is feeling very fatigued in the mornings. He thinks that he needs to reduce or change the mirtazapine medication that he takes at bedtime. PCP does not manage this medication. RN advised that he contact the prescribing provide to discuss further.     Patient verbalized understanding and in agreement with plan of care.     RN also scheduled BP follow up as instructed by PCP at last visit.     Leandra Hoang RN, BSN, PHN  [facility]: [place]

## 2021-12-10 DIAGNOSIS — E11.9 TYPE 2 DIABETES MELLITUS WITHOUT COMPLICATION, WITHOUT LONG-TERM CURRENT USE OF INSULIN (H): ICD-10-CM

## 2021-12-13 ENCOUNTER — TELEPHONE (OUTPATIENT)
Dept: FAMILY MEDICINE | Facility: CLINIC | Age: 61
End: 2021-12-13
Payer: MEDICARE

## 2021-12-13 DIAGNOSIS — E78.5 HYPERLIPIDEMIA LDL GOAL <100: ICD-10-CM

## 2021-12-13 RX ORDER — LANCING DEVICE/LANCETS
KIT MISCELLANEOUS
Qty: 100 EACH | Refills: 11 | Status: SHIPPED | OUTPATIENT
Start: 2021-12-13

## 2021-12-13 NOTE — TELEPHONE ENCOUNTER
Rula called said Home Health nurse faxed BP readings for provider to look at wondering if you received.  Etta Gutierrez  Team Jorge,

## 2021-12-14 NOTE — TELEPHONE ENCOUNTER
Routing refill request to provider for review/approval because:  Nadia given x1 and patient did not follow up, please advise    No future apt scheduled yet      Pending Prescriptions:                       Disp   Refills    atorvastatin (LIPITOR) 20 MG tablet [Pharm*90 tab*0        Sig: TAKE 1 TABLET BY MOUTH EVERY DAY

## 2021-12-15 RX ORDER — ATORVASTATIN CALCIUM 20 MG/1
TABLET, FILM COATED ORAL
Qty: 90 TABLET | Refills: 0 | Status: SHIPPED | OUTPATIENT
Start: 2021-12-15 | End: 2022-03-16

## 2021-12-15 NOTE — TELEPHONE ENCOUNTER
Called and spoke with Rula.     They are using [facility].    [phone] fax: [phone]    They are faxing in BP results to the provider to review.    She said that the BP were normal.     Last appointment he drank 3 cups of coffee before his appointment.    Every-time home care is taking the BP were normal or a little low per Rula.    Idalmis Mohamud,      A faxed request to [facility] for BP readings.

## 2021-12-19 DIAGNOSIS — K59.09 CHRONIC CONSTIPATION: ICD-10-CM

## 2021-12-19 RX ORDER — POLYETHYLENE GLYCOL 3350 17 G/17G
POWDER, FOR SOLUTION ORAL
Qty: 510 G | Refills: 2 | Status: SHIPPED | OUTPATIENT
Start: 2021-12-19 | End: 2022-05-23

## 2021-12-20 ENCOUNTER — TELEPHONE (OUTPATIENT)
Dept: FAMILY MEDICINE | Facility: CLINIC | Age: 61
End: 2021-12-20
Payer: MEDICARE

## 2021-12-20 NOTE — TELEPHONE ENCOUNTER
Asaf RN with [facility] called the clinic requesting verbal orders for skilled nursin time every other week X 9 weeks, starting 1/3/22 for medication set-up.    Gave okay for verbal orders for Home care Skilled nursing as requested.    Per [facility] protocol/Policies: Genesis Larose RN for Dr. Villegas  Patient who has bee seen by [facility] primary care provider within the past 2 years (21).

## 2021-12-21 ENCOUNTER — TELEPHONE (OUTPATIENT)
Dept: FAMILY MEDICINE | Facility: CLINIC | Age: 61
End: 2021-12-21
Payer: MEDICARE

## 2021-12-21 ENCOUNTER — MEDICAL CORRESPONDENCE (OUTPATIENT)
Dept: HEALTH INFORMATION MANAGEMENT | Facility: CLINIC | Age: 61
End: 2021-12-21
Payer: MEDICARE

## 2021-12-21 NOTE — TELEPHONE ENCOUNTER
Form came in via fax to be completed by the provider: Certification of Medical Necessity Form for Medicare covered Diabetic Supplies    Who is the form from?    [employer] [facility][id number] - [place] - [address] AT [facility] & [place]  This was faxed to [facility]  Where was the form placed? Providers bin  What number is listed as a contact on the form?     [id number] [place] - [address] AT [facility] & [place]  [address] [facility] [address]  Phone: [phone] Fax: [phone]    [facility]  Fax: [phone] Phone: [phone]    Type of letter, form or note: medical     Please return the phone encounter when the form/order has been completed. Thank you.

## 2021-12-26 ENCOUNTER — MEDICAL CORRESPONDENCE (OUTPATIENT)
Dept: HEALTH INFORMATION MANAGEMENT | Facility: CLINIC | Age: 61
End: 2021-12-26
Payer: MEDICARE

## 2022-01-05 ENCOUNTER — TELEPHONE (OUTPATIENT)
Dept: FAMILY MEDICINE | Facility: CLINIC | Age: 62
End: 2022-01-05
Payer: MEDICARE

## 2022-01-05 NOTE — TELEPHONE ENCOUNTER
Fax to be reviewed by the provider Home Care Plan of Care Certification      Who is the it from? [facility] Care Cert 12/26/2021 - 2/23/2022  This was faxed to [facility]   Where was the fax placed? Provider's desk to sign  What number is listed as a contact on the fax?      [facility]   Phone: [phone] Fax: [phone]     Please fax to above     Additional comments:

## 2022-01-06 DIAGNOSIS — Z53.9 DIAGNOSIS NOT YET DEFINED: Primary | ICD-10-CM

## 2022-01-06 PROCEDURE — G0179 MD RECERTIFICATION HHA PT: HCPCS | Performed by: FAMILY MEDICINE

## 2022-02-02 DIAGNOSIS — E11.9 TYPE 2 DIABETES MELLITUS WITHOUT COMPLICATION, WITHOUT LONG-TERM CURRENT USE OF INSULIN (H): ICD-10-CM

## 2022-02-04 NOTE — TELEPHONE ENCOUNTER
"Requested Prescriptions   Pending Prescriptions Disp Refills     CONTOUR NEXT TEST test strip [Pharmacy Med Name: CONTOUR NEXT TEST STRIPS 100S] 100 strip 3     Sig: TEST ONCE DAILY       Diabetic Supplies Protocol Passed - 2/2/2022 11:40 AM        Passed - Medication is active on med list        Passed - Patient is 18 years of age or older        Passed - Recent (6 mo) or future (30 days) visit within the authorizing provider's specialty     Patient had office visit in the last 6 months or has a visit in the next 30 days with authorizing provider.  See \"Patient Info\" tab in inbasket, or \"Choose Columns\" in Meds & Orders section of the refill encounter.                 Prescription approved per [facility] Refill Protocol.      Joyce SOLITARIO RN, BSN  [facility]    "

## 2022-02-06 NOTE — NURSING NOTE
"Chief Complaint   Patient presents with     RECHECK     Rash in left hand- rash is spreading to the right hand        Initial /60  Pulse 79  Temp 96.2  F (35.7  C) (Oral)  Ht 5' 5.94\" (1.675 m)  Wt 206 lb 6.4 oz (93.6 kg)  SpO2 95%  BMI 33.37 kg/m2 Estimated body mass index is 33.37 kg/(m^2) as calculated from the following:    Height as of this encounter: 5' 5.94\" (1.675 m).    Weight as of this encounter: 206 lb 6.4 oz (93.6 kg).  Medication Reconciliation: complete     An JEIMY Ryan    "
No

## 2022-02-10 ENCOUNTER — TELEPHONE (OUTPATIENT)
Dept: FAMILY MEDICINE | Facility: CLINIC | Age: 62
End: 2022-02-10
Payer: MEDICARE

## 2022-02-10 DIAGNOSIS — E11.9 TYPE 2 DIABETES MELLITUS WITHOUT COMPLICATION, WITHOUT LONG-TERM CURRENT USE OF INSULIN (H): Primary | ICD-10-CM

## 2022-02-10 RX ORDER — FLASH GLUCOSE SCANNING READER
1 EACH MISCELLANEOUS ONCE
Qty: 1 EACH | Refills: 0 | Status: SHIPPED | OUTPATIENT
Start: 2022-02-10 | End: 2022-02-10

## 2022-02-10 RX ORDER — FLASH GLUCOSE SENSOR
1 KIT MISCELLANEOUS
Qty: 2 EACH | Refills: 5 | Status: SHIPPED | OUTPATIENT
Start: 2022-02-10 | End: 2022-08-02

## 2022-02-10 NOTE — TELEPHONE ENCOUNTER
Reason for Call: Request for an order or referral:    Order or referral being requested: glucose monitor    Date needed: as soon as possible    Additional comments: uses [facility] pharmacy in [place]    Phone number Patient can be reached at:  Home number on file [phone] (home)    Best Time:  anytime    Can we leave a detailed message on this number?  YES    Call taken on 2/10/2022 at 10:27 AM by Tasha Marcus

## 2022-02-11 NOTE — TELEPHONE ENCOUNTER
Attempted to call pt at home/cell. This was the first attempt at calling and voice message left to return call to clinic at [phone].    Please relay message from Dr. Villegas when pt returns call.    Joyce SOLITARIO RN, BSN  [facility]

## 2022-02-17 ENCOUNTER — TELEPHONE (OUTPATIENT)
Dept: FAMILY MEDICINE | Facility: CLINIC | Age: 62
End: 2022-02-17
Payer: MEDICARE

## 2022-02-17 NOTE — TELEPHONE ENCOUNTER
Reason for Call:  Medication or medication refill:    Do you use a [facility]?  Name of the pharmacy and phone number for the current request:  [facility] in [place]    Name of the medication requested: Glucose monitor so patient does not have to prick his finger.    Can we leave a detailed message on this number? YES    Phone number patient can be reached at: Home number on file [phone] (home)    Best Time: anytime    Call taken on 2/17/2022 at 11:41 AM by aTsha Marcus

## 2022-02-17 NOTE — TELEPHONE ENCOUNTER
Prescription sent 2/10/22. See encounter 2/10/22 and 2/11/22.    Left message informing patient that PA is in process.     Veena CHOWDHURY RN  [facility]

## 2022-02-17 NOTE — TELEPHONE ENCOUNTER
Pt returning call. Advised that PA is in process and will follow up with pt once we have more information from insurance.    Andressa Cornelius RN  [facility]

## 2022-02-21 ENCOUNTER — TELEPHONE (OUTPATIENT)
Dept: FAMILY MEDICINE | Facility: CLINIC | Age: 62
End: 2022-02-21
Payer: MEDICARE

## 2022-02-21 NOTE — TELEPHONE ENCOUNTER
Reason for Call:  Home Health Care        Orders are needed for this patient. verbal    PT: na    OT: na    Skilled Nursing: Skilled nursing once every 2 weeks for 9 weeks and 2 PRN    Pt Provider: Bakari    Phone Number Homecare Nurse can be reached at: [phone]    Can we leave a detailed message on this number? YES    Phone number patient can be reached at: na    Best Time: any    Call taken on 2/21/2022 at 3:48 PM by Kaia Carlisle

## 2022-02-22 NOTE — TELEPHONE ENCOUNTER
The Home Care/Assisted Living/Nursing Facility is calling regarding an established patient.  Has the patient seen Home Care in the past or is currently residing in Assisted Living or Nursing Facility? Yes.     Yeimy calling from [facility] requesting the following orders that are within the Home Care, Assisted Living or Nursing Home Eval and Treatment standing order and can be signed as standing order signature required by RN.    Preferred Call Back Number: [phone]    Home Care Visits Continuation  Skilled nursing once every 2 weeks for 9 weeks and 2 PRN    Any additional Orders:  Any order requested not stated above are outside of the standing order and must be routed to a licensed practitioner for approval.    No    Writer has verified Requestor will send fax to have orders signed.    Sohail Spencer RN  [facility]

## 2022-03-04 ENCOUNTER — TELEPHONE (OUTPATIENT)
Dept: FAMILY MEDICINE | Facility: CLINIC | Age: 62
End: 2022-03-04
Payer: MEDICARE

## 2022-03-04 NOTE — TELEPHONE ENCOUNTER
[facility] has requested the most Recent face to face.    The last face to face was on 11/4/2021.    This was confirmed faxed back with their request.

## 2022-03-08 ENCOUNTER — TELEPHONE (OUTPATIENT)
Dept: FAMILY MEDICINE | Facility: CLINIC | Age: 62
End: 2022-03-08
Payer: MEDICARE

## 2022-03-08 DIAGNOSIS — Z53.9 DIAGNOSIS NOT YET DEFINED: Primary | ICD-10-CM

## 2022-03-08 PROCEDURE — G0179 MD RECERTIFICATION HHA PT: HCPCS | Performed by: FAMILY MEDICINE

## 2022-03-08 NOTE — TELEPHONE ENCOUNTER
Reason for Call:  Form, our goal is to have forms completed with 72 hours, however, some forms may require a visit or additional information.    Type of letter, form or note:  Home Health Certification    Who is the form from?: Home care    Where did the form come from: form was faxed in    What clinic location was the form placed at?: [facility]    Where the form was placed: Given to physician    What number is listed as a contact on the form?: [phone]       Call taken on 3/8/2022 at 12:26 PM by Tasha Marcus

## 2022-03-13 ENCOUNTER — NURSE TRIAGE (OUTPATIENT)
Dept: NURSING | Facility: CLINIC | Age: 62
End: 2022-03-13
Payer: MEDICARE

## 2022-03-13 NOTE — TELEPHONE ENCOUNTER
"Caller states that coffee gives him high blood pressure, and he wants to know: Is this true ? He drinks a big goblet (sana jar size=quart). Nurse checks his blood pressure in his home= it has been low. In the office it is high after drinking coffee. He does not know what his readings have been. He says that he was given blood pressure pills and he says he has decided he is not going to take them. Advised to not discontinue his medication, and to discuss further with Dr Villegas.   \"What can I drink before my blood test ? Can I drink water? (yes) I have diabetes. They won't give me a continuous glucose monitor because I do not use insulin. I am type 2. I am disappointed in the system. In the office my blood sugar has been good. I had been checking it once a day. I forgot to do it today. I have a glucose machine and test strips. Testing is hard because I have to prick my finger. How much would the CGM cost out of pocket? (advised to discuss with MD or pharmacist). It is hard to lose weight. I may have insulin resistance\".     Ivanna Cuellar RN Triage Nurse Advisor 4:31 PM 3/13/2022.     Reason for Disposition    [1] Caller has NON-URGENT medication question about med that PCP prescribed AND [2] triager unable to answer question    Additional Information    Negative: Drug overdose and triager unable to answer question    Negative: Caller requesting information unrelated to medicine    Negative: Caller requesting a prescription for Strep throat and has a positive culture result    Negative: Rash while taking a medication or within 3 days of stopping it    Negative: Immunization reaction suspected    Negative: [1] Asthma and [2] having symptoms of asthma (cough, wheezing, etc.)    Negative: [1] Influenza symptoms AND [2] anti-viral med prescription request, such as Tamiflu    Negative: [1] Symptom of illness (e.g., headache, abdominal pain, earache, vomiting) AND [2] more than mild    Negative: MORE THAN A DOUBLE DOSE " "of a prescription or over-the-counter (OTC) drug    Negative: [1] DOUBLE DOSE (an extra dose or lesser amount) of over-the-counter (OTC) drug AND [2] any symptoms (e.g., dizziness, nausea, pain, sleepiness)    Negative: [1] DOUBLE DOSE (an extra dose or lesser amount) of prescription drug AND [2] any symptoms (e.g., dizziness, nausea, pain, sleepiness)    Negative: Took another person's prescription drug    Negative: [1] DOUBLE DOSE (an extra dose or lesser amount) of prescription drug AND [2] NO symptoms (Exception: a double dose of antibiotics)    Negative: Diabetes drug error or overdose (e.g., took wrong type of insulin or took extra dose)    Negative: [1] Request for URGENT new prescription or refill of \"essential\" medication (i.e., likelihood of harm to patient if not taken) AND [2] triager unable to fill per unit policy    Negative: [1] Prescription not at pharmacy AND [2] was prescribed by PCP recently    Negative: [1] Pharmacy calling with prescription questions AND [2] triager unable to answer question    Negative: [1] Caller has URGENT medication question about med that PCP or specialist prescribed AND [2] triager unable to answer question    Protocols used: MEDICATION QUESTION CALL-A-AH      "

## 2022-03-14 NOTE — TELEPHONE ENCOUNTER
Patient notified of provider message as written. Patient verbalized understanding. No further questions.     Veena Lagunas RN

## 2022-03-14 NOTE — TELEPHONE ENCOUNTER
Please call  DO NOT stop Blood Pressure meds  It is okay if he checks His Blood sugar 1-2 Times a week  Okay to drink 1-2 cups coffee daily  Avoid High energy Drinks

## 2022-03-15 DIAGNOSIS — E78.5 HYPERLIPIDEMIA LDL GOAL <100: ICD-10-CM

## 2022-03-16 ENCOUNTER — LAB (OUTPATIENT)
Dept: LAB | Facility: CLINIC | Age: 62
End: 2022-03-16
Payer: MEDICARE

## 2022-03-16 DIAGNOSIS — I10 HYPERTENSION GOAL BP (BLOOD PRESSURE) < 140/90: ICD-10-CM

## 2022-03-16 DIAGNOSIS — E11.9 TYPE 2 DIABETES MELLITUS WITHOUT COMPLICATION, WITHOUT LONG-TERM CURRENT USE OF INSULIN (H): ICD-10-CM

## 2022-03-16 LAB
ANION GAP SERPL CALCULATED.3IONS-SCNC: 9 MMOL/L (ref 3–14)
BUN SERPL-MCNC: 16 MG/DL (ref 7–30)
CALCIUM SERPL-MCNC: 9.2 MG/DL (ref 8.5–10.1)
CHLORIDE BLD-SCNC: 107 MMOL/L (ref 94–109)
CO2 SERPL-SCNC: 23 MMOL/L (ref 20–32)
CREAT SERPL-MCNC: 0.98 MG/DL (ref 0.66–1.25)
GFR SERPL CREATININE-BSD FRML MDRD: 87 ML/MIN/1.73M2
GLUCOSE BLD-MCNC: 169 MG/DL (ref 70–99)
POTASSIUM BLD-SCNC: 4.3 MMOL/L (ref 3.4–5.3)
SODIUM SERPL-SCNC: 139 MMOL/L (ref 133–144)

## 2022-03-16 PROCEDURE — 80048 BASIC METABOLIC PNL TOTAL CA: CPT

## 2022-03-16 PROCEDURE — 36415 COLL VENOUS BLD VENIPUNCTURE: CPT

## 2022-03-16 RX ORDER — ATORVASTATIN CALCIUM 20 MG/1
TABLET, FILM COATED ORAL
Qty: 90 TABLET | Refills: 0 | Status: SHIPPED | OUTPATIENT
Start: 2022-03-16 | End: 2022-05-03

## 2022-03-16 NOTE — TELEPHONE ENCOUNTER
Routing refill request to provider for review/approval because:  Nadia given x1 and patient did not follow up, please advise.    Pt has appointment scheduled with Dr. Villegas 3/28/22    Joyce SOLITARIO RN, BSN  [facility] [place]

## 2022-03-27 DIAGNOSIS — E78.5 HYPERLIPIDEMIA LDL GOAL <100: ICD-10-CM

## 2022-03-28 RX ORDER — ATORVASTATIN CALCIUM 20 MG/1
TABLET, FILM COATED ORAL
Qty: 90 TABLET | Refills: 0 | OUTPATIENT
Start: 2022-03-28

## 2022-04-11 ENCOUNTER — TELEPHONE (OUTPATIENT)
Dept: FAMILY MEDICINE | Facility: CLINIC | Age: 62
End: 2022-04-11
Payer: MEDICARE

## 2022-04-11 DIAGNOSIS — E11.9 TYPE 2 DIABETES MELLITUS WITHOUT COMPLICATION, WITHOUT LONG-TERM CURRENT USE OF INSULIN (H): Primary | ICD-10-CM

## 2022-04-11 NOTE — TELEPHONE ENCOUNTER
Yeimy with [facility] zintin calling regarding a few different issues.     Yeimy states that patient has recently started taking 100mg of Co-Q 10 daily. She wants to make sure that this is ok with his current medications.     Yeimy also states that patient has stopped taking hid vitamins B6, B12, D, and E because they are too expensive. Ok for patient to stop taking, or can prescription be sent?     Yeimy states that she was told by patient that he cannot cut his own toenails because he is overweight per his doctor. Yeimy states that patient can reach his toes, ok to cut his own toenails?     Yeimy can be reached at [phone].     Roz Navarro RN   [facility]

## 2022-04-13 NOTE — TELEPHONE ENCOUNTER
Yeimy notified of provider's message as written, verbalized understanding.     Team, please assist patient in scheduling lab appointment. Thanks!     Will need order for B12 recheck placed.     Roz Navarro RN   [facility]

## 2022-04-13 NOTE — TELEPHONE ENCOUNTER
Called pt and made appointment for Lab on 4/19/22 at 8:30 am. Pt also wanted wife to be notified . Informed Wife Christina.        Please order labs.      Thank you  Jania CASIANO CMA

## 2022-04-14 NOTE — TELEPHONE ENCOUNTER
Called patient and wife answered and informed that needing appointment after lab work, understood and will call back and make appointment.       Yeimy HAWKINS CMA ([facility])

## 2022-04-19 ENCOUNTER — LAB (OUTPATIENT)
Dept: LAB | Facility: CLINIC | Age: 62
End: 2022-04-19
Payer: MEDICARE

## 2022-04-19 DIAGNOSIS — E11.9 TYPE 2 DIABETES MELLITUS WITHOUT COMPLICATION, WITHOUT LONG-TERM CURRENT USE OF INSULIN (H): ICD-10-CM

## 2022-04-19 LAB
HBA1C MFR BLD: 8.7 % (ref 0–5.6)
VIT B12 SERPL-MCNC: 748 PG/ML (ref 193–986)

## 2022-04-19 PROCEDURE — 36415 COLL VENOUS BLD VENIPUNCTURE: CPT

## 2022-04-19 PROCEDURE — 83036 HEMOGLOBIN GLYCOSYLATED A1C: CPT

## 2022-04-19 PROCEDURE — 82607 VITAMIN B-12: CPT

## 2022-04-19 NOTE — RESULT ENCOUNTER NOTE
Dear Yair,    Your recent test results are attached.      Pre-visit labs to be discussed in clinic.    If you have any questions please feel free to contact [phone] or myself via [employer].    Sincerely,  Blanche Jay, CNP

## 2022-04-20 ENCOUNTER — ANCILLARY PROCEDURE (OUTPATIENT)
Dept: GENERAL RADIOLOGY | Facility: CLINIC | Age: 62
End: 2022-04-20
Attending: PHYSICIAN ASSISTANT
Payer: MEDICARE

## 2022-04-20 ENCOUNTER — OFFICE VISIT (OUTPATIENT)
Dept: FAMILY MEDICINE | Facility: CLINIC | Age: 62
End: 2022-04-20
Payer: MEDICARE

## 2022-04-20 VITALS
SYSTOLIC BLOOD PRESSURE: 135 MMHG | HEART RATE: 92 BPM | OXYGEN SATURATION: 99 % | DIASTOLIC BLOOD PRESSURE: 78 MMHG | BODY MASS INDEX: 33.04 KG/M2 | HEIGHT: 66 IN | WEIGHT: 205.6 LBS | TEMPERATURE: 97.6 F

## 2022-04-20 DIAGNOSIS — M43.6 WRY NECK: ICD-10-CM

## 2022-04-20 DIAGNOSIS — H61.23 CERUMINOSIS, BILATERAL: ICD-10-CM

## 2022-04-20 DIAGNOSIS — M43.6 WRY NECK: Primary | ICD-10-CM

## 2022-04-20 PROCEDURE — 99213 OFFICE O/P EST LOW 20 MIN: CPT | Performed by: PHYSICIAN ASSISTANT

## 2022-04-20 PROCEDURE — 72040 X-RAY EXAM NECK SPINE 2-3 VW: CPT | Performed by: RADIOLOGY

## 2022-04-20 RX ORDER — OLANZAPINE 5 MG/1
5 TABLET ORAL DAILY
COMMUNITY
Start: 2022-02-15 | End: 2022-08-02

## 2022-04-20 ASSESSMENT — ANXIETY QUESTIONNAIRES
GAD7 TOTAL SCORE: 0
3. WORRYING TOO MUCH ABOUT DIFFERENT THINGS: NOT AT ALL
7. FEELING AFRAID AS IF SOMETHING AWFUL MIGHT HAPPEN: NOT AT ALL
6. BECOMING EASILY ANNOYED OR IRRITABLE: NOT AT ALL
5. BEING SO RESTLESS THAT IT IS HARD TO SIT STILL: NOT AT ALL
2. NOT BEING ABLE TO STOP OR CONTROL WORRYING: NOT AT ALL
1. FEELING NERVOUS, ANXIOUS, OR ON EDGE: NOT AT ALL
IF YOU CHECKED OFF ANY PROBLEMS ON THIS QUESTIONNAIRE, HOW DIFFICULT HAVE THESE PROBLEMS MADE IT FOR YOU TO DO YOUR WORK, TAKE CARE OF THINGS AT HOME, OR GET ALONG WITH OTHER PEOPLE: NOT DIFFICULT AT ALL

## 2022-04-20 ASSESSMENT — PATIENT HEALTH QUESTIONNAIRE - PHQ9
5. POOR APPETITE OR OVEREATING: NOT AT ALL
SUM OF ALL RESPONSES TO PHQ QUESTIONS 1-9: 0

## 2022-04-20 NOTE — LETTER
[facility]  [address] [place] [id number]  Phone: [phone]    April 20, 2022        Yair Benites  [address] [place] [id number]          To whom it may concern:    RE: Yair Benites    Patient was seen and treated today at our clinic.    Please contact me for questions or concerns.      Sincerely,        Mani Valles PA-C

## 2022-04-20 NOTE — PROGRESS NOTES
"  Assessment & Plan     Wry neck  - XR Cervical Spine 2/3 Views; Future  - Physical Therapy Referral; Future    Ceruminosis, bilateral  Cleared with warm water irrigation to good effect and no sequelae.            Return in about 6 weeks (around 6/1/2022) for follow up if symptoms persist, change or worsen.    ELLA [facility] DAVONTE Mazariegos is a 61 year old who presents for the following health issues  accompanied by his self.    HPI     Pain History:  When did you first notice your pain? - Acute Pain   Have you seen anyone else for your pain? No  Where in your body do you have pain? Neck Pain  Onset/Duration: 5 days   Description:   Location: in the back of the neck   Radiation:  Upper back  Intensity: 5/10  Progression of Symptoms:  Improving with taking Tylenol   Accompanying Signs & Symptoms:  Burning, tingling, prickly sensation in arm(s): no  Numbness in arm(s): no  Weakness in arm(s):  no  Fever: no  Headache: no  Nausea and/or vomiting: no  History:   Trauma: no  Previous neck pain: no  Previous surgery or injections: no  Previous Imaging (MRI,X ray): no  Precipitating or alleviating factors: None  Does movement impact the pain:  YES  Therapies tried and outcome: acetaminophen    Patient notes he woke up with the pain after sleeping on new bedding.     Review of Systems   Constitutional, HEENT, cardiovascular, pulmonary, gi and gu systems are negative, except as otherwise noted.      Objective    /78   Pulse 92   Temp 97.6  F (36.4  C) (Oral)   Ht 1.665 m (5' 5.55\")   Wt 93.3 kg (205 lb 9.6 oz)   SpO2 99%   BMI 33.64 kg/m    Body mass index is 33.64 kg/m .  Physical Exam   GENERAL: healthy, alert and no distress  EARS: Bilateral ceruminosis.   MS: normal muscle tone, no edema and neck exam shows normal strength and decreased ROM with pain upon paracervical palpation.   SKIN: no suspicious lesions or rashes                "

## 2022-04-21 ENCOUNTER — TELEPHONE (OUTPATIENT)
Dept: FAMILY MEDICINE | Facility: CLINIC | Age: 62
End: 2022-04-21
Payer: MEDICARE

## 2022-04-21 ASSESSMENT — ANXIETY QUESTIONNAIRES: GAD7 TOTAL SCORE: 0

## 2022-04-21 NOTE — TELEPHONE ENCOUNTER
Received call from RIYA Gaffney with [facility]. She is requesting re-certification orders for skilled nursing 1x/week every other week, 2 PRN's. VO given per RN protocol.    HAWK JohnsonN RN  [facility]

## 2022-04-25 ENCOUNTER — TELEPHONE (OUTPATIENT)
Dept: FAMILY MEDICINE | Facility: CLINIC | Age: 62
End: 2022-04-25
Payer: MEDICARE

## 2022-04-25 NOTE — TELEPHONE ENCOUNTER
Reason for Call:  Form, our goal is to have forms completed with 72 hours, however, some forms may require a visit or additional information.    Type of letter, form or note:  Home Health Certification    Who is the form from?: Home care    Where did the form come from: form was faxed in    What clinic location was the form placed at?: [facility]    Where the form was placed: Given to physician    What number is listed as a contact on the form?: [phone]       Call taken on 4/25/2022 at 2:50 PM by Tasha Marcus

## 2022-04-26 DIAGNOSIS — Z53.9 DIAGNOSIS NOT YET DEFINED: Primary | ICD-10-CM

## 2022-04-26 PROCEDURE — G0179 MD RECERTIFICATION HHA PT: HCPCS | Performed by: FAMILY MEDICINE

## 2022-05-03 ENCOUNTER — OFFICE VISIT (OUTPATIENT)
Dept: FAMILY MEDICINE | Facility: CLINIC | Age: 62
End: 2022-05-03
Payer: MEDICARE

## 2022-05-03 VITALS
BODY MASS INDEX: 33.54 KG/M2 | OXYGEN SATURATION: 96 % | DIASTOLIC BLOOD PRESSURE: 74 MMHG | SYSTOLIC BLOOD PRESSURE: 126 MMHG | HEART RATE: 102 BPM | TEMPERATURE: 99.1 F | WEIGHT: 205 LBS

## 2022-05-03 DIAGNOSIS — E78.5 HYPERLIPIDEMIA LDL GOAL <70: ICD-10-CM

## 2022-05-03 DIAGNOSIS — Z12.11 SCREEN FOR COLON CANCER: Primary | ICD-10-CM

## 2022-05-03 DIAGNOSIS — I10 HYPERTENSION GOAL BP (BLOOD PRESSURE) < 140/90: ICD-10-CM

## 2022-05-03 DIAGNOSIS — E78.5 HYPERLIPIDEMIA LDL GOAL <100: ICD-10-CM

## 2022-05-03 DIAGNOSIS — E11.9 TYPE 2 DIABETES MELLITUS WITHOUT COMPLICATION, WITHOUT LONG-TERM CURRENT USE OF INSULIN (H): Chronic | ICD-10-CM

## 2022-05-03 DIAGNOSIS — G47.33 OSA (OBSTRUCTIVE SLEEP APNEA): Chronic | ICD-10-CM

## 2022-05-03 DIAGNOSIS — F32.9 MAJOR DEPRESSIVE DISORDER WITH SINGLE EPISODE, REMISSION STATUS UNSPECIFIED: ICD-10-CM

## 2022-05-03 DIAGNOSIS — F20.0 PARANOID SCHIZOPHRENIA, CHRONIC CONDITION WITH ACUTE EXACERBATION (H): ICD-10-CM

## 2022-05-03 DIAGNOSIS — Z12.11 ENCOUNTER FOR SCREENING COLONOSCOPY: ICD-10-CM

## 2022-05-03 DIAGNOSIS — D12.6 ADENOMATOUS POLYP OF COLON, UNSPECIFIED PART OF COLON: ICD-10-CM

## 2022-05-03 PROCEDURE — 99214 OFFICE O/P EST MOD 30 MIN: CPT | Performed by: FAMILY MEDICINE

## 2022-05-03 RX ORDER — METFORMIN HCL 500 MG
1000 TABLET, EXTENDED RELEASE 24 HR ORAL DAILY
Qty: 180 TABLET | Refills: 1 | Status: SHIPPED | OUTPATIENT
Start: 2022-05-03 | End: 2022-08-02

## 2022-05-03 RX ORDER — ATORVASTATIN CALCIUM 20 MG/1
20 TABLET, FILM COATED ORAL DAILY
Qty: 90 TABLET | Refills: 3 | Status: SHIPPED | OUTPATIENT
Start: 2022-05-03 | End: 2023-03-06

## 2022-05-03 ASSESSMENT — PAIN SCALES - GENERAL: PAINLEVEL: NO PAIN (0)

## 2022-05-03 NOTE — PROGRESS NOTES
"  Assessment & Plan     Type 2 diabetes mellitus without complication, without long-term current use of insulin (H)  High  - metFORMIN (GLUCOPHAGE-XR) 500 MG 24 hr tablet; Take 2 tablets (1,000 mg) by mouth daily  Increase Metformin  Diabetic diet  - Albumin Random Urine Quantitative with Creat Ratio; Future  - Adult Eye Referral; Future    Hyperlipidemia LDL goal <100  Stable   - atorvastatin (LIPITOR) 20 MG tablet; Take 1 tablet (20 mg) by mouth daily    SANDRA (obstructive sleep apnea)- severe (AHI 45)  On cpap    Paranoid schizophrenia, chronic condition with acute exacerbation (H)  Sees Psychiatrist    Major depressive disorder with single episode, remission status unspecified  Stable     Encounter for screening colonoscopy  Discussed due for a colonoscopy this year    Hypertension goal BP (blood pressure) < 140/90  Stable     Hyperlipidemia LDL goal <70  Stable     Adenomatous polyp of colon, unspecified part of colon  Advised colonoscopy  - Adult Gastro Ref - Procedure Only; Future       BMI:   Estimated body mass index is 33.54 kg/m  as calculated from the following:    Height as of 4/20/22: 1.665 m (5' 5.55\").    Weight as of this encounter: 93 kg (205 lb).   Weight management plan: Discussed healthy diet and exercise guidelines        Return in about 3 months (around 8/3/2022) for Diabetes.    Radha Villegas MD  [facility] DAVONTE Mazariegos is a 61 year old who presents for the following health issues     HPI   Chief Complaint   Patient presents with     Recheck Medication     Lipids     Discuss diet and exercise.      Toenail         Diabetes Follow-up    How often are you checking your blood sugar? One time daily  What time of day are you checking your blood sugars (select all that apply)?  Before meals  Have you had any blood sugars above 200?  Yes   Have you had any blood sugars below 70?  No    What symptoms do you notice when your blood sugar is low?  Not applicable    What " concerns do you have today about your diabetes? None     Do you have any of these symptoms? (Select all that apply)  No numbness or tingling in feet.  No redness, sores or blisters on feet.  No complaints of excessive thirst.  No reports of blurry vision.  No significant changes to weight.    Have you had a diabetic eye exam in the last 12 months? No          Hyperlipidemia Follow-Up      Are you regularly taking any medication or supplement to lower your cholesterol?   Yes- atorvastatin (LIPITOR) 20 MG tablet    Are you having muscle aches or other side effects that you think could be caused by your cholesterol lowering medication?  No    Hypertension Follow-up      Do you check your blood pressure regularly outside of the clinic? Yes     Are you following a low salt diet? Yes    Are your blood pressures ever more than 140 on the top number (systolic) OR more   than 90 on the bottom number (diastolic), for example 140/90? No    BP Readings from Last 2 Encounters:   05/03/22 126/74   04/20/22 135/78     Hemoglobin A1C POCT (%)   Date Value   03/02/2021 6.4 (H)   11/19/2019 5.8 (H)     Hemoglobin A1C (%)   Date Value   04/19/2022 8.7 (H)   10/19/2021 7.2 (H)     LDL Cholesterol Calculated (mg/dL)   Date Value   10/19/2021 67   03/02/2021 56   11/19/2019 46         How many servings of fruits and vegetables do you eat daily?  3    On average, how many sweetened beverages do you drink each day (Examples: soda, juice, sweet tea, etc.  Do NOT count diet or artificially sweetened beverages)?   0    How many days per week do you exercise enough to make your heart beat faster? none    How many minutes a day do you exercise enough to make your heart beat faster?     How many days per week do you miss taking your medication? 0  Review of Systems   CONSTITUTIONAL: NEGATIVE for fever, chills, change in weight  ENT/MOUTH: NEGATIVE for ear, mouth and throat problems  RESP: NEGATIVE for significant cough or SOB  CV: NEGATIVE for  chest pain, palpitations or peripheral edema  GI: NEGATIVE for nausea, abdominal pain, heartburn, or change in bowel habits  NEURO: NEGATIVE for weakness, dizziness or paresthesias  PSYCHIATRIC: dong wll n meds      Objective    /74   Pulse 102   Temp 99.1  F (37.3  C) (Temporal)   Wt 93 kg (205 lb)   SpO2 96%   BMI 33.54 kg/m    Body mass index is 33.54 kg/m .  Physical Exam   GENERAL: healthy, alert and no distress  NECK: no adenopathy, no asymmetry, masses, or scars and thyroid normal to palpation  RESP: lungs clear to auscultation - no rales, rhonchi or wheezes  CV: regular rate and rhythm, normal S1 S2, no S3 or S4, no murmur, click or rub, no peripheral edema and peripheral pulses strong  ABDOMEN: soft, nontender, no hepatosplenomegaly, no masses and bowel sounds normal  MS: no gross musculoskeletal defects noted, no edema  PSYCH: mentation appears normal and affect normal/bright  Diabetic foot exam: normal DP and PT pulses, no trophic changes or ulcerative lesions and normal sensory exam    Lab on 04/19/2022   Component Date Value Ref Range Status     Hemoglobin A1C 04/19/2022 8.7 (A) 0.0 - 5.6 % Final    Normal <5.7%   Prediabetes 5.7-6.4%    Diabetes 6.5% or higher     Note: Adopted from ADA consensus guidelines.     Vitamin B12 04/19/2022 748  193 - 986 pg/mL Final

## 2022-05-09 ENCOUNTER — THERAPY VISIT (OUTPATIENT)
Dept: PHYSICAL THERAPY | Facility: CLINIC | Age: 62
End: 2022-05-09
Payer: MEDICARE

## 2022-05-09 DIAGNOSIS — M43.6 WRY NECK: ICD-10-CM

## 2022-05-09 DIAGNOSIS — M54.2 NECK PAIN: ICD-10-CM

## 2022-05-09 PROCEDURE — 97161 PT EVAL LOW COMPLEX 20 MIN: CPT | Mod: GP | Performed by: PHYSICAL THERAPIST

## 2022-05-09 PROCEDURE — 97110 THERAPEUTIC EXERCISES: CPT | Mod: GP | Performed by: PHYSICAL THERAPIST

## 2022-05-09 NOTE — PROGRESS NOTES
[facility] Physical Therapy Initial Evaluation  5/9/2022     Patient's Name: Yair Benites  Referring Provider: Mani Valles  Visit Diagnosis: No diagnosis found.  Payor: MEDICARE / Plan: MEDICARE / Product Type: Medicare /     Precautions/Restrictions/MD instructions: 4/20/22 evaluate and treat    Therapist ASSESSMENT  Yair Benites is a 61 year old male patient presenting to Physical Therapy with bilateral base of neck pain. Patient demonstrates decreased segmental mobility of upper, lower cervical and upper/mid thoracic spine. Signs and symptoms are consistent with chronic neck pain with mobility deficits. These impairments limit their ability to turn head comfortably for work, driving, and sleeping. Skilled PT services are necessary in order to reduce impairments and improve independent function.      SUBJECTIVE   Yair Benites is a 61 year old male who presents to outpatient physical therapy for evaluation. His symptoms consist of:  1. Wry neck  2. Neck pain, bilateral    Patient Comments (HPI): He reports that his symptoms began about 2 weeks ago, insidiously. Symptoms are located bilateral neck at CT junction and shoulders. He rates pain as 3/10 on average (at worst 3/10, at best 0/10). Overall, he reports his status remains unchanged.    He denies red flags, including Numbness, Diplopia, Dysarthria and Dysphagia.  He reports the following red flags: occasionally has some dizziness - sounds like orthostatic.    Aggravating Factors: neck movements, reaching overhead  Relieving Factors: NSAIDs    Previous Treatments: None    General health as reported by patient: good.    PMH/Review of Systems: hx of paranoid schizophrenia, depression I briefly reviewed the review of systems as noted on the health history form.  I am only responding to those symptoms which are directly relevant the specific indication for my consultation. I recommended the patient follow up with their primary care  referring provider to pursue any other symptoms which may be of concern.     Surgical history/trauma: See EMR. He denies any significant current illness or recent hospital admissions. He denies any regional implanted devices.  Imaging:  Results for orders placed or performed in visit on 04/20/22   XR Cervical Spine 2/3 Views    Narrative    CERVICAL SPINE TWO - THREE VIEWS  4/20/2022 3:20 PM     COMPARISON: None.    HISTORY: Wry neck.      Impression    IMPRESSION: Minimal degenerative anterolisthesis of C2 upon C3, C3  upon C4 and C4 upon C5. Alignment is otherwise normal. Vertebral body  heights normal. No evidence for fracture. Large anterior osteophytes  projecting from the anterior inferior corners of the C2, C3, C4, C5  and C6 vertebral bodies.    ASHLEY DANIEL MD         SYSTEM ID:  [id number]     Medications: See EMR    PERTINENT MEDICAL / SURGICAL HISTORY:   Patient Active Problem List   Diagnosis     Colon polyp     Traumatic brain injury (H)     IBS (irritable bowel syndrome)     Dizziness - light-headed     Primary localized osteoarthrosis, ankle and foot     Class 1 obesity due to excess calories with serious comorbidity and body mass index (BMI) of 33.0 to 33.9 in adult     Depression, major     SANDRA (obstructive sleep apnea)- severe (AHI 45)     Chronic gout without tophus, unspecified cause, unspecified site     Type 2 diabetes mellitus without complication, without long-term current use of insulin (H)     Paranoid schizophrenia, chronic condition with acute exacerbation (H)     Hypertension goal BP (blood pressure) < 140/90     Hyperlipidemia LDL goal <70     Past Surgical History:   Procedure Laterality Date     SURGICAL HISTORY OF -       nose       Occupation: works at [employer] in    Social history/Living Environment: lives with spouse. Enjoys socializing with friends, going to [employer]  Current exercise regimen/Recreational activities: none  Possible barriers impacting  outcomes: low baseline physical ability    Patient Self-identified Goal: Yair BRO Crosser would like to help his neck feel better.        OBJECTIVE  Observation: forward head, rounded shoulders and increased thoracic kyphosis  Gait: normal, no asymmetries or obvious deviations  Transfers: normal, no assist required  Screening (regional interdependence): Shoulder screen negative (ROM and overpressure) - noncontributory  Range of Motion: affected side: bilateral  Cervical flexion/extension full but hinges only at mid cervical. Cervical rotation: L 50 deg, R 30 deg.   Neuro Screen:    Myotomes: C4-T1 intact   Dermatomes: C4-T1 intact to light touch   Reflexes: not tested  Other: n/a  Strength: Intact along UE myotomes (C4-T1)  Functional movement: not assessed  Palpation: Tender to palpation at: cervical paraspinals bilateral, upper trap bilateral, levator scapulae left and bilateral and thoracic paraspinals bilateral  Segmental/Joint Mobility Assessment: HYPOmobile to upglides at C1-3, HYPOmobile to central posterior to anterior springing at upper and mid thoracic and HYPOmobile to unilateral posterior to anterior springing at upper and mid thoracic bilaterally  Special Tests: not assessed      ASSESSMENT/PLAN  Patient is a 61 year old male with cervical complaints.    Patient has the following significant findings with corresponding treatment plan.                Diagnosis 1:  Neck pain (Wry neck)    Pain -  hot/cold therapy, manual therapy, self management and education  Decreased ROM/flexibility - manual therapy and therapeutic exercise  Decreased joint mobility - manual therapy and therapeutic exercise  Decreased strength - therapeutic exercise, therapeutic activities and home program  Impaired posture - neuro re-education, therapeutic activities and home program    Therapy Evaluation Codes:   Cumulative Therapy Evaluation is: Low complexity.   Previous and current functional limitations:  (See Goal Flow Sheet  for this information)    Short term and Long term goals: (See Goal Flow Sheet for this information)     Communication ability:  Patient appears to be able to clearly communicate and understand verbal and written communication and follow directions correctly.    Treatment Explanation - The following has been discussed with the patient:   RX ordered/plan of care  Anticipated outcomes  Possible risks and side effects    This patient would benefit from PT intervention to resume normal activities.   Rehab potential is good.    Frequency:  1 X week, once daily  Duration:  for 10 weeks  Discharge Plan:  Achieve all LTG.  Independent in home treatment program.  Reach maximal therapeutic benefit.    Please refer to the daily flowsheet for treatment today, total treatment time and time spent performing 1:1 timed codes.     *Text entry may be via Dragon Dictation software in real-time. Efforts are made to edit for clarity.     Yeimi Lynne, PT, DPT  [facility]

## 2022-05-09 NOTE — PROGRESS NOTES
[facility]    OUTPATIENT Physical Therapy ORTHOPEDIC EVALUATION  PLAN OF TREATMENT FOR OUTPATIENT REHABILITATION  (COMPLETE FOR INITIAL CLAIMS ONLY)  Patient's Last Name, First Name, M.I.  YOB: 1960  Yair Benites    Provider s Name:  [facility]   Medical Record No.  [id number]   Start of Care Date:  05/09/22   Onset Date:   04/20/22 (provider referral)   Type:     _X__PT   ___OT Medical Diagnosis:    Encounter Diagnosis   Name Primary?    Wry neck         Treatment Diagnosis:  neck pain        Goals:     05/09/22 0500   Body Part   Goals listed below are for neck   Other Goal   Activity cervical rotation   Previous Functional Level no restrictions   Performance Level for work   Current Functional Level L 50 deg, R 30 deg with mid and end range pain   STG Target Performance rotation bilaterally symmetrical with end range pain only   Rationale for symptom modulation at work   Due Date 05/30/22   LTG Target Performance Rotation bilaterally symmetrical with no pain   Rationale for function at work   Due Date 07/18/22       Therapy Frequency:  1-2x/week tapering to every other week  Predicted Duration of Therapy Intervention:  10 weeks    ROBERTA GARCIA, PT                 I CERTIFY THE NEED FOR THESE SERVICES FURNISHED UNDER        THIS PLAN OF TREATMENT AND WHILE UNDER MY CARE     (Physician attestation of this document indicates review and certification of the therapy plan).                     Certification Date From:  05/09/22   Certification Date To:  07/18/22    Referring Provider:  Mani Valles    Initial Assessment        See Epic Evaluation SOC Date: 05/09/22

## 2022-05-12 ENCOUNTER — THERAPY VISIT (OUTPATIENT)
Dept: PHYSICAL THERAPY | Facility: CLINIC | Age: 62
End: 2022-05-12
Payer: MEDICARE

## 2022-05-12 DIAGNOSIS — M54.2 NECK PAIN: Primary | ICD-10-CM

## 2022-05-12 PROCEDURE — 97140 MANUAL THERAPY 1/> REGIONS: CPT | Mod: GP | Performed by: PHYSICAL THERAPIST

## 2022-05-12 PROCEDURE — 97110 THERAPEUTIC EXERCISES: CPT | Mod: GP | Performed by: PHYSICAL THERAPIST

## 2022-05-16 ENCOUNTER — THERAPY VISIT (OUTPATIENT)
Dept: PHYSICAL THERAPY | Facility: CLINIC | Age: 62
End: 2022-05-16
Payer: MEDICARE

## 2022-05-16 DIAGNOSIS — M54.2 NECK PAIN: Primary | ICD-10-CM

## 2022-05-16 PROCEDURE — 97110 THERAPEUTIC EXERCISES: CPT | Mod: GP | Performed by: PHYSICAL THERAPIST

## 2022-05-16 PROCEDURE — 97140 MANUAL THERAPY 1/> REGIONS: CPT | Mod: GP | Performed by: PHYSICAL THERAPIST

## 2022-05-22 DIAGNOSIS — K59.09 CHRONIC CONSTIPATION: ICD-10-CM

## 2022-05-23 RX ORDER — POLYETHYLENE GLYCOL 3350 17 G/17G
POWDER, FOR SOLUTION ORAL
Qty: 510 G | Refills: 2 | Status: SHIPPED | OUTPATIENT
Start: 2022-05-23 | End: 2022-10-10

## 2022-05-23 NOTE — TELEPHONE ENCOUNTER
"Requested Prescriptions   Signed Prescriptions Disp Refills    polyethylene glycol (MIRALAX) 17 GM/Dose powder 510 g 2     Sig: MIX 17 GRAMS IN LIQUID AS DIRECTED AND DRINK BY MOUTH ONCE DAILY       Laxatives Protocol Passed - 5/22/2022 12:01 AM        Passed - Patient is age 6 or older        Passed - Recent (12 mo) or future (30 days) visit within the authorizing provider's specialty     Patient has had an office visit with the authorizing provider or a provider within the authorizing providers department within the previous 12 mos or has a future within next 30 days. See \"Patient Info\" tab in inbasket, or \"Choose Columns\" in Meds & Orders section of the refill encounter.              Passed - Medication is active on med list           Fidelia Gandara RN  [facility]     "

## 2022-06-20 ENCOUNTER — TELEPHONE (OUTPATIENT)
Dept: FAMILY MEDICINE | Facility: CLINIC | Age: 62
End: 2022-06-20
Payer: MEDICARE

## 2022-06-20 NOTE — TELEPHONE ENCOUNTER
The Home Care/Assisted Living/Nursing Facility is calling regarding an established patient.  Has the patient seen Home Care in the past or is currently residing in Assisted Living or Nursing Facility? Yes.     Yeimy RITTER calling from [facility] requesting the following orders that are within the Home Care, Assisted Living or Nursing Home Eval and Treatment standing order and can be signed as standing order signature required by RN.    Preferred Call Back Number: [phone]    Home Care Visits Continuation   Skilled nursing 2 times a week x 9 weeks and 2 prn  Called and left detailed message with verbal ok for orders requested per RN protocol.    Any additional Orders:  Are there any orders requested, not stated above, that are outside of the standing order and must be routed to a licensed practitioner for approval?    No    Writer has verified Requestor will send fax to have orders signed.     Unrestricted diet/activity

## 2022-06-20 NOTE — TELEPHONE ENCOUNTER
Yeimy from [facility] [phone] secure line needs skilled nursing 2 x week for 9 weeks with 2 PRN.  Etta Patel,

## 2022-06-27 ENCOUNTER — TELEPHONE (OUTPATIENT)
Dept: FAMILY MEDICINE | Facility: CLINIC | Age: 62
End: 2022-06-27

## 2022-06-27 NOTE — TELEPHONE ENCOUNTER
Reason for Call:  Form, our goal is to have forms completed with 72 hours, however, some forms may require a visit or additional information.    Type of letter, form or note:  Home Health Certification    Who is the form from?: Home care    Where did the form come from: form was faxed in    What clinic location was the form placed at?: [facility]    Where the form was placed: Given to physician    What number is listed as a contact on the form?: [phone]       Call taken on 6/27/2022 at 2:30 PM by Tasha Marcus

## 2022-06-27 NOTE — TELEPHONE ENCOUNTER
Form received and given to Dr. Villegas to sign when she returns to the office on July 5th.  Per [facility] she has to be the one to sign.  Tasha Marcus,

## 2022-07-01 ENCOUNTER — MEDICAL CORRESPONDENCE (OUTPATIENT)
Dept: FAMILY MEDICINE | Facility: CLINIC | Age: 62
End: 2022-07-01

## 2022-07-05 DIAGNOSIS — Z53.9 DIAGNOSIS NOT YET DEFINED: Primary | ICD-10-CM

## 2022-07-05 PROCEDURE — G0179 MD RECERTIFICATION HHA PT: HCPCS | Performed by: FAMILY MEDICINE

## 2022-07-07 ENCOUNTER — MEDICAL CORRESPONDENCE (OUTPATIENT)
Dept: FAMILY MEDICINE | Facility: CLINIC | Age: 62
End: 2022-07-07

## 2022-07-27 DIAGNOSIS — M79.642 PAIN OF LEFT HAND: ICD-10-CM

## 2022-07-27 NOTE — TELEPHONE ENCOUNTER
Reason for Call:  Medication or medication refill:    Do you use a [facility]?  Name of the pharmacy and phone number for the current request:      Name of the medication requested: naproxen (NAPROSYN) 500 MG tablet     Other request: Currently not on medication list    Can we leave a detailed message on this number? YES    Phone number patient can be reached at: Home number on file [phone] (home)    Best Time: n/a    Call taken on 7/27/2022 at 2:15 PM by Chani Paris

## 2022-07-28 RX ORDER — NAPROXEN 500 MG/1
500 TABLET ORAL 2 TIMES DAILY PRN
Qty: 30 TABLET | Refills: 0 | Status: SHIPPED | OUTPATIENT
Start: 2022-07-28 | End: 2022-08-02

## 2022-07-28 NOTE — TELEPHONE ENCOUNTER
"Patient has appt 8/2/22    Requested Prescriptions   Pending Prescriptions Disp Refills     naproxen (NAPROSYN) 500 MG tablet 30 tablet 0     Sig: Take 1 tablet (500 mg) by mouth 2 times daily as needed for moderate pain       NSAID Medications Failed - 7/27/2022  2:24 PM        Failed - Normal ALT on file in past 12 months     Recent Labs   Lab Test 09/22/14  1055   ALT 34             Failed - Normal AST on file in past 12 months     No lab results found.          Failed - Normal CBC on file in past 12 months     Recent Labs   Lab Test 09/11/18  1507   WBC 11.8*   RBC 4.26*   HGB 13.6   HCT 39.4*                    Failed - Medication is active on med list        Passed - Blood pressure under 140/90 in past 12 months     BP Readings from Last 3 Encounters:   05/03/22 126/74   04/20/22 135/78   11/04/21 (!) 153/83                 Passed - Recent (12 mo) or future (30 days) visit within the authorizing provider's specialty     Patient has had an office visit with the authorizing provider or a provider within the authorizing providers department within the previous 12 mos or has a future within next 30 days. See \"Patient Info\" tab in inbasket, or \"Choose Columns\" in Meds & Orders section of the refill encounter.              Passed - Patient is age 6-64 years        Passed - Normal serum creatinine on file in past 12 months     Recent Labs   Lab Test 03/16/22  0927   CR 0.98       Ok to refill medication if creatinine is low             Fidelia Gandara RN  [facility]     "

## 2022-08-02 ENCOUNTER — OFFICE VISIT (OUTPATIENT)
Dept: FAMILY MEDICINE | Facility: CLINIC | Age: 62
End: 2022-08-02
Payer: MEDICARE

## 2022-08-02 VITALS
OXYGEN SATURATION: 95 % | DIASTOLIC BLOOD PRESSURE: 70 MMHG | HEART RATE: 91 BPM | SYSTOLIC BLOOD PRESSURE: 122 MMHG | WEIGHT: 205.4 LBS | TEMPERATURE: 97.9 F | BODY MASS INDEX: 33.61 KG/M2

## 2022-08-02 DIAGNOSIS — E11.9 TYPE 2 DIABETES MELLITUS WITHOUT COMPLICATION, WITHOUT LONG-TERM CURRENT USE OF INSULIN (H): ICD-10-CM

## 2022-08-02 DIAGNOSIS — Z12.11 ENCOUNTER FOR SCREENING COLONOSCOPY: ICD-10-CM

## 2022-08-02 DIAGNOSIS — H61.21 IMPACTED CERUMEN OF RIGHT EAR: ICD-10-CM

## 2022-08-02 DIAGNOSIS — Z12.11 SCREEN FOR COLON CANCER: ICD-10-CM

## 2022-08-02 DIAGNOSIS — I10 HYPERTENSION GOAL BP (BLOOD PRESSURE) < 140/90: ICD-10-CM

## 2022-08-02 DIAGNOSIS — Z23 HIGH PRIORITY FOR 2019-NCOV VACCINE: ICD-10-CM

## 2022-08-02 DIAGNOSIS — E78.5 HYPERLIPIDEMIA LDL GOAL <70: ICD-10-CM

## 2022-08-02 LAB — HBA1C MFR BLD: 7.8 % (ref 0–5.6)

## 2022-08-02 PROCEDURE — 90471 IMMUNIZATION ADMIN: CPT | Performed by: FAMILY MEDICINE

## 2022-08-02 PROCEDURE — 83036 HEMOGLOBIN GLYCOSYLATED A1C: CPT | Performed by: FAMILY MEDICINE

## 2022-08-02 PROCEDURE — 69209 REMOVE IMPACTED EAR WAX UNI: CPT | Mod: RT | Performed by: FAMILY MEDICINE

## 2022-08-02 PROCEDURE — 90677 PCV20 VACCINE IM: CPT | Performed by: FAMILY MEDICINE

## 2022-08-02 PROCEDURE — 36415 COLL VENOUS BLD VENIPUNCTURE: CPT | Performed by: FAMILY MEDICINE

## 2022-08-02 PROCEDURE — 90715 TDAP VACCINE 7 YRS/> IM: CPT | Performed by: FAMILY MEDICINE

## 2022-08-02 PROCEDURE — 99214 OFFICE O/P EST MOD 30 MIN: CPT | Mod: 25 | Performed by: FAMILY MEDICINE

## 2022-08-02 PROCEDURE — G0009 ADMIN PNEUMOCOCCAL VACCINE: HCPCS | Mod: 59 | Performed by: FAMILY MEDICINE

## 2022-08-02 RX ORDER — UBIDECARENONE 100 MG
200 CAPSULE ORAL DAILY
COMMUNITY

## 2022-08-02 RX ORDER — METFORMIN HCL 500 MG
1000 TABLET, EXTENDED RELEASE 24 HR ORAL DAILY
Qty: 180 TABLET | Refills: 1 | Status: SHIPPED | OUTPATIENT
Start: 2022-08-02 | End: 2022-08-05

## 2022-08-02 RX ORDER — OLANZAPINE 5 MG/1
2.5 TABLET ORAL DAILY
COMMUNITY
Start: 2022-08-02 | End: 2023-01-10

## 2022-08-02 ASSESSMENT — PAIN SCALES - GENERAL: PAINLEVEL: NO PAIN (0)

## 2022-08-02 NOTE — PROGRESS NOTES
Assessment & Plan     Type 2 diabetes mellitus without complication, without long-term current use of insulin (H)  Pending labs  - OPTOMETRY REFERRAL; Future  - Albumin Random Urine Quantitative with Creat Ratio; Future  - Hemoglobin A1c; Future  - metFORMIN (GLUCOPHAGE XR) 500 MG 24 hr tablet; Take 2 tablets (1,000 mg) by mouth daily    Hyperlipidemia LDL goal <70  Pending     Hypertension goal BP (blood pressure) < 140/90  Stable     High priority for 2019-nCoV vaccine  Discussed   - COVID-19,PF,PFIZER (12+ Yrs GRAY LABEL)    Encounter for screening colonoscopy  Pt says he is scheduled this month  Cerumen Removal Right ear wash-Pt feels better  TM normal after wash  Return in about 3 months (around 11/2/2022) for Medicare wellness Exam.    Radha Villegas MD  [facility] DAVONTE Mazariegos is a 61 year old, presenting for the following health issues:  Diabetes and Imm/Inj (COVID-19 VACCINE)      HPI     Diabetes Follow-up    How often are you checking your blood sugar? One time daily  What time of day are you checking your blood sugars (select all that apply)?  Before meals  Have you had any blood sugars above 200?  No  Have you had any blood sugars below 70?  no    What symptoms do you notice when your blood sugar is low?  None    What concerns do you have today about your diabetes? None     Do you have any of these symptoms? (Select all that apply)  No numbness or tingling in feet.  No redness, sores or blisters on feet.  No complaints of excessive thirst.  No reports of blurry vision.  No significant changes to weight.    Have you had a diabetic eye exam in the last 12 months? No                Hyperlipidemia Follow-Up      Are you regularly taking any medication or supplement to lower your cholesterol?   Yes- atorvastatin    Are you having muscle aches or other side effects that you think could be caused by your cholesterol lowering medication?  No    Hypertension Follow-up-Patient is  not taking medication.       Do you check your blood pressure regularly outside of the clinic? No     Are you following a low salt diet? No    Are your blood pressures ever more than 140 on the top number (systolic) OR more   than 90 on the bottom number (diastolic), for example 140/90? No    BP Readings from Last 2 Encounters:   08/02/22 122/70   05/03/22 126/74     Hemoglobin A1C (%)   Date Value   04/19/2022 8.7 (H)   10/19/2021 7.2 (H)   03/02/2021 6.4 (H)   11/19/2019 5.8 (H)     LDL Cholesterol Calculated (mg/dL)   Date Value   10/19/2021 67   03/02/2021 56   11/19/2019 46         How many servings of fruits and vegetables do you eat daily?  3    On average, how many sweetened beverages do you drink each day (Examples: soda, juice, sweet tea, etc.  Do NOT count diet or artificially sweetened beverages)?   0-1    How many days per week do you exercise enough to make your heart beat faster? 4    How many minutes a day do you exercise enough to make your heart beat faster? 60 or more    How many days per week do you miss taking your medication? 0  Review of Systems   CONSTITUTIONAL: NEGATIVE for fever, chills, change in weight  ENT/MOUTH: NEGATIVE for ear, mouth and throat problems  RESP: NEGATIVE for significant cough or SOB  CV: NEGATIVE for chest pain, palpitations or peripheral edema  GI: NEGATIVE for nausea, abdominal pain, heartburn, or change in bowel habits  PSYCHIATRIC: NEGATIVE for changes in mood or affect  ROS otherwise negative      Objective    /70   Pulse 91   Temp 97.9  F (36.6  C) (Temporal)   Wt 93.2 kg (205 lb 6.4 oz)   SpO2 95%   BMI 33.61 kg/m    Body mass index is 33.61 kg/m .  Physical Exam   GENERAL: healthy, alert and no distress  EYES: Eyes grossly normal to inspection  Rigt ear canal wax, left TM are normal   NECK: no adenopathy, no asymmetry, masses, or scars and thyroid normal to palpation  RESP: lungs clear to auscultation - no rales, rhonchi or wheezes  CV: regular rate  and rhythm, normal S1 S2, no S3 or S4, no murmur, click or rub, no peripheral edema and peripheral pulses strong  ABDOMEN: soft, nontender, no hepatosplenomegaly, no masses and bowel sounds normal  MS: no gross musculoskeletal defects noted, no edema  SKIN: no suspicious lesions or rashes  PSYCH: mentation appears normal    Pending               .  ..

## 2022-08-03 ENCOUNTER — TELEPHONE (OUTPATIENT)
Dept: FAMILY MEDICINE | Facility: CLINIC | Age: 62
End: 2022-08-03

## 2022-08-03 DIAGNOSIS — E11.9 TYPE 2 DIABETES MELLITUS WITHOUT COMPLICATION, WITHOUT LONG-TERM CURRENT USE OF INSULIN (H): ICD-10-CM

## 2022-08-03 NOTE — TELEPHONE ENCOUNTER
Attempted to call pt, left vm asking for call back.     Thanks,  RIYA Mistry  [facility]

## 2022-08-03 NOTE — TELEPHONE ENCOUNTER
----- Message from Radha Villegas MD sent at 8/3/2022 10:34 AM CDT -----  Please add jardiance 10 mg daily for better Diabetic control  Drink Lots of water with this to Prevent side effects  If any genital Itching/pain or toe pain let me Know  Sincerely,  Radha Villegas MD

## 2022-08-04 NOTE — TELEPHONE ENCOUNTER
Attempted to call patient via mobile number, voicemail unavailable. Able to reach patient via home number. Patient notified of provider's message as written. Patient verbalized understanding, but states that he does not want to take any additional medication at this time.     Roz Navarro RN   [facility]

## 2022-08-04 NOTE — TELEPHONE ENCOUNTER
Left message on answering machine for patient to call back to the nurse at [phone].    Veena Lagunas RN  [facility]

## 2022-08-05 RX ORDER — METFORMIN HCL 500 MG
TABLET, EXTENDED RELEASE 24 HR ORAL DAILY
Qty: 180 TABLET | Status: CANCELLED | OUTPATIENT
Start: 2022-08-05

## 2022-08-05 RX ORDER — METFORMIN HCL 500 MG
1500 TABLET, EXTENDED RELEASE 24 HR ORAL DAILY
Qty: 270 TABLET | Refills: 1 | Status: SHIPPED | OUTPATIENT
Start: 2022-08-05 | End: 2023-03-06

## 2022-08-05 NOTE — TELEPHONE ENCOUNTER
Patient notified of provider's message as written. Patient verbalized understanding. Would be willing to increase Metformin dose. Order pended.     Roz Navarro RN   [facility]

## 2022-08-08 ENCOUNTER — TELEPHONE (OUTPATIENT)
Dept: FAMILY MEDICINE | Facility: CLINIC | Age: 62
End: 2022-08-08

## 2022-08-08 NOTE — TELEPHONE ENCOUNTER
Yeimy, nurse with [facility] home care calling and can be reached at [phone]. States pt has Jardiance in the home and pt reports not receiving a call regarding medication. Advised that pt was contacted regarding Jardiance. Please see below message.     Should pt still take the Jardiance? Pt was fine with adding this.   Should he also increase Metformin to 1500mg daily? If so, is it 2 tabs (1000mg) in am and 1 (500mg)at night or 3 tabs (1500mg) in am? Please advise.      Andressa Cornelius RN  [facility]

## 2022-08-09 NOTE — TELEPHONE ENCOUNTER
Detailed voicemail left for RIYA Gaffney with [facility] with the information below.     LAVELLE Johnson RN  [facility]

## 2022-08-09 NOTE — TELEPHONE ENCOUNTER
Please see result note  Pt declined to take Jardiance so we increased metformin to 1500 mg per day

## 2022-08-09 NOTE — TELEPHONE ENCOUNTER
Should pt take Metformin 1000mg in am and 500mg at night or 1500mg in am?    Andressa Cornelius RN  [facility]

## 2022-08-15 ENCOUNTER — TRANSFERRED RECORDS (OUTPATIENT)
Dept: MULTI SPECIALTY CLINIC | Facility: CLINIC | Age: 62
End: 2022-08-15

## 2022-08-15 LAB — RETINOPATHY: NORMAL

## 2022-08-15 NOTE — TELEPHONE ENCOUNTER
Patient called the clinic inquiring about his lab results.  Patient was informed of the provider's recommendations.  Patient verbalized understanding and has no further questions or concerns at this time.      Radha Villegas MD   8/3/2022 10:34 AM CDT         Please add jardiance 10 mg daily for better Diabetic control  Drink Lots of water with this to Prevent side effects  If any genital Itching/pain or toe pain let me Know  Sincerely,  Radha Villegas MD

## 2022-08-22 ENCOUNTER — TELEPHONE (OUTPATIENT)
Dept: FAMILY MEDICINE | Facility: CLINIC | Age: 62
End: 2022-08-22

## 2022-08-22 NOTE — TELEPHONE ENCOUNTER
Yeimy RITTER with [facility] called the clinic requesting verbal orders for home care recertification Skilled nursin time every other week X 60 days for medication management.    Gave okay for verbal orders for Home care Skilled nursing as requested.    Per [facility] protocol/Policies: Genesis Larose RN for Dr. Villegas.  Patient who has bee seen by [facility] primary care provider within the past 2 years (22)

## 2022-08-24 ENCOUNTER — TELEPHONE (OUTPATIENT)
Dept: FAMILY MEDICINE | Facility: CLINIC | Age: 62
End: 2022-08-24

## 2022-08-24 NOTE — TELEPHONE ENCOUNTER
Fidelia, I haven't tried to reach Osmani and I don't see that anyone else from my team has tried to reach him either.     Maylin Laguerre RD LD [facility]

## 2022-08-24 NOTE — TELEPHONE ENCOUNTER
Diabetic Ed/ Maylin RAO,    Patient called clinic stated he missed a phone call but doesn't know why and stated no voice mail. I am not seeing any recent encounters from the primary clinic. Wondering if pt missed call from diabetic ed and he stated he has not had a chance to schedule yet?    Thanks,  Fidelia RN  [facility]

## 2022-08-24 NOTE — TELEPHONE ENCOUNTER
"Unable to determine who called patient from clinic. No encounters noted.     Spoke with pt and notified of this. Asked pt if he has any concerns or symptoms and per pt \"no\". Told pt to please call back with any concerns, pt agreeable.     Thanks,  RIYA Mistry  [facility]     "

## 2022-09-08 ENCOUNTER — TELEPHONE (OUTPATIENT)
Dept: FAMILY MEDICINE | Facility: CLINIC | Age: 62
End: 2022-09-08

## 2022-09-08 NOTE — TELEPHONE ENCOUNTER
Form received and given to Dr. Villegas to sign when she returns on Monday, September 12th.  Tasha Marcus,

## 2022-09-08 NOTE — TELEPHONE ENCOUNTER
Reason for Call:  Form, our goal is to have forms completed with 72 hours, however, some forms may require a visit or additional information.    Type of letter, form or note:  Home Health Certification    Who is the form from?: Home care    Where did the form come from: form was faxed in    What clinic location was the form placed at?: [facility]    Where the form was placed: Given to physician    What number is listed as a contact on the form?: [phone]       Call taken on 9/8/2022 at 8:16 AM by Tasha Marcus

## 2022-09-10 ENCOUNTER — TRANSFERRED RECORDS (OUTPATIENT)
Dept: HEALTH INFORMATION MANAGEMENT | Facility: CLINIC | Age: 62
End: 2022-09-10

## 2022-09-10 LAB — RETINOPATHY: NORMAL

## 2022-09-19 DIAGNOSIS — E11.9 TYPE 2 DIABETES MELLITUS WITHOUT COMPLICATION, WITHOUT LONG-TERM CURRENT USE OF INSULIN (H): ICD-10-CM

## 2022-09-19 DIAGNOSIS — Z53.9 DIAGNOSIS NOT YET DEFINED: Primary | ICD-10-CM

## 2022-09-19 PROCEDURE — G0179 MD RECERTIFICATION HHA PT: HCPCS | Performed by: FAMILY MEDICINE

## 2022-09-21 RX ORDER — METFORMIN HCL 500 MG
TABLET, EXTENDED RELEASE 24 HR ORAL
Qty: 180 TABLET | OUTPATIENT
Start: 2022-09-21

## 2022-10-05 ENCOUNTER — TELEPHONE (OUTPATIENT)
Dept: FAMILY MEDICINE | Facility: CLINIC | Age: 62
End: 2022-10-05

## 2022-10-05 NOTE — TELEPHONE ENCOUNTER
Patients wife is calling, and they are wondering if patient is supposed to take Jardiance along with his other diabetes med.    Advised per lab result notes below:   Radha Villegas MD   8/3/2022 10:34 AM CDT         Please add jardiance 10 mg daily for better Diabetic control  Drink Lots of water with this to Prevent side effects  If any genital Itching/pain or toe pain let me Know  Sincerely,  Radha Villegas MD         Patient stated understanding and agreeable with the plan of care.     Emeli RN,BSN  Triage Nurse  [facility]: [facility]

## 2022-10-08 DIAGNOSIS — K59.09 CHRONIC CONSTIPATION: ICD-10-CM

## 2022-10-10 RX ORDER — POLYETHYLENE GLYCOL 3350 17 G/17G
POWDER, FOR SOLUTION ORAL
Qty: 510 G | Refills: 2 | Status: SHIPPED | OUTPATIENT
Start: 2022-10-10 | End: 2022-11-29

## 2022-10-17 ENCOUNTER — TELEPHONE (OUTPATIENT)
Dept: FAMILY MEDICINE | Facility: CLINIC | Age: 62
End: 2022-10-17

## 2022-10-17 NOTE — TELEPHONE ENCOUNTER
"Called Yeimy, Home care nurse and left voicemail.     Called and spoke with patient and gave the following message.  \"Please take metformi as advised   Hold jardiance   Follow up 3 months   Sincerely,   Radha Villegas MD \"    Rima Hicks RN  [facility]    "

## 2022-10-19 ENCOUNTER — TELEPHONE (OUTPATIENT)
Dept: FAMILY MEDICINE | Facility: CLINIC | Age: 62
End: 2022-10-19

## 2022-10-19 NOTE — TELEPHONE ENCOUNTER
Reason for Call:  Form, our goal is to have forms completed with 72 hours, however, some forms may require a visit or additional information.    Type of letter, form or note:  Home Health Certification    Who is the form from?: Home care    Where did the form come from: form was faxed in    What clinic location was the form placed at?: [facility]    Where the form was placed: Given to physician    What number is listed as a contact on the form?: [phone]       Call taken on 10/19/2022 at 12:42 PM by Tasha Marcus

## 2022-10-20 DIAGNOSIS — Z53.9 DIAGNOSIS NOT YET DEFINED: Primary | ICD-10-CM

## 2022-10-20 PROCEDURE — G0179 MD RECERTIFICATION HHA PT: HCPCS | Performed by: FAMILY MEDICINE

## 2022-10-20 NOTE — PROGRESS NOTES
Discharge Note    Progress reporting period is from initial evaluation date (please see noted date below) to May 16, 2022.  Linked Episodes   Type: Episode: Status: Noted: Resolved: Last update: Updated by:   PHYSICAL THERAPY neck pain 529329 Active 5/9/2022 5/16/2022  1:25 PM Yeimi Lynne, PT      Comments:       Yair failed to follow up and current status is unknown.  Please see information below for last relevant information on current status.  Patient seen for 3 visits.    SUBJECTIVE  Subjective changes noted by patient:  Reports symptoms are about the same. He would like to change the quadruped exercise  .  Current pain level is  .     Previous pain level was  4/10.   Changes in function:  Yes (See Goal flowsheet attached for changes in current functional level)  Adverse reaction to treatment or activity: None    OBJECTIVE  Changes noted in objective findings: from 5/12 - Cervical rotation: L 60 with end range pain, R 40 with mild end range pain     ASSESSMENT/PLAN  Diagnosis: neck pain   Updated problem list and treatment plan:   Pain - HEP  STG/LTGs have been met or progress has been made towards goals:  Yes, please see goal flowsheet for most current information  Assessment of Progress: current status is unknown.    Last current status: Pt is progressing as expected   Self Management Plans:  HEP  I have re-evaluated this patient and find that the nature, scope, duration and intensity of the therapy is appropriate for the medical condition of the patient.  Yair continues to require the following intervention to meet STG and LTG's:  HEP.    Recommendations:  Discharge with current home program.  Patient to follow up with MD as needed.    Please refer to the daily flowsheet for treatment today, total treatment time and time spent performing 1:1 timed codes.

## 2022-12-12 ENCOUNTER — TELEPHONE (OUTPATIENT)
Dept: FAMILY MEDICINE | Facility: CLINIC | Age: 62
End: 2022-12-12

## 2022-12-12 NOTE — TELEPHONE ENCOUNTER
polyethylene glycol (MIRALAX) 17 GM/Dose powder 510 g 2 11/29/2022     PA is needed. Plan does not cover this medication. Please call plan at [phone] to initiate PA or call/fax pharmacy to change medication. Patient ID # is [id number].

## 2022-12-14 NOTE — TELEPHONE ENCOUNTER
PRIOR AUTHORIZATION DENIED    Medication: polyethylene glycol (MIRALAX) 17 GM/Dose powder    Denial Date: 12/14/2022    Denial Rational:  Per insurance, medication is excluded from patient's benefit plan and will not be covered. Review and appeal are not available because of this exclusion.                Appeal Information:  N/A

## 2022-12-14 NOTE — TELEPHONE ENCOUNTER
Central Prior Authorization Team   Phone: [phone]    PA Initiation    Medication: polyethylene glycol (MIRALAX) 17 GM/Dose powder  Insurance Company: [employer] - Phone [phone] Fax [phone]  Pharmacy Filling the Rx: [id number] - [place], [place] - [address] AT [facility] & [address]  Filling Pharmacy Phone: [phone]  Filling Pharmacy Fax:    Start Date: 12/14/2022

## 2022-12-19 ENCOUNTER — TELEPHONE (OUTPATIENT)
Dept: FAMILY MEDICINE | Facility: CLINIC | Age: 62
End: 2022-12-19

## 2022-12-19 NOTE — TELEPHONE ENCOUNTER
Pt states he will wait until after the 1st of the year to see if his insurance changes and will try to  the medication then. He states he has medication right now to help.    Radha Lacy MA

## 2022-12-19 NOTE — TELEPHONE ENCOUNTER
Called patient and informed of message below, nothing else needed.         Yeimy HAWKINS CMA ([facility])

## 2022-12-19 NOTE — TELEPHONE ENCOUNTER
Received call from patient Sheila with [facility] at the [facility] home care. She just saw patient for home care re-certification and is requesting orders for SN visits once every two weeks for 9 weeks and 2 additional visits as needed. Verbal approval given per request.    Tyesha Blanco RN   [facility]

## 2022-12-29 ENCOUNTER — TELEPHONE (OUTPATIENT)
Dept: FAMILY MEDICINE | Facility: CLINIC | Age: 62
End: 2022-12-29

## 2022-12-29 NOTE — TELEPHONE ENCOUNTER
[facility] received and given to Dr. Villegas to sign when she returns to the office on Monday, January 2nd, 2023.    Tasha Marcus,

## 2022-12-29 NOTE — TELEPHONE ENCOUNTER
Reason for Call:  Form, our goal is to have forms completed with 72 hours, however, some forms may require a visit or additional information.    Type of letter, form or note:  Home Health Certification    Who is the form from?: Home care    Where did the form come from: form was faxed in    What clinic location was the form placed at?: [facility]    Where the form was placed: Given to physician    What number is listed as a contact on the form?: [phone]       Call taken on 12/29/2022 at 8:25 AM by Tasha Marcus

## 2023-01-02 ENCOUNTER — OFFICE VISIT (OUTPATIENT)
Dept: URGENT CARE | Facility: URGENT CARE | Age: 63
End: 2023-01-02
Payer: MEDICARE

## 2023-01-02 VITALS
OXYGEN SATURATION: 98 % | HEART RATE: 94 BPM | SYSTOLIC BLOOD PRESSURE: 152 MMHG | WEIGHT: 205 LBS | DIASTOLIC BLOOD PRESSURE: 92 MMHG | BODY MASS INDEX: 33.54 KG/M2 | TEMPERATURE: 97.9 F

## 2023-01-02 DIAGNOSIS — E11.9 TYPE 2 DIABETES MELLITUS WITHOUT COMPLICATION, WITHOUT LONG-TERM CURRENT USE OF INSULIN (H): Chronic | ICD-10-CM

## 2023-01-02 DIAGNOSIS — M54.31 BILATERAL SCIATICA: Primary | ICD-10-CM

## 2023-01-02 DIAGNOSIS — E66.09 CLASS 1 OBESITY DUE TO EXCESS CALORIES WITH SERIOUS COMORBIDITY AND BODY MASS INDEX (BMI) OF 33.0 TO 33.9 IN ADULT: Chronic | ICD-10-CM

## 2023-01-02 DIAGNOSIS — E66.811 CLASS 1 OBESITY DUE TO EXCESS CALORIES WITH SERIOUS COMORBIDITY AND BODY MASS INDEX (BMI) OF 33.0 TO 33.9 IN ADULT: Chronic | ICD-10-CM

## 2023-01-02 DIAGNOSIS — M54.32 BILATERAL SCIATICA: Primary | ICD-10-CM

## 2023-01-02 PROCEDURE — 99213 OFFICE O/P EST LOW 20 MIN: CPT | Performed by: PHYSICIAN ASSISTANT

## 2023-01-02 RX ORDER — PREDNISONE 20 MG/1
40 TABLET ORAL DAILY
Qty: 10 TABLET | Refills: 0 | Status: SHIPPED | OUTPATIENT
Start: 2023-01-02 | End: 2023-01-07

## 2023-01-02 NOTE — PATIENT INSTRUCTIONS
[facility]  [address] [place]  [phone]      [facility] [place] [address] [address], [place]  [phone]

## 2023-01-04 ENCOUNTER — TELEPHONE (OUTPATIENT)
Dept: FAMILY MEDICINE | Facility: CLINIC | Age: 63
End: 2023-01-04

## 2023-01-04 NOTE — TELEPHONE ENCOUNTER
Reason for Call:  Form, our goal is to have forms completed with 72 hours, however, some forms may require a visit or additional information.    Type of letter, form or note:  Home Health Certification    Who is the form from?: Home care    Where did the form come from: form was faxed in    What clinic location was the form placed at?: [facility]    Where the form was placed: Given to physician    What number is listed as a contact on the form?: [phone]       Additional comments:     Call taken on 1/4/2023 at 9:44 AM by ANTONI VELASQUEZ

## 2023-01-10 ENCOUNTER — ANCILLARY PROCEDURE (OUTPATIENT)
Dept: GENERAL RADIOLOGY | Facility: CLINIC | Age: 63
End: 2023-01-10
Attending: FAMILY MEDICINE
Payer: MEDICARE

## 2023-01-10 ENCOUNTER — OFFICE VISIT (OUTPATIENT)
Dept: FAMILY MEDICINE | Facility: CLINIC | Age: 63
End: 2023-01-10
Payer: MEDICARE

## 2023-01-10 VITALS
BODY MASS INDEX: 32.3 KG/M2 | HEIGHT: 66 IN | OXYGEN SATURATION: 100 % | WEIGHT: 201 LBS | TEMPERATURE: 98.4 F | HEART RATE: 51 BPM | DIASTOLIC BLOOD PRESSURE: 78 MMHG | SYSTOLIC BLOOD PRESSURE: 118 MMHG

## 2023-01-10 DIAGNOSIS — Z12.11 SCREEN FOR COLON CANCER: ICD-10-CM

## 2023-01-10 DIAGNOSIS — Z23 NEED FOR PROPHYLACTIC VACCINATION AND INOCULATION AGAINST INFLUENZA: ICD-10-CM

## 2023-01-10 DIAGNOSIS — Z23 HIGH PRIORITY FOR 2019-NCOV VACCINE: ICD-10-CM

## 2023-01-10 DIAGNOSIS — M54.50 LOW BACK PAIN WITHOUT SCIATICA, UNSPECIFIED BACK PAIN LATERALITY, UNSPECIFIED CHRONICITY: ICD-10-CM

## 2023-01-10 DIAGNOSIS — M54.50 LOW BACK PAIN WITHOUT SCIATICA, UNSPECIFIED BACK PAIN LATERALITY, UNSPECIFIED CHRONICITY: Primary | ICD-10-CM

## 2023-01-10 PROCEDURE — 72100 X-RAY EXAM L-S SPINE 2/3 VWS: CPT | Mod: TC | Performed by: RADIOLOGY

## 2023-01-10 PROCEDURE — 99213 OFFICE O/P EST LOW 20 MIN: CPT | Mod: 25 | Performed by: FAMILY MEDICINE

## 2023-01-10 PROCEDURE — 90682 RIV4 VACC RECOMBINANT DNA IM: CPT | Performed by: FAMILY MEDICINE

## 2023-01-10 PROCEDURE — G0008 ADMIN INFLUENZA VIRUS VAC: HCPCS | Performed by: FAMILY MEDICINE

## 2023-01-10 PROCEDURE — 91312 COVID-19 VACCINE BIVALENT BOOSTER 12+ (PFIZER): CPT | Performed by: FAMILY MEDICINE

## 2023-01-10 PROCEDURE — 0124A COVID-19 VACCINE BIVALENT BOOSTER 12+ (PFIZER): CPT | Performed by: FAMILY MEDICINE

## 2023-01-10 RX ORDER — NABUMETONE 500 MG/1
500 TABLET, FILM COATED ORAL 2 TIMES DAILY
Qty: 30 TABLET | Refills: 0 | Status: SHIPPED | OUTPATIENT
Start: 2023-01-10 | End: 2023-02-09

## 2023-01-10 ASSESSMENT — PAIN SCALES - GENERAL: PAINLEVEL: SEVERE PAIN (7)

## 2023-01-10 NOTE — PROGRESS NOTES
"  Assessment & Plan     Low back pain without sciatica, unspecified back pain laterality, unspecified chronicity  S/p Fall  Advised PT  SEE [facility] care orders  The potential side effects of this medication have been discussed with the patient.  Call if any significant problems with these are experienced.  Follow up 1 month/sooner if worse    - Physical Therapy Referral; Future  - nabumetone (RELAFEN) 500 MG tablet; Take 1 tablet (500 mg) by mouth 2 times daily  - XR Lumbar Spine 2/3 Views; Future    Screen for colon cancer  Pt is scheduled for colonoscopy    High priority for 2019-nCoV vaccine  Advised   - COVID-19,PF,PFIZER BOOSTER BIVALENT 12+Yrs  Advised Flu vaccineBMI:       Return in about 3 weeks (around 1/31/2023) for Physical Exam, Diabetes.    Radha Villegas MD  [facility] DAVONTE Mazariegos is a 62 year old, presenting for the following health issues:  Tailbone Pain (X 3 weeks) and Imm/Inj (COVID-19 VACCINE)      HPI     ED/UC Followup:    Facility:  [facility]  Date of visit: 01/02/23  Reason for visit: tailbone pain  Current Status: Still having pain  It is better  Hurts when he lays down flat  No radiation pain to legs   Pertinent negatives include no fever, no numbness, no abdominal pain, no abdominal swelling, no bowel incontinence, no perianal numbness, no bladder incontinence, no dysuria, no leg pain, no paresthesias, no paresis, no tingling and no weakness.  Pt slipped on Ice around [place] and also when he went [place]-December 23rd  Review of Systems   CONSTITUTIONAL: NEGATIVE for fever, chills, change in weight  RESP: NEGATIVE for significant cough or SOB  CV: NEGATIVE for chest pain, palpitations or peripheral edema  GI: NEGATIVE for nausea, abdominal pain, heartburn, or change in bowel habits  MUSCULOSKELETAL: as above  ROS otherwise negative      Objective    /78   Pulse 51   Temp 98.4  F (36.9  C) (Temporal)   Ht 1.676 m (5' 6\")   Wt " 91.2 kg (201 lb)   SpO2 100%   BMI 32.44 kg/m    Body mass index is 32.44 kg/m .  Physical Exam   GENERAL: healthy, alert and no distress  RESP: lungs clear to auscultation - no rales, rhonchi or wheezes  CV: regular rate and rhythm, normal S1 S2, no S3 or S4, no murmur, click or rub, no peripheral edema and peripheral pulses strong  ABDOMEN: soft, nontender, no hepatosplenomegaly, no masses and bowel sounds normal  MS: no gross musculoskeletal defects noted, no edema  NEURO: Normal strength and tone, mentation intact and speech normal  Comprehensive back pain exam:  Tenderness of lower back-mild, Pain limits the following motions: has pain wit Flexion Lower back, Lower extremity strength functional and equal on both sides, Lower extremity reflexes within normal limits bilaterally and Straight leg raise negative bilaterally

## 2023-01-11 ENCOUNTER — TELEPHONE (OUTPATIENT)
Dept: FAMILY MEDICINE | Facility: CLINIC | Age: 63
End: 2023-01-11

## 2023-01-11 NOTE — TELEPHONE ENCOUNTER
Test Results    Contacts       Type Contact Phone/Fax    01/11/2023 11:20 AM CST Phone (Incoming) Yair Benites (Self) [phone] (M)          Who ordered the test:  Dr. Villegas    Type of test: X-ray    Date of test:  01/10/23    Where was the test performed:  [facility]     What are your questions/concerns?:  Patient is calling for xray result, I informed patient at this time results are not in yet but will call him when they are    Okay to leave a detailed message?: Yes at Cell number on file:    Telephone Information:   Mobile [phone]     Jacqueline Baptiste   [facility]  Central Scheduler

## 2023-01-12 NOTE — TELEPHONE ENCOUNTER
Patient and wife called and spoke with scheduling, patient's mobile phone does not receive messages and would like main contact to be home phone, scheduling helped with this.     Patient and wife seeking Xray results, writer relayed message and recommendations and gave PT referral number, stated understanding.    HAWK KathleenN RN  [facility]

## 2023-01-12 NOTE — TELEPHONE ENCOUNTER
"Called patient, left message to call back at [phone]. Calling to relay result notes and recommendations below:    X-Ray Results: Xray shows Mild to moderate arthritis  Sincerely,  Radha Villegas MD    \"Please see result note and call Pt  Recommend PT  Follow up 1 month if not better\"  Dr. Radha Villegas     PT Referral #: [phone]    LAVELLE Kathleen RN  [facility]    "

## 2023-01-17 ENCOUNTER — TRANSFERRED RECORDS (OUTPATIENT)
Dept: HEALTH INFORMATION MANAGEMENT | Facility: CLINIC | Age: 63
End: 2023-01-17
Payer: MEDICARE

## 2023-01-19 ENCOUNTER — TELEPHONE (OUTPATIENT)
Dept: FAMILY MEDICINE | Facility: CLINIC | Age: 63
End: 2023-01-19
Payer: MEDICARE

## 2023-01-19 NOTE — TELEPHONE ENCOUNTER
"Received call from patient. He is looking for results from x-ray done 01/10/2023. Relayed result message. Pt replied, \"I fell twice on the ice, how is there no damage. It hurts.\" Advised that no fracture present per results. Pt verbalized understanding.     \"Xray shows Mild to moderate arthritis.  Sincerely, Radha Villegas MD\"  Radha Villegas MD 1/11/2023  5:32 PM CST    Tyesha Blanco RN   [facility]  "

## 2023-01-23 ENCOUNTER — MEDICAL CORRESPONDENCE (OUTPATIENT)
Dept: HEALTH INFORMATION MANAGEMENT | Facility: CLINIC | Age: 63
End: 2023-01-23
Payer: MEDICARE

## 2023-02-09 DIAGNOSIS — M54.50 LOW BACK PAIN WITHOUT SCIATICA, UNSPECIFIED BACK PAIN LATERALITY, UNSPECIFIED CHRONICITY: ICD-10-CM

## 2023-02-09 RX ORDER — NABUMETONE 500 MG/1
TABLET, FILM COATED ORAL
Qty: 30 TABLET | Refills: 0 | Status: SHIPPED | OUTPATIENT
Start: 2023-02-09 | End: 2023-05-01

## 2023-02-14 ENCOUNTER — TELEPHONE (OUTPATIENT)
Dept: FAMILY MEDICINE | Facility: CLINIC | Age: 63
End: 2023-02-14

## 2023-02-14 NOTE — TELEPHONE ENCOUNTER
Yeimy ([facility] #[phone]) calling to inform provider patient is not taking prescribed Jardiance.    Yeimy requesting skilled nursing visits 1x every 2 weeks for 2 months and 2 prn visits.    Verbal orders approved; Orders approved per [facility] standing orders/policy for Home Care, Assisted Living or Nursing Home Evaluations and Treatments; Yeimy verbalized understanding.    Called patient; states he is not taking jardiance because he is on too many medications (never started); patient is taking metformin.     Rescheduled patient's wellness exam to 3/06/2023 (originally later that week; patient can only have Monday appointments d/t work).    Patient was sleeping and wanted to get back to sleep.    Routing to PCP.    Shelby Leslie RN

## 2023-02-16 ENCOUNTER — TELEPHONE (OUTPATIENT)
Dept: FAMILY MEDICINE | Facility: CLINIC | Age: 63
End: 2023-02-16
Payer: MEDICARE

## 2023-02-16 DIAGNOSIS — Z53.9 DIAGNOSIS NOT YET DEFINED: Primary | ICD-10-CM

## 2023-02-16 PROCEDURE — G0179 MD RECERTIFICATION HHA PT: HCPCS | Performed by: FAMILY MEDICINE

## 2023-02-16 NOTE — TELEPHONE ENCOUNTER
Reason for Call:  Form, our goal is to have forms completed with 72 hours, however, some forms may require a visit or additional information.    Type of letter, form or note:  Home Health Certification    Who is the form from?: Home care    Where did the form come from: form was faxed in    What clinic location was the form placed at?: [facility]    Where the form was placed: Given to physician    What number is listed as a contact on the form?: [phone]       Call taken on 2/16/2023 at 8:09 AM by Tasha Marcus

## 2023-03-06 ENCOUNTER — OFFICE VISIT (OUTPATIENT)
Dept: FAMILY MEDICINE | Facility: CLINIC | Age: 63
End: 2023-03-06
Payer: MEDICARE

## 2023-03-06 VITALS
OXYGEN SATURATION: 96 % | BODY MASS INDEX: 32.56 KG/M2 | WEIGHT: 202.6 LBS | SYSTOLIC BLOOD PRESSURE: 134 MMHG | HEIGHT: 66 IN | TEMPERATURE: 98.1 F | HEART RATE: 80 BPM | DIASTOLIC BLOOD PRESSURE: 87 MMHG

## 2023-03-06 DIAGNOSIS — I10 HYPERTENSION GOAL BP (BLOOD PRESSURE) < 140/90: ICD-10-CM

## 2023-03-06 DIAGNOSIS — E11.9 TYPE 2 DIABETES MELLITUS WITHOUT COMPLICATION, WITHOUT LONG-TERM CURRENT USE OF INSULIN (H): ICD-10-CM

## 2023-03-06 DIAGNOSIS — K63.5 POLYP OF COLON, UNSPECIFIED PART OF COLON, UNSPECIFIED TYPE: ICD-10-CM

## 2023-03-06 DIAGNOSIS — M1A.9XX0 CHRONIC GOUT WITHOUT TOPHUS, UNSPECIFIED CAUSE, UNSPECIFIED SITE: ICD-10-CM

## 2023-03-06 DIAGNOSIS — F32.9 MAJOR DEPRESSIVE DISORDER WITH SINGLE EPISODE, REMISSION STATUS UNSPECIFIED: ICD-10-CM

## 2023-03-06 DIAGNOSIS — Z00.00 ENCOUNTER FOR MEDICARE ANNUAL WELLNESS EXAM: Primary | ICD-10-CM

## 2023-03-06 DIAGNOSIS — G47.33 OSA (OBSTRUCTIVE SLEEP APNEA): Chronic | ICD-10-CM

## 2023-03-06 DIAGNOSIS — Z12.5 SCREENING FOR PROSTATE CANCER: ICD-10-CM

## 2023-03-06 DIAGNOSIS — F20.0 PARANOID SCHIZOPHRENIA, CHRONIC CONDITION WITH ACUTE EXACERBATION (H): ICD-10-CM

## 2023-03-06 DIAGNOSIS — S06.9X0S TRAUMATIC BRAIN INJURY, WITHOUT LOSS OF CONSCIOUSNESS, SEQUELA (H): ICD-10-CM

## 2023-03-06 DIAGNOSIS — E78.5 HYPERLIPIDEMIA LDL GOAL <70: ICD-10-CM

## 2023-03-06 LAB — HBA1C MFR BLD: 7.9 % (ref 0–5.6)

## 2023-03-06 PROCEDURE — 99213 OFFICE O/P EST LOW 20 MIN: CPT | Mod: 25 | Performed by: FAMILY MEDICINE

## 2023-03-06 PROCEDURE — 36415 COLL VENOUS BLD VENIPUNCTURE: CPT | Performed by: FAMILY MEDICINE

## 2023-03-06 PROCEDURE — 82570 ASSAY OF URINE CREATININE: CPT | Performed by: FAMILY MEDICINE

## 2023-03-06 PROCEDURE — 83036 HEMOGLOBIN GLYCOSYLATED A1C: CPT | Performed by: FAMILY MEDICINE

## 2023-03-06 PROCEDURE — 80061 LIPID PANEL: CPT | Performed by: FAMILY MEDICINE

## 2023-03-06 PROCEDURE — 82043 UR ALBUMIN QUANTITATIVE: CPT | Performed by: FAMILY MEDICINE

## 2023-03-06 PROCEDURE — 84550 ASSAY OF BLOOD/URIC ACID: CPT | Performed by: FAMILY MEDICINE

## 2023-03-06 PROCEDURE — 99207 PR ANNUAL WELLNESS VISIT, PPS, SUBSEQUENT STAT: CPT | Performed by: FAMILY MEDICINE

## 2023-03-06 PROCEDURE — G0103 PSA SCREENING: HCPCS | Performed by: FAMILY MEDICINE

## 2023-03-06 PROCEDURE — 80048 BASIC METABOLIC PNL TOTAL CA: CPT | Performed by: FAMILY MEDICINE

## 2023-03-06 RX ORDER — METFORMIN HCL 500 MG
1500 TABLET, EXTENDED RELEASE 24 HR ORAL DAILY
Qty: 270 TABLET | Refills: 1 | Status: SHIPPED | OUTPATIENT
Start: 2023-03-06 | End: 2023-06-06

## 2023-03-06 RX ORDER — ATORVASTATIN CALCIUM 20 MG/1
20 TABLET, FILM COATED ORAL DAILY
Qty: 90 TABLET | Refills: 3 | Status: SHIPPED | OUTPATIENT
Start: 2023-03-06 | End: 2024-04-17

## 2023-03-06 RX ORDER — OLANZAPINE 5 MG/1
2.5 TABLET ORAL AT BEDTIME
COMMUNITY
Start: 2023-02-13

## 2023-03-06 ASSESSMENT — PATIENT HEALTH QUESTIONNAIRE - PHQ9: SUM OF ALL RESPONSES TO PHQ QUESTIONS 1-9: 0

## 2023-03-06 ASSESSMENT — PAIN SCALES - GENERAL: PAINLEVEL: NO PAIN (0)

## 2023-03-06 NOTE — PATIENT INSTRUCTIONS
Patient Education   Personalized Prevention Plan  You are due for the preventive services outlined below.  Your care team is available to assist you in scheduling these services.  If you have already completed any of these items, please share that information with your care team to update in your medical record.  Health Maintenance Due   Topic Date Due     Eye Exam  11/19/2020     Kidney Microalbumin Urine Test  03/02/2022     Annual Wellness Visit  04/27/2022     Cholesterol Lab  10/19/2022     Depression Assessment  10/20/2022     A1C Lab  02/02/2023     Basic Metabolic Panel  03/16/2023

## 2023-03-06 NOTE — PROGRESS NOTES
SUBJECTIVE:   Yair is a 62 year old who presents for Preventive Visit.  Patient has been advised of split billing requirements and indicates understanding: Yes  Are you in the first 12 months of your Medicare coverage?  No    HPI    Have you ever done Advance Care Planning? (For example, a Health Directive, POLST, or a discussion with a medical provider or your loved ones about your wishes): No, advance care planning information given to patient to review.  Advanced care planning was discussed at today's visit.       Fall risk  Fallen 2 or more times in the past year?: No  Any fall with injury in the past year?: No    Cognitive Screening -unable due to TBI and medical issues1) Repeat 3 items (Leader, Season, Table)    2) Clock draw: unable  3) 3 item recall: Recalls 3 objects  Results: 3 items recalled: COGNITIVE IMPAIRMENT LESS LIKELY    Mini-CogTM Copyright S Ahsly. Licensed by the author for use in [facility]; reprinted with permission (richard@[facility]). All rights reserved.      Do you have sleep apnea, excessive snoring or daytime drowsiness?: yes    Reviewed and updated as needed this visit by clinical staff   Tobacco  Allergies  Meds  Problems  Med Hx  Surg Hx  Fam Hx          Reviewed and updated as needed this visit by Provider   Tobacco  Allergies  Meds  Problems  Med Hx  Surg Hx  Fam Hx         Social History     Tobacco Use     Smoking status: Former     Years: 6.00     Types: Cigarettes     Quit date: 1983     Years since quittin.2     Smokeless tobacco: Never   Substance Use Topics     Alcohol use: No         Alcohol Use 2021   Prescreen: >3 drinks/day or >7 drinks/week? No       Diabetes Follow-up      How often are you checking your blood sugar? Not at all    What concerns do you have today about your diabetes? None     Do you have any of these symptoms? (Select all that apply)  No numbness or tingling in feet.  No redness, sores or blisters on feet.  No  complaints of excessive thirst.  No reports of blurry vision.  No significant changes to weight.    Have you had a diabetic eye exam in the last 12 months? Yes- Date of last eye exam: august,2022,  [facility]    Has not been taking Jardiance as he says his wife Told him not to take?      Hyperlipidemia Follow-Up      Are you regularly taking any medication or supplement to lower your cholesterol?   Yes- statins    Are you having muscle aches or other side effects that you think could be caused by your cholesterol lowering medication?  No    Hypertension Follow-up      Do you check your blood pressure regularly outside of the clinic? No     Are you following a low salt diet? Yes    Are your blood pressures ever more than 140 on the top number (systolic) OR more   than 90 on the bottom number (diastolic), for example 140/90? No    BP Readings from Last 2 Encounters:   03/06/23 134/87   01/10/23 118/78     Hemoglobin A1C (%)   Date Value   08/02/2022 7.8 (H)   04/19/2022 8.7 (H)   03/02/2021 6.4 (H)   11/19/2019 5.8 (H)     LDL Cholesterol Calculated (mg/dL)   Date Value   10/19/2021 67   03/02/2021 56   11/19/2019 46         Current providers sharing in care for this patient include:   Patient Care Team:  Radha Villegas MD as PCP - General  Radha Villegas MD as Assigned PCP  Maylin Laguerre RD as Diabetes Educator (Dietitian, Registered)    The following health maintenance items are reviewed in Epic and correct as of today:  Health Maintenance   Topic Date Due     EYE EXAM  11/19/2020     MICROALBUMIN  03/02/2022     MEDICARE ANNUAL WELLNESS VISIT  04/27/2022     LIPID  10/19/2022     PHQ-9  10/20/2022     A1C  02/02/2023     BMP  03/16/2023     ANNUAL REVIEW OF HM ORDERS  04/20/2023     DIABETIC FOOT EXAM  05/03/2023     ADVANCE CARE PLANNING  04/27/2026     COLORECTAL CANCER SCREENING  01/17/2030     DTAP/TDAP/TD IMMUNIZATION (4 - Td or Tdap) 08/02/2032     HEPATITIS C SCREENING  Completed      HIV SCREENING  Completed     DEPRESSION ACTION PLAN  Completed     INFLUENZA VACCINE  Completed     Pneumococcal Vaccine: Pediatrics (0 to 5 Years) and At-Risk Patients (6 to 64 Years)  Completed     ZOSTER IMMUNIZATION  Completed     COVID-19 Vaccine  Completed     IPV IMMUNIZATION  Aged Out     MENINGITIS IMMUNIZATION  Aged Out     Lab work is in process  Labs reviewed in EPIC  BP Readings from Last 3 Encounters:   23 134/87   01/10/23 118/78   23 (!) 152/92    Wt Readings from Last 3 Encounters:   23 91.9 kg (202 lb 9.6 oz)   01/10/23 91.2 kg (201 lb)   23 93 kg (205 lb)                  Patient Active Problem List   Diagnosis     Colon polyp     Traumatic brain injury     IBS (irritable bowel syndrome)     Dizziness - light-headed     Primary localized osteoarthrosis, ankle and foot     Class 1 obesity due to excess calories with serious comorbidity and body mass index (BMI) of 33.0 to 33.9 in adult     Depression, major     SANDRA (obstructive sleep apnea)- severe (AHI 45)     Chronic gout without tophus, unspecified cause, unspecified site     Type 2 diabetes mellitus without complication, without long-term current use of insulin (H)     Paranoid schizophrenia, chronic condition with acute exacerbation (H)     Hypertension goal BP (blood pressure) < 140/90     Hyperlipidemia LDL goal <70     Neck pain     Past Surgical History:   Procedure Laterality Date     SURGICAL HISTORY OF -       nose       Social History     Tobacco Use     Smoking status: Former     Years: 6.00     Types: Cigarettes     Quit date: 1983     Years since quittin.2     Smokeless tobacco: Never   Substance Use Topics     Alcohol use: No     Family History   Problem Relation Age of Onset     C.A.D. Father         MI age 66     Heart Disease Father      Depression Brother      Cancer Brother         Brain Tumor     Respiratory Brother      Depression Brother      Gallbladder Disease Brother      Dementia  Brother      Cancer Brother      Parkinsonism Brother      Diabetes Maternal Uncle      Asthma No family hx of      Hypertension No family hx of          Current Outpatient Medications   Medication Sig Dispense Refill     aspirin 81 MG tablet Take 1 tablet by mouth daily.       atorvastatin (LIPITOR) 20 MG tablet Take 1 tablet (20 mg) by mouth daily 90 tablet 3     co-enzyme Q-10 100 MG CAPS capsule Take 200 mg by mouth daily       CONTOUR NEXT TEST test strip TEST ONCE DAILY 100 strip 3     empagliflozin (JARDIANCE) 10 MG TABS tablet Take 1 tablet (10 mg) by mouth daily 30 tablet 6     Lancet Devices (MICROLET NEXT LANCING DEVICE) MISC USE WITH LANCETS EVERY  each 11     melatonin 5 MG tablet Take 1 tablet (5 mg) by mouth nightly as needed for sleep       metFORMIN (GLUCOPHAGE XR) 500 MG 24 hr tablet Take 3 tablets (1,500 mg) by mouth daily 270 tablet 1     Microlet Lancets MISC USE AS DIRECTED TO TEST BLOOD SUGAR ONCE DAILY 100 each 1     mirtazapine (REMERON) 30 MG tablet Take 30 mg by mouth At Bedtime.       Multiple Vitamins-Minerals (MULTIVITAMIN & MINERAL PO) Take 1 tablet by mouth daily.       order for DME Equipment being ordered:Face mask for CPAP machine size small with tubing and filter. [employer] ([phone]) 1 Device 0     OVER-THE-COUNTER Beta Prostate- Take 1 tablet daily.       polyethylene glycol (MIRALAX) 17 GM/Dose powder MIX 17 GRAMS IN LIQUID AS DIRECED AND DRINK BY MOUTH ONCE DAILY 510 g 2     Vitamin D3 (CHOLECALCIFEROL) 125 MCG (5000 UT) tablet Take by mouth daily       zinc gluconate 50 MG tablet Take 1 tablet (50 mg) by mouth daily       loratadine (CLARITIN) 10 MG tablet Take 1 tablet (10 mg) by mouth daily (Patient not taking: Reported on 3/6/2023)       nabumetone (RELAFEN) 500 MG tablet TAKE 1 TABLET(500 MG) BY MOUTH TWICE DAILY (Patient not taking: Reported on 3/6/2023) 30 tablet 0     OLANZapine (ZYPREXA) 5 MG tablet Take 2.5 mg by mouth At Bedtime    "    Allergies   Allergen Reactions     Erythromycin Nausea     Imipramine Diarrhea     Imipramine Hcl      Toxic levels     Risperidone      Other reaction(s): Extrapyramidal Side Effect     Erythromycin Rash     Recent Labs   Lab Test 08/02/22  1555 04/19/22  0755 03/16/22  0927 10/19/21  1257 03/02/21  1523 11/19/19  1627 05/04/18  1304 01/12/18  1541 11/18/16  1152 03/23/15  0953   A1C 7.8* 8.7*  --  7.2* 6.4* 5.8*   < >  --    < > 6.2*   LDL  --   --   --  67 56 46   < >  --    < > 71   HDL  --   --   --  38* 38* 38*  --   --    < > 39*   TRIG  --   --   --  322* 311* 310*  --   --    < > 206*   CR  --   --  0.98  --  0.89 0.88   < >  --    < >  --    GFRESTIMATED  --   --  87  --  >90 >90   < >  --    < >  --    GFRESTBLACK  --   --   --   --  >90 >90   < >  --    < >  --    POTASSIUM  --   --  4.3  --  3.6 4.0   < >  --    < >  --    TSH  --   --   --   --   --   --   --  1.84  --  2.69    < > = values in this interval not displayed.              Review of Systems  CONSTITUTIONAL: NEGATIVE for fever, chills, change in weight  INTEGUMENTARY/SKIN: NEGATIVE for worrisome rashes, moles or lesions  EYES: NEGATIVE for vision changes or irritation  ENT/MOUTH: NEGATIVE for ear, mouth and throat problems  RESP: NEGATIVE for significant cough or SOB  BREAST: NEGATIVE for masses, tenderness or discharge  CV: NEGATIVE for chest pain, palpitations or peripheral edema  GI: NEGATIVE for nausea, abdominal pain, heartburn, or change in bowel habits  : NEGATIVE for frequency, dysuria, or hematuria  MUSCULOSKELETAL: NEGATIVE for significant arthralgias or myalgia  NEURO: NEGATIVE for weakness, dizziness or paresthesias  ENDOCRINE: NEGATIVE for temperature intolerance, skin/hair changes  HEME: NEGATIVE for bleeding problems  PSYCHIATRIC: NEGATIVE for changes in mood or affect    OBJECTIVE:   /87   Pulse 80   Temp 98.1  F (36.7  C) (Temporal)   Ht 1.678 m (5' 6.06\")   Wt 91.9 kg (202 lb 9.6 oz)   SpO2 96%   BMI 32.64 " "kg/m   Estimated body mass index is 32.64 kg/m  as calculated from the following:    Height as of this encounter: 1.678 m (5' 6.06\").    Weight as of this encounter: 91.9 kg (202 lb 9.6 oz).  Physical Exam  GENERAL: healthy, alert and no distress  EYES: Eyes grossly normal to inspection, PERRL and conjunctivae and sclerae normal  HENT: ear canals and TM's normal, nose and mouth without ulcers or lesions  NECK: no adenopathy, no asymmetry, masses, or scars and thyroid normal to palpation  RESP: lungs clear to auscultation - no rales, rhonchi or wheezes  CV: regular rate and rhythm, normal S1 S2, no S3 or S4, no murmur, click or rub, no peripheral edema and peripheral pulses strong  ABDOMEN: soft, nontender, no hepatosplenomegaly, no masses and bowel sounds normal  MS: no gross musculoskeletal defects noted, no edema  SKIN: no suspicious lesions or rashes  NEURO: Normal strength and tone, mentation intact and speech normal  PSYCH: mentation appears normal, affect normal/bright  Diabetic foot exam: normal DP and PT pulses, no trophic changes or ulcerative lesions and normal sensory exam    Diagnostic Test Results:  Labs reviewed in Epic  Pending     ASSESSMENT / PLAN:   (Z00.00) Encounter for Medicare annual wellness exam  (primary encounter diagnosis)  Comment:   Plan:     (E11.9) Type 2 diabetes mellitus without complication, without long-term current use of insulin (H)  Comment: pending labs will reviewed   Plan: Adult Eye  Referral, Albumin Random         Urine Quantitative with Creat Ratio, Lipid         panel reflex to direct LDL Non-fasting,         HEMOGLOBIN A1C, metFORMIN (GLUCOPHAGE XR) 500         MG 24 hr tablet, empagliflozin (JARDIANCE) 10         MG TABS tablet            (I10) Hypertension goal BP (blood pressure) < 140/90  Comment: stable   Plan: BASIC METABOLIC PANEL            (F20.0) Paranoid schizophrenia, chronic condition with acute exacerbation (H)  Comment: sees specialist  Plan: " "    (S06.9X0S) Traumatic brain injury, without loss of consciousness, sequela (H)  Comment: stable   Plan:     (G47.33) SANDRA (obstructive sleep apnea)- severe (AHI 45)  Comment: on cpap  Plan:     (K63.5) Polyp of colon, unspecified part of colon, unspecified type  Comment: UTD with colonoscopy      (M1A.9XX0) Chronic gout without tophus, unspecified cause, unspecified site  Comment: pending   Plan: Uric acid        Doing well    (E78.5) Hyperlipidemia LDL goal <70  Comment: pending labs   Plan: on statins    (F32.9) Major depressive disorder with single episode, remission status unspecified  Comment: stable   Plan: PHQ9 reviewed           Patient has been advised of split billing requirements and indicates understanding: Yes-handouts      COUNSELING:  Reviewed preventive health counseling, as reflected in patient instructions       Regular exercise       Healthy diet/nutrition       Vision screening       Hearing screening       Dental care       Bladder control       Fall risk prevention       Aspirin prophylaxis        Prostate cancer screening       The 10-year ASCVD risk score (Pao DEMARCO, et al., 2019) is: 24%    Values used to calculate the score:      Age: 62 years      Sex: Male      Is Non- : No      Diabetic: Yes      Tobacco smoker: No      Systolic Blood Pressure: 134 mmHg      Is BP treated: No      HDL Cholesterol: 38 mg/dL      Total Cholesterol: 169 mg/dL      BMI:   Estimated body mass index is 32.64 kg/m  as calculated from the following:    Height as of this encounter: 1.678 m (5' 6.06\").    Weight as of this encounter: 91.9 kg (202 lb 9.6 oz).   Weight management plan: Discussed healthy diet and exercise guidelines      He reports that he quit smoking about 40 years ago. His smoking use included cigarettes. He has never used smokeless tobacco.      Appropriate preventive services were discussed with this patient, including applicable screening as appropriate for " cardiovascular disease, diabetes, osteopenia/osteoporosis, and glaucoma.  As appropriate for age/gender, discussed screening for colorectal cancer, prostate cancer, breast cancer, and cervical cancer. Checklist reviewing preventive services available has been given to the patient.    Reviewed patients plan of care and provided an AVS. The Intermediate Care Plan ( asthma action plan, low back pain action plan, and migraine action plan) for Yair meets the Care Plan requirement. This Care Plan has been established and reviewed with the Patient.      Radha Villegas MD  [facility]    Identified Health Risks:

## 2023-03-06 NOTE — LETTER
March 7, 2023    Yair BRO Kamari  [address] [place] [address]  [facility] [id number]          Dear ,    We are writing to inform you of your test results.  Diabetic test is High   Please take Jardiance as recommended to get Better control of Your Diabetes   Drink Lots of water with this medicine   Electrolytes and kidney tests are normal.   Your urine shows microalbumin which is a sign of early Kidney damage.Good control of Blood pressure  and Diabetes will help decrease this.         Resulted Orders   Albumin Random Urine Quantitative with Creat Ratio   Result Value Ref Range    Creatinine Urine mg/dL 25 mg/dL    Albumin Urine mg/L 7 mg/L    Albumin Urine mg/g Cr 28.00 (H) 0.00 - 17.00 mg/g Cr   Lipid panel reflex to direct LDL Non-fasting   Result Value Ref Range    Cholesterol 146 <200 mg/dL    Triglycerides 371 (H) <150 mg/dL    Direct Measure HDL 37 (L) >=40 mg/dL    LDL Cholesterol Calculated 35 <=100 mg/dL    Non HDL Cholesterol 109 <130 mg/dL    Patient Fasting > 8hrs? Yes     Narrative    Cholesterol  Desirable:  <200 mg/dL    Triglycerides  Normal:  Less than 150 mg/dL  Borderline High:  150-199 mg/dL  High:  200-499 mg/dL  Very High:  Greater than or equal to 500 mg/dL    Direct Measure HDL  Female:  Greater than or equal to 50 mg/dL   Male:  Greater than or equal to 40 mg/dL    LDL Cholesterol  Desirable:  <100mg/dL  Above Desirable:  100-129 mg/dL   Borderline High:  130-159 mg/dL   High:  160-189 mg/dL   Very High:  >= 190 mg/dL    Non HDL Cholesterol  Desirable:  130 mg/dL  Above Desirable:  130-159 mg/dL  Borderline High:  160-189 mg/dL  High:  190-219 mg/dL  Very High:  Greater than or equal to 220 mg/dL   HEMOGLOBIN A1C   Result Value Ref Range    Hemoglobin A1C 7.9 (H) 0.0 - 5.6 %      Comment:      Normal <5.7%   Prediabetes 5.7-6.4%    Diabetes 6.5% or higher     Note: Adopted from ADA consensus guidelines.   BASIC METABOLIC PANEL   Result Value Ref Range    Sodium 137 133 - 144 mmol/L     Potassium 3.9 3.4 - 5.3 mmol/L    Chloride 104 94 - 109 mmol/L    Carbon Dioxide (CO2) 29 20 - 32 mmol/L    Anion Gap 4 3 - 14 mmol/L    Urea Nitrogen 14 7 - 30 mg/dL    Creatinine 0.90 0.66 - 1.25 mg/dL    Calcium 10.0 8.5 - 10.1 mg/dL    Glucose 161 (H) 70 - 99 mg/dL    GFR Estimate >90 >60 mL/min/1.73m2      Comment:      eGFR calculated using 2021 CKD-EPI equation.   Uric acid   Result Value Ref Range    Uric Acid 7.1 3.5 - 7.2 mg/dL   PSA, screen   Result Value Ref Range    Prostate Specific Antigen Screen 0.46 0.00 - 4.00 ug/L       If you have any questions or concerns, please call the clinic at the number listed above.       Sincerely,      Radha Villegas MD

## 2023-03-07 LAB
ANION GAP SERPL CALCULATED.3IONS-SCNC: 4 MMOL/L (ref 3–14)
BUN SERPL-MCNC: 14 MG/DL (ref 7–30)
CALCIUM SERPL-MCNC: 10 MG/DL (ref 8.5–10.1)
CHLORIDE BLD-SCNC: 104 MMOL/L (ref 94–109)
CHOLEST SERPL-MCNC: 146 MG/DL
CO2 SERPL-SCNC: 29 MMOL/L (ref 20–32)
CREAT SERPL-MCNC: 0.9 MG/DL (ref 0.66–1.25)
CREAT UR-MCNC: 25 MG/DL
FASTING STATUS PATIENT QL REPORTED: YES
GFR SERPL CREATININE-BSD FRML MDRD: >90 ML/MIN/1.73M2
GLUCOSE BLD-MCNC: 161 MG/DL (ref 70–99)
HDLC SERPL-MCNC: 37 MG/DL
LDLC SERPL CALC-MCNC: 35 MG/DL
MICROALBUMIN UR-MCNC: 7 MG/L
MICROALBUMIN/CREAT UR: 28 MG/G CR (ref 0–17)
NONHDLC SERPL-MCNC: 109 MG/DL
POTASSIUM BLD-SCNC: 3.9 MMOL/L (ref 3.4–5.3)
PSA SERPL-MCNC: 0.46 UG/L (ref 0–4)
SODIUM SERPL-SCNC: 137 MMOL/L (ref 133–144)
TRIGL SERPL-MCNC: 371 MG/DL
URATE SERPL-MCNC: 7.1 MG/DL (ref 3.5–7.2)

## 2023-03-08 ENCOUNTER — TELEPHONE (OUTPATIENT)
Dept: FAMILY MEDICINE | Facility: CLINIC | Age: 63
End: 2023-03-08
Payer: MEDICARE

## 2023-03-08 NOTE — TELEPHONE ENCOUNTER
"Called patient, left message to call back at [phone]. Called to relay result notes below:    \"Diabetic test is High  Please take Jardiance as recommended to get Better control of Your Diabetes  Drink Lots of water with this medicine  Electrolytes and kidney tests are normal.   Your urine shows microalbumin which is a sign of early Kidney damage.Good control of Blood pressure  and Diabetes will help decrease this.  Sincerely,  Radha Villegas MD\"    Please note, results have been mailed to patient as well.    LAVELLE Kathleen RN  [facility]  "

## 2023-03-08 NOTE — TELEPHONE ENCOUNTER
Patient called back, writer relayed message regarding results. Patient stated understanding and that he has a nurse who sets up his medications for him and he will start the new medication after the next visit with her, states that it will be next week. No further questions.    LAVELLE Kathleen RN  [facility]

## 2023-04-17 ENCOUNTER — TELEPHONE (OUTPATIENT)
Dept: FAMILY MEDICINE | Facility: CLINIC | Age: 63
End: 2023-04-17
Payer: MEDICARE

## 2023-04-17 DIAGNOSIS — Z53.9 DIAGNOSIS NOT YET DEFINED: Primary | ICD-10-CM

## 2023-04-17 PROCEDURE — G0179 MD RECERTIFICATION HHA PT: HCPCS | Performed by: FAMILY MEDICINE

## 2023-04-17 NOTE — TELEPHONE ENCOUNTER
The Home Care/Assisted Living/Nursing Facility is calling regarding an established patient.  Has the patient seen Home Care in the past or is currently residing in Assisted Living or Nursing Facility? Yes.     Sheila calling from Home Care requesting the following orders that are within the Home Care, Assisted Living or Nursing Home Eval and Treatment standing order and can be signed as standing order signature required by RN.    Preferred Call Back Number: [phone], dvm ok    Home Care Visits Continuation  Verbal orders for skilled nursing  One time a week every 2 weeks  For 9 weeks plus 2 prn     For medication set up and health assessment    Any additional Orders:  Are there any orders requested, not stated above, that are outside of the standing order and must be routed to a licensed practitioner for approval?    No    Writer has verified Requestor will send fax to have orders signed.    LAVELLE Kathleen RN  [facility]

## 2023-04-17 NOTE — TELEPHONE ENCOUNTER
Verbal orders for skilled nursing  One time a week every 2 weeks  For 9 weeks pluse 2 prn    For medication set up and health assessment    [phone]

## 2023-04-25 ENCOUNTER — TELEPHONE (OUTPATIENT)
Dept: FAMILY MEDICINE | Facility: CLINIC | Age: 63
End: 2023-04-25
Payer: MEDICARE

## 2023-04-25 NOTE — TELEPHONE ENCOUNTER
Reason for Call:  Form, our goal is to have forms completed with 72 hours, however, some forms may require a visit or additional information.    Type of letter, form or note:  Home Health Certification    Who is the form from?: Home care    Where did the form come from: form was faxed in    What clinic location was the form placed at?: [facility]    Where the form was placed: Given to physician    What number is listed as a contact on the form?: [phone]       Call taken on 4/25/2023 at 4:08 PM by Tasha Marcus

## 2023-05-01 ENCOUNTER — NURSE TRIAGE (OUTPATIENT)
Dept: NURSING | Facility: CLINIC | Age: 63
End: 2023-05-01

## 2023-05-01 ENCOUNTER — OFFICE VISIT (OUTPATIENT)
Dept: FAMILY MEDICINE | Facility: CLINIC | Age: 63
End: 2023-05-01
Payer: MEDICARE

## 2023-05-01 VITALS
OXYGEN SATURATION: 96 % | WEIGHT: 201 LBS | HEART RATE: 96 BPM | DIASTOLIC BLOOD PRESSURE: 88 MMHG | BODY MASS INDEX: 32.38 KG/M2 | TEMPERATURE: 97.7 F | SYSTOLIC BLOOD PRESSURE: 142 MMHG

## 2023-05-01 DIAGNOSIS — H60.90 OTITIS EXTERNA, UNSPECIFIED CHRONICITY, UNSPECIFIED LATERALITY, UNSPECIFIED TYPE: ICD-10-CM

## 2023-05-01 DIAGNOSIS — M25.562 PAIN IN BOTH KNEES, UNSPECIFIED CHRONICITY: ICD-10-CM

## 2023-05-01 DIAGNOSIS — I10 HYPERTENSION GOAL BP (BLOOD PRESSURE) < 140/90: Primary | ICD-10-CM

## 2023-05-01 DIAGNOSIS — F20.0 PARANOID SCHIZOPHRENIA, CHRONIC CONDITION WITH ACUTE EXACERBATION (H): ICD-10-CM

## 2023-05-01 DIAGNOSIS — M25.561 PAIN IN BOTH KNEES, UNSPECIFIED CHRONICITY: ICD-10-CM

## 2023-05-01 DIAGNOSIS — S06.9X0S TRAUMATIC BRAIN INJURY, WITHOUT LOSS OF CONSCIOUSNESS, SEQUELA (H): ICD-10-CM

## 2023-05-01 PROCEDURE — 99214 OFFICE O/P EST MOD 30 MIN: CPT | Performed by: FAMILY MEDICINE

## 2023-05-01 RX ORDER — LOSARTAN POTASSIUM 25 MG/1
25 TABLET ORAL DAILY
Qty: 30 TABLET | Refills: 0 | Status: SHIPPED | OUTPATIENT
Start: 2023-05-01 | End: 2023-05-30

## 2023-05-01 RX ORDER — NEOMYCIN SULFATE, POLYMYXIN B SULFATE, HYDROCORTISONE 3.5; 10000; 1 MG/ML; [USP'U]/ML; MG/ML
3 SOLUTION/ DROPS AURICULAR (OTIC) 4 TIMES DAILY
Qty: 5 ML | Refills: 0 | Status: SHIPPED | OUTPATIENT
Start: 2023-05-01 | End: 2023-05-11

## 2023-05-01 ASSESSMENT — PAIN SCALES - GENERAL: PAINLEVEL: NO PAIN (0)

## 2023-05-01 NOTE — PROGRESS NOTES
Assessment & Plan     Hypertension goal BP (blood pressure) < 140/90  Advised add zozaar  SEE [facility] care orders  The potential side effects of this medication have been discussed with the patient.  Call if any significant problems with these are experienced.  Follow up BP check and Potassium check 2-3 weeks   - losartan (COZAAR) 25 MG tablet; Take 1 tablet (25 mg) by mouth daily    Otitis externa, unspecified chronicity, unspecified laterality, unspecified type  Advised   Wear ear Plugs   - neomycin-polymyxin-hydrocortisone (CORTISPORIN) 3.5-65559-3 otic solution; Place 3 drops in ear(s) 4 times daily    Pain in both knees, unspecified chronicity  Referral done  - Physical Therapy Referral; Future    Traumatic brain injury, without loss of consciousness, sequela (H)  Stable     Paranoid schizophrenia, chronic condition with acute exacerbation (H)  Sees Psych  Right Little Toe nail -the nail is almost Coming out  It was easily removed  Bacitracin applied  Follow up prn sign of redness, pain, infection etc  Keep area clean  Use bacitracin  Wash with soap and water     Radha Villegas MD  [facility] DAVONTE Mazariegos is a 62 year old, presenting for the following health issues:  Diabetes, Hypertension, Ear Problem (Possible swimmers ear.), and Ingrown Toenail        5/1/2023     2:48 PM   Additional Questions   Roomed by Juani FARIAS   Pt says he has been swimming and ears Itch   Wants them checked  Right little Toe nail -has some pain in nail area  He has Bilateral Knee pain Left more than Right  No trauma  It hurts in the back    Hypertension Follow-up      Do you check your blood pressure regularly outside of the clinic? No     Are you following a low salt diet? No    Are your blood pressures ever more than 140 on the top number (systolic) OR more   than 90 on the bottom number (diastolic), for example 140/90? Yes      How many servings of fruits and vegetables do you eat daily?  2-3    On  average, how many sweetened beverages do you drink each day (Examples: soda, juice, sweet tea, etc.  Do NOT count diet or artificially sweetened beverages)?   0  He goes swimming and has Lost some weight        Review of Systems   CONSTITUTIONAL: NEGATIVE for fever, chills, change in weight  ENT/MOUTH: NEGATIVE for ear, mouth and throat problems  RESP: NEGATIVE for significant cough or SOB  CV: NEGATIVE for chest pain, palpitations or peripheral edema  GI: NEGATIVE for nausea, abdominal pain, heartburn, or change in bowel habits  PSYCHIATRIC: NEGATIVE for changes in mood or affect      Objective    BP (!) 142/88   Pulse 96   Temp 97.7  F (36.5  C) (Temporal)   Wt 91.2 kg (201 lb)   SpO2 96%   BMI 32.38 kg/m    Body mass index is 32.38 kg/m .  Physical Exam   GENERAL: healthy, alert and no distress  GENERAL: healthy, alert, no distress and obese  NECK: no adenopathy, no asymmetry, masses, or scars and thyroid normal to palpation  RESP: lungs clear to auscultation - no rales, rhonchi or wheezes  CV: regular rate and rhythm, normal S1 S2, no S3 or S4, no murmur, click or rub, no peripheral edema and peripheral pulses strong  ABDOMEN: soft, nontender, no hepatosplenomegaly, no masses and bowel sounds normal  MS: no gross musculoskeletal defects noted, no edema  SKIN: no suspicious lesions or rashes  NEURO: Normal strength and tone, mentation intact and speech normal  PSYCH: mentation appears normal  Knee Bilateral  -no swelling  Mild tenderness posterior   No mass  No Joint line tenderness     Office Visit on 03/06/2023   Component Date Value Ref Range Status     Creatinine Urine mg/dL 03/06/2023 25  mg/dL Final     Albumin Urine mg/L 03/06/2023 7  mg/L Final     Albumin Urine mg/g Cr 03/06/2023 28.00 (H)  0.00 - 17.00 mg/g Cr Final     Cholesterol 03/06/2023 146  <200 mg/dL Final     Triglycerides 03/06/2023 371 (H)  <150 mg/dL Final     Direct Measure HDL 03/06/2023 37 (L)  >=40 mg/dL Final     LDL Cholesterol  Calculated 03/06/2023 35  <=100 mg/dL Final     Non HDL Cholesterol 03/06/2023 109  <130 mg/dL Final     Patient Fasting > 8hrs? 03/06/2023 Yes   Final     Hemoglobin A1C 03/06/2023 7.9 (H)  0.0 - 5.6 % Final    Normal <5.7%   Prediabetes 5.7-6.4%    Diabetes 6.5% or higher     Note: Adopted from ADA consensus guidelines.     Sodium 03/06/2023 137  133 - 144 mmol/L Final     Potassium 03/06/2023 3.9  3.4 - 5.3 mmol/L Final     Chloride 03/06/2023 104  94 - 109 mmol/L Final     Carbon Dioxide (CO2) 03/06/2023 29  20 - 32 mmol/L Final     Anion Gap 03/06/2023 4  3 - 14 mmol/L Final     Urea Nitrogen 03/06/2023 14  7 - 30 mg/dL Final     Creatinine 03/06/2023 0.90  0.66 - 1.25 mg/dL Final     Calcium 03/06/2023 10.0  8.5 - 10.1 mg/dL Final     Glucose 03/06/2023 161 (H)  70 - 99 mg/dL Final     GFR Estimate 03/06/2023 >90  >60 mL/min/1.73m2 Final    eGFR calculated using 2021 CKD-EPI equation.     Uric Acid 03/06/2023 7.1  3.5 - 7.2 mg/dL Final     Prostate Specific Antigen Screen 03/06/2023 0.46  0.00 - 4.00 ug/L Final

## 2023-05-01 NOTE — PROGRESS NOTES
{PROVIDER CHARTING PREFERENCE:239670}    Subjective   Yair is a 62 year old, presenting for the following health issues:  Diabetes        5/1/2023     2:48 PM   Additional Questions   Roomed by Juani FARIAS     Diabetes Follow-up    How often are you checking your blood sugar? A few times a week  What time of day are you checking your blood sugars (select all that apply)?  Before meals  Have you had any blood sugars above 200?  No  Have you had any blood sugars below 70?  Yes ***    What symptoms do you notice when your blood sugar is low?  None    What concerns do you have today about your diabetes? None     Do you have any of these symptoms? (Select all that apply)  No numbness or tingling in feet.  No redness, sores or blisters on feet.  No complaints of excessive thirst.  No reports of blurry vision.  No significant changes to weight.      {Reference  Diabetes Management Resources  Blood Glucose Log - 3 weeks  Blood Glucose Log with Food and Insulin Record :028185}    Hyperlipidemia Follow-Up      Are you regularly taking any medication or supplement to lower your cholesterol?   Yes- atorvastatin    Are you having muscle aches or other side effects that you think could be caused by your cholesterol lowering medication?  No    Hypertension Follow-up      Do you check your blood pressure regularly outside of the clinic? Yes     Are you following a low salt diet? No    Are your blood pressures ever more than 140 on the top number (systolic) OR more   than 90 on the bottom number (diastolic), for example 140/90? No    BP Readings from Last 2 Encounters:   05/01/23 (!) 146/88   03/06/23 134/87     Hemoglobin A1C (%)   Date Value   03/06/2023 7.9 (H)   08/02/2022 7.8 (H)   03/02/2021 6.4 (H)   11/19/2019 5.8 (H)     LDL Cholesterol Calculated (mg/dL)   Date Value   03/06/2023 35   10/19/2021 67   03/02/2021 56   11/19/2019 46         How many servings of fruits and vegetables do you eat daily?  2-3    On  average, how many sweetened beverages do you drink each day (Examples: soda, juice, sweet tea, etc.  Do NOT count diet or artificially sweetened beverages)?   0-1    How many days per week do you exercise enough to make your heart beat faster? 3 or less    How many minutes a day do you exercise enough to make your heart beat faster? 60 or more    How many days per week do you miss taking your medication? 0    {additonal problems for provider to add (Optional):779316}      Review of Systems   {ROS COMP (Optional):186167}      Objective    BP (!) 146/88   Pulse 96   Temp 97.7  F (36.5  C) (Temporal)   Wt 91.2 kg (201 lb)   SpO2 96%   BMI 32.38 kg/m    Body mass index is 32.38 kg/m .  Physical Exam   {Exam List (Optional):732488}    {Diagnostic Test Results (Optional):651937}    {AMBULATORY ATTESTATION (Optional):279728}

## 2023-05-01 NOTE — TELEPHONE ENCOUNTER
"Pt states he has been having knee pain for years. Left greater than right. There is no acute issue with the knee he just decided he's had enough of the pain. He states he has an appt with his PCP later this morning he just wanted more information on what this pain could be. Gave him assurance that his doctor is a good resource to determine the cause of his chronic ongoing knee pain and the best treatment for said pain.    Reason for Disposition    [1] MILD pain (e.g., does not interfere with normal activities) AND [2] present > 7 days    Additional Information    Negative: Sounds like a life-threatening emergency to the triager    Negative: [1] Swollen joint AND [2] fever    Negative: [1] Red area or streak AND [2] fever    Negative: Patient sounds very sick or weak to the triager    Negative: [1] SEVERE pain (e.g., excruciating, unable to walk) AND [2] not improved after 2 hours of pain medicine    Negative: [1] Can't move swollen joint at all AND [2] no fever    Negative: [1] Thigh or calf pain AND [2] only 1 side AND [3] present > 1 hour    Negative: [1] Thigh, calf, or ankle swelling AND [2] only 1 side    Negative: [1] Looks infected (spreading redness, pus) AND [2] large red area (> 2 in. or 5 cm)    Negative: [1] Very swollen joint AND [2] no fever    Negative: Blistering rash in area of pain (i.e., dermatomal distribution or \"band\" or \"stripe\")    Negative: Looks like a boil, infected sore, or deep ulcer    Negative: [1] Redness of the skin AND [2] no fever    Negative: [1] MODERATE pain (e.g., interferes with normal activities, limping) AND [2] present > 3 days    Negative: [1] Swollen joint AND [2] no fever or redness    Negative: [1] Fluid-filled sack just below knee cap (i.e., inferior aspect of the anterior knee) AND [2] no fever or redness    Protocols used: KNEE PAIN-A-    Cait Green RN on 5/1/2023 at 1:37 AM    "

## 2023-05-11 ENCOUNTER — NURSE TRIAGE (OUTPATIENT)
Dept: NURSING | Facility: CLINIC | Age: 63
End: 2023-05-11
Payer: MEDICARE

## 2023-05-11 NOTE — TELEPHONE ENCOUNTER
If he still has ear pain he should go to Urgent care -recheck  He had ext Otitis which is usually treated with drops unless the ear canal is more swollen now

## 2023-05-11 NOTE — TELEPHONE ENCOUNTER
He was given RX of neomycin-olymyxin-hydrocortisone drops for both ears.  He's unable to get the drops in for ear infection. It's just running out. Unable to get four drops in in a day. Both ears. They would like an oral alternative. I told them, in the mean time, to continue using the drops. Please call them at:  [phone]. May leave a detailed message.  Joaquina Thorpe RN  [facility]    Reason for Disposition    Caller has URGENT medicine question about med that PCP or specialist prescribed and triager unable to answer question    Additional Information    Negative: Intentional drug overdose and suicidal thoughts or ideas    Negative: Drug overdose and triager unable to answer question    Negative: Caller requesting a renewal or refill of a medicine patient is currently taking    Negative: Caller requesting information unrelated to medicine    Negative: Caller requesting information about COVID-19 Vaccine    Negative: Caller requesting information about Emergency Contraception    Negative: Caller requesting information about Combined Birth Control Pills    Negative: Caller requesting information about Progestin Birth Control Pills    Negative: Caller requesting information about Post-Op pain or medicines    Negative: Caller requesting a prescription antibiotic (such as penicillin) for Strep throat and has a positive culture result    Negative: Caller requesting a prescription anti-viral med (such as Tamiflu) and has influenza (flu) symptoms    Negative: Immunization reaction suspected    Negative: Rash while taking a medicine or within 3 days of stopping it    Negative: Asthma and having symptoms of asthma (cough, wheezing, etc.)    Negative: Symptom of illness (e.g., headache, abdominal pain, earache, vomiting) that are more than mild    Negative: Breastfeeding questions about mother's medicines and diet    Negative: MORE THAN A DOUBLE DOSE of a prescription or over-the-counter (OTC) drug     Negative: DOUBLE DOSE (an extra dose or lesser amount) of prescription drug and any symptoms (e.g., dizziness, nausea, pain, sleepiness)    Negative: DOUBLE DOSE (an extra dose or lesser amount) of over-the-counter (OTC) drug and any symptoms (e.g., dizziness, nausea, pain, sleepiness)    Negative: Took another person's prescription drug    Negative: DOUBLE DOSE (an extra dose or lesser amount) of prescription drug and NO symptoms  (Exception: A double dose of antibiotics.)    Negative: Diabetes drug error or overdose (e.g., took wrong type of insulin or took extra dose)    Negative: Caller has medication question about med NOT prescribed by PCP and triager unable to answer question (e.g., compatibility with other med, storage)    Negative: Prescription not at pharmacy and was prescribed by PCP recently  (Exception: triager has access to EMR and prescription is recorded there. Go to Home Care and confirm for pharmacy.)    Negative: Pharmacy calling with prescription question and triager unable to answer question    Protocols used: MEDICATION QUESTION CALL-A-OH

## 2023-05-11 NOTE — TELEPHONE ENCOUNTER
Spoke with Rula. Notified her of provider's message. States pt does not have pain, but is hard of hearing. Rula asked if Dr. Villegas refilled the drops. Advised that drops had been refilled.    Wife was given [facility] information incase pt has ear pain or symptoms worsen.    Andressa Cornelius RN  [facility]

## 2023-05-12 NOTE — TELEPHONE ENCOUNTER
Called and updated Rula on provider's message.  She will need to help him with the ear drops as he was having trouble doing it himself.  She stated that he was not waiting before turning over to do the ear drops in the opposite ear.  Advise that patient wait 3-5 min before turning to the other side to do the drops in the opposite ear and then wait another 3-5 min before getting up after administration in the second ear.  She verbalized understanding.  If pain or if drops continue to run out despite waiting the 3-5 min, she will have him go to .    Please clarify how many more days patient should do the drops for  Wife thinks patient already used the drops for 4 days (but was letting the drops run out of his ear)  Due to this being the weekend and provider not in clinic today, we may not call her back today.  Wife will have patient continue the drops til we call back to clarify      Sohail Spencer RN  [facility]

## 2023-05-15 NOTE — TELEPHONE ENCOUNTER
Called and spoke with Rula, pt's EC. Relayed message and scheduled f/u appointment for today with Dr. Baeza.    Tyesha Blanco RN   [facility]

## 2023-05-21 ENCOUNTER — TELEPHONE (OUTPATIENT)
Dept: NURSING | Facility: CLINIC | Age: 63
End: 2023-05-21
Payer: MEDICARE

## 2023-05-30 DIAGNOSIS — I10 HYPERTENSION GOAL BP (BLOOD PRESSURE) < 140/90: ICD-10-CM

## 2023-05-30 RX ORDER — LOSARTAN POTASSIUM 25 MG/1
TABLET ORAL
Qty: 90 TABLET | OUTPATIENT
Start: 2023-05-30

## 2023-06-06 ENCOUNTER — OFFICE VISIT (OUTPATIENT)
Dept: FAMILY MEDICINE | Facility: CLINIC | Age: 63
End: 2023-06-06
Payer: MEDICARE

## 2023-06-06 VITALS
DIASTOLIC BLOOD PRESSURE: 81 MMHG | TEMPERATURE: 98.1 F | OXYGEN SATURATION: 95 % | WEIGHT: 199.8 LBS | HEART RATE: 97 BPM | BODY MASS INDEX: 32.11 KG/M2 | SYSTOLIC BLOOD PRESSURE: 127 MMHG | HEIGHT: 66 IN

## 2023-06-06 DIAGNOSIS — E66.09 CLASS 1 OBESITY DUE TO EXCESS CALORIES WITH SERIOUS COMORBIDITY AND BODY MASS INDEX (BMI) OF 32.0 TO 32.9 IN ADULT: Primary | ICD-10-CM

## 2023-06-06 DIAGNOSIS — H61.23 BILATERAL IMPACTED CERUMEN: ICD-10-CM

## 2023-06-06 DIAGNOSIS — E66.811 CLASS 1 OBESITY DUE TO EXCESS CALORIES WITH SERIOUS COMORBIDITY AND BODY MASS INDEX (BMI) OF 32.0 TO 32.9 IN ADULT: Primary | ICD-10-CM

## 2023-06-06 DIAGNOSIS — E11.9 TYPE 2 DIABETES MELLITUS WITHOUT COMPLICATION, WITHOUT LONG-TERM CURRENT USE OF INSULIN (H): ICD-10-CM

## 2023-06-06 DIAGNOSIS — I10 HYPERTENSION GOAL BP (BLOOD PRESSURE) < 140/90: ICD-10-CM

## 2023-06-06 DIAGNOSIS — E78.5 HYPERLIPIDEMIA LDL GOAL <70: ICD-10-CM

## 2023-06-06 LAB — HBA1C MFR BLD: 7.1 % (ref 0–5.6)

## 2023-06-06 PROCEDURE — 36415 COLL VENOUS BLD VENIPUNCTURE: CPT | Performed by: FAMILY MEDICINE

## 2023-06-06 PROCEDURE — 99214 OFFICE O/P EST MOD 30 MIN: CPT | Performed by: FAMILY MEDICINE

## 2023-06-06 PROCEDURE — 83036 HEMOGLOBIN GLYCOSYLATED A1C: CPT | Performed by: FAMILY MEDICINE

## 2023-06-06 RX ORDER — METFORMIN HCL 500 MG
1500 TABLET, EXTENDED RELEASE 24 HR ORAL DAILY
Qty: 270 TABLET | Refills: 1 | Status: SHIPPED | OUTPATIENT
Start: 2023-06-06 | End: 2023-12-12

## 2023-06-06 NOTE — NURSING NOTE
Ear exam showing wax occlusion completed by provider.  H202/H20 was placed in the bilateral ear(s) via irrigation tool: elephant ear.  An Ryan, CMA

## 2023-06-06 NOTE — PROGRESS NOTES
Assessment & Plan     Type 2 diabetes mellitus without complication, without long-term current use of insulin (H)  Pending   Discussed about increasing Jardiance dose or consider GLP 1 due to benefits as discussed and can decrease metformin  Pt declined   - empagliflozin (JARDIANCE) 10 MG TABS tablet; Take 1 tablet (10 mg) by mouth daily  - metFORMIN (GLUCOPHAGE XR) 500 MG 24 hr tablet; Take 3 tablets (1,500 mg) by mouth daily  - Hemoglobin A1c; Future    Class 1 obesity due to excess calories with serious comorbidity and body mass index (BMI) of 32.0 to 32.9 in adult  Discussed meds Like GLP1 and Jardiance can help lose wt and help with ascvd risk  Pt declined change     Hypertension goal BP (blood pressure) < 140/90  Stable     Hyperlipidemia LDL goal <70  Stable   Cerumen Impaction -wear wash dine by MA    Ordering of each unique test  Prescription drug management  :396307}   follow up 6 month  Radha Villegas MD  [facility] DAVONTE Mazariegos is a 62 year old, presenting for the following health issues:  Diabetes, Other (Would like to discuss insulin resistance and get a continuous glucose monitoring device ), and Ear Problem (Plugged ear/)        6/6/2023     4:06 PM   Additional Questions   Roomed by Sharon LEBRON CMA         6/6/2023     4:06 PM   Patient Reported Additional Medications   Patient reports taking the following new medications None     HPI     Diabetes Follow-up    How often are you checking your blood sugar? One time daily  What time of day are you checking your blood sugars (select all that apply)?  Before meals  Have you had any blood sugars above 200?  No  Have you had any blood sugars below 70?  No    What symptoms do you notice when your blood sugar is low?  None    What concerns do you have today about your diabetes? None     Do you have any of these symptoms? (Select all that apply)  Excessive thirst and Weight loss          Hyperlipidemia Follow-Up      Are you  regularly taking any medication or supplement to lower your cholesterol?   Yes- taking atorvastatin    Are you having muscle aches or other side effects that you think could be caused by your cholesterol lowering medication?  No    Hypertension Follow-up      Do you check your blood pressure regularly outside of the clinic? Yes - check every other week by home nurse    Are you following a low salt diet? Yes    Are your blood pressures ever more than 140 on the top number (systolic) OR more   than 90 on the bottom number (diastolic), for example 140/90? No    BP Readings from Last 2 Encounters:   06/06/23 127/81   05/01/23 (!) 142/88     Hemoglobin A1C (%)   Date Value   03/06/2023 7.9 (H)   08/02/2022 7.8 (H)   03/02/2021 6.4 (H)   11/19/2019 5.8 (H)     LDL Cholesterol Calculated (mg/dL)   Date Value   03/06/2023 35   10/19/2021 67   03/02/2021 56   11/19/2019 46         How many servings of fruits and vegetables do you eat daily?  2-3    On average, how many sweetened beverages do you drink each day (Examples: soda, juice, sweet tea, etc.  Do NOT count diet or artificially sweetened beverages)?   0-1 glass of juice per day    How many days per week do you exercise enough to make your heart beat faster? 3 or less    How many minutes a day do you exercise enough to make your heart beat faster? 60 or more    How many days per week do you miss taking your medication? 0    Pt is doing well on Jardiance   accuchecks  are better  Ears feel Plugged   Feels he has wax      Review of Systems   CONSTITUTIONAL: NEGATIVE for fever, chills, change in weight  ENT/MOUTH: NEGATIVE for ear, mouth and throat problems  RESP: NEGATIVE for significant cough or SOB  CV: NEGATIVE for chest pain, palpitations or peripheral edema  GI: NEGATIVE for nausea, abdominal pain, heartburn, or change in bowel habits  PSYCHIATRIC: NEGATIVE for changes in mood or affect  ROS otherwise negative      Objective    /81   Pulse 97   Temp 98.1  F  "(36.7  C) (Temporal)   Ht 1.678 m (5' 6.06\")   Wt 90.6 kg (199 lb 12.8 oz)   SpO2 95%   BMI 32.19 kg/m    Body mass index is 32.19 kg/m .  Physical Exam   GENERAL: healthy, alert and no distress  EYES: Eyes grossly normal to inspection  Canals with soft wax  NECK: no adenopathy, no asymmetry, masses, or scars and thyroid normal to palpation  RESP: lungs clear to auscultation - no rales, rhonchi or wheezes  CV: regular rate and rhythm, normal S1 S2, no S3 or S4, no murmur, click or rub, no peripheral edema and peripheral pulses strong  ABDOMEN: soft, nontender, no hepatosplenomegaly, no masses and bowel sounds normal  MS: no gross musculoskeletal defects noted, no edema  PSYCH: mentation appears normal, affect normal/bright  Diabetic foot exam: normal  PT pulses    Pending             "

## 2023-06-19 ENCOUNTER — TELEPHONE (OUTPATIENT)
Dept: FAMILY MEDICINE | Facility: CLINIC | Age: 63
End: 2023-06-19
Payer: MEDICARE

## 2023-06-19 NOTE — TELEPHONE ENCOUNTER
Home Care is calling regarding an established patient with [facility].       Requesting orders from: Radha Villegas  Provider is following patient: Yes  Is this a 60-day recertification request?  Yes    Orders Requested    Skilled Nursing  Request for recertification LATE week of 6/19/23 - recert scheduled for last week missed by HC nurse    Verbal orders given, provider out of office, covering provider used.  Home Care will send orders for covering provider to sign.  Confirmed ok to leave a detailed message with call back.  Contact information confirmed and updated as needed.    Rima Hicks RN

## 2023-06-20 ENCOUNTER — TELEPHONE (OUTPATIENT)
Dept: FAMILY MEDICINE | Facility: CLINIC | Age: 63
End: 2023-06-20
Payer: MEDICARE

## 2023-06-20 NOTE — TELEPHONE ENCOUNTER
Sheila calling from [facility] regarding an established patient with [facility].       Requesting orders from: Radha Villegas  Provider is following patient: Yes  Is this a 60-day recertification request?  No    Orders Requested    Skilled Nursing  Request for continuation of care with no increase or decrease in frequency  Frequency: 1x/2 weeks for 9 weeks   2 PRN visits  For medication set up and health assessment     Verbal orders given.  Home Care will send orders for provider to sign.    Aiyana Toribio RN

## 2023-06-28 ENCOUNTER — TELEPHONE (OUTPATIENT)
Dept: FAMILY MEDICINE | Facility: CLINIC | Age: 63
End: 2023-06-28
Payer: MEDICARE

## 2023-06-28 DIAGNOSIS — K59.09 CHRONIC CONSTIPATION: Primary | ICD-10-CM

## 2023-06-28 NOTE — TELEPHONE ENCOUNTER
Patient returning call, states that he spoke with his insurance regarding Polyethylene glycol and states that it will not be covered under insurance, patient is not able to afford OTC.    Patient asking PCP for alternative laxative to Polyethylene glycol to please be sent to pharmacy.    Patient states that he will be out of medication tonight.    LAVELLE Kathleen RN  [facility]

## 2023-06-28 NOTE — TELEPHONE ENCOUNTER
Pt calling regarding prescription for Polyethylene glycol. States medication is not covered by insurance and has always been covered. Cost is $17. Writer advised medication is available otc and some insurances don't cover. Person in the background said this medication has always been covered by insurance. Pt was advised to call his insurance.     Writer called pharmacy and medication is not covered by insurance. States it would probably be cheaper to buy otc. Called and left detailed message for pt with this information and advised to call back to [phone] if any further questions.    Andressa Cornelius RN  [facility]

## 2023-06-28 NOTE — TELEPHONE ENCOUNTER
Spoke to patient and read the message back to them. Patient understands to reach out to insurance company.     Tessa Thrasher -    [facility]

## 2023-06-30 DIAGNOSIS — I10 HYPERTENSION GOAL BP (BLOOD PRESSURE) < 140/90: ICD-10-CM

## 2023-06-30 RX ORDER — LOSARTAN POTASSIUM 25 MG/1
TABLET ORAL
Qty: 30 TABLET | Refills: 0 | Status: SHIPPED | OUTPATIENT
Start: 2023-06-30 | End: 2023-07-31

## 2023-07-03 ENCOUNTER — THERAPY VISIT (OUTPATIENT)
Dept: PHYSICAL THERAPY | Facility: CLINIC | Age: 63
End: 2023-07-03
Attending: FAMILY MEDICINE
Payer: MEDICARE

## 2023-07-03 DIAGNOSIS — M25.562 CHRONIC PAIN OF BOTH KNEES: ICD-10-CM

## 2023-07-03 DIAGNOSIS — M25.561 PAIN IN BOTH KNEES, UNSPECIFIED CHRONICITY: ICD-10-CM

## 2023-07-03 DIAGNOSIS — G89.29 CHRONIC PAIN OF BOTH KNEES: ICD-10-CM

## 2023-07-03 DIAGNOSIS — M25.561 CHRONIC PAIN OF BOTH KNEES: ICD-10-CM

## 2023-07-03 DIAGNOSIS — M25.562 PAIN IN BOTH KNEES, UNSPECIFIED CHRONICITY: ICD-10-CM

## 2023-07-03 PROCEDURE — 97530 THERAPEUTIC ACTIVITIES: CPT | Mod: GP | Performed by: PHYSICAL THERAPIST

## 2023-07-03 PROCEDURE — 97162 PT EVAL MOD COMPLEX 30 MIN: CPT | Mod: GP | Performed by: PHYSICAL THERAPIST

## 2023-07-03 ASSESSMENT — ACTIVITIES OF DAILY LIVING (ADL)
HOW_WOULD_YOU_RATE_THE_OVERALL_FUNCTION_OF_YOUR_KNEE_DURING_YOUR_USUAL_DAILY_ACTIVITIES?: ABNORMAL
PAIN: I HAVE THE SYMPTOM BUT IT DOES NOT AFFECT MY ACTIVITY
HOS_ADL_HIGHEST_POTENTIAL_SCORE: 4
HOS_ADL_ITEM_SCORE_TOTAL: 4
KNEE_ACTIVITY_OF_DAILY_LIVING_SUM: 4
STANDING FOR 15 MINUTES: NO DIFFICULTY AT ALL

## 2023-07-03 NOTE — PROGRESS NOTES
PHYSICAL THERAPY EVALUATION  Type of Visit: Evaluation    See electronic medical record for Abuse and Falls Screening details.    Subjective      Presenting condition or subjective complaint: Primarily left knee and leg bother him after working his shift.  Date of onset: 05/01/23    Relevant medical history:     Dates & types of surgery: nose 1991~1992    Prior diagnostic imaging/testing results: X-ray     Prior therapy history for the same diagnosis, illness or injury: No      Prior Level of Function   Transfers: Independent  Ambulation: Independent  ADL: Independent  IADL: Housekeeping, Work, Yard work    Living Environment  Social support: With a significant other or spouse   Type of home: House   Stairs to enter the home: Yes 12 Is there a railing: Yes   Ramp: No   Stairs inside the home: Yes 6 Is there a railing: Yes   Help at home: None  Equipment owned:       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 Music and electronics    Patient goals for therapy: my job    Pain assessment: Pain at best: 0/10, pain at worst 6/10. Average pain 6/10     Objective   KNEE EVALUATION  PAIN: Pain is Exacerbated By: walking, pushing and pulling, lifting at work  Pain is Relieved By: heat and rest  INTEGUMENTARY (edema, incisions):   POSTURE: WNL  GAIT:  Weightbearing Status: WBAT  Assistive Device(s): None  Gait Deviations: Decreased gait speed, lacks terminal knee extension on left, with decreased left stance time and right stride length, lacks left knee flexion during swing phase.  BALANCE/PROPRIOCEPTION: 30s STS 9 reps / 10MWT 7.1 m/s  WEIGHTBEARING ALIGNMENT:   NON-WEIGHTBEARING ALIGNMENT:   ROM: R knee 0-0-125 end range flexion pain / L knee  pain at end range  STRENGTH: SLR with lag due to pain  FLEXIBILITY:   SPECIAL TESTS: - varus/valgus stress at 30 deg, + valgus stress at 0 deg L, - anterior and posterior drawers  FUNCTIONAL TESTS:   PALPATION: no  TTP  JOINT MOBILITY: no apprehension with patellar mob though reports pain in posterior aspect of the knee with mob    Assessment & Plan   CLINICAL IMPRESSIONS   Medical Diagnosis: Pain in both knees, unspecified chronicity    Treatment Diagnosis: L>R knee pain, diff mensical vs primary symptomatic OA   Impression/Assessment: Patient is a 62 year old male with L>R knee pain complaints.  The following significant findings have been identified: Pain, Decreased ROM/flexibility, Decreased strength, Impaired gait, Impaired muscle performance and Decreased activity tolerance. These impairments interfere with their ability to perform self care tasks, work tasks, recreational activities and community mobility as compared to previous level of function.     Clinical Decision Making (Complexity):   Clinical Presentation: Evolving/Changing  Clinical Presentation Rationale: based on medical and personal factors listed in PT evaluation  Clinical Decision Making (Complexity): Moderate complexity    PLAN OF CARE  Treatment Interventions:  Modalities: Cryotherapy, Hot Pack  Interventions: Gait Training, Manual Therapy, Neuromuscular Re-education, Therapeutic Activity, Therapeutic Exercise, Self-Care/Home Management    Long Term Goals     PT Goal 1  Goal Identifier: Squatting  Goal Description: Patient will demonstrate squat to below parallel with no compenastions and no increase in pain x 5 reps to indicate ability to complete work place and recreational tasks.  Target Date: 10/01/23  PT Goal 2  Goal Identifier: 30s STS  Goal Description: Patient will demonstrate 13 or more repetitions in 30s Sit to stand test to demonstrate decreasing fall risk.  Target Date: 10/01/23      Frequency of Treatment: 2-4x/month  Duration of Treatment: 12 weeks    Recommended Referrals to Other Professionals: none  Education Assessment:        Risks and benefits of evaluation/treatment have been explained.   Patient/Family/caregiver agrees with Plan of  Care.     Evaluation Time:     PT Gildardo, Moderate Complexity Minutes (70072): 25    Signing Clinician: ROBERTA GARCIA, PT      AN[facility]                                                                                   OUTPATIENT PHYSICAL THERAPY      PLAN OF TREATMENT FOR OUTPATIENT REHABILITATION   Patient's Last Name, First Name, Yair Briggs YOB: 1960   Provider's Name   [facility]   Medical Record No.  [id number]     Onset Date: 05/01/23  Start of Care Date: 07/03/23     Medical Diagnosis:  Pain in both knees, unspecified chronicity      PT Treatment Diagnosis:  L>R knee pain, diff mensical vs primary symptomatic OA Plan of Treatment  Frequency/Duration: 2-4x/month/ 12 weeks    Certification date from 07/03/23 to 10/01/23         See note for plan of treatment details and functional goals     ROBERTA GARCIA, PT                         I CERTIFY THE NEED FOR THESE SERVICES FURNISHED UNDER        THIS PLAN OF TREATMENT AND WHILE UNDER MY CARE     (Physician attestation of this document indicates review and certification of the therapy plan).                  Referring Provider:  Radha Villegas      Initial Assessment  See Epic Evaluation- Start of Care Date: 07/03/23

## 2023-07-05 ENCOUNTER — TRANSFERRED RECORDS (OUTPATIENT)
Dept: HEALTH INFORMATION MANAGEMENT | Facility: CLINIC | Age: 63
End: 2023-07-05
Payer: MEDICARE

## 2023-07-07 ENCOUNTER — TELEPHONE (OUTPATIENT)
Dept: FAMILY MEDICINE | Facility: CLINIC | Age: 63
End: 2023-07-07
Payer: MEDICARE

## 2023-07-07 NOTE — TELEPHONE ENCOUNTER
Reason for Call:  Form, our goal is to have forms completed with 72 hours, however, some forms may require a visit or additional information.    Type of letter, form or note:  Home Health Certification    Who is the form from?: Home care    Where did the form come from: form was faxed in    What clinic location was the form placed at?: [facility]    Where the form was placed: Given to physician    What number is listed as a contact on the form?: [phone]       Call taken on 7/7/2023 at 7:46 AM by Tasha Marcus

## 2023-07-12 DIAGNOSIS — Z53.9 DIAGNOSIS NOT YET DEFINED: Primary | ICD-10-CM

## 2023-07-12 PROCEDURE — G0179 MD RECERTIFICATION HHA PT: HCPCS | Performed by: FAMILY MEDICINE

## 2023-07-14 ENCOUNTER — NURSE TRIAGE (OUTPATIENT)
Dept: NURSING | Facility: CLINIC | Age: 63
End: 2023-07-14
Payer: MEDICARE

## 2023-07-15 NOTE — TELEPHONE ENCOUNTER
"Triage Call:    Patient's spouse calling to report right eye pain and redness.  Consent to communicate located in chart.  Patient was at a concert tonight and believes that something from the railings got into his eye.  He reports moderate-severe pain, redness to sclera, clear drainage, and itchiness.  He denies redness or swelling to eyelids.    According to the protocol, patient should go to ED now.  Care advice given. Patient's wife verbalizes understanding and agrees with plan of care. Patient plans to go to [facility].    Carla Sierra RN  07/14/23 11:55 PM  [facility] Nurse Advisor    Reason for Disposition    [1] Foreign body sensation (\"feels like something is in there\") AND [2] irrigation didn't help    Additional Information    Negative: Chemical got in the eye    Negative: Piece of something got in the eye    Negative: Eye redness followed an eye injury    Negative: Yellow or green pus in the eyes    Negative: [1] Eyelid is swollen AND [2] no redness of white of eye (sclera)    Negative: Caused by pollen or other allergy OR main symptom is itchy eyes    Negative: Red, widespread rash also present    Negative: SEVERE eye pain    Negative: [1] Eyelids are very swollen (shut or almost) AND [2] fever    Negative: [1] Eyelid (outer) is very red (or tender to touch) AND [2] fever    Protocols used: EYE - RED WITHOUT PUS-A-AH      "

## 2023-07-17 DIAGNOSIS — E11.9 TYPE 2 DIABETES MELLITUS WITHOUT COMPLICATION, WITHOUT LONG-TERM CURRENT USE OF INSULIN (H): ICD-10-CM

## 2023-07-30 DIAGNOSIS — I10 HYPERTENSION GOAL BP (BLOOD PRESSURE) < 140/90: ICD-10-CM

## 2023-07-31 RX ORDER — LOSARTAN POTASSIUM 25 MG/1
25 TABLET ORAL DAILY
Qty: 90 TABLET | Refills: 1 | Status: SHIPPED | OUTPATIENT
Start: 2023-07-31 | End: 2023-12-12

## 2023-08-01 ENCOUNTER — TRANSFERRED RECORDS (OUTPATIENT)
Dept: HEALTH INFORMATION MANAGEMENT | Facility: CLINIC | Age: 63
End: 2023-08-01
Payer: MEDICARE

## 2023-08-03 ENCOUNTER — TELEPHONE (OUTPATIENT)
Dept: CALL CENTER | Age: 63
End: 2023-08-03
Payer: MEDICARE

## 2023-08-03 NOTE — TELEPHONE ENCOUNTER
Spoke to wife, Osmani is having eye pain right eye and sensitivity to light. He used the Ocufloxicin drops for five days after the abrasion on 7-22 thinking that would heal the eye. Now painful again. He is using a warm cloth on the eye. Has some drops left, told him to put a few in tonight. He will come to be seen tomorrow at 9:20 am and bring his insurance card.

## 2023-08-03 NOTE — TELEPHONE ENCOUNTER
[facility] Call Center    Phone Message    May a detailed message be left on voicemail: yes     Reason for Call: Appointment Intake    Referring Provider Name: N/A  Diagnosis and/or Symptoms: Scratched cornea    Patient was seen on 7/22 at [facility] for having something in his right eye per spouse Rula. There was something in his eye that was no longer there anymore but it scratched his cornea. [place] gave patient some eyedrops and told him to follow up with an eye provider.     Patient and spouse does not drive and ask that we schedule patient for the [place] [facility] only. Our next opening is not until 8/21 and patient is in pain and cannot wait until then. Please call spouse back at [phone] to ser if we can get him in soon. Thank you.      Action Taken: Message routed to:  Other:  Clinic    Travel Screening: Not Applicable

## 2023-08-04 ENCOUNTER — TELEPHONE (OUTPATIENT)
Dept: OPHTHALMOLOGY | Facility: CLINIC | Age: 63
End: 2023-08-04

## 2023-08-04 ENCOUNTER — OFFICE VISIT (OUTPATIENT)
Dept: OPHTHALMOLOGY | Facility: CLINIC | Age: 63
End: 2023-08-04
Payer: MEDICARE

## 2023-08-04 DIAGNOSIS — S05.01XA ABRASION OF RIGHT CORNEA, INITIAL ENCOUNTER: Primary | ICD-10-CM

## 2023-08-04 PROCEDURE — 92002 INTRM OPH EXAM NEW PATIENT: CPT | Performed by: STUDENT IN AN ORGANIZED HEALTH CARE EDUCATION/TRAINING PROGRAM

## 2023-08-04 RX ORDER — MOXIFLOXACIN 5 MG/ML
1 SOLUTION/ DROPS OPHTHALMIC 4 TIMES DAILY
Qty: 3 ML | Refills: 0 | Status: SHIPPED | OUTPATIENT
Start: 2023-08-04 | End: 2023-08-08

## 2023-08-04 ASSESSMENT — VISUAL ACUITY
METHOD: SNELLEN - LINEAR
OS_CC: 20/20
CORRECTION_TYPE: GLASSES
OD_CC: 20/40

## 2023-08-04 ASSESSMENT — SLIT LAMP EXAM - LIDS
COMMENTS: NORMAL
COMMENTS: NORMAL

## 2023-08-04 ASSESSMENT — EXTERNAL EXAM - RIGHT EYE: OD_EXAM: NORMAL

## 2023-08-04 ASSESSMENT — EXTERNAL EXAM - LEFT EYE: OS_EXAM: NORMAL

## 2023-08-04 NOTE — TELEPHONE ENCOUNTER
[facility] Call Center    Phone Message    May a detailed message be left on voicemail: yes     Reason for Call: Other: Pt would like to clarify with  if he is okayed to go back to work on Monday or Tuesday. Please reach out to pt and inform him. Thank You!      Action Taken: Message routed to:  [facility] eye    Travel Screening: Not Applicable

## 2023-08-04 NOTE — LETTER
August 4, 2023      Yair Benites  [address] [place] [id number]        To Whom It May Concern:    Yair Benites  was seen in the eye clinic on August 4, 2023. He needs to return for follow up on Tuesday, August 8th.  Please excuse him from work that day due to this follow up appointment.        Sincerely,        Keith Gomez MD

## 2023-08-04 NOTE — LETTER
"    8/4/2023         RE: Yair Benites  [address] [facility] [address]        Dear Colleague,    Thank you for referring your patient, Yair Benites, to the [facility]. Please see a copy of my visit note below.     Current Eye Medications:  ofloxacin - right eye PRN     Subjective:  seen at ED on 7/15/2023 for a corneal abrasion right eye - scratched cornea right eye with finger - used ofloxacin for 5 days, pain subsided. Woke up yesterday, pain right eye returned with redness, and became sensitive to light when going into his friend's van yesterday. Restarted ofloxacin yesterday, has not found relief with drops.     Unable to tell if it was hurting right after he woke up  Was in the pool 2 days ago    No previous eye injuries or surgeries.      Objective:  See Ophthalmology Exam.       Assessment:  Yair Benites is a 62 year old male who presents with:   Encounter Diagnosis   Name Primary?     Abrasion of right cornea, initial encounter Yes       Plan:  Stop ofloxacin drops     Use moxifloxacin every 2 hours when awake    Alternate with artificial tears every other hour when awake (like Refresh Optive or Systane Balance. Avoid \"get the red out\" drops).     Use gentamicin at bedtime in the right eye     Return for recheck next week    Keith Gomez MD  [phone]        Again, thank you for allowing me to participate in the care of your patient.        Sincerely,        Keith Gomez MD  "

## 2023-08-04 NOTE — TELEPHONE ENCOUNTER
I spoke with the patients wife and let her know he is cleared too work Monday and Tuesday after his appointment. The note for work was written by patient request to get him off work for his appointments.

## 2023-08-04 NOTE — PROGRESS NOTES
" Current Eye Medications:  ofloxacin - right eye PRN     Subjective:  seen at ED on 7/15/2023 for a corneal abrasion right eye - scratched cornea right eye with finger - used ofloxacin for 5 days, pain subsided. Woke up yesterday, pain right eye returned with redness, and became sensitive to light when going into his friend's van yesterday. Restarted ofloxacin yesterday, has not found relief with drops.     Unable to tell if it was hurting right after he woke up  Was in the pool 2 days ago    No previous eye injuries or surgeries.      Objective:  See Ophthalmology Exam.       Assessment:  Yair Benites is a 62 year old male who presents with:   Encounter Diagnosis   Name Primary?    Abrasion of right cornea, initial encounter Yes       Plan:  Stop ofloxacin drops     Use moxifloxacin every 2 hours when awake    Alternate with artificial tears every other hour when awake (like Refresh Optive or Systane Balance. Avoid \"get the red out\" drops).     Use gentamicin at bedtime in the right eye     Return for recheck next week    Keith Gomez MD  [phone]      "

## 2023-08-04 NOTE — PATIENT INSTRUCTIONS
"Stop ofloxacin drops     Use moxifloxacin every 2 hours when awake    Alternate with artificial tears every other hour when awake (like Refresh Optive or Systane Balance. Avoid \"get the red out\" drops).     Use gentamicin at bedtime in the right eye     Return for recheck next week    Keith Gomez MD  [phone]    "

## 2023-08-07 ENCOUNTER — TELEPHONE (OUTPATIENT)
Dept: OPHTHALMOLOGY | Facility: CLINIC | Age: 63
End: 2023-08-07
Payer: MEDICARE

## 2023-08-07 NOTE — TELEPHONE ENCOUNTER
Spoke to [facility] staff, gentamicin ophthalmic ointment not available through their warehouse - staff tried to order it through the the  but can not guarantee that it will arrive tomorrow.     Called [facility] pharmacy - they are unable to find gentamicin ophthalmic ointment to order. Will route back to Dr. Gomez to ask for a substitute.

## 2023-08-07 NOTE — TELEPHONE ENCOUNTER
[facility] Call Center    Phone Message    May a detailed message be left on voicemail: yes     Reason for Call: Medication Question or concern regarding medication   Prescription Clarification  Name of Medication: gentamicin (GARAMYCIN) 0.3 % ophthalmic ointment  Prescribing Provider: Dr Gomez   Pharmacy:   [id number] - [place], [place] - [address] AT [facility] [address]   What on the order needs clarification? Caller wife Rula stated pharmacy told them they no longer make this medication or can get this medication in and is requesting a substitute. Please contact pt's wife Rula to discuss options. Thank you!      Action Taken: Message routed to:  [facility]    Travel Screening: Not Applicable

## 2023-08-08 ENCOUNTER — OFFICE VISIT (OUTPATIENT)
Dept: OPHTHALMOLOGY | Facility: CLINIC | Age: 63
End: 2023-08-08
Payer: MEDICARE

## 2023-08-08 DIAGNOSIS — H16.001 CORNEAL ULCER OF RIGHT EYE: Primary | ICD-10-CM

## 2023-08-08 PROCEDURE — 92012 INTRM OPH EXAM EST PATIENT: CPT | Performed by: STUDENT IN AN ORGANIZED HEALTH CARE EDUCATION/TRAINING PROGRAM

## 2023-08-08 RX ORDER — MOXIFLOXACIN 5 MG/ML
1 SOLUTION/ DROPS OPHTHALMIC 4 TIMES DAILY
Qty: 6 ML | Refills: 4 | Status: SHIPPED | OUTPATIENT
Start: 2023-08-08 | End: 2023-08-09

## 2023-08-08 RX ORDER — MINERAL OIL, PETROLATUM 425; 573 MG/G; MG/G
OINTMENT OPHTHALMIC
Qty: 3.5 G | Refills: 4 | Status: SHIPPED | OUTPATIENT
Start: 2023-08-08 | End: 2023-12-12

## 2023-08-08 RX ORDER — CARBOXYMETHYLCELLULOSE SODIUM AND GLYCERIN 5; 9 MG/ML; MG/ML
1 SOLUTION/ DROPS OPHTHALMIC 4 TIMES DAILY
Qty: 10 ML | Refills: 3 | Status: SHIPPED | OUTPATIENT
Start: 2023-08-08 | End: 2023-12-12

## 2023-08-08 ASSESSMENT — VISUAL ACUITY
CORRECTION_TYPE: GLASSES
METHOD: SNELLEN - LINEAR
OS_CC+: +1
OD_CC: 20/30
OS_CC: 20/25

## 2023-08-08 ASSESSMENT — SLIT LAMP EXAM - LIDS
COMMENTS: NORMAL
COMMENTS: NORMAL

## 2023-08-08 ASSESSMENT — EXTERNAL EXAM - LEFT EYE: OS_EXAM: NORMAL

## 2023-08-08 ASSESSMENT — EXTERNAL EXAM - RIGHT EYE: OD_EXAM: NORMAL

## 2023-08-08 NOTE — PATIENT INSTRUCTIONS
"Use moxifloxacin every 2 hours when awake in the right eye     Stop using the [facility] Redness Relief Eye Drops  Instead use artificial tears up to four times a day (prescription sent to the pharmacy for these. Other options:Refresh Optive or Systane Balance. Avoid \"get the red out\" drops).     Use ophthalmic ointment like Refresh PM (generic ok) at bedtime in the right eye (prescription sent to the pharmacy for this).    Return in 1 week for recheck    Keith Gomez MD  [phone]    "

## 2023-08-08 NOTE — LETTER
"    8/8/2023         RE: Yair Benites  [address] [facility] [id number]        Dear Colleague,    Thank you for referring your patient, Yair Benites, to the [facility]. Please see a copy of my visit note below.     Current Eye Medications:  Vigamox, and '[facility] redness relief' drops right eye every four hours.      Subjective:  Follow up corneal abrasion right eye.  Right eye is feeling a little better, but not back to normal.  The pharmacy was unable to get the ointment prescribed at his last visit.     Objective:  See Ophthalmology Exam.       Assessment:  Yair Benites is a 62 year old male who presents with:   Encounter Diagnosis   Name Primary?     Corneal ulcer of right eye Not using drops as recommended and using redness relief  drops. Reviewed how to correctly use eyedrops in detail and asked him to share the instructions with his wife.        Plan:  Use moxifloxacin every 2 hours when awake in the right eye     Stop using the [facility] Redness Relief Eye Drops  Instead use artificial tears up to four times a day (prescription sent to the pharmacy for these. Other options:Refresh Optive or Systane Balance. Avoid \"get the red out\" drops).     Use ophthalmic ointment like Refresh PM (generic ok) at bedtime in the right eye (prescription sent to the pharmacy for this).    Return in 1 week for recheck    Keith Gomez MD  [phone]      Again, thank you for allowing me to participate in the care of your patient.        Sincerely,        Keith Gomez MD  "

## 2023-08-08 NOTE — TELEPHONE ENCOUNTER
Saw patient in clinic today and adjusted his meds as noted in 8/8 clinic encounter.     Keith Gomez MD  [phone]

## 2023-08-08 NOTE — PROGRESS NOTES
" Current Eye Medications:  Vigamox, and '[facility] redness relief' drops right eye every four hours.      Subjective:  Follow up corneal abrasion right eye.  Right eye is feeling a little better, but not back to normal.  The pharmacy was unable to get the ointment prescribed at his last visit.     Objective:  See Ophthalmology Exam.       Assessment:  Yair Beintes is a 62 year old male who presents with:   Encounter Diagnosis   Name Primary?    Corneal ulcer of right eye Not using drops as recommended and using redness relief  drops. Reviewed how to correctly use eyedrops in detail and asked him to share the instructions with his wife.        Plan:  Use moxifloxacin every 2 hours when awake in the right eye     Stop using the [facility] Redness Relief Eye Drops  Instead use artificial tears up to four times a day (prescription sent to the pharmacy for these. Other options:Refresh Optive or Systane Balance. Avoid \"get the red out\" drops).     Use ophthalmic ointment like Refresh PM (generic ok) at bedtime in the right eye (prescription sent to the pharmacy for this).    Return in 1 week for recheck    Keith Gomez MD  [phone]      "

## 2023-08-09 DIAGNOSIS — H16.001 CORNEAL ULCER OF RIGHT EYE: ICD-10-CM

## 2023-08-09 RX ORDER — MOXIFLOXACIN 5 MG/ML
1 SOLUTION/ DROPS OPHTHALMIC
Qty: 9 ML | Refills: 3 | Status: SHIPPED | OUTPATIENT
Start: 2023-08-09 | End: 2023-09-19

## 2023-08-15 ENCOUNTER — OFFICE VISIT (OUTPATIENT)
Dept: OPHTHALMOLOGY | Facility: CLINIC | Age: 63
End: 2023-08-15
Payer: MEDICARE

## 2023-08-15 DIAGNOSIS — H16.001 CORNEAL ULCER OF RIGHT EYE: Primary | ICD-10-CM

## 2023-08-15 PROCEDURE — 92012 INTRM OPH EXAM EST PATIENT: CPT | Performed by: STUDENT IN AN ORGANIZED HEALTH CARE EDUCATION/TRAINING PROGRAM

## 2023-08-15 RX ORDER — BACITRACIN 500 [USP'U]/G
OINTMENT OPHTHALMIC
Qty: 3.5 G | Refills: 4 | Status: SHIPPED | OUTPATIENT
Start: 2023-08-15 | End: 2023-12-12

## 2023-08-15 RX ORDER — TOBRAMYCIN 3 MG/ML
1 SOLUTION/ DROPS OPHTHALMIC
Qty: 5 ML | Refills: 4 | Status: SHIPPED | OUTPATIENT
Start: 2023-08-15 | End: 2023-08-22

## 2023-08-15 ASSESSMENT — VISUAL ACUITY
OD_CC+: -2
OS_CC+: -1
CORRECTION_TYPE: GLASSES
OS_CC: 20/25
METHOD: SNELLEN - LINEAR
OD_CC: 20/30

## 2023-08-15 ASSESSMENT — TONOMETRY
OD_IOP_MMHG: 10
IOP_METHOD: ICARE
OS_IOP_MMHG: 10

## 2023-08-15 ASSESSMENT — EXTERNAL EXAM - RIGHT EYE: OD_EXAM: NORMAL

## 2023-08-15 ASSESSMENT — SLIT LAMP EXAM - LIDS
COMMENTS: NORMAL
COMMENTS: NORMAL

## 2023-08-15 ASSESSMENT — EXTERNAL EXAM - LEFT EYE: OS_EXAM: NORMAL

## 2023-08-15 NOTE — PATIENT INSTRUCTIONS
Continue moxifloxacin every 2 hours when awake in the right eye (If bottle runs out,  a refill from the pharmacy)    Add tobramycin every 2 hours when awake in the right eye (If tube runs out,  a refill from the pharmacy)     Continue artificial tears three times a day (If bottle runs out,  a refill from the pharmacy)     Switch to bacitracin ophthalmic ointment  at bedtime in the right eye    Wait 5 minutes between each drop.    Return on Tuesday 08/22 at 10:55 am    Call for sooner appointment if symptoms worsen.    Keith Gomez MD  [phone]

## 2023-08-15 NOTE — PROGRESS NOTES
Current Eye Medications:  Lubricating eye drops ([facility]) every two hours right eye. Moxifloxacin every 2 hours right eye. Was using ointment, ran out Saturday.     Subjective:  1 week recheck corneal ulcer of right eye. Vision is blurry right eye. Vision is doing well left eye. Still Light sensitive, light is too much for patient. Itchy and scratchy right eye. No eye pain in either eye.      Objective:  See Ophthalmology Exam.      Assessment:  Yair Benites is a 62 year old male who presents with:   Encounter Diagnosis   Name Primary?    Corneal ulcer of right eye Slightly increased in size. Change drops as below.       Plan:  Continue moxifloxacin every 2 hours when awake in the right eye (If bottle runs out,  a refill from the pharmacy)    Add tobramycin every 2 hours when awake in the right eye (If tube runs out,  a refill from the pharmacy)     Continue artificial tears three times a day (If bottle runs out,  a refill from the pharmacy)     Switch to bacitracin ophthalmic ointment  at bedtime in the right eye    Wait 5 minutes between each drop.    Return on Tuesday 08/22 at 10:55 am    Call for sooner appointment if symptoms worsen.    Keith Gomez MD  [phone]

## 2023-08-15 NOTE — LETTER
8/15/2023         RE: Yair Benites  [address] [facility] [address]        Dear Colleague,    Thank you for referring your patient, Yair Benites, to the [facility]. Please see a copy of my visit note below.     Current Eye Medications:  Lubricating eye drops ([facility]) every two hours right eye. Moxifloxacin every 2 hours right eye. Was using ointment, ran out Saturday.     Subjective:  1 week recheck corneal ulcer of right eye. Vision is blurry right eye. Vision is doing well left eye. Still Light sensitive, light is too much for patient. Itchy and scratchy right eye. No eye pain in either eye.      Objective:  See Ophthalmology Exam.      Assessment:  Yair Benites is a 62 year old male who presents with:   Encounter Diagnosis   Name Primary?     Corneal ulcer of right eye Slightly increased in size. Change drops as below.       Plan:  Continue moxifloxacin every 2 hours when awake in the right eye (If bottle runs out,  a refill from the pharmacy)    Add tobramycin every 2 hours when awake in the right eye (If tube runs out,  a refill from the pharmacy)     Continue artificial tears three times a day (If bottle runs out,  a refill from the pharmacy)     Switch to bacitracin ophthalmic ointment  at bedtime in the right eye    Wait 5 minutes between each drop.    Return on Tuesday 08/22 at 10:55 am    Call for sooner appointment if symptoms worsen.    Keith Gomez MD  [phone]     Again, thank you for allowing me to participate in the care of your patient.        Sincerely,        Keith Gomez MD

## 2023-08-17 ENCOUNTER — TELEPHONE (OUTPATIENT)
Dept: FAMILY MEDICINE | Facility: CLINIC | Age: 63
End: 2023-08-17
Payer: MEDICARE

## 2023-08-17 NOTE — TELEPHONE ENCOUNTER
[facility] called for delay in re-certification. Verbal ok given.     Thanks,  RIYA Mistry  [facility]

## 2023-08-22 ENCOUNTER — OFFICE VISIT (OUTPATIENT)
Dept: OPHTHALMOLOGY | Facility: CLINIC | Age: 63
End: 2023-08-22
Payer: MEDICARE

## 2023-08-22 DIAGNOSIS — H16.001 CORNEAL ULCER OF RIGHT EYE: Primary | ICD-10-CM

## 2023-08-22 PROCEDURE — 99207 PR NO CHARGE LOS: CPT | Performed by: STUDENT IN AN ORGANIZED HEALTH CARE EDUCATION/TRAINING PROGRAM

## 2023-08-22 RX ORDER — TOBRAMYCIN 3 MG/ML
1 SOLUTION/ DROPS OPHTHALMIC 4 TIMES DAILY
Qty: 5 ML | Refills: 4 | Status: SHIPPED | OUTPATIENT
Start: 2023-08-22 | End: 2023-09-19

## 2023-08-22 ASSESSMENT — SLIT LAMP EXAM - LIDS
COMMENTS: NORMAL
COMMENTS: NORMAL

## 2023-08-22 ASSESSMENT — VISUAL ACUITY
OD_CC: 20/30
OD_CC+: -2
METHOD: SNELLEN - LINEAR
CORRECTION_TYPE: GLASSES
OS_CC: 20/30
OS_CC+: -2

## 2023-08-22 ASSESSMENT — EXTERNAL EXAM - RIGHT EYE: OD_EXAM: NORMAL

## 2023-08-22 ASSESSMENT — EXTERNAL EXAM - LEFT EYE: OS_EXAM: NORMAL

## 2023-08-22 NOTE — PROGRESS NOTES
Current Eye Medications:  moxifloxacin - right eye q2h   Artificial tears - right eye q2h   Bacitracin dimitry - right eye at bedtime      Subjective:  1 wk corneal ulcer right eye follow-up - right eye still red but feels and looks better. Pt is still sensitive to light. Did not get tobramycin - drop was not in stock at [facility].  Wondering how long it will take for right eye to get better.         Objective:  See Ophthalmology Exam.       Assessment:  Yair Benites is a 62 year old male who presents with:   Encounter Diagnosis   Name Primary?    Corneal ulcer of right eye Slighly improved today.     *Per [facility] staff - pt picked up tobramycin gtt and bacitracin dimitry on Friday 8/18. I looked at the eyedrops that he is currently using and Tobramycin was not in his bag of drops. We reordered another bottle to our pharmacy in the clinic, who has it in stock, and the patient was walked over to the pharmacy to  the drops.       Plan:  Decrease moxifloxacin (tan top) to four times a day in the right eye      tobramycin and use four times a day in the right eye     Continue bacitracin ointment at bedtime in the right eye    Continue artificial tears every couple of hours    Wait at least 5 minutes between drops     Keith Gomez MD  [phone]

## 2023-08-22 NOTE — LETTER
August 22, 2023      Yair Benites  [address] [place] [id number]        To Whom It May Concern:    Yair Benites  was seen in the eye clinic on 08/22/23.  Please excuse him from work on August 22, 2023 and next Tuesday August 29th, 2022 due to follow up appointments needed.      Sincerely,        Keith Gomez MD

## 2023-08-22 NOTE — LETTER
8/22/2023         RE: Yair Benites  [address] [facility] [address]        Dear Colleague,    Thank you for referring your patient, Yair Benites, to the [facility]. Please see a copy of my visit note below.     Current Eye Medications:  moxifloxacin - right eye q2h   Artificial tears - right eye q2h   Bacitracin dimitry - right eye at bedtime      Subjective:  1 wk corneal ulcer right eye follow-up - right eye still red but feels and looks better. Pt is still sensitive to light. Did not get tobramycin - drop was not in stock at [facility].  Wondering how long it will take for right eye to get better.         Objective:  See Ophthalmology Exam.       Assessment:  Yair Benites is a 62 year old male who presents with:   Encounter Diagnosis   Name Primary?     Corneal ulcer of right eye Slighly improved today.     *Per [facility] staff - pt picked up tobramycin gtt and bacitracin dimitry on Friday 8/18. I looked at the eyedrops that he is currently using and Tobramycin was not in his bag of drops. We reordered another bottle to our pharmacy in the clinic, who has it in stock, and the patient was walked over to the pharmacy to  the drops.       Plan:  Decrease moxifloxacin (tan top) to four times a day in the right eye      tobramycin and use four times a day in the right eye     Continue bacitracin ointment at bedtime in the right eye    Continue artificial tears every couple of hours    Wait at least 5 minutes between drops     Keith Gomez MD  [phone]      Again, thank you for allowing me to participate in the care of your patient.        Sincerely,        Keith Gomez MD

## 2023-08-22 NOTE — PATIENT INSTRUCTIONS
Decrease moxifloxacin (tan top) to four times a day in the right eye      tobramycin and use four times a day in the right eye     Continue bacitracin ointment at bedtime in the right eye    Continue artificial tears every couple of hours    Wait at least 5 minutes between drops     Keith Gomez MD  [phone]

## 2023-08-23 ENCOUNTER — TELEPHONE (OUTPATIENT)
Dept: FAMILY MEDICINE | Facility: CLINIC | Age: 63
End: 2023-08-23
Payer: MEDICARE

## 2023-08-23 NOTE — TELEPHONE ENCOUNTER
Home Care is calling regarding an established patient with [facility].       Requesting orders from: Radha Villegas  Provider is following patient: Yes  Is this a 60-day recertification request?  Yes    Orders Requested    Skilled Nursing  Request for recertification   Frequency: 1 time every 2 weeks x 9 weeks, 2 PRN visits for med set up and health assessment     Information was gathered and will be sent to provider for review.  RN will contact Home Care with information after provider review.  Confirmed ok to leave a detailed message with call back.  Contact information confirmed and updated as needed.    Sohail Spencer RN

## 2023-08-23 NOTE — TELEPHONE ENCOUNTER
Called Sheila ([facility]) and relayed verbal ok for requested home care orders per Dr. Radha Villegas, no further questions.    HAWK KathleenN RN  [facility]

## 2023-08-29 ENCOUNTER — TELEPHONE (OUTPATIENT)
Dept: OPHTHALMOLOGY | Facility: CLINIC | Age: 63
End: 2023-08-29

## 2023-08-29 NOTE — TELEPHONE ENCOUNTER
Offered appt on Friday, pt's wife states he is off on Tuesdays, all other days not able to come until 3:45-3:50.  Osmani's eye is doing better, not getting any worse. Ok to wait until next week per doctor. Scheduled for Tuesday 9/5 - knows to keep on using drops/ungs as directed even if eye is feeling better.

## 2023-08-29 NOTE — TELEPHONE ENCOUNTER
[facility]    Phone Message    May a detailed message be left on voicemail: yes     Reason for Call: Appointment Intake    Referring Provider Name: n/a  Diagnosis and/or Symptoms: Pt's spouse is calling to reschedule today's appt with . Writer reschedule to next available (10/17) but spouse states  would like to see pt sooner, at least once a week. Please reach out to pt's spouse if  wants to see pt sooner than 10/17      Action Taken: Message routed to:  [facility] eye    Travel Screening: Not Applicable

## 2023-09-05 ENCOUNTER — NURSE TRIAGE (OUTPATIENT)
Dept: NURSING | Facility: CLINIC | Age: 63
End: 2023-09-05

## 2023-09-05 NOTE — TELEPHONE ENCOUNTER
Patient is calling to cancel his appointment.  FNA transferred to scheduling.  Additional Information   Negative: RN needs further essential information from caller in order to complete triage   Negative: Requesting regular office appointment   Negative: [1] Caller requesting NON-URGENT health information AND [2] PCP's office is the best resource   Health Information question, no triage required and triager able to answer question    Protocols used: Information Only Call-A-AH

## 2023-09-07 ENCOUNTER — TELEPHONE (OUTPATIENT)
Dept: FAMILY MEDICINE | Facility: CLINIC | Age: 63
End: 2023-09-07
Payer: MEDICARE

## 2023-09-07 DIAGNOSIS — Z53.9 DIAGNOSIS NOT YET DEFINED: Primary | ICD-10-CM

## 2023-09-07 PROCEDURE — G0179 MD RECERTIFICATION HHA PT: HCPCS | Performed by: FAMILY MEDICINE

## 2023-09-07 NOTE — TELEPHONE ENCOUNTER
Reason for Call:  Form, our goal is to have forms completed with 72 hours, however, some forms may require a visit or additional information.    Type of letter, form or note:  Home Health Certification    Who is the form from?: Home care    Where did the form come from: form was faxed in    What clinic location was the form placed at?: [facility]    Where the form was placed: Given to physician    What number is listed as a contact on the form?: [phone]       Call taken on 9/7/2023 at 3:02 PM by Tasha Marcus

## 2023-09-18 ENCOUNTER — TELEPHONE (OUTPATIENT)
Dept: OPHTHALMOLOGY | Facility: CLINIC | Age: 63
End: 2023-09-18
Payer: MEDICARE

## 2023-09-18 ENCOUNTER — NURSE TRIAGE (OUTPATIENT)
Dept: FAMILY MEDICINE | Facility: CLINIC | Age: 63
End: 2023-09-18
Payer: MEDICARE

## 2023-09-18 DIAGNOSIS — H16.001 CORNEAL ULCER OF RIGHT EYE: ICD-10-CM

## 2023-09-18 NOTE — TELEPHONE ENCOUNTER
[facility] Call [facility]    Phone Message    May a detailed message be left on voicemail: yes     Reason for Call: Medication Refill Request    Has the patient contacted the pharmacy for the refill? Yes   Name of medication being requested: moxifloxacin (VIGAMOX) 0.5 % ophthalmic solution and tobramycin (TOBREX) 0.3 % ophthalmic solution   Provider who prescribed the medication: Keith Gomez MD   Pharmacy:   [id number] [address] AT [facility]     Date medication is needed: asap    Action Taken: Message routed to:  [facility] Ophthalmology    Travel Screening: Not Applicable

## 2023-09-18 NOTE — TELEPHONE ENCOUNTER
"Received call from patient. He reports a scratched cornea injury 2 months ago. He is experiencing pain, which is constant and rated 4/10. He reports \"Dr. Gomez stopped my drops and that is malpractice\". Triaged and advised to be seen today, 09/18/23 per protocol for these symptoms. He was transferred to eye dept, as patient had requested a refill of his eye drops.  Reason for Disposition   Eye pain/discomfort and more than mild    Additional Information   Negative: Followed an eye injury   Negative: Eye pain from chemical in the eye   Negative: Eye pain from foreign body in eye   Negative: Has sinus pain or pressure   Negative: Severe eye pain   Negative: Complete loss of vision in one or both eyes   Negative: Eyelids are very swollen (shut or almost) and fever   Negative: Eyelid (outer) is very red and fever   Negative: Foreign body sensation ('feels like something is in there') and irrigation didn't help   Negative: Vomiting   Negative: Ulcer or sore seen on the cornea (clear center part of the eye)   Negative: Recent eye surgery and increasing eye pain   Negative: Blurred vision and new or worsening   Negative: Patient sounds very sick or weak to the triager    Answer Assessment - Initial Assessment Questions  1. ONSET: \"When did the pain start?\" (e.g., minutes, hours, days)      Pain started a couple months ago after a concert in [place].   2. TIMING: \"Does the pain come and go, or has it been constant since it started?\" (e.g., constant, intermittent, fleeting)      Constant.   3. SEVERITY: \"How bad is the pain?\"   (Scale 1-10; mild, moderate or severe)    - MILD (1-3): doesn't interfere with normal activities     - MODERATE (4-7): interferes with normal activities or awakens from sleep     - SEVERE (8-10): excruciating pain and patient unable to do normal activities      4/10.   4. LOCATION: \"Where does it hurt?\"  (e.g., eyelid, eye, cheekbone)      R eye.   5. CAUSE: \"What do you think is causing the pain?\"    " "  Scratched cornea.   6. VISION: \"Do you have blurred vision or changes in your vision?\"       Somewhat sensitive to light.   7. EYE DISCHARGE: \"Is there any discharge (pus) from the eye(s)?\"  If yes, ask: \"What color is it?\"       Yes, it is yellow.   8. FEVER: \"Do you have a fever?\" If so, ask: \"What is it, how was it measured, and when did it start?\"       No.   9. OTHER SYMPTOMS: \"Do you have any other symptoms?\" (e.g., headache, nasal discharge, facial rash)      Puffy eye.  10. PREGNANCY: \"Is there any chance you are pregnant?\" \"When was your last menstrual period?\"        N/A.    Protocols used: Eye Pain-A-OH    "

## 2023-09-19 RX ORDER — TOBRAMYCIN 3 MG/ML
1 SOLUTION/ DROPS OPHTHALMIC 4 TIMES DAILY
Qty: 5 ML | Refills: 4 | Status: SHIPPED | OUTPATIENT
Start: 2023-09-19 | End: 2023-12-12

## 2023-09-19 RX ORDER — MOXIFLOXACIN 5 MG/ML
1 SOLUTION/ DROPS OPHTHALMIC 4 TIMES DAILY
Qty: 9 ML | Refills: 3 | Status: SHIPPED | OUTPATIENT
Start: 2023-09-19 | End: 2023-12-12

## 2023-09-19 NOTE — TELEPHONE ENCOUNTER
Patient was last seen on 8/22/23 and requested to return in one week to follow his corneal ulcer right eye, but he has cancelled his appointments that were made for 8/29/23, 9/5/23, and 9/21/23. I will refill his medications temporarily, but he needs to be seen for follow up for further refills. I will have our  staff contact him to attempt to schedule something sooner than the 9/26/23 appointment he has with Dr. Aldana and the 10/17/23 appointment he has with me (both appointments made by the [facility]).     Keith Gomez MD  [phone]

## 2023-09-19 NOTE — TELEPHONE ENCOUNTER
"Note from  staff, Mati Cohen today:     \"I tried calling the patient and it went right to voicemail. I left a voicemail explaining that we need to see him sooner and then gave him our number for scheduling on the voicemail.\"    Keith Gomez MD  [phone]   "

## 2023-09-26 ENCOUNTER — OFFICE VISIT (OUTPATIENT)
Dept: OPHTHALMOLOGY | Facility: CLINIC | Age: 63
End: 2023-09-26
Payer: MEDICARE

## 2023-09-26 DIAGNOSIS — T15.01XA FOREIGN BODY OF RIGHT CORNEA, INITIAL ENCOUNTER: Primary | ICD-10-CM

## 2023-09-26 PROCEDURE — 65222 REMOVE FOREIGN BODY FROM EYE: CPT | Mod: RT | Performed by: OPHTHALMOLOGY

## 2023-09-26 PROCEDURE — 92012 INTRM OPH EXAM EST PATIENT: CPT | Mod: 25 | Performed by: OPHTHALMOLOGY

## 2023-09-26 ASSESSMENT — VISUAL ACUITY
OD_CC: 20/25
METHOD: SNELLEN - LINEAR
OS_CC+: -2
OS_CC: 20/25

## 2023-09-26 ASSESSMENT — TONOMETRY
OD_IOP_MMHG: 10
OS_IOP_MMHG: 12
IOP_METHOD: ICARE

## 2023-09-26 ASSESSMENT — SLIT LAMP EXAM - LIDS: COMMENTS: NORMAL

## 2023-09-26 NOTE — LETTER
9/26/2023         RE: Yair Benites  [address] [place] [id number]        Dear Colleague,    Thank you for referring your patient, Yair Benites, to the [facility]. Please see a copy of my visit note below.     Current Eye Medications:  None now, stopped drops about 1 week ago.     Subjective:  Itching, scratchy, and watering right eye for last 6 weeks. Vision is improved right eye. Vision is doing well left eye.         Objective:  See Ophthalmology Exam.       Assessment:  Corneal foreign body right eye.      Plan:  Foreign body right cornea removed with Jeweler's forceps under Proparacaine anesthesia.  Resume:   moxifloxacin (tan top) - one drop in the right eye four times daily   Bacitracin Ointment - apply a small squirt in the right eye every evening    Return visit at 2:00 on Monday October 2nd    Ephraim Aldana M.D.  [phone]         Again, thank you for allowing me to participate in the care of your patient.        Sincerely,        Ephraim Aldana MD

## 2023-09-26 NOTE — PROGRESS NOTES
Current Eye Medications:  None now, stopped drops about 1 week ago.     Subjective:  Itching, scratchy, and watering right eye for last 6 weeks. Vision is improved right eye. Vision is doing well left eye.         Objective:  See Ophthalmology Exam.       Assessment:  Corneal foreign body right eye.      Plan:  Foreign body right cornea removed with Jeweler's forceps under Proparacaine anesthesia.  Resume:   moxifloxacin (tan top) - one drop in the right eye four times daily   Bacitracin Ointment - apply a small squirt in the right eye every evening    Return visit at 2:00 on Monday October 2nd    Ephraim Adlana M.D.  [phone]

## 2023-09-26 NOTE — PATIENT INSTRUCTIONS
Resume:   moxifloxacin (tan top) - one drop in the right eye four times daily   Bacitracin Ointment - apply a small squirt in the right eye every evening    Return visit at 2:00 on Monday October 2nd    Ephraim Aldana M.D.  [phone]

## 2023-09-29 ENCOUNTER — TRANSFERRED RECORDS (OUTPATIENT)
Dept: MULTI SPECIALTY CLINIC | Facility: CLINIC | Age: 63
End: 2023-09-29

## 2023-09-29 LAB — RETINOPATHY: NORMAL

## 2023-10-01 ASSESSMENT — EXTERNAL EXAM - LEFT EYE: OS_EXAM: NORMAL

## 2023-10-01 ASSESSMENT — SLIT LAMP EXAM - LIDS: COMMENTS: NORMAL

## 2023-10-01 ASSESSMENT — EXTERNAL EXAM - RIGHT EYE: OD_EXAM: NORMAL

## 2023-10-02 ENCOUNTER — OFFICE VISIT (OUTPATIENT)
Dept: OPHTHALMOLOGY | Facility: CLINIC | Age: 63
End: 2023-10-02
Payer: MEDICARE

## 2023-10-02 DIAGNOSIS — H16.9 KERATITIS: Primary | ICD-10-CM

## 2023-10-02 PROCEDURE — 92012 INTRM OPH EXAM EST PATIENT: CPT | Performed by: OPHTHALMOLOGY

## 2023-10-02 RX ORDER — PREDNISOLONE ACETATE 10 MG/ML
1 SUSPENSION/ DROPS OPHTHALMIC 2 TIMES DAILY
Qty: 10 ML | Refills: 0 | Status: SHIPPED | OUTPATIENT
Start: 2023-10-02 | End: 2023-12-12

## 2023-10-02 ASSESSMENT — EXTERNAL EXAM - RIGHT EYE: OD_EXAM: NORMAL

## 2023-10-02 ASSESSMENT — VISUAL ACUITY
METHOD: SNELLEN - LINEAR
OS_CC: 20/20
OD_CC+: +2
CORRECTION_TYPE: GLASSES
OD_PH_CC+: -3
OS_CC+: -1
OD_CC: 20/40
OD_PH_CC: 20/25

## 2023-10-02 ASSESSMENT — SLIT LAMP EXAM - LIDS
COMMENTS: NORMAL
COMMENTS: NORMAL

## 2023-10-02 ASSESSMENT — EXTERNAL EXAM - LEFT EYE: OS_EXAM: NORMAL

## 2023-10-02 NOTE — PATIENT INSTRUCTIONS
Begin: Prednisolone one drop in the right eye twice daily    Decrease: Moxifloxacin one drop in the right eye twice daily until the bottle runs out    Continue: Bacitracin Ointment before bed every night     Return visit in 2 weeks for an MD check     Ephraim Aldana M.D.  [phone]

## 2023-10-02 NOTE — LETTER
10/2/2023         RE: Yair Benites  [address] [place] [id number]        Dear Colleague,    Thank you for referring your patient, Yair Benites, to the [facility]. Please see a copy of my visit note below.     Current Eye Medications:  Moxifloxacin - one drop in the right eye four times daily-yesterday used TID   Bacitracin Ointment - apply a small squirt in the right eye every evening     Subjective:  here for FB follow up right eye. No pain in the eye, feels better.      Objective:  See Ophthalmology Exam.       Assessment:  Keratitis right eye improving.      Plan:  Begin: Prednisolone one drop in the right eye twice daily    Decrease: Moxifloxacin one drop in the right eye twice daily until the bottle runs out    Continue: Bacitracin Ointment before bed every night     Return visit in 2 weeks for an MD check     Ephraim Aldana M.D.  [phone]          Again, thank you for allowing me to participate in the care of your patient.        Sincerely,        Ephraim Aldana MD

## 2023-10-02 NOTE — PROGRESS NOTES
Current Eye Medications:  Moxifloxacin - one drop in the right eye four times daily-yesterday used TID   Bacitracin Ointment - apply a small squirt in the right eye every evening     Subjective:  here for FB follow up right eye. No pain in the eye, feels better.      Objective:  See Ophthalmology Exam.       Assessment:  Keratitis right eye improving.      Plan:  Begin: Prednisolone one drop in the right eye twice daily    Decrease: Moxifloxacin one drop in the right eye twice daily until the bottle runs out    Continue: Bacitracin Ointment before bed every night     Return visit in 2 weeks for an MD check     Ephraim Aldana M.D.  [phone]

## 2023-10-04 DIAGNOSIS — F25.1 SCHIZOAFFECTIVE DISORDER, DEPRESSIVE TYPE (H): Primary | ICD-10-CM

## 2023-10-08 ENCOUNTER — TELEPHONE (OUTPATIENT)
Dept: FAMILY MEDICINE | Facility: CLINIC | Age: 63
End: 2023-10-08
Payer: MEDICARE

## 2023-10-08 NOTE — TELEPHONE ENCOUNTER
Order/Referral Request    Who is requesting: [facility]     Orders being requested: 1 prn visit for medication set up    Reason service is needed/diagnosis: medication set up     When are orders needed by:  ASAP     Has this been discussed with Provider: No    Does patient have a preference on a Group/Provider/Facility?  [facility]     Does patient have an appointment scheduled?: No    Where to send orders:  verbal order, please leave detail vm to [facility] at [phone]    Okay to leave a detailed message?: Yes at Other phone number:  [phone]

## 2023-10-09 ENCOUNTER — TELEPHONE (OUTPATIENT)
Dept: FAMILY MEDICINE | Facility: CLINIC | Age: 63
End: 2023-10-09
Payer: MEDICARE
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
## 2023-10-09 NOTE — TELEPHONE ENCOUNTER
Writer called Ros[facility] and relayed ok for requested verbal order per Dr. Radha Villegas, no further questions.    HAWK KathleenN RN  [facility]

## 2023-10-12 DIAGNOSIS — M54.50 LOW BACK PAIN WITHOUT SCIATICA, UNSPECIFIED BACK PAIN LATERALITY, UNSPECIFIED CHRONICITY: ICD-10-CM

## 2023-10-12 RX ORDER — NABUMETONE 500 MG/1
500 TABLET, FILM COATED ORAL 2 TIMES DAILY
Qty: 180 TABLET | Refills: 0 | Status: SHIPPED | OUTPATIENT
Start: 2023-10-12 | End: 2023-12-12

## 2023-10-12 NOTE — TELEPHONE ENCOUNTER
Dr. Villegas,       Patient called regarding medication. Stated that a Margaux Barraza [place] provider prescribed med originally. Medication is helping and would like to continue to take the medication unless you have other option for treating his neuropathy. Please advise.     Thanks,  RIYA Mistry  [facility]      Quality 226: Preventive Care And Screening: Tobacco Use: Screening And Cessation Intervention: Patient screened for tobacco use and is an ex/non-smoker Quality 130: Documentation Of Current Medications In The Medical Record: Current Medications Documented Detail Level: Detailed Quality 431: Preventive Care And Screening: Unhealthy Alcohol Use - Screening: Patient screened for unhealthy alcohol use using a single question and scores less than 2 times per year

## 2023-10-12 NOTE — TELEPHONE ENCOUNTER
Received call from Adelita with [facility] ([phone]).    Patient need nabumetone 500 mg refilled. She called pharmacy and they said that Rx is closed and he need to contact doctor to request the refill.    Forwarding to PCP because nabumetone (RELAFEN) 500 MG tablet was discontinued 05/01/23.    Tyesha Blanco RN   [facility]

## 2023-10-13 ENCOUNTER — TELEPHONE (OUTPATIENT)
Dept: FAMILY MEDICINE | Facility: CLINIC | Age: 63
End: 2023-10-13
Payer: MEDICARE

## 2023-10-13 NOTE — TELEPHONE ENCOUNTER
Home Care is calling regarding an established patient with [facility].       Requesting orders from: Radha Villegas  Provider is following patient: Yes  Is this a 60-day recertification request?  No    Orders Requested    Skilled Nursing  Request for continuation of care with decrease in frequency   Goals have been met/progressing.  Frequency:  every 2 weeks for nine weeks        Verbal orders given.  Home Care will send orders for provider to sign.  Confirmed ok to leave a detailed message with call back.  Contact information confirmed and updated as needed.    Rima Hicks RN

## 2023-10-17 ENCOUNTER — TELEPHONE (OUTPATIENT)
Dept: FAMILY MEDICINE | Facility: CLINIC | Age: 63
End: 2023-10-17

## 2023-10-17 NOTE — TELEPHONE ENCOUNTER
RN received call from Adeilta with [facility].    Adelita states she has not hear back if Dr. Villegas is willing to take over prescribing.Vivian.    RN advised a prescription had been sent on 10/12 to patients pharmacy.    Adelita verbalized understanding.    Norm Ochoa, RN, BSN, PHN  [facility]

## 2023-10-17 NOTE — TELEPHONE ENCOUNTER
Home Care is calling regarding an established patient with [facility].       Requesting orders from: Radha Villegas  Provider is following patient: Yes  Is this a 60-day recertification request?  No    Orders Requested    Skilled Nursing  Request for continuation of care with no increase or decrease in frequency  Frequency:  2 prn visits for nine weeks           Verbal orders given.  Home Care will send orders for provider to sign.  Confirmed ok to leave a detailed message with call back.  Contact information confirmed and updated as needed.    Rima Hicks RN

## 2023-10-20 ENCOUNTER — TELEPHONE (OUTPATIENT)
Dept: OPHTHALMOLOGY | Facility: CLINIC | Age: 63
End: 2023-10-20
Payer: MEDICARE

## 2023-10-20 NOTE — CONFIDENTIAL NOTE
Spoke with Rula. I told her that Dr. Martini note state to use Moxifloxacin drops until they run out. Use prednisolone twice daily right eye. Continue Bacitracin ointment at night. Return in 2 weeks. Rula scheduled appointment for Patient for 10/30/2023 to follow up with Dr. Aldana.

## 2023-10-20 NOTE — TELEPHONE ENCOUNTER
[facility]    Phone Message    May a detailed message be left on voicemail: yes     Reason for Call: Other: PT was prescribed some eye drops Moxifloxacin , pt wife called and states that his eyes has been doing okay and is wondering if he should continue to take the eye drops. Please review and contact spouse Rula [phone] to discuss . Thank you      Action Taken: Other: EYE    Travel Screening: Not Applicable

## 2023-10-27 ENCOUNTER — TELEPHONE (OUTPATIENT)
Dept: FAMILY MEDICINE | Facility: CLINIC | Age: 63
End: 2023-10-27
Payer: MEDICARE

## 2023-10-27 DIAGNOSIS — E11.9 TYPE 2 DIABETES MELLITUS WITHOUT COMPLICATION, WITHOUT LONG-TERM CURRENT USE OF INSULIN (H): ICD-10-CM
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
## 2023-10-30 ENCOUNTER — OFFICE VISIT (OUTPATIENT)
Dept: OPHTHALMOLOGY | Facility: CLINIC | Age: 63
End: 2023-10-30
Payer: MEDICARE

## 2023-10-30 DIAGNOSIS — H16.9 KERATITIS: Primary | ICD-10-CM

## 2023-10-30 PROCEDURE — 92012 INTRM OPH EXAM EST PATIENT: CPT | Performed by: OPHTHALMOLOGY

## 2023-10-30 ASSESSMENT — EXTERNAL EXAM - LEFT EYE: OS_EXAM: NORMAL

## 2023-10-30 ASSESSMENT — VISUAL ACUITY
METHOD: SNELLEN - LINEAR
OD_CC: 20/25
OS_CC: 20/20
CORRECTION_TYPE: GLASSES

## 2023-10-30 ASSESSMENT — SLIT LAMP EXAM - LIDS
COMMENTS: NORMAL
COMMENTS: NORMAL

## 2023-10-30 ASSESSMENT — EXTERNAL EXAM - RIGHT EYE: OD_EXAM: NORMAL

## 2023-10-30 NOTE — PATIENT INSTRUCTIONS
Continue:  Prednisolone drops in the right eye twice daily for 2 more weeks or until the bottle runs out     Return visit in 1-2 months for a complete exam     Ehpraim Aldana M.D.  [phone]

## 2023-10-30 NOTE — PROGRESS NOTES
" Current Eye Medications: \"white cap\" prednisolone - right eye BID        Subjective:  1 mo keratitis right eye follow-up - right eye feels better, not using dimitry anymore as it would make him sleepy.      Objective:  See Ophthalmology Exam.       Assessment:  Keratitis right eye improved.      Plan:  Continue:  Prednisolone drops in the right eye twice daily for 2 more weeks or until the bottle runs out     Return visit in 1-2 months for a complete exam     Ephraim Aldana M.D.  [phone]       "

## 2023-10-30 NOTE — LETTER
"    10/30/2023         RE: Yair Benites  [address] [facility] [address]        Dear Colleague,    Thank you for referring your patient, Yair Benites, to the [facility]. Please see a copy of my visit note below.     Current Eye Medications: \"white cap\" prednisolone - right eye BID        Subjective:  1 mo keratitis right eye follow-up - right eye feels better, not using dimitry anymore as it would make him sleepy.      Objective:  See Ophthalmology Exam.       Assessment:  Keratitis right eye improved.      Plan:  Continue:  Prednisolone drops in the right eye twice daily for 2 more weeks or until the bottle runs out     Return visit in 1-2 months for a complete exam     Ephraim Aldana M.D.  [phone]         Again, thank you for allowing me to participate in the care of your patient.        Sincerely,        Ephraim Aldana MD  "

## 2023-11-09 ENCOUNTER — TELEPHONE (OUTPATIENT)
Dept: FAMILY MEDICINE | Facility: CLINIC | Age: 63
End: 2023-11-09
Payer: MEDICARE

## 2023-11-09 NOTE — TELEPHONE ENCOUNTER
Reason for Call:  Form, our goal is to have forms completed with 72 hours, however, some forms may require a visit or additional information.    Type of letter, form or note:  Home Health Certification    Who is the form from?: Home care    Where did the form come from: form was faxed in    What clinic location was the form placed at?: [facility]    Where the form was placed: Given to physician    What number is listed as a contact on the form?: [phone]       Call taken on 11/9/2023 at 9:14 AM by Tasha Marcus

## 2023-11-09 NOTE — TELEPHONE ENCOUNTER
[facility] received and given to Dr. Villegas to sign when she returns to the office on Monday, November 13th.  Tasha Marcus,

## 2023-11-13 DIAGNOSIS — Z53.9 DIAGNOSIS NOT YET DEFINED: Primary | ICD-10-CM

## 2023-11-13 PROCEDURE — G0179 MD RECERTIFICATION HHA PT: HCPCS | Performed by: FAMILY MEDICINE

## 2023-11-25 PROBLEM — H16.9 KERATITIS: Status: ACTIVE | Noted: 2023-11-25

## 2023-12-04 ENCOUNTER — TELEPHONE (OUTPATIENT)
Dept: FAMILY MEDICINE | Facility: CLINIC | Age: 63
End: 2023-12-04
Payer: MEDICARE

## 2023-12-04 NOTE — TELEPHONE ENCOUNTER
Patient is calling and requesting a prescription for CGM. He states that he is doing fingerstick blood sugar checks currently and currently testing once a day. Pt states that he gets sick at the sight of blood and to please let Dr. Villegas know this. He has upcoming appt scheduled for diabetes follow-up for 12/12/23.     Tyesha Blanco RN   [facility]

## 2023-12-05 DIAGNOSIS — F25.1 SCHIZOAFFECTIVE DISORDER, DEPRESSIVE TYPE (H): Primary | ICD-10-CM

## 2023-12-05 DIAGNOSIS — Z79.899 ENCOUNTER FOR LONG-TERM (CURRENT) USE OF MEDICATIONS: ICD-10-CM

## 2023-12-05 DIAGNOSIS — Z13.228 SCREENING FOR METABOLIC DISORDER: ICD-10-CM

## 2023-12-11 ENCOUNTER — TELEPHONE (OUTPATIENT)
Dept: FAMILY MEDICINE | Facility: CLINIC | Age: 63
End: 2023-12-11

## 2023-12-11 NOTE — TELEPHONE ENCOUNTER
Home Care is calling regarding an established patient with [facility].       Requesting orders from: Radha Villegas  Provider is following patient: Yes  Is this a 60-day recertification request?  Yes    Orders Requested    Skilled Nursing  Request for recertification   Frequency:  1x/wk for 60 days to assist/edu with medication       Verbal orders given.  Home Care will send orders for provider to sign.  Confirmed ok to leave a detailed message with call back.  Contact information confirmed and updated as needed.    Fidelia Garcia RN

## 2023-12-12 ENCOUNTER — OFFICE VISIT (OUTPATIENT)
Dept: FAMILY MEDICINE | Facility: CLINIC | Age: 63
End: 2023-12-12
Payer: COMMERCIAL

## 2023-12-12 VITALS
DIASTOLIC BLOOD PRESSURE: 87 MMHG | OXYGEN SATURATION: 96 % | HEART RATE: 107 BPM | WEIGHT: 194 LBS | RESPIRATION RATE: 18 BRPM | SYSTOLIC BLOOD PRESSURE: 137 MMHG | BODY MASS INDEX: 31.25 KG/M2

## 2023-12-12 DIAGNOSIS — F32.9 MAJOR DEPRESSIVE DISORDER WITH SINGLE EPISODE, REMISSION STATUS UNSPECIFIED: ICD-10-CM

## 2023-12-12 DIAGNOSIS — E11.9 TYPE 2 DIABETES MELLITUS WITHOUT COMPLICATION, WITHOUT LONG-TERM CURRENT USE OF INSULIN (H): ICD-10-CM

## 2023-12-12 DIAGNOSIS — S06.9X0S TRAUMATIC BRAIN INJURY, WITHOUT LOSS OF CONSCIOUSNESS, SEQUELA (H): ICD-10-CM

## 2023-12-12 DIAGNOSIS — Z13.6 SCREENING FOR AAA (ABDOMINAL AORTIC ANEURYSM): ICD-10-CM

## 2023-12-12 DIAGNOSIS — I10 HYPERTENSION GOAL BP (BLOOD PRESSURE) < 140/90: ICD-10-CM

## 2023-12-12 DIAGNOSIS — Z29.11 NEED FOR VACCINATION AGAINST RESPIRATORY SYNCYTIAL VIRUS: ICD-10-CM

## 2023-12-12 DIAGNOSIS — E78.5 HYPERLIPIDEMIA LDL GOAL <70: ICD-10-CM

## 2023-12-12 LAB — HBA1C MFR BLD: 6.5 % (ref 0–5.6)

## 2023-12-12 PROCEDURE — 99214 OFFICE O/P EST MOD 30 MIN: CPT | Mod: 25 | Performed by: FAMILY MEDICINE

## 2023-12-12 PROCEDURE — 80048 BASIC METABOLIC PNL TOTAL CA: CPT | Performed by: FAMILY MEDICINE

## 2023-12-12 PROCEDURE — 83036 HEMOGLOBIN GLYCOSYLATED A1C: CPT | Performed by: FAMILY MEDICINE

## 2023-12-12 PROCEDURE — G0008 ADMIN INFLUENZA VIRUS VAC: HCPCS | Performed by: FAMILY MEDICINE

## 2023-12-12 PROCEDURE — 90480 ADMN SARSCOV2 VAC 1/ONLY CMP: CPT | Performed by: FAMILY MEDICINE

## 2023-12-12 PROCEDURE — 91320 SARSCV2 VAC 30MCG TRS-SUC IM: CPT | Performed by: FAMILY MEDICINE

## 2023-12-12 PROCEDURE — 90662 IIV NO PRSV INCREASED AG IM: CPT | Performed by: FAMILY MEDICINE

## 2023-12-12 PROCEDURE — 36415 COLL VENOUS BLD VENIPUNCTURE: CPT | Performed by: FAMILY MEDICINE

## 2023-12-12 RX ORDER — LOSARTAN POTASSIUM 25 MG/1
25 TABLET ORAL DAILY
Qty: 90 TABLET | Refills: 3 | Status: SHIPPED | OUTPATIENT
Start: 2023-12-12

## 2023-12-12 RX ORDER — BACITRACIN 500 [USP'U]/G
OINTMENT OPHTHALMIC
Qty: 3.5 G | Refills: 4 | Status: SHIPPED | OUTPATIENT
Start: 2023-12-12 | End: 2024-04-02

## 2023-12-12 RX ORDER — METFORMIN HCL 500 MG
1500 TABLET, EXTENDED RELEASE 24 HR ORAL DAILY
Qty: 270 TABLET | Refills: 1 | Status: SHIPPED | OUTPATIENT
Start: 2023-12-12 | End: 2024-04-01

## 2023-12-12 ASSESSMENT — PATIENT HEALTH QUESTIONNAIRE - PHQ9: SUM OF ALL RESPONSES TO PHQ QUESTIONS 1-9: 6

## 2023-12-12 ASSESSMENT — PAIN SCALES - GENERAL: PAINLEVEL: NO PAIN (0)

## 2023-12-12 NOTE — PROGRESS NOTES
"  Assessment & Plan   Addendum  Pt needs ongoing Home care for medicines management due to his medical conditios  Type 2 diabetes mellitus without complication, without long-term current use of insulin (H)  Doing well   - Basic metabolic panel  (Ca, Cl, CO2, Creat, Gluc, K, Na, BUN); Future  - Hemoglobin A1c; Future  - empagliflozin (JARDIANCE) 10 MG TABS tablet; Take 1 tablet (10 mg) by mouth daily  - metFORMIN (GLUCOPHAGE XR) 500 MG 24 hr tablet; Take 3 tablets (1,500 mg) by mouth daily  - Basic metabolic panel  (Ca, Cl, CO2, Creat, Gluc, K, Na, BUN)  - Hemoglobin A1c    Hypertension goal BP (blood pressure) < 140/90  Stable   - Basic metabolic panel  (Ca, Cl, CO2, Creat, Gluc, K, Na, BUN); Future  - losartan (COZAAR) 25 MG tablet; Take 1 tablet (25 mg) by mouth daily  - Basic metabolic panel  (Ca, Cl, CO2, Creat, Gluc, K, Na, BUN)    Major depressive disorder with single episode, remission status unspecified  Stable     Traumatic brain injury, with unknown loss of consciousness status, sequela (H24)      Hyperlipidemia LDL goal <70  Stable on meds   Labs reviewed     Screening for AAA (abdominal aortic aneurysm)  Advised   -  Aorta Medicare AAA Screening; Future    Need for vaccination against respiratory syncytial virus  Advised     Ordering of each unique test  Prescription drug management         BMI:   Estimated body mass index is 31.25 kg/m  as calculated from the following:    Height as of 6/6/23: 1.678 m (5' 6.06\").    Weight as of this encounter: 88 kg (194 lb).   Weight management plan: Discussed healthy diet and exercise guidelines        Radha Villegas MD  [facility] DAVONTE Mazariegos is a 63 year old, presenting for the following health issues:  Diabetes      HPI       Diabetes Follow-up    How often are you checking your blood sugar? One time daily  What time of day are you checking your blood sugars (select all that apply)?  Before meals  Have you had any blood sugars " above 200?  No  Have you had any blood sugars below 70?  no  What symptoms do you notice when your blood sugar is low?  None  What concerns do you have today about your diabetes? None   Do you have any of these symptoms? (Select all that apply)  Numbness in feet and Burning in feet          Hyperlipidemia Follow-Up    Are you regularly taking any medication or supplement to lower your cholesterol?   Yes- atorvastatin  Are you having muscle aches or other side effects that you think could be caused by your cholesterol lowering medication?  No    Hypertension Follow-up    Do you check your blood pressure regularly outside of the clinic? Yes   Are you following a low salt diet? No  Are your blood pressures ever more than 140 on the top number (systolic) OR more   than 90 on the bottom number (diastolic), for example 140/90? Patient does not know what it is    BP Readings from Last 2 Encounters:   12/12/23 137/87   06/06/23 127/81     Hemoglobin A1C (%)   Date Value   12/12/2023 6.5 (H)   06/06/2023 7.1 (H)   03/02/2021 6.4 (H)   11/19/2019 5.8 (H)     LDL Cholesterol Calculated (mg/dL)   Date Value   03/06/2023 35   10/19/2021 67   03/02/2021 56   11/19/2019 46     How many servings of fruits and vegetables do you eat daily?  2-3  On average, how many sweetened beverages do you drink each day (Examples: soda, juice, sweet tea, etc.  Do NOT count diet or artificially sweetened beverages)?   0-1  How many days per week do you exercise enough to make your heart beat faster? 3 or less  How many minutes a day do you exercise enough to make your heart beat faster? 60 or more  How many days per week do you miss taking your medication? 0  Depression Followup  How are you doing with your depression since your last visit? Improved sees Psych for Depression and schizophrenia  Are you having other symptoms that might be associated with depression? No  Have you had a significant life event?  No   Are you feeling anxious or having  panic attacks?   No  Do you have any concerns with your use of alcohol or other drugs? No    Social History     Tobacco Use    Smoking status: Former     Years: 6     Types: Cigarettes     Quit date: 1983     Years since quittin.9    Smokeless tobacco: Never   Substance Use Topics    Alcohol use: No    Drug use: No         2022     2:45 PM 3/6/2023     3:35 PM 2023     3:29 PM   PHQ   PHQ-9 Total Score 0 0 6   Q9: Thoughts of better off dead/self-harm past 2 weeks Not at all Not at all Not at all         2022     2:45 PM   ADELAIDA-7 SCORE   Total Score 0         2023     3:29 PM   Last PHQ-9   1.  Little interest or pleasure in doing things 3   2.  Feeling down, depressed, or hopeless 3   3.  Trouble falling or staying asleep, or sleeping too much 0   4.  Feeling tired or having little energy 0   5.  Poor appetite or overeating 0   6.  Feeling bad about yourself 0   7.  Trouble concentrating 0   8.  Moving slowly or restless 0   Q9: Thoughts of better off dead/self-harm past 2 weeks 0   PHQ-9 Total Score 6   Difficulty at work, home, or with people Not difficult at all             Review of Systems   CONSTITUTIONAL: NEGATIVE for fever, chills, change in weight  ENT/MOUTH: NEGATIVE for ear, mouth and throat problems  RESP: NEGATIVE for significant cough or SOB  CV: NEGATIVE for chest pain, palpitations or peripheral edema  GI: NEGATIVE for nausea, abdominal pain, heartburn, or change in bowel habits  ROS otherwise negative      Objective    /87   Pulse 107   Resp 18   Wt 88 kg (194 lb)   SpO2 96%   BMI 31.25 kg/m    Body mass index is 31.25 kg/m .  Physical Exam   GENERAL: healthy, alert and no distress  EYES: Eyes grossly normal to inspection  NECK: no adenopathy, no asymmetry, masses, or scars and thyroid normal to palpation  RESP: lungs clear to auscultation - no rales, rhonchi or wheezes  CV: regular rate and rhythm, normal S1 S2, no S3 or S4, no murmur, click or rub, no  peripheral edema and peripheral pulses strong  ABDOMEN: soft, nontender, no hepatosplenomegaly, no masses and bowel sounds normal  MS: no gross musculoskeletal defects noted, no edema  Diabetic foot exam: normal DP and PT pulses, no trophic changes or ulcerative lesions, and normal sensory exam  Psych -normal   Pending labs

## 2023-12-12 NOTE — LETTER
December 13, 2023        Yair Benites  [address] [place] [id number]        Dear ,    We are writing to inform you of your test results.  Electrolytes and kidney tests are normal.   Diabetic test is good       Resulted Orders   Basic metabolic panel  (Ca, Cl, CO2, Creat, Gluc, K, Na, BUN)   Result Value Ref Range    Sodium 136 135 - 145 mmol/L      Comment:      Reference intervals for this test were updated on 09/26/2023 to more accurately reflect our healthy population. There may be differences in the flagging of prior results with similar values performed with this method. Interpretation of those prior results can be made in the context of the updated reference intervals.     Potassium 4.7 3.4 - 5.3 mmol/L    Chloride 98 98 - 107 mmol/L    Carbon Dioxide (CO2) 23 22 - 29 mmol/L    Anion Gap 15 7 - 15 mmol/L    Urea Nitrogen 12.9 8.0 - 23.0 mg/dL    Creatinine 0.84 0.67 - 1.17 mg/dL    GFR Estimate >90 >60 mL/min/1.73m2    Calcium 10.1 8.8 - 10.2 mg/dL    Glucose 127 (H) 70 - 99 mg/dL   Hemoglobin A1c   Result Value Ref Range    Hemoglobin A1C 6.5 (H) 0.0 - 5.6 %      Comment:      Normal <5.7%   Prediabetes 5.7-6.4%    Diabetes 6.5% or higher     Note: Adopted from ADA consensus guidelines.       If you have any questions or concerns, please call the clinic at the number listed above.       Sincerely,      Radha Villegas MD

## 2023-12-13 LAB
ANION GAP SERPL CALCULATED.3IONS-SCNC: 15 MMOL/L (ref 7–15)
BUN SERPL-MCNC: 12.9 MG/DL (ref 8–23)
CALCIUM SERPL-MCNC: 10.1 MG/DL (ref 8.8–10.2)
CHLORIDE SERPL-SCNC: 98 MMOL/L (ref 98–107)
CREAT SERPL-MCNC: 0.84 MG/DL (ref 0.67–1.17)
DEPRECATED HCO3 PLAS-SCNC: 23 MMOL/L (ref 22–29)
EGFRCR SERPLBLD CKD-EPI 2021: >90 ML/MIN/1.73M2
GLUCOSE SERPL-MCNC: 127 MG/DL (ref 70–99)
POTASSIUM SERPL-SCNC: 4.7 MMOL/L (ref 3.4–5.3)
SODIUM SERPL-SCNC: 136 MMOL/L (ref 135–145)

## 2023-12-13 NOTE — TELEPHONE ENCOUNTER
Left a message for Yair Horta at [phone].     The letter is ready for  at the [facility]  after 10:00 am September 20, 2021.    Idalmis Mohamud,     The patient is a 3y6m Female complaining of fever.

## 2023-12-13 NOTE — TELEPHONE ENCOUNTER
Patient called back and was notified of provider's message below  He verbalized understanding and will continue current way of checking BG with fingersticks    Sohail Spencer RN  [employer]

## 2023-12-13 NOTE — TELEPHONE ENCOUNTER
Left message on voicemail advising patient to return call at [phone].    When patient returns call, please see note by Dr. Villegas:    Insurance will cover CGM if you are on Insulin       Ana Maria VALDERRAMA, RN  [facility], [place]

## 2023-12-14 ENCOUNTER — TELEPHONE (OUTPATIENT)
Dept: FAMILY MEDICINE | Facility: CLINIC | Age: 63
End: 2023-12-14
Payer: MEDICARE

## 2023-12-14 NOTE — TELEPHONE ENCOUNTER
Patient's wife, Rula calling, consent to communicate on file.    She says patient will need to call back to reschedule his AAA screening, cancelled the test scheduled for next Tuesday as he is not available that day.    She says he does not know why this needs to be done.   I advised it is often recommended for people who used to smoke.    She says he has not smoked since 1980.    I see note in provider visit on 12/12/23:    Screening for AAA (abdominal aortic aneurysm)  Advised   - [place] Aorta Medicare AAA Screening; Future      Routed to Dr. Villegas to verify the reason the AAA screening is needed.    Rosalba BARDALES RN  [facility] Triage

## 2023-12-15 ENCOUNTER — MEDICAL CORRESPONDENCE (OUTPATIENT)
Dept: HEALTH INFORMATION MANAGEMENT | Facility: CLINIC | Age: 63
End: 2023-12-15

## 2023-12-20 ENCOUNTER — TELEPHONE (OUTPATIENT)
Dept: FAMILY MEDICINE | Facility: CLINIC | Age: 63
End: 2023-12-20
Payer: MEDICARE

## 2023-12-20 NOTE — TELEPHONE ENCOUNTER
Called patient LM for patient to return call to clarify message.     Patient is at work and will be on after 3:15pm.    Patient is not due for preventive visit until March 6, 2024.  Currently patient is not using a CMG is he requesting a rx for one? Or is patient looking for testing supplies for glucose meter?

## 2023-12-20 NOTE — TELEPHONE ENCOUNTER
Reason for Call:  Appointment Request    Patient requesting this type of appt:  Preventive     Requested provider: Radha Villegas    Reason patient unable to be scheduled: Needs to be scheduled by clinic    When does patient want to be seen/preferred time: 1-2 weeks    Comments: Patient called in wanting an appt for a continuous glucose monitor.     Okay to leave a detailed message?: Yes at Home number on file [phone] (home)    Call taken on 12/20/2023 at 8:39 AM by Paige Fontaine

## 2023-12-20 NOTE — TELEPHONE ENCOUNTER
One time AAA screening is recommended for men between the age of 65 - 75 who have smoked at least 100 cigarettes in their lifetime, per USPSTF guidelines

## 2023-12-20 NOTE — TELEPHONE ENCOUNTER
Spoke with spouse and provided reasoning for AAA. Verbalized understanding.       Thanks,  Fidelia RN  [facility]

## 2023-12-21 ENCOUNTER — OFFICE VISIT (OUTPATIENT)
Dept: FAMILY MEDICINE | Facility: CLINIC | Age: 63
End: 2023-12-21
Payer: MEDICARE

## 2023-12-21 VITALS
SYSTOLIC BLOOD PRESSURE: 128 MMHG | DIASTOLIC BLOOD PRESSURE: 70 MMHG | RESPIRATION RATE: 20 BRPM | HEART RATE: 98 BPM | HEIGHT: 66 IN | TEMPERATURE: 97.5 F | OXYGEN SATURATION: 97 % | WEIGHT: 192.4 LBS | BODY MASS INDEX: 30.92 KG/M2

## 2023-12-21 DIAGNOSIS — J06.9 UPPER RESPIRATORY TRACT INFECTION, UNSPECIFIED TYPE: Primary | ICD-10-CM

## 2023-12-21 PROCEDURE — 99213 OFFICE O/P EST LOW 20 MIN: CPT | Performed by: FAMILY MEDICINE

## 2023-12-21 PROCEDURE — 87635 SARS-COV-2 COVID-19 AMP PRB: CPT | Performed by: FAMILY MEDICINE

## 2023-12-21 RX ORDER — DOXYCYCLINE 100 MG/1
100 CAPSULE ORAL 2 TIMES DAILY
Qty: 14 CAPSULE | Refills: 0 | Status: SHIPPED | OUTPATIENT
Start: 2023-12-21 | End: 2023-12-28

## 2023-12-21 RX ORDER — AMOXICILLIN 500 MG/1
1000 CAPSULE ORAL 3 TIMES DAILY
Qty: 42 CAPSULE | Refills: 0 | Status: SHIPPED | OUTPATIENT
Start: 2023-12-21 | End: 2023-12-28

## 2023-12-21 ASSESSMENT — ENCOUNTER SYMPTOMS
PARESTHESIAS: 0
FATIGUE: 0
MYALGIAS: 0
PALPITATIONS: 0
APPETITE CHANGE: 0
ARTHRALGIAS: 0
SORE THROAT: 0
WEAKNESS: 0
CHILLS: 0
DIZZINESS: 0
NERVOUS/ANXIOUS: 0
FEVER: 0
COUGH: 1
SHORTNESS OF BREATH: 0
JOINT SWELLING: 0
WHEEZING: 1
HEADACHES: 0

## 2023-12-21 NOTE — COMMUNITY RESOURCES LIST (ENGLISH)
12/21/2023   [facility] - Outpatient Clinics  N/A  For additional resource needs, please contact your health insurance member services or your primary care team.  Phone: [phone]   Email: N/A   Address: [facility] [place] [address]   Hours: N/A        Financial Stability       Utility payment assistance  1  [place] [facility] - [facility] and Utilities Distance: 11.72 miles      In-Person, Phone/Virtual   [address] [place] [address]  Language: English  Hours: Mon - Fri 8:30 AM - 4:30 PM  Fees: Free   Phone: (425) 952-8894 Website: https://[place].gov/[facility]/energy/consumer-assistance/energy-assistance-program/     2  [place] [facility] [place]'s Telephone Assistance Plan (TAP) and [facility] Lifeline and Affordable Connectivity Program (ACP) Distance: 15.98 miles      Phone/Virtual   [address] [place] [address]  Language: English  Fees: Free   Phone: (580) 769-8684 Email: [email or web address]. Website: https://[place]/puc/consumers/telephone/          Important Numbers & Websites       [facility]   211 211unitedway.org  Poison Control   (259) 796-9668 [place]po[place]  Suicide and Crisis Lifeline   [address] [place], [place] [address]line.org  Childhelp [place] Child Abuse Hotline   815.155.3674 Childhelphotline.org  National Sexual Assault Hotline   (885) 182-4512 (HOPE) Rainn.org  National Runaway Safeline   (309) 857-7665 (RUNAWAY) 1800runaway.org  Pregnancy & Postpartum Support [place]   Call/text 050-927-2673 Ppsupportmn.org  Substance Abuse National Helpline ([facility]   322-936-HELP (7551) Findtreatment.gov  Emergency Services   911

## 2023-12-21 NOTE — PROGRESS NOTES
1. Upper respiratory tract infection, unspecified type  - amoxicillin (AMOXIL) 500 MG capsule; Take 2 capsules (1,000 mg) by mouth 3 times daily for 7 days  Dispense: 42 capsule; Refill: 0  - Symptomatic COVID-19 Virus (Coronavirus) by PCR Nose  - doxycycline hyclate (VIBRAMYCIN) 100 MG capsule; Take 1 capsule (100 mg) by mouth 2 times daily for 7 days  Dispense: 14 capsule; Refill: 0    Subjective   Yair is a 63 year old, presenting for the following health issues:  Sick        12/21/2023     3:53 PM   Additional Questions   Roomed by MP   Accompanied by NA         12/21/2023     3:53 PM   Patient Reported Additional Medications   Patient reports taking the following new medications None per patient       History of Present Illness       Reason for visit:  Cold virus  Symptom onset:  3-7 days ago  Symptoms include:  Runny nose cough sneezing  Symptom intensity:  Moderate  Symptom progression:  Staying the same  Had these symptoms before:  Yes  Has tried/received treatment for these symptoms:  Yes  Previous treatment was successful:  Yes  Prior treatment description:  Pills  What makes it worse:  Cold  What makes it better:  Vicks vapor rub    He eats 2-3 servings of fruits and vegetables daily.He consumes 0 sweetened beverage(s) daily.He exercises with enough effort to increase his heart rate 9 or less minutes per day.  He exercises with enough effort to increase his heart rate 3 or less days per week.   He is taking medications regularly.       Pre-Provider Visit Orders - Rapid Strep  Does the patient have shortness of breath/trouble breathing, an earache, drooling/too much saliva, or any difficulty opening his mouth/moving neck?  Patient reports shortness of breath/trouble breathing {    URI symptoms: Sick on Sunday.  Tried Vicks.  Started feeling good on Wednesday.  Possible exposure today concerned about relapse.  Running nose, cough, wheezing, productive cough.  Had fever initially.  No sore throat.  Tried  "a lot of water.  Patient is not a smoker.  Patient works placing labels on bins.     Review of Systems   Constitutional:  Negative for appetite change, chills, fatigue and fever.   HENT:  Negative for congestion, ear pain, hearing loss and sore throat.    Respiratory:  Positive for cough and wheezing. Negative for shortness of breath.    Cardiovascular:  Negative for chest pain, palpitations and peripheral edema.   Musculoskeletal:  Negative for arthralgias, joint swelling and myalgias.   Skin:  Negative for rash.   Neurological:  Negative for dizziness, weakness, headaches and paresthesias.   Psychiatric/Behavioral:  Negative for mood changes. The patient is not nervous/anxious.             Objective    /70   Pulse 98   Temp 97.5  F (36.4  C) (Temporal)   Resp 20   Ht 1.67 m (5' 5.75\")   Wt 87.3 kg (192 lb 6.4 oz)   SpO2 97%   BMI 31.29 kg/m    Body mass index is 31.29 kg/m .  Physical Exam  Constitutional:       General: He is not in acute distress.     Appearance: He is well-developed.   HENT:      Head: Normocephalic and atraumatic.      Nose: Nose normal.   Eyes:      Conjunctiva/sclera: Conjunctivae normal.   Neck:      Trachea: No tracheal deviation.   Cardiovascular:      Rate and Rhythm: Normal rate and regular rhythm.      Heart sounds: Normal heart sounds.   Pulmonary:      Effort: Pulmonary effort is normal.      Breath sounds: Wheezing present.   Musculoskeletal:         General: Normal range of motion.      Cervical back: Normal range of motion.   Skin:     Findings: No erythema or rash.   Neurological:      Mental Status: He is alert and oriented to person, place, and time.   Psychiatric:         Behavior: Behavior normal.                      "

## 2023-12-21 NOTE — PATIENT INSTRUCTIONS
Ramirez Mazariegos,    Thank you for allowing [facility] to manage your care.    I sent your prescriptions to your pharmacy.    For your convenience, test results are released as soon as they are available  Please allow 1-2 business days for me to send you a comment about your results.  If not done so, I encourage you to login into [employer] Muse ([email or web address] to review your results in real time.     If you have any questions or concerns, please feel free to call us at [phone].    Sincerely,    Dr. Melvin    Did you know?      You can schedule a video visit for follow-up appointments as well as future appointments for certain conditions.  Please see the below link.     [email or web address]    If you have not already done so,  I encourage you to sign up for [employer] ([email or web address]).  This will allow you to review your results, securely communicate with a provider, and schedule virtual visits as well.

## 2023-12-21 NOTE — COMMUNITY RESOURCES LIST (ENGLISH)
12/21/2023    [place] [employer] / [employer]  N/A  For questions about this resource list or additional care needs, please contact your primary care clinic or care manager.  Phone: [phone]   Email: N/A   Address: [address] [place] [address]   Hours: N/A        Financial Stability       Utility payment assistance  1  [facility]  Office - [facility] Distance: 4.42 miles      Phone/Virtual   [address] [place] [address]  Language: English  Hours: Mon - Fri 8:30 AM - 12:00 PM , Mon - Fri 1:00 PM - 4:00 PM  Fees: Free   Phone: (689) 431-8115 Email: inocencio@[email or web address].[employer]ster Website: https://www.Ikonopediausa.org/usn/     2  [facility] Community Action Program[employer] Inc[employer] (ACCAP) - Energy Assistance Program Distance: 4.43 miles      In-Person, Phone/Virtual   [address] [place] [address]  Language: English  Hours: Mon - Fri 8:00 AM - 4:30 PM  Fees: Free   Phone: (461) 635-6807 Email: accap@accap.org Website: http://www.accap.org          Important Numbers & Websites       Emergency Services   911  [facility] Services   311  Poison Control   (239) 549-3844  Suicide Prevention Lifeline   (921) 611-3238 (TALK)  Child Abuse Hotline   (441) 870-3440 (4-A-Child)  Sexual Assault Hotline   (462) 211-4491 (HOPE)  National Runaway Safeline   (570) 375-6040 (RUNAWAY)  All-Options Talkline   (251) 724-8017  Substance Abuse Referral   (285) 940-9294 (HELP)

## 2023-12-22 ENCOUNTER — NURSE TRIAGE (OUTPATIENT)
Dept: NURSING | Facility: CLINIC | Age: 63
End: 2023-12-22
Payer: MEDICARE

## 2023-12-22 ENCOUNTER — TELEPHONE (OUTPATIENT)
Dept: NURSING | Facility: CLINIC | Age: 63
End: 2023-12-22
Payer: MEDICARE

## 2023-12-22 LAB — SARS-COV-2 RNA RESP QL NAA+PROBE: POSITIVE

## 2023-12-22 NOTE — TELEPHONE ENCOUNTER
Patient classified as COVID treatment eligible by Epic high risk algorithm:  Yes    Coronavirus (COVID-19) Notification    Reason for call  Notify of POSITIVE COVID-19 lab result, assess symptoms,  review [facility] recommendations    Lab Result   Lab test for 2019-nCoV rRt-PCR or SARS-COV-2 PCR  Oropharyngeal AND/OR nasopharyngeal swabs were POSITIVE for 2019-nCoV RNA [OR] SARS-COV-2 RNA (COVID-19) RNA     We have been unable to reach patient by phone at this time to notify of their Positive COVID-19 result.    Left voicemail message requesting a call back to [phone] [facility] for results.        A Positive COVID-19 letter will be sent via [employer] or the mail.    Yessica Parra

## 2023-12-23 NOTE — TELEPHONE ENCOUNTER
COVID Positive/Requesting COVID treatment    Patient is positive for COVID and requesting treatment options.    Date of positive COVID test (PCR or at home)? 12/21/23  Current COVID symptoms: cough, fatigue, and congestion or runny nose  Date COVID symptoms began: 12/14/23    Pt is past window for Paxlovid treatment. Pt's wife Rula reports pt's cough is mild. Home care and Covid precautions reviewed with Rula per protocol. Rula declines virtual visit at this time. Will call back if new or worsening symptoms.     Reason for Disposition   [1] HIGH RISK patient (e.g., weak immune system, age > 64 years, obesity with BMI 30 or higher, pregnant, chronic lung disease or other chronic medical condition) AND [2] COVID symptoms (e.g., cough, fever)  (Exceptions: Already seen by PCP and no new or worsening symptoms.)    Additional Information   Negative: SEVERE difficulty breathing (e.g., struggling for each breath, speaks in single words)   Negative: Difficult to awaken or acting confused (e.g., disoriented, slurred speech)   Negative: Bluish (or gray) lips or face now   Negative: Shock suspected (e.g., cold/pale/clammy skin, too weak to stand, low BP, rapid pulse)   Negative: Sounds like a life-threatening emergency to the triager   Negative: [1] Diagnosed or suspected COVID-19 AND [2] symptoms lasting 3 or more weeks   Negative: [1] COVID-19 exposure AND [2] no symptoms   Negative: COVID-19 vaccine reaction suspected (e.g., fever, headache, muscle aches) occurring 1 to 3 days after getting vaccine   Negative: COVID-19 vaccine, questions about   Negative: [1] Lives with someone known to have influenza (flu test positive) AND [2] flu-like symptoms (e.g., cough, runny nose, sore throat, SOB; with or without fever)   Negative: [1] Possible COVID-19 symptoms AND [2] triager concerned about severity of symptoms or other causes   Negative: COVID-19 and breastfeeding, questions about   Negative: SEVERE or constant chest pain or  "pressure  (Exception: Mild central chest pain, present only when coughing.)   Negative: MODERATE difficulty breathing (e.g., speaks in phrases, SOB even at rest, pulse 100-120)   Negative: [1] Headache AND [2] stiff neck (can't touch chin to chest)   Negative: Oxygen level (e.g., pulse oximetry) 90 percent or lower   Negative: Chest pain or pressure  (Exception: MILD central chest pain, present only when coughing.)   Negative: [1] Drinking very little AND [2] dehydration suspected (e.g., no urine > 12 hours, very dry mouth, very lightheaded)     \"Urinated half hour ago\" per pt's wife Rula   Negative: Patient sounds very sick or weak to the triager   Negative: MILD difficulty breathing (e.g., minimal/no SOB at rest, SOB with walking, pulse <100)   Negative: Fever > 103 F (39.4 C)   Negative: [1] Fever > 101 F (38.3 C) AND [2] age > 60 years   Negative: [1] Fever > 100.0 F (37.8 C) AND [2] bedridden (e.g., CVA, chronic illness, recovering from surgery)   Negative: Oxygen level (e.g., pulse oximetry) 91 to 94 percent    Protocols used: Coronavirus (COVID-19) Diagnosed or [id number]    "

## 2023-12-27 NOTE — TELEPHONE ENCOUNTER
Called patient no answer left message to return call to clinic regarding information below     Thank you  LS

## 2023-12-28 NOTE — TELEPHONE ENCOUNTER
Patient is scheduled to see Dr. Villegas for a diabetic check on Tuesday, January 16th.  Tasha Marcus,

## 2024-01-04 ENCOUNTER — TELEPHONE (OUTPATIENT)
Dept: FAMILY MEDICINE | Facility: CLINIC | Age: 64
End: 2024-01-04
Payer: MEDICARE

## 2024-01-04 NOTE — TELEPHONE ENCOUNTER
Reason for Call:  Form, our goal is to have forms completed with 72 hours, however, some forms may require a visit or additional information.    Type of letter, form or note:  Home Health Certification    Who is the form from?: Home care    Where did the form come from: form was faxed in    What clinic location was the form placed at?: [facility]    Where the form was placed: Given to physician    What number is listed as a contact on the form?: [phone]       Call taken on 1/4/2024 at 2:23 PM by Tasha Marcus

## 2024-01-06 DIAGNOSIS — Z53.9 DIAGNOSIS NOT YET DEFINED: Primary | ICD-10-CM

## 2024-01-06 PROCEDURE — G0179 MD RECERTIFICATION HHA PT: HCPCS | Performed by: FAMILY MEDICINE

## 2024-01-08 ENCOUNTER — TELEPHONE (OUTPATIENT)
Dept: FAMILY MEDICINE | Facility: CLINIC | Age: 64
End: 2024-01-08
Payer: MEDICARE

## 2024-01-08 DIAGNOSIS — G47.33 OSA (OBSTRUCTIVE SLEEP APNEA): Primary | Chronic | ICD-10-CM

## 2024-01-08 DIAGNOSIS — F20.0 PARANOID SCHIZOPHRENIA, CHRONIC CONDITION WITH ACUTE EXACERBATION (H): ICD-10-CM

## 2024-01-08 DIAGNOSIS — E11.9 TYPE 2 DIABETES MELLITUS WITHOUT COMPLICATION, WITHOUT LONG-TERM CURRENT USE OF INSULIN (H): Chronic | ICD-10-CM

## 2024-01-08 NOTE — TELEPHONE ENCOUNTER
FYI - Status Update    Who is Calling: family member, spouse     Update: Pts wife would like a prescription for a new cpap mask to be sent to [facility] and they would like someone to come to their house to help with his meds as well.     Does caller want a call/response back: Yes     Okay to leave a detailed message?: Yes at Home number on file [phone] (home)

## 2024-01-09 ENCOUNTER — TELEPHONE (OUTPATIENT)
Dept: FAMILY MEDICINE | Facility: CLINIC | Age: 64
End: 2024-01-09
Payer: MEDICARE

## 2024-01-09 NOTE — TELEPHONE ENCOUNTER
Called and left message for Vivian [facility] with [facility] at [phone]. Please see TE from 12/11/23. Re certification orders were just given on 12/11/23. Called [facility] and they could not find pt in their system.     Called and spoke with Rosanna with [facility] at [phone]. Please see TE from 10/8/23. She couldn't find pt in her system. Office #[phone]. Called [facility]. Pt was discharged from homecare yesterday. Homecare would need a new referral to open another episode. Pt wasn't re- certified before he was discharged, so would need new orders. Fax to intake at [phone].    Routing to PCP to advise on new referral to homecare for skilled nursing for medication management.    Andressa Cornelius RN  [facility]

## 2024-01-09 NOTE — TELEPHONE ENCOUNTER
DME for new cpap mask faxed to [facility] (f: [phone]).    Rula (spouse) called and informed.      Rula said that his last day with the current home care agency ([facility]) was yesterday.      Tasha Marcus,

## 2024-01-09 NOTE — TELEPHONE ENCOUNTER
Looks like patient is already receiving HC services. They should discuss their needs for help with meds with HC.    Routing to PCP to request- DME for new cpap mask    Sohail Spencer RN  [facility]

## 2024-01-10 ENCOUNTER — TELEPHONE (OUTPATIENT)
Dept: FAMILY MEDICINE | Facility: CLINIC | Age: 64
End: 2024-01-10

## 2024-01-10 NOTE — TELEPHONE ENCOUNTER
Patient Quality Outreach    Patient is due for the following:   Physical Annual Wellness Visit    Next Steps:   Patient has upcoming appointment, these items will be addressed at that time.    Type of outreach:    none    Next Steps:  Reach out within 90 days via  none .    Max number of attempts reached: No. Will try again in 90 days if patient still on fail list.    Questions for provider review:    None           KEEGAN KAMARA MA  Chart routed to self.

## 2024-01-10 NOTE — TELEPHONE ENCOUNTER
FYI - Status Update    Who is Calling: Home Care    Update: [facility] calling to verify that they accept the patient referral for home health.     Samantha. [phone], please call for further inquiry or questions.  Bettina Cortez, CMA

## 2024-01-11 ENCOUNTER — PATIENT OUTREACH (OUTPATIENT)
Dept: CARE COORDINATION | Facility: CLINIC | Age: 64
End: 2024-01-11
Payer: MEDICARE

## 2024-01-11 ENCOUNTER — MEDICAL CORRESPONDENCE (OUTPATIENT)
Dept: HEALTH INFORMATION MANAGEMENT | Facility: CLINIC | Age: 64
End: 2024-01-11
Payer: MEDICARE

## 2024-01-11 NOTE — PROGRESS NOTES
Clinic Care Coordination Contact    Home Care and CC referrals placed by PCP.  Per chart notes, it appears patient may be working with [facility] as home care was recertified  from 12/16/2023-2/13/2024.  SW called [facility] to clarify they are not seeing patient due to recent new home care order.  Per Rehana at [facility], they have not received a referral and do not know this patient.  SW left message for [facility] to clarify if they are seeing patient.      SW will await return call from [facility], will call in 1-2 business days if no return call     Delfina Beltre, RICCARDOW, MSW   [facility]  Care Coordination  [facility]  [phone]  1/11/2024 10:15 AM

## 2024-01-12 NOTE — PROGRESS NOTES
Clinic Care Coordination Contact    [facility], left return message stating that [facility] had been seeing patient but he was discharged as his needs were met.  When a new referral was placed 1/8/2024,  they declined as patient did not have a skilled need. His need for was long term medication management, which they don't provide.  [facility] # is [phone].  SUKHDEV spoke with patient's spouse as patient at work.  She reports patient needs medication set up.  SW informed this is not covered for a skilled service, patient must have MA and a waivered program for ongoing medication management coverage.  Patient's spouse reports patient has a CADI worker that coordinates all services.  SW suggested that patient/spouse call patient's CADI  to coordination medication management services.      SUKHDEV will update PCP, close and not intervene further     Delfina Beltre, VANGIE, MSW   [facility]  Care Coordination  [facility]  [phone]  1/12/2024 12:13 PM

## 2024-01-16 ENCOUNTER — OFFICE VISIT (OUTPATIENT)
Dept: FAMILY MEDICINE | Facility: CLINIC | Age: 64
End: 2024-01-16
Payer: MEDICARE

## 2024-01-16 VITALS
DIASTOLIC BLOOD PRESSURE: 88 MMHG | RESPIRATION RATE: 18 BRPM | HEIGHT: 66 IN | SYSTOLIC BLOOD PRESSURE: 137 MMHG | TEMPERATURE: 96.5 F | OXYGEN SATURATION: 98 % | WEIGHT: 194.8 LBS | BODY MASS INDEX: 31.31 KG/M2 | HEART RATE: 107 BPM

## 2024-01-16 DIAGNOSIS — U07.1 INFECTION DUE TO 2019 NOVEL CORONAVIRUS: Primary | ICD-10-CM

## 2024-01-16 DIAGNOSIS — E11.9 TYPE 2 DIABETES MELLITUS WITHOUT COMPLICATION, WITHOUT LONG-TERM CURRENT USE OF INSULIN (H): ICD-10-CM

## 2024-01-16 PROCEDURE — 99213 OFFICE O/P EST LOW 20 MIN: CPT | Performed by: FAMILY MEDICINE

## 2024-01-16 ASSESSMENT — PAIN SCALES - GENERAL: PAINLEVEL: NO PAIN (0)

## 2024-01-16 NOTE — PROGRESS NOTES
"  Assessment & Plan     Infection due to 2019 novel coronavirus  Better  Diabetes -stable   Follow up 3 month Diabetic check  Advised RSV vaccine at pharmacy  Radha Villegas MD  [facility] FRIJACK Mazariegos is a 63 year old, presenting for the following health issues:  Ear Problem (Noise in ears x one day)      1/16/2024     2:40 PM   Additional Questions   Roomed by Juani FARIAS     Chief Complaint   Patient presents with    Ear Problem     Noise in ears x one day     Had  covid end of December   No sore Throat  Has some Ringing in his ears  Blood sugars are Good          Review of Systems   CONSTITUTIONAL: NEGATIVE for fever, chills, change in weight  ENT/MOUTH: as above  RESP: NEGATIVE for significant cough or SOB  CV: NEGATIVE for chest pain, palpitations or peripheral edema  GI: NEGATIVE for nausea, abdominal pain, heartburn, or change in bowel habits      Objective    /88   Pulse 107   Temp (!) 96.5  F (35.8  C) (Temporal)   Resp 18   Ht 1.67 m (5' 5.75\")   Wt 88.4 kg (194 lb 12.8 oz)   SpO2 98%   BMI 31.68 kg/m    Body mass index is 31.68 kg/m .  Physical Exam   GENERAL: healthy, alert and no distress  HENT: ear canals and TM's normal, nose and mouth without ulcers or lesions  NECK: no adenopathy, no asymmetry, masses, or scars and thyroid normal to palpation  RESP: lungs clear to auscultation - no rales, rhonchi or wheezes  CV: regular rate and rhythm, normal S1 S2, no S3 or S4, no murmur, click or rub, no peripheral edema and peripheral pulses strong  ABDOMEN: soft, nontender, no hepatosplenomegaly, no masses and bowel sounds normal  MS: no gross musculoskeletal defects noted, no edema                    "

## 2024-01-16 NOTE — PROGRESS NOTES
SUBJECTIVE:   Yair is a 63 year old, presenting for the following:  Physical        2024     2:40 PM   Additional Questions   Roomed by Juani       Are you in the first 12 months of your Medicare coverage?  No    HPI        Have you ever done Advance Care Planning? (For example, a Health Directive, POLST, or a discussion with a medical provider or your loved ones about your wishes): No, advance care planning information given to patient to review.  {Advance Care Planning No Options:305883}       Fall risk  Fallen 2 or more times in the past year?: No  Any fall with injury in the past year?: No    Cognitive Screening   1) Repeat 3 items (Leader, Season, Table)    2) Clock draw: NORMAL  3) 3 item recall: Recalls 2 objects   Results: NORMAL clock, 1-2 items recalled: COGNITIVE IMPAIRMENT LESS LIKELY    Mini-CogTM Copyright S Ashly. Licensed by the author for use in [facility]; reprinted with permission (irchard@[facility]). All rights reserved.          Reviewed and updated as needed this visit by clinical staff                  Reviewed and updated as needed this visit by Provider                 Social History     Tobacco Use    Smoking status: Former     Years: 6     Types: Cigarettes     Quit date: 1983     Years since quittin.0     Passive exposure: Never    Smokeless tobacco: Never   Substance Use Topics    Alcohol use: No     {Rooming staff  Click this link to complete the Prescreen if response below is not for today's visit  Alcohol Use Prescreen >3 drinks/day or > 7 drinks/week.  If the prescreen question answer is YES, complete the full AUDIT  :964412}        2021     2:27 PM   Alcohol Use   Prescreen: >3 drinks/day or >7 drinks/week? No     Do you have a current opioid prescription? No  Do you use any other controlled substances or medications that are not prescribed by a provider? None  {Provider  If there are gaps in the social history shown above, please follow the link  "and refresh the note Link to Social and Substance History :281351}    {Outside tests to abstract? (Optional):905177}    {additional problems to add (Optional):002073}    Current providers sharing in care for this patient include:   Patient Care Team:  Radha Villegas MD as PCP - General  Radha Villegas MD as Assigned PCP  Maylin Laguerre RD as Diabetes Educator (Dietitian, Registered)  Keith Gomez MD as Assigned Surgical Provider  Ephraim Aldana MD as MD (Ophthalmology)    The following health maintenance items are reviewed in Epic and correct as of today:  Health Maintenance   Topic Date Due    RSV VACCINE (Pregnancy & 60+) (1 - 1-dose 60+ series) Never done    AORTIC ANEURYSM SCREENING (SYSTEM ASSIGNED)  Never done    MEDICARE ANNUAL WELLNESS VISIT  03/06/2024    LIPID  03/06/2024    MICROALBUMIN  03/06/2024    ANNUAL REVIEW OF HM ORDERS  05/01/2024    A1C  06/12/2024    PHQ-9  06/12/2024    EYE EXAM  10/30/2024    BMP  12/12/2024    DIABETIC FOOT EXAM  12/12/2024    FALL RISK ASSESSMENT  01/16/2025    ADVANCE CARE PLANNING  03/06/2028    COLORECTAL CANCER SCREENING  01/17/2030    DTAP/TDAP/TD IMMUNIZATION (3 - Td or Tdap) 08/02/2032    HEPATITIS C SCREENING  Completed    HIV SCREENING  Completed    DEPRESSION ACTION PLAN  Completed    INFLUENZA VACCINE  Completed    Pneumococcal Vaccine: 65+ Years  Completed    ZOSTER IMMUNIZATION  Completed    COVID-19 Vaccine  Completed    IPV IMMUNIZATION  Aged Out    HPV IMMUNIZATION  Aged Out    MENINGITIS IMMUNIZATION  Aged Out    RSV MONOCLONAL ANTIBODY  Aged Out     {Chronicprobdata (optional):636681}  {Decision Support (Optional):573540}        Review of Systems  {ROS COMP (Optional):553697}    OBJECTIVE:   /88   Pulse 107   Temp (!) 96.5  F (35.8  C) (Temporal)   Resp 18   Ht 1.67 m (5' 5.75\")   Wt 88.4 kg (194 lb 12.8 oz)   SpO2 98%   BMI 31.68 kg/m   Estimated body mass index is 31.68 kg/m  as calculated from the following:    Height as of " "this encounter: 1.67 m (5' 5.75\").    Weight as of this encounter: 88.4 kg (194 lb 12.8 oz).  Physical Exam  {Exam (Optional) :501473}    {Diagnostic Test Results (Optional):661357}    ASSESSMENT / PLAN:   {Diag Picklist:302005}    {Patient advised of split billing (Optional):230006}      COUNSELING:  {Medicare Counselin}        He reports that he quit smoking about 41 years ago. His smoking use included cigarettes. He has never been exposed to tobacco smoke. He has never used smokeless tobacco.      Appropriate preventive services were discussed with this patient, including applicable screening as appropriate for fall prevention, nutrition, physical activity, Tobacco-use cessation, weight loss and cognition.  Checklist reviewing preventive services available has been given to the patient.    Reviewed patients plan of care and provided an AVS. The {CarePlan:382596} for Yair meets the Care Plan requirement. This Care Plan has been established and reviewed with the {PATIENT, FAMILY MEMBER, CAREGIVER:033478}.    {Counseling Resources  US Preventive Services Task Force  Cholesterol Screening  Health diet/nutrition  Pooled Cohorts Equation Calculator  USDA's MyPlate  ASA Prophylaxis  Lung CA Screening  Osteoporosis prevention/bone health :329251}  {Prostate Cancer Screening  Consider for men 55-69 per guidance from USPSTF :601895}    Radha Villegas MD  [facility]    Identified Health Risks:  {Medicare required documentation of substance and opioid use disorders screening :534284}  "

## 2024-02-06 ENCOUNTER — PATIENT OUTREACH (OUTPATIENT)
Dept: GERIATRIC MEDICINE | Facility: CLINIC | Age: 64
End: 2024-02-06
Payer: COMMERCIAL

## 2024-02-06 NOTE — PROGRESS NOTES
[facility] Care Coordination Contact    Member became effective with [facility] on 2/1/2024 with [employer].  Previous Health Plan: [employer] MSC+  Previous Care System: [employer]  Previous care coordinators name and number: Ruchi Agarwal Type: CADI  Last MMIS Entry: Date NA and Type NA  MMIS visit date (and type) if different from above: NA  Services Listed in MMIS:   UTF received: No: Requested on 2/6/24  Mailed welcome letter and Mailed [employer] Behind document  Address/Phone discrepancy: NA  MnChoices: Revised    Jenn Colin  Care Management Specialist  [facility]  [phone]

## 2024-02-06 NOTE — LETTER
February 6, 2024    Important [employer] Information    YAIR BRO CROSSER  [address] [place], [place] [address] [address]  A Partner in Your Care  Dear Yair,  Thank you for choosing [employer] for your health plan coverage! I am excited to welcome you as a member of [employer] .  My name is Cierra Saenz RN and I will be working with you as your Care Coordinator. [employer] partners with [employer] to provide members with Care Coordination services.  As your Care Coordinator, I can:  Work with you to create a Care Plan to keep you healthy and safe  Help you make appointments to see health care providers   Support you and your family in making health care decisions  Find community services that may interest you  Identify health benefits you are eligible for  What happens next?  To get started, I will call you. I ll ask you a few questions about your health and schedule a time to meet. You will have a chance to ask me questions, too.  Questions?  Call me at [phone] Monday-Friday between 8am and 5pm. TTY: [phone]. I look forward to speaking with you soon.  Sincerely,    Cierra Saenz RN    E-mail: [email or web address]  Phone: [phone]      [employer]    cc: member records                                                                                        _

## 2024-02-12 ENCOUNTER — PATIENT OUTREACH (OUTPATIENT)
Dept: GERIATRIC MEDICINE | Facility: CLINIC | Age: 64
End: 2024-02-12
Payer: COMMERCIAL

## 2024-02-13 ENCOUNTER — PATIENT OUTREACH (OUTPATIENT)
Dept: CARE COORDINATION | Facility: CLINIC | Age: 64
End: 2024-02-13
Payer: COMMERCIAL

## 2024-02-13 ENCOUNTER — TELEPHONE (OUTPATIENT)
Dept: FAMILY MEDICINE | Facility: CLINIC | Age: 64
End: 2024-02-13
Payer: COMMERCIAL

## 2024-02-13 ENCOUNTER — PATIENT OUTREACH (OUTPATIENT)
Dept: GERIATRIC MEDICINE | Facility: CLINIC | Age: 64
End: 2024-02-13
Payer: COMMERCIAL

## 2024-02-13 NOTE — PROGRESS NOTES
[facility] Care Coordination Contact    Called member to schedule annual HRA home visit. HRA has been scheduled for 2/21/24 at 330pm.    Cierra Saenz RN  [facility]  [phone]

## 2024-02-13 NOTE — TELEPHONE ENCOUNTER
Home Care is calling regarding an established patient with [facility].       Requesting orders from: Radha Villegas  Provider is following patient: Yes  Is this a 60-day recertification request?  No    Bi is , reviewed record with him, we find that the patient was with a different agency and was discharged about a month ago as they did not provide med management which is what patient still needed.   Therefore, the faxed order from 1/8/24 eventually got faxed to [facility].    Orders Requested    Skilled Nursing  Request for initial evaluation and treatment (one time)     Bi is not a nurse, he sent my call to the confidential nursing voicemail where I left detailed approval of order.    Verbal orders given.  Home Care will send orders for provider to sign.  Confirmed ok to leave a detailed message with call back.  Contact information confirmed and updated as needed.    Rosalba Quiñones RN

## 2024-02-13 NOTE — PROGRESS NOTES
[facility] Care Coordination Contact    CC referral from pcp to find a new homecare agency. 30 day notice and discharge from previous homecare agency. [facility] [phone] took referral today. They will set up intake 24-48 hours. Spouse, cadi cm and pcp notified.     Cierra Saenz RN  [facility]  [phone]

## 2024-02-15 ENCOUNTER — TELEPHONE (OUTPATIENT)
Dept: FAMILY MEDICINE | Facility: CLINIC | Age: 64
End: 2024-02-15
Payer: COMMERCIAL

## 2024-02-15 NOTE — TELEPHONE ENCOUNTER
Called and spoke with Bettina ([facility]) and relayed ok for requested home care orders per Dr. Radha Villegas, no further questions.    HAWK KathleenN RN  [facility]

## 2024-02-15 NOTE — TELEPHONE ENCOUNTER
Home Care is calling regarding an established patient with [facility].       Requesting orders from: Radha Villegas  Provider is following patient: Yes  Is this a 60-day recertification request?  No    Orders Requested    Skilled Nursing  Request for delay in care, service is not able to be provided within same scheduled day.   Jacqueline says the home care orders are dated 2/13, they had trouble getting in touch with patient and now is scheduled for 2/19/24 start of care      Information was gathered and will be sent to provider for review.  RN will contact Home Care with information after provider review.  Confirmed ok to leave a detailed message with call back.  Contact information confirmed and updated as needed.    Rosalba Quiñones RN

## 2024-02-16 ENCOUNTER — MEDICAL CORRESPONDENCE (OUTPATIENT)
Dept: HEALTH INFORMATION MANAGEMENT | Facility: CLINIC | Age: 64
End: 2024-02-16
Payer: COMMERCIAL

## 2024-02-19 ENCOUNTER — MEDICAL CORRESPONDENCE (OUTPATIENT)
Dept: HEALTH INFORMATION MANAGEMENT | Facility: CLINIC | Age: 64
End: 2024-02-19

## 2024-02-20 ENCOUNTER — TELEPHONE (OUTPATIENT)
Dept: FAMILY MEDICINE | Facility: CLINIC | Age: 64
End: 2024-02-20
Payer: COMMERCIAL

## 2024-02-20 NOTE — TELEPHONE ENCOUNTER
Yenni from [facility] is calling regarding an established patient with  [facility].       Requesting orders from: Radha Villegas  Provider is following patient: Yes  Is this a 60-day recertification request?  No    Orders Requested    Skilled Nursing  Request for continuation of care with no increase or decrease in frequency  Frequency:  Once a week  For disease management, diabetic education, and medication set up.        Verbal orders given.  Home Care will send orders for provider to sign.  Confirmed ok to leave a detailed message with call back.  Contact information confirmed and updated as needed.    Aiyana Oliver RN

## 2024-02-21 ENCOUNTER — PATIENT OUTREACH (OUTPATIENT)
Dept: GERIATRIC MEDICINE | Facility: CLINIC | Age: 64
End: 2024-02-21
Payer: COMMERCIAL

## 2024-02-21 ASSESSMENT — ACTIVITIES OF DAILY LIVING (ADL): DEPENDENT_IADLS:: CLEANING;MEDICATION MANAGEMENT

## 2024-02-22 NOTE — PROGRESS NOTES
[facility] Care Coordination Contact  [facility] CCDB Assessment   (for members on other waivers)    Home visit for Health Risk Assessment with Yair Benites completed on February 21, 2024    Type of residence:: Private home - stairs  Current living arrangement:: I live in a private home with spouse     Assessment completed with:: Patient, Spouse or significant other    Current Care Plan  Member is a recipient of the CADI Waiver program.  Member currently receiving the following home care services: Skilled Nursing   Member currently receiving the following community resources: [facility] Programs, Financial/Insurance, Home Care, Meals on Wheels, Other (see comment), Housekeeping/Chore Agency (ILS)      Medication Review  Medication reconciliation completed in Epic: If no, please explain Has [facility] homecare nurse just admitted 2/19/24 managing his medications  Medication set-up & administration: RN set up weekly.  Self-administers medications.  Medication Risk Assessment Medication (1 or more, place referral to MTM): N/A: No risk factors identified  MTM Referral Placed: No: No risk factors idenified    Mental/Behavioral Health   Depression Screening:   PHQ-2 Total Score (Adult) - Positive if 3 or more points; Administer PHQ-9 if positive: 0       Mental health DX:: Yes   Mental health DX how managed:: Medication, Psychiatrist    Falls Assessment:   Fallen 2 or more times in the past year?: Yes   Any fall with injury in the past year?: No    ADL/IADL Dependencies:   Dependent ADLs:: Independent  Dependent IADLs:: Cleaning, Medication Management    Health Plan sponsored benefits: [employer] MSH: Shared information regarding [employer] Program. Reviewed preventative health screening and health plan supplemental benefits/incentives. Reviewed medication disposal form.    PCA Assessment completed at visit: Not Applicable      Care Plan & Recommendations: Osmani is new to [facility] 2/1/24. He is primarily  care coordinated with his [facility] CADI waiver. He is asking for help with homemaking, chore service, HCD and a financial will. I emailed his cadi cm and updated her for services. His previous homecare agency discharged from nursing services due to short staffing. [facility] did open him to nursing services on 2/19/24. Wife called to schedule Osmani an eye exam while I was there for 3/2/24.     CADI Care Plan/ISP requested from waiver .     Follow-Up Plan: Member informed of future contact, plan to f/u with member with a 6 month telephone assessment.  Contact information shared with member and family, encouraged member to call with any questions or concerns at any time.    [place] care continuum providers: Please see Snapshot and Care Management Flowsheets for Specific details of care plan.    This CC note routed to PCP, Radha Villegas.     Cierra Saenz RN  [facility]  [phone]

## 2024-02-27 ENCOUNTER — PATIENT OUTREACH (OUTPATIENT)
Dept: GERIATRIC MEDICINE | Facility: CLINIC | Age: 64
End: 2024-02-27
Payer: COMMERCIAL

## 2024-02-27 NOTE — PROGRESS NOTES
[facility] Care Coordination Contact       CHW, per JESUS Norton followed up w/ [facility] on HCD and legal assistance. CHW,spoke w/ spouse Rula  and she noted [facility] has busy schedule for next few weeks.   We set up tentative call date  w/ [facility] for 3/11/24 @ 3pm. [facility] and or spouse will follow  up/w CHW up to confirm appt.      JULY Valiente  [facility]  [phone]

## 2024-02-27 NOTE — LETTER
[facility] Navigating Cancer Advance Care Planning       Yair DIEUDONNE Benites  [address] [place] [address]  [place] [address]      Dear Yair    You shared with me your interest in receiving information on Advance Care Planning and Health Care Directives. Discussing and making decisions about this part of our health is very important.  A Health Care Directive is a written document that outlines your goals, values, beliefs and choices for health care and medical treatment in the event you are unable to speak for yourself.     We greatly value the opportunity to assist you in documenting your choices and to honor your   wishes. We ve enclosed Health Care Directive, Worksheet and Educational Materials to help you get started thinking about your values and goals. We have several options for additional resources:     Health Care Directives and Advance Care Planning resources can be viewed and printed   for free at our web site:  [email or web address].     Free group classes on Advance Care Planning and completing a Health Care Directive are available at multiple locations and times. These classes are led by trained staff who will provide information and guide you through a Health Care Directive.  They can also review, notarize and add your Health Care Directive to your medical record. [place] for a class at [email or web address] or by calling [employer] at [phone] or toll free [phone].    COPIES of completed Health Care Directives can be brought or mailed to any of our   locations, including the address listed below. You can also email a copy to [email or web address] .    Email or call me at the contact information listed below for questions, assistance, or to   make an appointment to discuss creating a Health Care Directive. You can also contact   our [facility] Navigating Cancer Department for questions or assistance.       Sincerely,     Cierra Saenz RN  [facility] [facility]  [phone]

## 2024-02-27 NOTE — PROGRESS NOTES
[facility] Care Coordination Contact    Received after visit chart from care coordinator.  Completed following tasks: Mailed copy of care plan/support plan to member, Mailed health care directive documents, Mailed [place] Choices signature sheet pages 3-4, Mailed Safe Medication Disposal , and Mailed [employer] Leave Behind Letter    Jenn Colin  Care Management Specialist  [facility]  [phone]

## 2024-02-27 NOTE — LETTER
February 27, 2024    Important Medica Information    YAIR BRO CROSSER  [address] [facility]  [place] [address]  Your Care Plan  Dear Yair,  When we spoke recently, I promised to send you a Care Plan. The plan enclosed is a summary of our discussion. It includes the steps we agreed would help you meet your health goals. In addition, I can help you with:  [id number]  This program is available to members who need a ride to medical and dental visits. To schedule a ride, call [phone] or [phone] (toll free). TTY: 711. You can call Monday - Thursday 8 a.m. to 5 p.m. and Fridays 9 a.m. to 5 p.m.  One Pass  One Pass is your no-cost, complete, fitness solution for your mind and body. To learn more visit [employer]/fitness or call One Pass, toll-free [phone] (TTY: 711) 8 a.m. to 9 p.m. Monday-Friday.  Health Care Directive   This form helps you outline your health care wishes. You can request a form from me and I will answer any questions you have before you discuss it with your doctor.   Annual Physical  Take a key step on your path to good health and set up an annual physical at your clinic.  Questions?  Call me at [phone] Monday-Friday between 8am and 5pm.  TTY: 711. As we discussed, I plan to be in touch with you again in 6 months to follow up via phone.  Sincerely,    Cierra Saenz RN    E-mail: [email or web address]  Phone: [phone]      [facility]    cc: member records

## 2024-03-11 ENCOUNTER — TELEPHONE (OUTPATIENT)
Dept: FAMILY MEDICINE | Facility: CLINIC | Age: 64
End: 2024-03-11
Payer: COMMERCIAL

## 2024-03-11 NOTE — TELEPHONE ENCOUNTER
Forms/Letter Request    Type of form/letter: Home Health Certification      Do we have the form/letter: Yes:     Who is the form from? Home care    Where did/will the form come from? form was faxed in    When is form/letter needed by: 3-5 days     How would you like the form/letter returned: Fax : [phone]    Patient Notified form requests are processed in 5-7 business days:Yes    Okay to leave a detailed message?: Yes at Home number on file [phone] (home)      Additional Notes: Rash was not present during exam, advised if rash flares to RTC Render Risk Assessment In Note?: no Detail Level: Simple

## 2024-03-14 DIAGNOSIS — Z53.9 DIAGNOSIS NOT YET DEFINED: Primary | ICD-10-CM

## 2024-03-14 PROCEDURE — G0180 MD CERTIFICATION HHA PATIENT: HCPCS | Performed by: FAMILY MEDICINE

## 2024-03-18 ENCOUNTER — TELEPHONE (OUTPATIENT)
Dept: FAMILY MEDICINE | Facility: CLINIC | Age: 64
End: 2024-03-18
Payer: COMMERCIAL

## 2024-03-18 ENCOUNTER — MEDICAL CORRESPONDENCE (OUTPATIENT)
Dept: FAMILY MEDICINE | Facility: CLINIC | Age: 64
End: 2024-03-18
Payer: COMMERCIAL

## 2024-03-18 NOTE — TELEPHONE ENCOUNTER
Continuous Glucose Monitoring and Supplies Order Form from [employer] received and given to Dr. Villegas to complete and sign.  Tasha Marcus,

## 2024-03-18 NOTE — TELEPHONE ENCOUNTER
Reason for Call:  Form, our goal is to have forms completed with 72 hours, however, some forms may require a visit or additional information.    Type of letter, form or note:  medical    Who is the form from?:   [employer]    Where did the form come from: form was faxed in    What clinic location was the form placed at?: [facility]    Where the form was placed: Given to physician    What number is listed as a contact on the form?: [phone]       Call taken on 3/18/2024 at 11:06 AM by Tasha Marcus

## 2024-03-25 ENCOUNTER — TELEPHONE (OUTPATIENT)
Dept: FAMILY MEDICINE | Facility: CLINIC | Age: 64
End: 2024-03-25
Payer: COMMERCIAL

## 2024-03-25 RX ORDER — MULTIVIT-MIN/IRON/FOLIC ACID/K 18-600-40
500 CAPSULE ORAL DAILY
COMMUNITY

## 2024-03-25 NOTE — TELEPHONE ENCOUNTER
Paola ([facility] RN) calling with further medication update:    Notes that patient would like it recorded that he will be taking:  -Vit C 500 mg capsule daily (decreased from 1000 mg)  -CoQ10, take 1 gummy (200 mg) daily    Need verbal ok from provider to change CoQ10, Vit D3, and Melatonin from tablet form to gummy form per patient request? Will still be same dose/frequency and patient will get OTC, home care just needs verbal ok for switch. Writer has updated chart.    Paola ([facility] RN) CB#: [phone], dvm ok    Thanks,  HAWK KathleenN RN  [facility]

## 2024-03-25 NOTE — TELEPHONE ENCOUNTER
Paola calling from [employer] to inform of patient reported supplements he is taking.    Patient is now taking Vitamin C 1,000 mg by mouth daily, and Force Factor Test x180 Boost, Male Testosterone Booster once daily.     Supplements added to medication list.    HAWK AquinoN RN  [facility]

## 2024-03-25 NOTE — TELEPHONE ENCOUNTER
Homecare is needing a verbal ok for order:  Need verbal ok from provider to change CoQ10, Vit D3, and Melatonin from tablet form to gummy form per patient request?     Andressa Cornelius RN  [facility]

## 2024-03-27 NOTE — TELEPHONE ENCOUNTER
Called Paola. Left detailed voice message on identified voicemail box regarding provider's message, advised to return call at [phone] for any further questions.     HAWK AquinoN RN  [facility]

## 2024-04-01 ENCOUNTER — OFFICE VISIT (OUTPATIENT)
Dept: FAMILY MEDICINE | Facility: CLINIC | Age: 64
End: 2024-04-01
Payer: COMMERCIAL

## 2024-04-01 VITALS
TEMPERATURE: 97.4 F | WEIGHT: 195.6 LBS | RESPIRATION RATE: 16 BRPM | HEART RATE: 94 BPM | OXYGEN SATURATION: 95 % | BODY MASS INDEX: 31.81 KG/M2 | DIASTOLIC BLOOD PRESSURE: 79 MMHG | SYSTOLIC BLOOD PRESSURE: 136 MMHG

## 2024-04-01 DIAGNOSIS — E66.09 CLASS 1 OBESITY DUE TO EXCESS CALORIES WITH SERIOUS COMORBIDITY AND BODY MASS INDEX (BMI) OF 32.0 TO 32.9 IN ADULT: ICD-10-CM

## 2024-04-01 DIAGNOSIS — G47.33 OSA (OBSTRUCTIVE SLEEP APNEA): Chronic | ICD-10-CM

## 2024-04-01 DIAGNOSIS — F20.0 PARANOID SCHIZOPHRENIA, CHRONIC CONDITION WITH ACUTE EXACERBATION (H): ICD-10-CM

## 2024-04-01 DIAGNOSIS — E66.811 CLASS 1 OBESITY DUE TO EXCESS CALORIES WITH SERIOUS COMORBIDITY AND BODY MASS INDEX (BMI) OF 32.0 TO 32.9 IN ADULT: ICD-10-CM

## 2024-04-01 DIAGNOSIS — E11.9 TYPE 2 DIABETES MELLITUS WITHOUT COMPLICATION, WITHOUT LONG-TERM CURRENT USE OF INSULIN (H): Primary | ICD-10-CM

## 2024-04-01 DIAGNOSIS — Z79.899 ENCOUNTER FOR LONG-TERM (CURRENT) USE OF MEDICATIONS: ICD-10-CM

## 2024-04-01 DIAGNOSIS — I10 HYPERTENSION GOAL BP (BLOOD PRESSURE) < 140/90: ICD-10-CM

## 2024-04-01 DIAGNOSIS — Z13.228 SCREENING FOR METABOLIC DISORDER: ICD-10-CM

## 2024-04-01 DIAGNOSIS — F25.1 SCHIZOAFFECTIVE DISORDER, DEPRESSIVE TYPE (H): ICD-10-CM

## 2024-04-01 DIAGNOSIS — E78.5 HYPERLIPIDEMIA LDL GOAL <70: ICD-10-CM

## 2024-04-01 LAB — HBA1C MFR BLD: 6.7 % (ref 0–5.6)

## 2024-04-01 PROCEDURE — 99214 OFFICE O/P EST MOD 30 MIN: CPT | Performed by: FAMILY MEDICINE

## 2024-04-01 PROCEDURE — 83721 ASSAY OF BLOOD LIPOPROTEIN: CPT | Mod: 59 | Performed by: FAMILY MEDICINE

## 2024-04-01 PROCEDURE — 82947 ASSAY GLUCOSE BLOOD QUANT: CPT | Performed by: FAMILY MEDICINE

## 2024-04-01 PROCEDURE — 83036 HEMOGLOBIN GLYCOSYLATED A1C: CPT | Performed by: FAMILY MEDICINE

## 2024-04-01 PROCEDURE — 82570 ASSAY OF URINE CREATININE: CPT | Performed by: FAMILY MEDICINE

## 2024-04-01 PROCEDURE — 82043 UR ALBUMIN QUANTITATIVE: CPT | Performed by: FAMILY MEDICINE

## 2024-04-01 PROCEDURE — 80061 LIPID PANEL: CPT | Performed by: FAMILY MEDICINE

## 2024-04-01 PROCEDURE — 36415 COLL VENOUS BLD VENIPUNCTURE: CPT | Performed by: FAMILY MEDICINE

## 2024-04-01 PROCEDURE — G2211 COMPLEX E/M VISIT ADD ON: HCPCS | Performed by: FAMILY MEDICINE

## 2024-04-01 RX ORDER — METFORMIN HCL 500 MG
1500 TABLET, EXTENDED RELEASE 24 HR ORAL DAILY
Qty: 270 TABLET | Refills: 1 | Status: SHIPPED | OUTPATIENT
Start: 2024-04-01 | End: 2024-07-01

## 2024-04-01 ASSESSMENT — PAIN SCALES - GENERAL: PAINLEVEL: NO PAIN (0)

## 2024-04-01 NOTE — PROGRESS NOTES
Assessment & Plan     Type 2 diabetes mellitus without complication, without long-term current use of insulin (H)  Pending   Accuchecks are Good   - Albumin Random Urine Quantitative with Creat Ratio; Future  - empagliflozin (JARDIANCE) 10 MG TABS tablet; Take 1 tablet (10 mg) by mouth daily  - metFORMIN (GLUCOPHAGE XR) 500 MG 24 hr tablet; Take 3 tablets (1,500 mg) by mouth daily  - Lipid panel reflex to direct LDL Fasting; Future  - Hemoglobin A1c; Future    Class 1 obesity due to excess calories with serious comorbidity and body mass index (BMI) of 32.0 to 32.9 in adult  Diabetic diet     Hyperlipidemia LDL goal <70  Stable   - Lipid panel reflex to direct LDL Fasting; Future    Hypertension goal BP (blood pressure) < 140/90  Controlled     SANDRA (obstructive sleep apnea)- severe (AHI 45)  On cpap  The longitudinal plan of care for the diagnosis(es)/condition(s) as documented were addressed during this visit. Due to the added complexity in care, I will continue to support Yair in the subsequent management and with ongoing continuity of care.    Paranoid schizophrenia, chronic condition with acute exacerbation (H)  Sees Psychiatrist and doing well   Subjective   Yair is a 63 year old, presenting for the following health issues:  Diabetes and Medication Request (Would like to discuss flomax for prostate issues)        4/1/2024    12:25 PM   Additional Questions   Roomed by Juani FARIAS       Diabetes Follow-up    How often are you checking your blood sugar? One time daily  What time of day are you checking your blood sugars (select all that apply)?  Before meals  Have you had any blood sugars above 200?  No  Have you had any blood sugars below 70?  No  What symptoms do you notice when your blood sugar is low?  None  What concerns do you have today about your diabetes? None   Do you have any of these symptoms? (Select all that apply)  No numbness or tingling in feet.  No redness, sores or blisters on feet.   No complaints of excessive thirst.  No reports of blurry vision.  No significant changes to weight.          Hyperlipidemia Follow-Up    Are you regularly taking any medication or supplement to lower your cholesterol?   atorvastatin (LIPITOR) 20 MG tablet   Are you having muscle aches or other side effects that you think could be caused by your cholesterol lowering medication?  No    Hypertension Follow-up    Do you check your blood pressure regularly outside of the clinic? Yes   Are you following a low salt diet? No  Are your blood pressures ever more than 140 on the top number (systolic) OR more   than 90 on the bottom number (diastolic), for example 140/90? Unsure checked by nurse.     BP Readings from Last 2 Encounters:   04/01/24 136/79   01/16/24 137/88     Hemoglobin A1C (%)   Date Value   04/01/2024 6.7 (H)   12/12/2023 6.5 (H)   03/02/2021 6.4 (H)   11/19/2019 5.8 (H)     LDL Cholesterol Calculated (mg/dL)   Date Value   03/06/2023 35   10/19/2021 67   03/02/2021 56   11/19/2019 46     How many servings of fruits and vegetables do you eat daily?  0-1  On average, how many sweetened beverages do you drink each day (Examples: soda, juice, sweet tea, etc.  Do NOT count diet or artificially sweetened beverages)?   1  How many days per week do you exercise enough to make your heart beat faster? 3 or less  How many minutes a day do you exercise enough to make your heart beat faster? 60 or more  How many days per week do you miss taking your medication? 0        Review of Systems  CONSTITUTIONAL: NEGATIVE for fever, chills, change in weight  ENT/MOUTH: NEGATIVE for ear, mouth and throat problems  RESP: NEGATIVE for significant cough or SOB  CV: NEGATIVE for chest pain, palpitations or peripheral edema  GI: NEGATIVE for nausea, abdominal pain, heartburn, or change in bowel habits  PSYCHIATRIC: NEGATIVE for changes in mood or affect  ROS otherwise negative  Rest of the ROS is Negative except see above and Problem  list [stable]        Objective    /79   Pulse 94   Temp 97.4  F (36.3  C) (Temporal)   Resp 16   Wt 88.7 kg (195 lb 9.6 oz)   SpO2 95%   BMI 31.81 kg/m    Body mass index is 31.81 kg/m .  Physical Exam   GENERAL: alert and no distress  EYES: Eyes grossly normal to inspection  NECK: no adenopathy, no asymmetry, masses, or scars  RESP: lungs clear to auscultation - no rales, rhonchi or wheezes  CV: regular rate and rhythm, normal S1 S2, no S3 or S4, no murmur, click or rub, no peripheral edema  ABDOMEN: soft, nontender, no hepatosplenomegaly, no masses and bowel sounds normal  MS: no gross musculoskeletal defects noted, no edema  SKIN: no suspicious lesions or rashes  PSYCH: mentation appears normal    Pending         Signed Electronically by: Radha Villegas MD

## 2024-04-01 NOTE — LETTER
April 2, 2024    Yair BRO Kamari  [address] [place] [address]  [facility] [address]          Dear ,    We are writing to inform you of your test results.  3 month sugar average is Good   Strict Low cholesterol diet       Resulted Orders   Albumin Random Urine Quantitative with Creat Ratio   Result Value Ref Range    Creatinine Urine mg/dL 24.2 mg/dL      Comment:      The reference ranges have not been established in urine creatinine. The results should be integrated into the clinical context for interpretation.    Albumin Urine mg/L <12.0 mg/L      Comment:      The reference ranges have not been established in urine albumin. The results should be integrated into the clinical context for interpretation.    Albumin Urine mg/g Cr        Comment:      Unable to calculate, urine albumin and/or urine creatinine is outside detectable limits.  Microalbuminuria is defined as an albumin:creatinine ratio of 17 to 299 for males and 25 to 299 for females. A ratio of albumin:creatinine of 300 or higher is indicative of overt proteinuria.  Due to biologic variability, positive results should be confirmed by a second, first-morning random or 24-hour timed urine specimen. If there is discrepancy, a third specimen is recommended. When 2 out of 3 results are in the microalbuminuria range, this is evidence for incipient nephropathy and warrants increased efforts at glucose control, blood pressure control, and institution of therapy with an angiotensin-converting-enzyme (ACE) inhibitor (if the patient can tolerate it).     Lipid panel reflex to direct LDL Fasting   Result Value Ref Range    Cholesterol 184 <200 mg/dL    Triglycerides 457 (H) <150 mg/dL    Direct Measure HDL 35 (L) >=40 mg/dL    LDL Cholesterol Calculated        Comment:      Cannot estimate LDL when triglyceride exceeds 400 mg/dL    Non HDL Cholesterol 149 (H) <130 mg/dL    Patient Fasting > 8hrs? Yes     Narrative    Cholesterol  Desirable:  <200  mg/dL    Triglycerides  Normal:  Less than 150 mg/dL  Borderline High:  150-199 mg/dL  High:  200-499 mg/dL  Very High:  Greater than or equal to 500 mg/dL    Direct Measure HDL  Female:  Greater than or equal to 50 mg/dL   Male:  Greater than or equal to 40 mg/dL    LDL Cholesterol  Desirable:  <100mg/dL  Above Desirable:  100-129 mg/dL   Borderline High:  130-159 mg/dL   High:  160-189 mg/dL   Very High:  >= 190 mg/dL    Non HDL Cholesterol  Desirable:  130 mg/dL  Above Desirable:  130-159 mg/dL  Borderline High:  160-189 mg/dL  High:  190-219 mg/dL  Very High:  Greater than or equal to 220 mg/dL   Hemoglobin A1c   Result Value Ref Range    Hemoglobin A1C 6.7 (H) 0.0 - 5.6 %      Comment:      Normal <5.7%   Prediabetes 5.7-6.4%    Diabetes 6.5% or higher     Note: Adopted from ADA consensus guidelines.   LDL cholesterol direct   Result Value Ref Range    LDL Cholesterol Direct 96 <100 mg/dL      Comment:      Age 2-19 years:  Desirable: 0-110 mg/dL   Borderline high: 110-129 mg/dL   High: >= 130 mg/dL    Age 20 years and older:  Desirable: <100mg/dL  Above desirable: 100-129 mg/dL   Borderline high: 130-159 mg/dL   High: 160-189 mg/dL   Very high: >= 190 mg/dL       If you have any questions or concerns, please call the clinic at the number listed above.       Sincerely,      Radha Villegas MD

## 2024-04-02 LAB
CHOLEST SERPL-MCNC: 184 MG/DL
CREAT UR-MCNC: 24.2 MG/DL
FASTING STATUS PATIENT QL REPORTED: YES
FASTING STATUS PATIENT QL REPORTED: YES
GLUCOSE SERPL-MCNC: 172 MG/DL (ref 70–99)
HDLC SERPL-MCNC: 35 MG/DL
LDLC SERPL CALC-MCNC: ABNORMAL MG/DL
LDLC SERPL DIRECT ASSAY-MCNC: 96 MG/DL
MICROALBUMIN UR-MCNC: <12 MG/L
MICROALBUMIN/CREAT UR: NORMAL MG/G{CREAT}
NONHDLC SERPL-MCNC: 149 MG/DL
TRIGL SERPL-MCNC: 457 MG/DL

## 2024-04-11 ENCOUNTER — TELEPHONE (OUTPATIENT)
Dept: FAMILY MEDICINE | Facility: CLINIC | Age: 64
End: 2024-04-11
Payer: COMMERCIAL

## 2024-04-11 DIAGNOSIS — M54.50 LOW BACK PAIN WITHOUT SCIATICA, UNSPECIFIED BACK PAIN LATERALITY, UNSPECIFIED CHRONICITY: ICD-10-CM

## 2024-04-11 RX ORDER — NABUMETONE 500 MG/1
500 TABLET, FILM COATED ORAL 2 TIMES DAILY
Qty: 180 TABLET | Refills: 1 | Status: SHIPPED | OUTPATIENT
Start: 2024-04-11 | End: 2024-10-07

## 2024-04-11 NOTE — TELEPHONE ENCOUNTER
"Routing to PCP to please advise. Requesting refill of Nabumetone. Notes as \"patient not taking in chart\". Per wife, patient continues to take this, last dose yesterday.    Wife also has questions regarding medication:   Wife is concerned about if the medication is addictive and asks if it can cause irritability?    Medication/pharmacy pended, PCP please advise.    Thanks,  LAVELLE Kathleen RN  [facility]    "

## 2024-04-11 NOTE — TELEPHONE ENCOUNTER
Medication Question or Refill    Contacts         Type Contact Phone/Fax    04/11/2024 02:51 PM CDT Phone (Incoming) CAROLE MARTIN (Emergency Contact) [phone] (M)            What medication are you calling about (include dose and sig)?: nabumetone 500mg tabs      Preferred Pharmacy:   [id number] - [place], [place] - [address] AT [facility] & [id number] [facility] [address]  Phone: [phone] Fax: [phone]      Controlled Substance Agreement on file:   CSA -- Patient Level:    CSA: None found at the patient level.       Who prescribed the medication?: Dr. Villegas per patient's wife    Do you need a refill? Yes, pharmacy told wife that patient needed a new prescription.     When did you use the medication last? Yesterday. I did not see this medication on the patient's medlist but according to his wife he takes this twice daily.    Patient offered an appointment? No    Do you have any questions or concerns?  Yes: wife is concerned about if the medication is addictive and asks if it can cause irritability.      Okay to leave a detailed message?: Yes at Other phone number:      [phone]

## 2024-04-15 ENCOUNTER — TELEPHONE (OUTPATIENT)
Dept: FAMILY MEDICINE | Facility: CLINIC | Age: 64
End: 2024-04-15
Payer: COMMERCIAL

## 2024-04-15 NOTE — TELEPHONE ENCOUNTER
Received call from Paola, calling from [facility]. She is calling to update the following to patient's medication list:    Super beta prostate supplement  250 mg tablets  Serving size is 2 tablets daily     Provided verbal okay to Paola, and added to medication list.    HAWK AquinoN RN  [facility]

## 2024-04-15 NOTE — TELEPHONE ENCOUNTER
Home Care is calling regarding an established patient with [facility].       Requesting orders from: Radha Villegas  Provider is following patient: Yes  Is this a 60-day recertification request?  Yes    Orders Requested    Skilled Nursing  Request for recertification   Frequency:  1x/wk for 9 wks  3 PRN visits  This would be for medication management       Confirmed ok to leave a detailed message with call back.  Contact information confirmed and updated as needed.    Katy Hammonds RN     01-Oct-2022 22:50

## 2024-04-15 NOTE — TELEPHONE ENCOUNTER
Called and spoke with Paola from [facility] and gave provider approval of requested home care orders.    Christine M Klisch, RN

## 2024-04-17 ENCOUNTER — TELEPHONE (OUTPATIENT)
Dept: FAMILY MEDICINE | Facility: CLINIC | Age: 64
End: 2024-04-17
Payer: COMMERCIAL

## 2024-04-17 DIAGNOSIS — E78.5 HYPERLIPIDEMIA LDL GOAL <70: ICD-10-CM

## 2024-04-17 DIAGNOSIS — Z12.5 SCREENING FOR PROSTATE CANCER: Primary | ICD-10-CM

## 2024-04-17 RX ORDER — ATORVASTATIN CALCIUM 20 MG/1
20 TABLET, FILM COATED ORAL DAILY
Qty: 90 TABLET | Refills: 0 | Status: SHIPPED | OUTPATIENT
Start: 2024-04-17 | End: 2024-07-01

## 2024-04-17 NOTE — TELEPHONE ENCOUNTER
Patient is requesting a PSA lab     Patient seen on 4/1/24 for annual visit    Cued if ok    Rima Hicks RN  [facility]

## 2024-04-19 ENCOUNTER — MEDICAL CORRESPONDENCE (OUTPATIENT)
Dept: HEALTH INFORMATION MANAGEMENT | Facility: CLINIC | Age: 64
End: 2024-04-19

## 2024-04-26 ENCOUNTER — TELEPHONE (OUTPATIENT)
Dept: FAMILY MEDICINE | Facility: CLINIC | Age: 64
End: 2024-04-26
Payer: COMMERCIAL

## 2024-04-26 NOTE — TELEPHONE ENCOUNTER
Reason for Call:  Form, our goal is to have forms completed with 72 hours, however, some forms may require a visit or additional information.    Type of letter, form or note:  Home Health Certification    Who is the form from?: Home care    Where did the form come from: form was faxed in    What clinic location was the form placed at?: [facility]    Where the form was placed: Given to physician    What number is listed as a contact on the form?: [phone]       Call taken on 4/26/2024 at 1:51 PM by Tasha Marcus

## 2024-04-29 ENCOUNTER — NURSE TRIAGE (OUTPATIENT)
Dept: NURSING | Facility: CLINIC | Age: 64
End: 2024-04-29
Payer: COMMERCIAL

## 2024-04-29 DIAGNOSIS — Z53.9 DIAGNOSIS NOT YET DEFINED: Primary | ICD-10-CM

## 2024-04-29 PROCEDURE — G0179 MD RECERTIFICATION HHA PT: HCPCS | Performed by: FAMILY MEDICINE

## 2024-04-29 NOTE — TELEPHONE ENCOUNTER
Triage Call:    Caller: Patient    Diarrhea x6 today.  Denies abdominal pain or fever.     Protocol Recommended Disposition: Home care.     Caller verbalized understanding of instructions and questions answered.      Andressa Mandujano RN on 4/29/2024 at 6:15 PM    Reason for Disposition   MILD-MODERATE diarrhea (e.g., 1-6 times / day more than normal)    Additional Information   Negative: Shock suspected (e.g., cold/pale/clammy skin, too weak to stand, low BP, rapid pulse)   Negative: Difficult to awaken or acting confused (e.g., disoriented, slurred speech)   Negative: Sounds like a life-threatening emergency to the triager   Negative: SEVERE abdominal pain (e.g., excruciating) and present > 1 hour   Negative: SEVERE abdominal pain and age > 60 years   Negative: Bloody, black, or tarry bowel movements  (Exception: Chronic-unchanged black-grey bowel movements and is taking iron pills or Pepto-Bismol.)   Negative: SEVERE diarrhea (e.g., 7 or more times / day more than normal) and age > 60 years   Negative: Constant abdominal pain lasting > 2 hours   Negative: Drinking very little and dehydration suspected (e.g., no urine > 12 hours, very dry mouth, very lightheaded)   Negative: Patient sounds very sick or weak to the triager   Negative: SEVERE diarrhea (e.g., 7 or more times / day more than normal) and present > 24 hours (1 day)   Negative: MODERATE diarrhea (e.g., 4-6 times / day more than normal) and present > 48 hours (2 days)   Negative: MODERATE diarrhea (e.g., 4-6 times / day more than normal) and age > 70 years   Negative: Abdominal pain (Exception: Pain clears completely with each passage of diarrhea stool.)   Negative: Fever > 101 F (38.3 C)   Negative: Blood in the stool  (Exception: Only on toilet paper. Reason: Diarrhea can cause rectal irritation with blood on wiping.)   Negative: Mucus or pus in stool has been present > 2 days and diarrhea is more than mild   Negative: Weak immune system (e.g., HIV  positive, cancer chemo, splenectomy, organ transplant, chronic steroids)   Negative: Travel to a foreign country in past month   Negative: Recent antibiotic therapy (i.e., within last 2 months) and diarrhea present > 3 days since antibiotic was stopped   Negative: Recent hospitalization and diarrhea present > 3 days   Negative: Tube feedings (e.g., nasogastric, g-tube, j-tube)   Negative: MILD diarrhea (e.g., 1-3 or more stools than normal in past 24 hours) diarrhea without known cause and present > 7 days   Negative: Patient wants to be seen   Negative: Diarrhea is a chronic symptom (recurrent or ongoing AND lasting > 4 weeks)   Negative: SEVERE diarrhea (e.g., 7 or more times / day more than normal)    Protocols used: Diarrhea-A-OH

## 2024-05-20 ENCOUNTER — TELEPHONE (OUTPATIENT)
Dept: FAMILY MEDICINE | Facility: CLINIC | Age: 64
End: 2024-05-20
Payer: COMMERCIAL

## 2024-05-20 NOTE — TELEPHONE ENCOUNTER
Home Care is calling regarding an established patient with [facility].       Requesting orders from: Radha Villegas  Provider is following patient: Yes  Is this a 60-day recertification request?  No    Orders Requested  Discontinue Boost supplement- no longer wants to take  Would  like to discontinue LOSARTAN and take Super Beet complex instead    Would like a referral to dermatology to assess skin rashes and blood blister he has had for years.    Confirmed ok to leave a detailed message with call back.  Contact information confirmed and updated as needed.      Advised that pt will need to have an in person appointment to discuss,         Blanche Moreno RN

## 2024-06-17 ENCOUNTER — TELEPHONE (OUTPATIENT)
Dept: FAMILY MEDICINE | Facility: CLINIC | Age: 64
End: 2024-06-17
Payer: COMMERCIAL

## 2024-06-17 NOTE — TELEPHONE ENCOUNTER
Home Care is calling regarding an established patient with [facility].       Requesting orders from: Radha Villegas  Provider is following patient: Yes  Is this a 60-day recertification request?  Yes    Orders Requested    Skilled Nursing  Request for recertification   Frequency:  1x/wk for 6 wks      Confirmed ok to leave a detailed message with call back.  Contact information confirmed and updated as needed.    Blanche Moreno RN     Patient should stay on brand-name Synthroid.  PA department is working to get this done.

## 2024-06-17 NOTE — TELEPHONE ENCOUNTER
VO given via DVM.    Ana Maria Guidry, HAWKN RN  [facility]

## 2024-06-21 ENCOUNTER — TELEPHONE (OUTPATIENT)
Dept: FAMILY MEDICINE | Facility: CLINIC | Age: 64
End: 2024-06-21
Payer: COMMERCIAL

## 2024-06-21 NOTE — TELEPHONE ENCOUNTER
Paola RITTER for [facility] is calling regarding an established patient with  [facility] can be reached at [phone].  {     Requesting orders from: Radha Villegas  Provider is following patient: Yes      Orders Requested    Skilled Nursing  Request for recertification   Frequency: Once every other week x 7 weeks to set up medications      Information was gathered and will be sent to provider to confirm provider will be following patient.  RN will contact Home Care with information after provider review.  Confirmed ok to leave a detailed message with call back.  Contact information confirmed and updated as needed.    Jacqueline Stewart RN

## 2024-06-24 NOTE — TELEPHONE ENCOUNTER
Called and left detailed message on identified, secured  voicemail with verbal ok for orders requested.    Andressa Cornelius RN  [facility]

## 2024-07-01 ENCOUNTER — OFFICE VISIT (OUTPATIENT)
Dept: FAMILY MEDICINE | Facility: CLINIC | Age: 64
End: 2024-07-01
Payer: COMMERCIAL

## 2024-07-01 VITALS
SYSTOLIC BLOOD PRESSURE: 134 MMHG | OXYGEN SATURATION: 93 % | DIASTOLIC BLOOD PRESSURE: 84 MMHG | TEMPERATURE: 98 F | HEART RATE: 93 BPM | WEIGHT: 198 LBS | HEIGHT: 66 IN | BODY MASS INDEX: 31.82 KG/M2 | RESPIRATION RATE: 18 BRPM

## 2024-07-01 DIAGNOSIS — G47.33 OSA (OBSTRUCTIVE SLEEP APNEA): Chronic | ICD-10-CM

## 2024-07-01 DIAGNOSIS — F32.9 MAJOR DEPRESSIVE DISORDER WITH SINGLE EPISODE, REMISSION STATUS UNSPECIFIED: ICD-10-CM

## 2024-07-01 DIAGNOSIS — E78.5 HYPERLIPIDEMIA LDL GOAL <70: ICD-10-CM

## 2024-07-01 DIAGNOSIS — E11.9 TYPE 2 DIABETES MELLITUS WITHOUT COMPLICATION, WITHOUT LONG-TERM CURRENT USE OF INSULIN (H): Chronic | ICD-10-CM

## 2024-07-01 DIAGNOSIS — F20.0 PARANOID SCHIZOPHRENIA, CHRONIC CONDITION WITH ACUTE EXACERBATION (H): ICD-10-CM

## 2024-07-01 DIAGNOSIS — Z12.5 SCREENING FOR PROSTATE CANCER: ICD-10-CM

## 2024-07-01 DIAGNOSIS — Z00.00 ENCOUNTER FOR MEDICARE ANNUAL WELLNESS EXAM: Primary | ICD-10-CM

## 2024-07-01 DIAGNOSIS — I10 HYPERTENSION GOAL BP (BLOOD PRESSURE) < 140/90: ICD-10-CM

## 2024-07-01 DIAGNOSIS — M1A.9XX0 CHRONIC GOUT WITHOUT TOPHUS, UNSPECIFIED CAUSE, UNSPECIFIED SITE: ICD-10-CM

## 2024-07-01 DIAGNOSIS — Z13.6 ENCOUNTER FOR ABDOMINAL AORTIC ANEURYSM (AAA) SCREENING: ICD-10-CM

## 2024-07-01 LAB — HBA1C MFR BLD: 7.1 % (ref 0–5.6)

## 2024-07-01 PROCEDURE — 83036 HEMOGLOBIN GLYCOSYLATED A1C: CPT | Performed by: FAMILY MEDICINE

## 2024-07-01 PROCEDURE — 99397 PER PM REEVAL EST PAT 65+ YR: CPT | Performed by: FAMILY MEDICINE

## 2024-07-01 PROCEDURE — 99214 OFFICE O/P EST MOD 30 MIN: CPT | Mod: 25 | Performed by: FAMILY MEDICINE

## 2024-07-01 PROCEDURE — G0103 PSA SCREENING: HCPCS | Performed by: FAMILY MEDICINE

## 2024-07-01 PROCEDURE — 36415 COLL VENOUS BLD VENIPUNCTURE: CPT | Performed by: FAMILY MEDICINE

## 2024-07-01 PROCEDURE — 84550 ASSAY OF BLOOD/URIC ACID: CPT | Performed by: FAMILY MEDICINE

## 2024-07-01 RX ORDER — METFORMIN HCL 500 MG
1500 TABLET, EXTENDED RELEASE 24 HR ORAL DAILY
Qty: 270 TABLET | Refills: 1 | Status: SHIPPED | OUTPATIENT
Start: 2024-07-01

## 2024-07-01 RX ORDER — ATORVASTATIN CALCIUM 20 MG/1
20 TABLET, FILM COATED ORAL DAILY
Qty: 90 TABLET | Refills: 3 | Status: SHIPPED | OUTPATIENT
Start: 2024-07-01

## 2024-07-01 SDOH — HEALTH STABILITY: PHYSICAL HEALTH: ON AVERAGE, HOW MANY DAYS PER WEEK DO YOU ENGAGE IN MODERATE TO STRENUOUS EXERCISE (LIKE A BRISK WALK)?: 0 DAYS

## 2024-07-01 SDOH — HEALTH STABILITY: PHYSICAL HEALTH: ON AVERAGE, HOW MANY MINUTES DO YOU ENGAGE IN EXERCISE AT THIS LEVEL?: 0 MIN

## 2024-07-01 ASSESSMENT — PAIN SCALES - GENERAL: PAINLEVEL: NO PAIN (0)

## 2024-07-01 ASSESSMENT — PATIENT HEALTH QUESTIONNAIRE - PHQ9
10. IF YOU CHECKED OFF ANY PROBLEMS, HOW DIFFICULT HAVE THESE PROBLEMS MADE IT FOR YOU TO DO YOUR WORK, TAKE CARE OF THINGS AT HOME, OR GET ALONG WITH OTHER PEOPLE: NOT DIFFICULT AT ALL
SUM OF ALL RESPONSES TO PHQ QUESTIONS 1-9: 3

## 2024-07-01 ASSESSMENT — SOCIAL DETERMINANTS OF HEALTH (SDOH): HOW OFTEN DO YOU GET TOGETHER WITH FRIENDS OR RELATIVES?: NEVER

## 2024-07-01 NOTE — LETTER
July 2, 2024    Yair Benites  [address] [place] [id number]          Dear ,    We are writing to inform you of your test results.  3 month Diabetic test Borderline but stable   Other labs normal       Resulted Orders   PSA, screen   Result Value Ref Range    Prostate Specific Antigen Screen 0.43 0.00 - 4.50 ng/mL    Narrative    This result is obtained using the Roche Elecsys total PSA method on the tamiko e801 immunoassay analyzer. Results obtained with different assay methods or kits cannot be used interchangeably.   Hemoglobin A1c   Result Value Ref Range    Hemoglobin A1C 7.1 (H) 0.0 - 5.6 %      Comment:      Normal <5.7%   Prediabetes 5.7-6.4%    Diabetes 6.5% or higher     Note: Adopted from ADA consensus guidelines.   Uric acid   Result Value Ref Range    Uric Acid 6.0 3.4 - 7.0 mg/dL       If you have any questions or concerns, please call the clinic at the number listed above.       Sincerely,      Radha Villegas MD

## 2024-07-01 NOTE — PROGRESS NOTES
"Preventive Care Visit  [facility] DAVONTE Villegas MD, Family Medicine  Jul 1, 2024      Assessment & Plan     Encounter for Medicare annual wellness exam      Type 2 diabetes mellitus without complication, without long-term current use of insulin (H)  Controlled   - Hemoglobin A1c; Future  - metFORMIN (GLUCOPHAGE XR) 500 MG 24 hr tablet; Take 3 tablets (1,500 mg) by mouth daily    Paranoid schizophrenia, chronic condition with acute exacerbation (H)  Sees Psychiatris and doing well     SANDRA (obstructive sleep apnea)- severe (AHI 45)  On cpap   Does not use daily as recommended         Hypertension goal BP (blood pressure) < 140/90  Controlled     Hyperlipidemia LDL goal <70  Stable   - atorvastatin (LIPITOR) 20 MG tablet; Take 1 tablet (20 mg) by mouth daily    Major depressive disorder with single episode, remission status unspecified  Controlled     Chronic gout without tophus, unspecified cause, unspecified site  Pending   Pt has   - Uric acid; Future    Screening for prostate cancer  Discussed   - PSA, screen; Future    Encounter for abdominal aortic aneurysm (AAA) screening  Advised   - [place] Aorta Medicare AAA Screening; Future            BMI  Estimated body mass index is 31.75 kg/m  as calculated from the following:    Height as of this encounter: 1.682 m (5' 6.22\").    Weight as of this encounter: 89.8 kg (198 lb).   Weight management plan: Discussed healthy diet and exercise guidelines    Counseling  Appropriate preventive services were discussed with this patient, including applicable screening as appropriate for fall prevention, nutrition, physical activity, Tobacco-use cessation, weight loss and cognition.  Checklist reviewing preventive services available has been given to the patient.  Reviewed patient's diet, addressing concerns and/or questions.   Patient is at risk for social isolation and has been provided with information about the benefit of social connection.   Patient reported " safety concerns were addressed today.    Work on weight loss  Regular exercise    Subjective   Yair is a 63 year old, presenting for the following:  Physical        7/1/2024     2:50 PM   Additional Questions   Roomed by Juani         [facility] Care Directive  Patient does not have a Health Care Directive or Living Will: Discussed advance care planning with patient; information given to patient to review.    HPI  Pt here for a Physical and medicines check    Diabetes Follow-up    How often are you checking your blood sugar? One time daily  What time of day are you checking your blood sugars (select all that apply)?  Before meals  Have you had any blood sugars above 200?  No  Have you had any blood sugars below 70?  No  What symptoms do you notice when your blood sugar is low?  None  What concerns do you have today about your diabetes? None   Do you have any of these symptoms? (Select all that apply)  No numbness or tingling in feet.  No redness, sores or blisters on feet.  No complaints of excessive thirst.  No reports of blurry vision.  No significant changes to weight.          Hyperlipidemia Follow-Up    Are you regularly taking any medication or supplement to lower your cholesterol?   No  Are you having muscle aches or other side effects that you think could be caused by your cholesterol lowering medication?  Yes- statin    Hypertension Follow-up    Do you check your blood pressure regularly outside of the clinic? No   Are you following a low salt diet? Yes  Are your blood pressures ever more than 140 on the top number (systolic) OR more   than 90 on the bottom number (diastolic), for example 140/90? No    BP Readings from Last 2 Encounters:   07/01/24 134/84   04/01/24 136/79     Hemoglobin A1C (%)   Date Value   04/01/2024 6.7 (H)   12/12/2023 6.5 (H)   03/02/2021 6.4 (H)   11/19/2019 5.8 (H)     LDL Cholesterol Calculated   Date Value   04/01/2024      Comment:     Cannot estimate LDL when triglyceride  exceeds 400 mg/dL   03/06/2023 35 mg/dL   03/02/2021 56 mg/dL   11/19/2019 46 mg/dL     LDL Cholesterol Direct (mg/dL)   Date Value   04/01/2024 96     Pt sees Psychiatrist for mental helath issues      7/1/2024   General Health   How would you rate your overall physical health? Good   Feel stress (tense, anxious, or unable to sleep) Not at all            7/1/2024   Nutrition   Diet: Diabetic            7/1/2024   Exercise   Days per week of moderate/strenous exercise 0 days   Average minutes spent exercising at this level 0 min      (!) EXERCISE CONCERN      7/1/2024   Social Factors   Frequency of gathering with friends or relatives Never   Worry food won't last until get money to buy more No   Food not last or not have enough money for food? No   Do you have housing? (Housing is defined as stable permanent housing and does not include staying ouside in a car, in a tent, in an abandoned building, in an overnight shelter, or couch-surfing.) Yes   Are you worried about losing your housing? No   Lack of transportation? No   Unable to get utilities (heat,electricity)? No      (!) SOCIAL CONNECTIONS CONCERN      7/1/2024   Fall Risk   Fallen 2 or more times in the past year? No   Trouble with walking or balance? No             7/1/2024   Activities of Daily Living- Home Safety   Needs help with the following daily activites None of the above   Safety concerns in the home Throw rugs in the hallway            7/1/2024   Dental   Dentist two times every year? Yes            7/1/2024   Hearing Screening   Hearing concerns? None of the above            7/1/2024   Driving Risk Screening   Patient/family members have concerns about driving No            7/1/2024   General Alertness/Fatigue Screening   Have you been more tired than usual lately? No            7/1/2024   Urinary Incontinence Screening   Bothered by leaking urine in past 6 months No            7/1/2024   TB Screening   Were you born outside of the [place]? No             Today's PHQ-9 Score:       2023     3:29 PM   PHQ-9 SCORE   PHQ-9 Total Score 6           2024   Substance Use   Alcohol more than 3/day or more than 7/wk No   Do you have a current opioid prescription? No   How severe/bad is pain from 1 to 10? 0/10 (No Pain)   Do you use any other substances recreationally? No        Social History     Tobacco Use    Smoking status: Former     Current packs/day: 0.00     Types: Cigarettes     Start date: 1977     Quit date: 1983     Years since quittin.5     Passive exposure: Never    Smokeless tobacco: Never   Vaping Use    Vaping status: Never Used   Substance Use Topics    Alcohol use: No    Drug use: No       Last PSA:   PSA   Date Value Ref Range Status   2021 0.49 0 - 4 ug/L Final     Comment:     Assay Method:  Chemiluminescence using Siemens Vista analyzer     Prostate Specific Antigen Screen   Date Value Ref Range Status   2023 0.46 0.00 - 4.00 ug/L Final     ASCVD Risk   The 10-year ASCVD risk score (Pao DEMARCO, et al., 2019) is: 32.4%    Values used to calculate the score:      Age: 63 years      Sex: Male      Is Non- : No      Diabetic: Yes      Tobacco smoker: No      Systolic Blood Pressure: 134 mmHg      Is BP treated: Yes      HDL Cholesterol: 35 mg/dL      Total Cholesterol: 184 mg/dL            Reviewed and updated as needed this visit by Provider                    Past Medical History:   Diagnosis Date    Colon polyp     Depression, major     Diabetes (H)     Gout     Headache disorder 06/15/2011    Hyperlipidemia     Hypertensive disorder 2009    IBS (irritable bowel syndrome)     Obesity     SANDRA (obstructive sleep apnea)     Stupor 2009    With NEAR SYNCOPE AND DIZZINESS    Synovitis and tenosynovitis 09/15/2010    Traumatic brain injury (H)     MOM in accident when pregnant with pt     Past Surgical History:   Procedure Laterality Date    SURGICAL HISTORY OF -       nose      OB History   No obstetric history on file.     Lab work is in process  Labs reviewed in EPIC  BP Readings from Last 3 Encounters:   24 134/84   24 136/79   24 137/88    Wt Readings from Last 3 Encounters:   24 89.8 kg (198 lb)   24 88.7 kg (195 lb 9.6 oz)   24 88.4 kg (194 lb 12.8 oz)                  Patient Active Problem List   Diagnosis    Colon polyp    Dizziness - light-headed    Primary localized osteoarthrosis, ankle and foot    Class 1 obesity due to excess calories with serious comorbidity and body mass index (BMI) of 31.0 to 31.9 in adult    Depression, major    SANDRA (obstructive sleep apnea)- severe (AHI 45)    Chronic gout without tophus, unspecified cause, unspecified site    Type 2 diabetes mellitus without complication, without long-term current use of insulin (H)    Paranoid schizophrenia, chronic condition with acute exacerbation (H)    Hypertension goal BP (blood pressure) < 140/90    Hyperlipidemia LDL goal <70    Neck pain    Chronic pain of both knees    Keratitis     Past Surgical History:   Procedure Laterality Date    SURGICAL HISTORY OF -       nose       Social History     Tobacco Use    Smoking status: Former     Current packs/day: 0.00     Types: Cigarettes     Start date: 1977     Quit date: 1983     Years since quittin.5     Passive exposure: Never    Smokeless tobacco: Never   Substance Use Topics    Alcohol use: No     Family History   Problem Relation Age of Onset    Cataracts Mother     C.A.D. Father         MI age 66    Heart Disease Father     Depression Brother     Cancer Brother         Brain Tumor    Respiratory Brother     Depression Brother     Gallbladder Disease Brother     Dementia Brother     Cancer Brother     Parkinsonism Brother     Diabetes Maternal Uncle     Asthma No family hx of     Hypertension No family hx of          Current Outpatient Medications   Medication Sig Dispense Refill    atorvastatin (LIPITOR) 20  "MG tablet Take 1 tablet (20 mg) by mouth daily 90 tablet 3    metFORMIN (GLUCOPHAGE XR) 500 MG 24 hr tablet Take 3 tablets (1,500 mg) by mouth daily 270 tablet 1    Ascorbic Acid (VITAMIN C) 500 MG CAPS Take 500 mg by mouth daily      aspirin 81 MG tablet Take 1 tablet by mouth daily.      blood glucose monitoring (NO BRAND SPECIFIED) meter device kit Use to test blood sugar 1 time daily or as directed. 1 kit 0    co-enzyme Q-10 100 MG CAPS capsule Take 200 mg by mouth daily Gummy form      CONTOUR NEXT TEST test strip TEST ONCE DAILY 100 strip 3    empagliflozin (JARDIANCE) 10 MG TABS tablet Take 1 tablet (10 mg) by mouth daily 90 tablet 1    Lancet Devices (MICROLET NEXT LANCING DEVICE) MISC USE WITH LANCETS EVERY  each 11    losartan (COZAAR) 25 MG tablet Take 1 tablet (25 mg) by mouth daily 90 tablet 3    melatonin 5 MG tablet Take 5 mg by mouth nightly as needed for sleep Gummy form      mirtazapine (REMERON) 30 MG tablet Take 30 mg by mouth At Bedtime.      Multiple Vitamins-Minerals (MULTIVITAMIN & MINERAL PO) Take 1 tablet by mouth daily.      nabumetone (RELAFEN) 500 MG tablet Take 1 tablet (500 mg) by mouth 2 times daily 180 tablet 1    NONFORMULARY Take 500 mg by mouth daily \"Super Beta Prostate Supplement\"      NONFORMULARY Take 1 capsule by mouth daily \"Force Factor Test x180 Boost, Male Testosterone Booster\"      OLANZapine (ZYPREXA) 5 MG tablet Take 2.5 mg by mouth At Bedtime      order for DME Equipment being ordered:Face mask for CPAP machine size small with tubing and filter. [employer] ([phone]) 1 Device 0    psyllium (METAMUCIL/KONSYL) 58.6 % powder Take  Ones daily 660 g 11    Vitamin D3 (CHOLECALCIFEROL) 125 MCG (5000 UT) tablet Take 1 tablet by mouth daily Gummy form      zinc gluconate 50 MG tablet Take 1 tablet (50 mg) by mouth daily       Allergies   Allergen Reactions    Erythromycin Nausea    Imipramine Diarrhea    Imipramine Hcl      Toxic levels    " Risperidone      Other reaction(s): Extrapyramidal Side Effect    Erythromycin Rash     Recent Labs   Lab Test 04/01/24  1307 12/12/23  1514 06/06/23  1653 03/06/23  1537 03/16/22  0927 10/19/21  1257 03/02/21  1523 11/19/19  1627 05/04/18  1304 01/12/18  1541   A1C 6.7* 6.5* 7.1* 7.9*   < > 7.2* 6.4* 5.8*   < >  --    LDL 96  --   --  35  --  67 56 46   < >  --    HDL 35*  --   --  37*  --  38* 38* 38*  --   --    TRIG 457*  --   --  371*  --  322* 311* 310*  --   --    CR  --  0.84  --  0.90   < >  --  0.89 0.88   < >  --    GFRESTIMATED  --  >90  --  >90   < >  --  >90 >90   < >  --    GFRESTBLACK  --   --   --   --   --   --  >90 >90   < >  --    POTASSIUM  --  4.7  --  3.9   < >  --  3.6 4.0   < >  --    TSH  --   --   --   --   --   --   --   --   --  1.84    < > = values in this interval not displayed.      Current providers sharing in care for this patient include:  Patient Care Team:  Radha Villegas MD as PCP - General  Radha Villegas MD as Assigned PCP  Maylin Laguerre RD as Diabetes Educator (Dietitian, Registered)  Keith Gomez MD as Assigned Surgical Provider  Ephraim Aldana MD as MD (Ophthalmology)  Cierra Saenz, RN as Lead Care Coordinator (Primary Care - CC)    The following health maintenance items are reviewed in Epic and correct as of today:  Health Maintenance   Topic Date Due    AORTIC ANEURYSM SCREENING (SYSTEM ASSIGNED)  Never done    MEDICARE ANNUAL WELLNESS VISIT  03/06/2024    COVID-19 Vaccine (6 - 2023-24 season) 04/12/2024    PHQ-9  06/12/2024    INFLUENZA VACCINE (1) 09/01/2024    A1C  10/01/2024    EYE EXAM  10/30/2024    BMP  12/12/2024    DIABETIC FOOT EXAM  12/12/2024    LIPID  04/01/2025    MICROALBUMIN  04/01/2025    ANNUAL REVIEW OF HM ORDERS  04/01/2025    FALL RISK ASSESSMENT  07/01/2025    ADVANCE CARE PLANNING  03/06/2028    COLORECTAL CANCER SCREENING  01/17/2030    DTAP/TDAP/TD IMMUNIZATION (3 - Td or Tdap) 08/02/2032    HEPATITIS C SCREENING  Completed    HIV  "SCREENING  Completed    DEPRESSION ACTION PLAN  Completed    Pneumococcal Vaccine: 65+ Years  Completed    ZOSTER IMMUNIZATION  Completed    RSV VACCINE (Pregnancy & 60+)  Completed    IPV IMMUNIZATION  Aged Out    HPV IMMUNIZATION  Aged Out    MENINGITIS IMMUNIZATION  Aged Out    RSV MONOCLONAL ANTIBODY  Aged Out         Review of Systems  CONSTITUTIONAL: NEGATIVE for fever, chills, change in weight  INTEGUMENTARY/SKIN: NEGATIVE for worrisome rashes, moles or lesions  EYES: NEGATIVE for vision changes or irritation  ENT/MOUTH: NEGATIVE for ear, mouth and throat problems  RESP: NEGATIVE for significant cough or SOB  BREAST: NEGATIVE for masses, tenderness or discharge  CV: NEGATIVE for chest pain, palpitations or peripheral edema  GI: NEGATIVE for nausea, abdominal pain, heartburn, or change in bowel habits  : NEGATIVE for frequency, dysuria, or hematuria  MUSCULOSKELETAL: NEGATIVE for significant arthralgias or myalgia  NEURO: NEGATIVE for weakness, dizziness or paresthesias  ENDOCRINE: NEGATIVE for temperature intolerance, skin/hair changes  HEME: NEGATIVE for bleeding problems  PSYCHIATRIC: NEGATIVE for changes in mood or affect     Objective    Exam  /84   Pulse 93   Temp 98  F (36.7  C) (Temporal)   Resp 18   Ht 1.682 m (5' 6.22\")   Wt 89.8 kg (198 lb)   SpO2 93%   BMI 31.75 kg/m     Estimated body mass index is 31.75 kg/m  as calculated from the following:    Height as of this encounter: 1.682 m (5' 6.22\").    Weight as of this encounter: 89.8 kg (198 lb).    Physical Exam  GENERAL: alert and no distress  EYES: Eyes grossly normal to inspection, PERRL and conjunctivae and sclerae normal  HENT: ear canals and TM's normal, nose and mouth without ulcers or lesions  NECK: no adenopathy, no asymmetry, masses, or scars  RESP: lungs clear to auscultation - no rales, rhonchi or wheezes  CV: regular rate and rhythm, normal S1 S2, no S3 or S4, no murmur, click or rub, no peripheral edema  ABDOMEN: soft, " nontender, no hepatosplenomegaly, no masses and bowel sounds normal  MS: no gross musculoskeletal defects noted, no edema  SKIN: no suspicious lesions or rashes  NEURO: Normal strength and tone, mentation intact and speech normal  PSYCH: mentation appears normal, affect normal/bright  Diabetic foot exam: normal PT pulses        7/1/2024   Mini Cog   Clock Draw Score 0 Abnormal   3 Item Recall 3 objects recalled   Mini Cog Total Score 3                 Signed Electronically by: Radha Villegas MD

## 2024-07-01 NOTE — PATIENT INSTRUCTIONS
"Patient Education   Preventive Care Advice   This is general advice we often give to help people stay healthy. Your care team may have specific advice just for you. Please talk to your care team about your own preventive care needs.  Lifestyle  Exercise at least 150 minutes each week (30 minutes a day, 5 days a week).  Do muscle strengthening activities 2 days a week. These help control your weight and prevent disease.  No smoking.  Wear sunscreen to prevent skin cancer.  Have your home tested for radon every 2 to 5 years. Radon is a colorless, odorless gas that can harm your lungs. To learn more, go to www.health.[facility].mn.us and search for \"Radon in Homes.\"  Keep guns unloaded and locked up in a safe place like a safe or gun vault, or, use a gun lock and hide the keys. Always lock away bullets separately. To learn more, visit [employer].[place].gov and search for \"safe gun storage.\"  Nutrition  Eat 5 or more servings of fruits and vegetables each day.  Try wheat bread, brown rice and whole grain pasta (instead of white bread, rice, and pasta).  Get enough calcium and vitamin D. Check the label on foods and aim for 100% of the RDA (recommended daily allowance).  Regular exams  Have a dental exam and cleaning every 6 months.  See your health care team every year to talk about:  Any changes in your health.  Any medicines your care team has prescribed.  Preventive care, family planning, and ways to prevent chronic diseases.  Shots (vaccines)   HPV shots (up to age 26), if you've never had them before.  Hepatitis B shots (up to age 59), if you've never had them before.  COVID-19 shot: Get this shot when it's due.  Flu shot: Get a flu shot every year.  Tetanus shot: Get a tetanus shot every 10 years.  Pneumococcal, hepatitis A, and RSV shots: Ask your care team if you need these based on your risk.  Shingles shot (for age 50 and up).  General health tests  Diabetes screening:  Starting at age 35, Get screened for diabetes at least " every 3 years.  If you are younger than age 35, ask your care team if you should be screened for diabetes.  Cholesterol test: At age 39, start having a cholesterol test every 5 years, or more often if advised.  Bone density scan (DEXA): At age 50, ask your care team if you should have this scan for osteoporosis (brittle bones).  Hepatitis C: Get tested at least once in your life.  Abdominal aortic aneurysm screening: Talk to your doctor about having this screening if you:  Have ever smoked; and  Are biologically male; and  Are between the ages of 65 and 75.  STIs (sexually transmitted infections)  Before age 24: Ask your care team if you should be screened for STIs.  After age 24: Get screened for STIs if you're at risk. You are at risk for STIs (including HIV) if:  You are sexually active with more than one person.  You don't use condoms every time.  You or a partner was diagnosed with a sexually transmitted infection.  If you are at risk for HIV, ask about PrEP medicine to prevent HIV.  Get tested for HIV at least once in your life, whether you are at risk for HIV or not.  Cancer screening tests  Cervical cancer screening: If you have a cervix, begin getting regular cervical cancer screening tests at age 21. Most people who have regular screenings with normal results can stop after age 65. Talk about this with your provider.  Breast cancer scan (mammogram): If you've ever had breasts, begin having regular mammograms starting at age 40. This is a scan to check for breast cancer.  Colon cancer screening: It is important to start screening for colon cancer at age 45.  Have a colonoscopy test every 10 years (or more often if you're at risk) Or, ask your provider about stool tests like a FIT test every year or Cologuard test every 3 years.  To learn more about your testing options, visit: www.[employer]/970814.pdf.  For help making a decision, visit: shena/cv81708.  Prostate cancer screening test: If you have a  prostate and are age 55 to 69, ask your provider if you would benefit from a yearly prostate cancer screening test.  Lung cancer screening: If you are a current or former smoker age 50 to 80, ask your care team if ongoing lung cancer screenings are right for you.  For informational purposes only. Not to replace the advice of your health care provider. Copyright   2023 [facility]. All rights reserved. Clinically reviewed by the  [facility]. [employer] 826595 - REV 04/24.  Learning About Being Active as an Older Adult  Why is being active important as you get older?     Being active is one of the best things you can do for your health. And it's never too late to start. Being active--or getting active, if you aren't already--has definite benefits. It can:  Give you more energy,  Keep your mind sharp.  Improve balance to reduce your risk of falls.  Help you manage chronic illness with fewer medicines.  No matter how old you are, how fit you are, or what health problems you have, there is a form of activity that will work for you. And the more physical activity you can do, the better your overall health will be.  What kinds of activity can help you stay healthy?  Being more active will make your daily activities easier. Physical activity includes planned exercise and things you do in daily life. There are four types of activity:  Aerobic.  Doing aerobic activity makes your heart and lungs strong.  Includes walking, dancing, and gardening.  Aim for at least 2  hours spread throughout the week.  It improves your energy and can help you sleep better.  Muscle-strengthening.  This type of activity can help maintain muscle and strengthen bones.  Includes climbing stairs, using resistance bands, and lifting or carrying heavy loads.  Aim for at least twice a week.  It can help protect the knees and other joints.  Stretching.  Stretching gives you better range of motion in joints and  muscles.  Includes upper arm stretches, calf stretches, and gentle yoga.  Aim for at least twice a week, preferably after your muscles are warmed up from other activities.  It can help you function better in daily life.  Balancing.  This helps you stay coordinated and have good posture.  Includes heel-to-toe walking, cedrick chi, and certain types of yoga.  Aim for at least 3 days a week.  It can reduce your risk of falling.  Even if you have a hard time meeting the recommendations, it's better to be more active than less active. All activity done in each category counts toward your weekly total. You'd be surprised how daily things like carrying groceries, keeping up with grandchildren, and taking the stairs can add up.  What keeps you from being active?  If you've had a hard time being more active, you're not alone. Maybe you remember being able to do more. Or maybe you've never thought of yourself as being active. It's frustrating when you can't do the things you want. Being more active can help. What's holding you back?  Getting started.  Have a goal, but break it into easy tasks. Small steps build into big accomplishments.  Staying motivated.  If you feel like skipping your activity, remember your goal. Maybe you want to move better and stay independent. Every activity gets you one step closer.  Not feeling your best.  Start with 5 minutes of an activity you enjoy. Prove to yourself you can do it. As you get comfortable, increase your time.  You may not be where you want to be. But you're in the process of getting there. Everyone starts somewhere.  How can you find safe ways to stay active?  Talk with your doctor about any physical challenges you're facing. Make a plan with your doctor if you have a health problem or aren't sure how to get started with activity.  If you're already active, ask your doctor if there is anything you should change to stay safe as your body and health change.  If you tend to feel dizzy  "after you take medicine, avoid activity at that time. Try being active before you take your medicine. This will reduce your risk of falls.  If you plan to be active at home, make sure to clear your space before you get started. Remove things like TV cords, coffee tables, and throw rugs. It's safest to have plenty of space to move freely.  The key to getting more active is to take it slow and steady. Try to improve only a little bit at a time. Pick just one area to improve on at first. And if an activity hurts, stop and talk to your doctor.  Where can you learn more?  Go to https://www.[employer].net/patiented  Enter P600 in the search box to learn more about \"Learning About Being Active as an Older Adult.\"  Current as of: June 5, 2023               Content Version: 14.0    4809-6651 [employer].   Care instructions adapted under license by your healthcare professional. If you have questions about a medical condition or this instruction, always ask your healthcare professional. [employer] disclaims any warranty or liability for your use of this information.      Learning About Activities of Daily Living  What are activities of daily living?     Activities of daily living (ADLs) are the basic self-care tasks you do every day. These include eating, bathing, dressing, and moving around.  As you age, and if you have health problems, you may find that it's harder to do some of these tasks. If so, your doctor can suggest ideas that may help.  To measure what kind of help you may need, your doctor will ask how well you are able to do ADLs. Let your doctor know if there are any tasks that you are having trouble doing. This is an important first step to getting help. And when you have the help you need, you can stay as independent as possible.  How will a doctor assess your ADLs?  Asking about ADLs is part of a routine health checkup your doctor will likely do as you age. Your health check might be " done in a doctor's office, in your home, or at a hospital. The goal is to find out if you are having any problems that could make it hard to care for yourself or that make it unsafe for you to be on your own.  To measure your ADLs, your doctor will ask how hard it is for you to do routine tasks. Your doctor may also want to know if you have changed the way you do a task because of a health problem. Your doctor may watch how you:  Walk back and forth.  Keep your balance while you stand or walk.  Move from sitting to standing or from a bed to a chair.  Button or unbutton a shirt or sweater.  Remove and put on your shoes.  It's common to feel a little worried or anxious if you find you can't do all the things you used to be able to do. Talking with your doctor about ADLs is a way to make sure you're as safe as possible and able to care for yourself as well as you can. You may want to bring a caregiver, friend, or family member to your checkup. They can help you talk to your doctor.  Follow-up care is a key part of your treatment and safety. Be sure to make and go to all appointments, and call your doctor if you are having problems. It's also a good idea to know your test results and keep a list of the medicines you take.  Current as of: October 24, 2023               Content Version: 14.0    8285-2995 [employer].   Care instructions adapted under license by your healthcare professional. If you have questions about a medical condition or this instruction, always ask your healthcare professional. [employer] disclaims any warranty or liability for your use of this information.      Relationships for Good Health  Relationships are important for our health and happiness. Social isolation, loneliness and lack of support are bad for your health. Studies show that loneliness can harm health and limit your life span as much as high blood pressure and smoking.   Take some time to reflect on your  relationships. Then answer these questions:  Are there people in your life that cause you stress or drain your energy? What can you do to set limits?  ________________________________________________________________________________________________________________________________________________________________________________________________________________________________________________________________________________________________________________________________________________  Who do you enjoy spending time with? Who can you go to for support?  ________________________________________________________________________________________________________________________________________________________________________________________________________________________________________________________________________________________________________________________________________________  What can you do to improve your relationships with others?  __________________________________________________________________________________________________________________________________________________________________________________________________________________  ______________________________________________________________________________________________________________________________  What do you like most about your relationships with others?  ________________________________________________________________________________________________________________________________________________________________________________________________________________________________________________________________________________________________________________________________________________  My goal: ______________________________________________________________________  I will  ______________________________________________________________________________________________________________________________________________________________________________________________    For informational purposes only. Not to replace the advice of your health care provider. Copyright   2018 [facility]. All rights reserved. Clinically reviewed by [facility]  Team. [employer] 277688 - Rev 04/21.    Eating Healthy Foods: Care Instructions  With every meal, you can make healthy food choices. Try to eat a variety of fruits, vegetables, whole grains, lean proteins, and low-fat dairy products. This can help you get the right balance of nutrients, including vitamins and minerals. Small changes add up over time. You can start by adding one healthy food to your meals each day.    Try to make half your plate fruits and vegetables, one-fourth whole grains, and one-fourth lean proteins. Try including dairy with your meals.   Eat more fruits and vegetables. Try to have them with most meals and snacks.   Foods for healthy eating    Fruits    These can be fresh, frozen, canned, or dried.  Try to choose whole fruit rather than fruit juice.  Eat a variety of colors.    Vegetables    These can be fresh, frozen, canned, or dried.  Beans, peas, and lentils count too.    Whole grains    Choose whole-grain breads, cereals, and noodles.  Try brown rice.    Lean proteins    These can include lean meat, poultry, fish, and eggs.  You can also have tofu, beans, peas, lentils, nuts, and seeds.    Dairy    Try milk, yogurt, and cheese.  Choose low-fat or fat-free when you can.  If you need to, use lactose-free milk or fortified plant-based milk products, such as soy milk.    Water    Drink water when you're thirsty.  Limit sugar-sweetened drinks, including soda, fruit drinks, and sports drinks.  Where can you learn more?  Go to https://www.healthwise.net/patiented  Enter T756 in the search box to learn more about  "\"Eating Healthy Foods: Care Instructions.\"  Current as of: September 20, 2023               Content Version: 14.0    9777-0479 [employer].   Care instructions adapted under license by your healthcare professional. If you have questions about a medical condition or this instruction, always ask your healthcare professional. [employer] disclaims any warranty or liability for your use of this information.      Body Mass Index: Care Instructions  Overview     Body mass index (BMI) can help you see if your weight is raising your risk for health problems. It uses a formula to compare how much you weigh with how tall you are.  A BMI lower than 18.5 is considered underweight.  A BMI between 18.5 and 24.9 is considered healthy.  A BMI between 25 and 29.9 is considered overweight. A BMI of 30 or higher is considered obese.  If your BMI is in the normal range, it means that you have a lower risk for weight-related health problems. If your BMI is in the overweight or obese range, you may be at increased risk for weight-related health problems, such as high blood pressure, heart disease, stroke, arthritis or joint pain, and diabetes. If your BMI is in the underweight range, you may be at increased risk for health problems such as fatigue, lower protection (immunity) against illness, muscle loss, bone loss, hair loss, and hormone problems.  BMI is just one measure of your risk for weight-related health problems. You may be at higher risk for health problems if you are not active, you eat an unhealthy diet, or you drink too much alcohol or use tobacco products.  Follow-up care is a key part of your treatment and safety. Be sure to make and go to all appointments, and call your doctor if you are having problems. It's also a good idea to know your test results and keep a list of the medicines you take.  How can you care for yourself at home?  Practice healthy eating habits. This includes eating plenty of " "fruits, vegetables, whole grains, lean protein, and low-fat dairy.  If your doctor recommends it, get more exercise. Walking is a good choice. Bit by bit, increase the amount you walk every day. Try for at least 30 minutes on most days of the week.  Do not smoke. Smoking can increase your risk for health problems. If you need help quitting, talk to your doctor about stop-smoking programs and medicines. These can increase your chances of quitting for good.  Limit alcohol to 2 drinks a day for men and 1 drink a day for women. Too much alcohol can cause health problems.  If you have a BMI higher than 25  Your doctor may do other tests to check your risk for weight-related health problems. This may include measuring the distance around your waist. A waist measurement of more than 40 inches in men or 35 inches in women can increase the risk of weight-related health problems.  Talk with your doctor about steps you can take to stay healthy or improve your health. You may need to make lifestyle changes to lose weight and stay healthy, such as changing your diet and getting regular exercise.  If you have a BMI lower than 18.5  Your doctor may do other tests to check your risk for health problems.  Talk with your doctor about steps you can take to stay healthy or improve your health. You may need to make lifestyle changes to gain or maintain weight and stay healthy, such as getting more healthy foods in your diet and doing exercises to build muscle.  Where can you learn more?  Go to https://www.healthArachno.net/patiented  Enter S176 in the search box to learn more about \"Body Mass Index: Care Instructions.\"  Current as of: May 13, 2023               Content Version: 14.0    0735-7030 [employer].   Care instructions adapted under license by your healthcare professional. If you have questions about a medical condition or this instruction, always ask your healthcare professional. [employer] disclaims " "any warranty or liability for your use of this information.      Learning About Being Physically Active  What is physical activity?     Being physically active means doing any kind of activity that gets your body moving.  The types of physical activity that can help you get fit and stay healthy include:  Aerobic or \"cardio\" activities. These make your heart beat faster and make you breathe harder, such as brisk walking, riding a bike, or running. They strengthen your heart and lungs and build up your endurance.  Strength training activities. These make your muscles work against, or \"resist,\" something. Examples include lifting weights or doing push-ups. These activities help tone and strengthen your muscles and bones.  Stretches. These let you move your joints and muscles through their full range of motion. Stretching helps you be more flexible.  Reaching a balance between these three types of physical activity is important because each one contributes to your overall fitness.  What are the benefits of being active?  Being active is one of the best things you can do for your health. It helps you to:  Feel stronger and have more energy to do all the things you like to do.  Focus better at school or work.  Feel, think, and sleep better.  Reach and stay at a healthy weight.  Lose fat and build lean muscle.  Lower your risk for serious health problems, including diabetes, heart attack, high blood pressure, and some cancers.  Keep your heart, lungs, bones, muscles, and joints strong and healthy.  How can you make being active part of your life?  Start slowly. Make it your long-term goal to get at least 30 minutes of exercise on most days of the week. Walking is a good choice. You also may want to do other activities, such as running, swimming, cycling, or playing tennis or team sports.  Pick activities that you like--ones that make your heart beat faster, your muscles stronger, and your muscles and joints more flexible. " "If you find more than one thing you like doing, do them all. You don't have to do the same thing every day.  Get your heart pumping every day. Any activity that makes your heart beat faster and keeps it at that rate for a while counts.  Here are some great ways to get your heart beating faster:  Go for a brisk walk, run, or hike.  Go for a swim or bike ride.  Take an online exercise class or dance.  Play a game of touch football, basketball, or soccer.  Play tennis, pickleball, or racquetball.  Climb stairs.  Even some household chores can be aerobic. Just do them at a faster pace. Raking or mowing the lawn, sweeping the garage, and vacuuming and cleaning your home all can help get your heart rate up.  Strengthen your muscles during the week. You don't have to lift heavy weights or grow big, bulky muscles to get stronger. Doing a few simple activities that make your muscles work against, or \"resist,\" something can help you get stronger. Aim for at least twice a week.  For example, you can:  Do push-ups or sit-ups, which use your own body weight as resistance.  Lift weights or dumbbells or use stretch bands at home or in a gym or community center.  Stretch your muscles often. Stretching will help you as you become more active. It can help you stay flexible and loosen tight muscles. It can also help improve your balance and posture and can be a great way to relax.  Be sure to stretch the muscles you'll be using when you work out. It's best to warm your muscles slightly before you stretch them. Walk or do some other light aerobic activity for a few minutes. Then start stretching.  When you stretch your muscles:  Do it slowly. Stretching is not about going fast or making sudden movements.  Don't push or bounce during a stretch.  Hold each stretch for at least 15 to 30 seconds, if you can. You should feel a stretch in the muscle, but not pain.  Breathe out as you do the stretch. Then breathe in as you hold the stretch. " "Don't hold your breath.  If you're worried about how more activity might affect your health, have a checkup before you start. Follow any special advice your doctor gives you for getting a smart start.  Where can you learn more?  Go to https://www.[employer].net/patiented  Enter W332 in the search box to learn more about \"Learning About Being Physically Active.\"  Current as of: June 5, 2023               Content Version: 14.0    0511-3952 [employer].   Care instructions adapted under license by your healthcare professional. If you have questions about a medical condition or this instruction, always ask your healthcare professional. "[employer] disclaims any warranty or liability for your use of this information.         "

## 2024-07-02 LAB
PSA SERPL DL<=0.01 NG/ML-MCNC: 0.43 NG/ML (ref 0–4.5)
URATE SERPL-MCNC: 6 MG/DL (ref 3.4–7)

## 2024-07-08 ENCOUNTER — PATIENT OUTREACH (OUTPATIENT)
Dept: GASTROENTEROLOGY | Facility: CLINIC | Age: 64
End: 2024-07-08
Payer: COMMERCIAL

## 2024-07-10 ENCOUNTER — TELEPHONE (OUTPATIENT)
Dept: FAMILY MEDICINE | Facility: CLINIC | Age: 64
End: 2024-07-10
Payer: COMMERCIAL

## 2024-07-10 NOTE — TELEPHONE ENCOUNTER
Reason for Call:  Form, our goal is to have forms completed with 72 hours, however, some forms may require a visit or additional information.    Type of letter, form or note:  Home Health Certification    Who is the form from?: Home care    Where did the form come from: form was faxed in    What clinic location was the form placed at?: [facility]    Where the form was placed: Given to physician    What number is listed as a contact on the form?: [phone]       Call taken on 7/10/2024 at 2:08 PM by Tasha Marcus

## 2024-07-11 ENCOUNTER — TELEPHONE (OUTPATIENT)
Dept: FAMILY MEDICINE | Facility: CLINIC | Age: 64
End: 2024-07-11
Payer: COMMERCIAL

## 2024-07-11 DIAGNOSIS — K59.09 CHRONIC CONSTIPATION: Primary | ICD-10-CM

## 2024-07-11 RX ORDER — POLYETHYLENE GLYCOL 3350 17 G/17G
1 POWDER, FOR SOLUTION ORAL DAILY
Qty: 765 G | Refills: 1 | Status: SHIPPED | OUTPATIENT
Start: 2024-07-11

## 2024-07-11 NOTE — TELEPHONE ENCOUNTER
"Wife, Rula calling. Consent to communicate is on file. States pt has been taking Miralax every night for the past few years. Has been purchasing otc. Wife is wondering how long he needs to take this for. Medication has been helping with constipation.     RN reviewed Micromedex and per medication counseling: \"Patient should not take this drug for more than 2 weeks, unless approved by healthcare professional.\"    Routing to PCP to advise. Ok to continue Miralax and add to Epic med list?    Andressa Cornelius RN  [facility]    "

## 2024-07-12 DIAGNOSIS — Z53.9 DIAGNOSIS NOT YET DEFINED: Primary | ICD-10-CM

## 2024-07-12 PROCEDURE — G0179 MD RECERTIFICATION HHA PT: HCPCS | Performed by: FAMILY MEDICINE

## 2024-07-12 NOTE — TELEPHONE ENCOUNTER
Called Rula, pt's spouse, and notified her of reply from Dr. Villegas. She verbalized understanding and will notify patient.     Tyesha Blanco RN

## 2024-07-28 DIAGNOSIS — E11.9 TYPE 2 DIABETES MELLITUS WITHOUT COMPLICATION, WITHOUT LONG-TERM CURRENT USE OF INSULIN (H): ICD-10-CM

## 2024-08-01 ENCOUNTER — PATIENT OUTREACH (OUTPATIENT)
Dept: GERIATRIC MEDICINE | Facility: CLINIC | Age: 64
End: 2024-08-01
Payer: COMMERCIAL

## 2024-08-01 NOTE — PROGRESS NOTES
[facility] Care Coordination Contact      [facility] Six-Month Telephone Assessment    6 month telephone assessment completed on 8/1/24.    ER visits: No  Hospitalizations: No  TCU stays: No  Significant health status changes: none reported.   Falls/Injuries: No  ADL/IADL changes: No  Changes in services: No    Caregiver Assessment follow up:  none    Goals: See POC in chart for goal progress documentation.      Will see member in 6 months for an annual health risk assessment.   Encouraged member to call CC with any questions or concerns in the meantime.     Cierra Saenz RN  [facility]  [phone]

## 2024-08-12 ENCOUNTER — TELEPHONE (OUTPATIENT)
Dept: FAMILY MEDICINE | Facility: CLINIC | Age: 64
End: 2024-08-12
Payer: COMMERCIAL

## 2024-08-12 NOTE — TELEPHONE ENCOUNTER
Home Care is calling regarding an established patient with [facility].       Requesting orders from: Radha Villegas  Provider is following patient: Yes  Is this a 60-day recertification request?  Yes    Orders Requested    Skilled Nursing  Request for recertification   Frequency: 1x EOW x 7 wks  1x/wk x 1/wk  3 PRN    Please confirm if ok to discontinue the Force Factor testosterone booster supplement      Confirmed ok to leave a detailed message with call back.  Contact information confirmed and updated as needed.    Blanche Moreno RN

## 2024-08-17 ENCOUNTER — MEDICAL CORRESPONDENCE (OUTPATIENT)
Dept: HEALTH INFORMATION MANAGEMENT | Facility: CLINIC | Age: 64
End: 2024-08-17

## 2024-09-05 ENCOUNTER — TELEPHONE (OUTPATIENT)
Dept: FAMILY MEDICINE | Facility: CLINIC | Age: 64
End: 2024-09-05
Payer: COMMERCIAL

## 2024-09-05 NOTE — TELEPHONE ENCOUNTER
[facility] received and given to Dr. Villegas to sign when she returns to the office on Monday, September 9th, 2024.  Tasha Marcus,

## 2024-09-05 NOTE — TELEPHONE ENCOUNTER
Reason for Call:  Form, our goal is to have forms completed with 72 hours, however, some forms may require a visit or additional information.    Type of letter, form or note:  Home Health Certification    Who is the form from?: Home care    Where did the form come from: form was faxed in    What clinic location was the form placed at?: [facility]    Where the form was placed: Given to physician    What number is listed as a contact on the form?: [phone]       Call taken on 9/5/2024 at 2:00 PM by Tasha Marcus

## 2024-09-09 DIAGNOSIS — Z53.9 DIAGNOSIS NOT YET DEFINED: Primary | ICD-10-CM

## 2024-09-09 PROCEDURE — G0179 MD RECERTIFICATION HHA PT: HCPCS | Performed by: FAMILY MEDICINE

## 2024-09-12 ENCOUNTER — OFFICE VISIT (OUTPATIENT)
Dept: FAMILY MEDICINE | Facility: CLINIC | Age: 64
End: 2024-09-12
Payer: COMMERCIAL

## 2024-09-12 VITALS
SYSTOLIC BLOOD PRESSURE: 136 MMHG | HEART RATE: 92 BPM | WEIGHT: 200 LBS | DIASTOLIC BLOOD PRESSURE: 87 MMHG | RESPIRATION RATE: 16 BRPM | OXYGEN SATURATION: 95 % | TEMPERATURE: 98.5 F | BODY MASS INDEX: 32.14 KG/M2 | HEIGHT: 66 IN

## 2024-09-12 DIAGNOSIS — I10 HYPERTENSION GOAL BP (BLOOD PRESSURE) < 140/90: ICD-10-CM

## 2024-09-12 DIAGNOSIS — E11.9 TYPE 2 DIABETES MELLITUS WITHOUT COMPLICATION, WITHOUT LONG-TERM CURRENT USE OF INSULIN (H): Primary | Chronic | ICD-10-CM

## 2024-09-12 DIAGNOSIS — Z13.6 ENCOUNTER FOR ABDOMINAL AORTIC ANEURYSM (AAA) SCREENING: ICD-10-CM

## 2024-09-12 DIAGNOSIS — E78.5 HYPERLIPIDEMIA LDL GOAL <70: ICD-10-CM

## 2024-09-12 PROCEDURE — G0008 ADMIN INFLUENZA VIRUS VAC: HCPCS | Performed by: FAMILY MEDICINE

## 2024-09-12 PROCEDURE — 90662 IIV NO PRSV INCREASED AG IM: CPT | Performed by: FAMILY MEDICINE

## 2024-09-12 PROCEDURE — 99213 OFFICE O/P EST LOW 20 MIN: CPT | Mod: 25 | Performed by: FAMILY MEDICINE

## 2024-09-12 NOTE — PROGRESS NOTES
Assessment & Plan     Type 2 diabetes mellitus without complication, without long-term current use of insulin (H)  Follow up lab appointment next month  - Hemoglobin A1c; Future    Hypertension goal BP (blood pressure) < 140/90  Stable     Hyperlipidemia LDL goal <70  Stable     Encounter for abdominal aortic aneurysm (AAA) screening  Advised   - [place] Aorta Medicare AAA Screening; Future              Subjective   Yair is a 63 year old, presenting for the following health issues:  Diabetes      9/12/2024     1:39 PM   Additional Questions   Roomed by sydnee     History of Present Illness       Diabetes:   He presents for follow up of diabetes.  He is checking home blood glucose one time daily.   He checks blood glucose before meals.  Blood glucose is never over 200 and sometimes under 70.  When his blood glucose is low, the patient is asymptomatic for confusion, blurred vision, lethargy and reports not feeling dizzy, shaky, or weak.   He has no concerns regarding his diabetes at this time.   He is not experiencing numbness or burning in feet, excessive thirst, blurry vision, weight changes or redness, sores or blisters on feet.           He eats 2-3 servings of fruits and vegetables daily.He consumes 0 sweetened beverage(s) daily.He exercises with enough effort to increase his heart rate 60 or more minutes per day.  He exercises with enough effort to increase his heart rate 4 days per week.   He is taking medications regularly.           Hyperlipidemia Follow-Up    Are you regularly taking any medication or supplement to lower your cholesterol?   Yes- statin  Are you having muscle aches or other side effects that you think could be caused by your cholesterol lowering medication?  No    Hypertension Follow-up    Do you check your blood pressure regularly outside of the clinic? No   Are you following a low salt diet? Yes  Are your blood pressures ever more than 140 on the top number (systolic) OR more   than 90 on  "the bottom number (diastolic), for example 140/90? No        Review of Systems  CONSTITUTIONAL: NEGATIVE for fever, chills, change in weight  ENT/MOUTH: NEGATIVE for ear, mouth and throat problems  RESP: NEGATIVE for significant cough or SOB  CV: NEGATIVE for chest pain, palpitations or peripheral edema  PSYCHIATRIC: NEGATIVE for changes in mood or affect  ROS otherwise negative      Objective    /87   Pulse 92   Temp 98.5  F (36.9  C) (Temporal)   Resp 16   Ht 1.683 m (5' 6.25\")   Wt 90.7 kg (200 lb)   SpO2 95%   BMI 32.04 kg/m    Body mass index is 32.04 kg/m .  Physical Exam   GENERAL: alert and no distress  NECK: no adenopathy, no asymmetry, masses, or scars  RESP: lungs clear to auscultation - no rales, rhonchi or wheezes  CV: regular rate and rhythm, normal S1 S2, no S3 or S4, no murmur, click or rub, no peripheral edema  ABDOMEN: soft, nontender, no hepatosplenomegaly, no masses and bowel sounds normal  MS: no gross musculoskeletal defects noted, no edema  Good posterior tibial  Office Visit on 07/01/2024   Component Date Value Ref Range Status     Prostate Specific Antigen Screen 07/01/2024 0.43  0.00 - 4.50 ng/mL Final     Hemoglobin A1C 07/01/2024 7.1 (H)  0.0 - 5.6 % Final    Normal <5.7%   Prediabetes 5.7-6.4%    Diabetes 6.5% or higher     Note: Adopted from ADA consensus guidelines.     Uric Acid 07/01/2024 6.0  3.4 - 7.0 mg/dL Final     No results found for any visits on 09/12/24.        Signed Electronically by: Radha Villegas MD    "

## 2024-10-07 DIAGNOSIS — M54.50 LOW BACK PAIN WITHOUT SCIATICA, UNSPECIFIED BACK PAIN LATERALITY, UNSPECIFIED CHRONICITY: ICD-10-CM

## 2024-10-07 RX ORDER — NABUMETONE 500 MG/1
500 TABLET, FILM COATED ORAL 2 TIMES DAILY
Qty: 180 TABLET | Refills: 1 | Status: SHIPPED | OUTPATIENT
Start: 2024-10-07

## 2024-10-14 ENCOUNTER — MEDICAL CORRESPONDENCE (OUTPATIENT)
Dept: HEALTH INFORMATION MANAGEMENT | Facility: CLINIC | Age: 64
End: 2024-10-14
Payer: COMMERCIAL

## 2024-10-14 ENCOUNTER — TELEPHONE (OUTPATIENT)
Dept: FAMILY MEDICINE | Facility: CLINIC | Age: 64
End: 2024-10-14
Payer: COMMERCIAL

## 2024-10-14 NOTE — TELEPHONE ENCOUNTER
Paola from [facility] is calling regarding an established patient with [facility].       Requesting orders from: Radha Villegas  Provider is following patient: Yes  Is this a 60-day recertification request?  Yes    Orders Requested    Skilled Nursing  Request for recertification   Frequency:  1x/wk for every other wk for 7 wks  For medication management.    Information was gathered and will be sent to provider for review.  RN will contact Home Care with information after provider review.  Confirmed ok to leave a detailed message with call back.  Contact information confirmed and updated as needed.    Aiyana Oliver RN

## 2024-10-16 ENCOUNTER — MEDICAL CORRESPONDENCE (OUTPATIENT)
Dept: HEALTH INFORMATION MANAGEMENT | Facility: CLINIC | Age: 64
End: 2024-10-16

## 2024-10-28 ENCOUNTER — TELEPHONE (OUTPATIENT)
Dept: FAMILY MEDICINE | Facility: CLINIC | Age: 64
End: 2024-10-28
Payer: COMMERCIAL

## 2024-10-28 DIAGNOSIS — Z53.9 DIAGNOSIS NOT YET DEFINED: Primary | ICD-10-CM

## 2024-10-28 PROCEDURE — G0179 MD RECERTIFICATION HHA PT: HCPCS | Performed by: FAMILY MEDICINE

## 2024-10-28 NOTE — TELEPHONE ENCOUNTER
Paola calling    Pt told her he fell appropriately 1.5 weeks ago at the [facility] in the shower. He hit the bench and the tile floor with his head as well as his right arm and elbow.    Per Paola  Ping ball size lump on his head- no pain, pupils are equal size, normal balance and strength. Some tenderness to his right elbow.   Pt does not want to come in and has no concerns with his fall      Routing to PCP to advise if further eval is needed       Blanche, RN    Triage Nurse  [facility]

## 2024-10-28 NOTE — TELEPHONE ENCOUNTER
Forms/Letter Request    Type of form/letter: Home Health Certification      Do we have the form/letter: Yes:     Who is the form from? Home care    Where did/will the form come from? form was faxed in    When is form/letter needed by: 3-5 days     How would you like the form/letter returned: Fax : [phone]    Patient Notified form requests are processed in 5-7 business days:Yes    Okay to leave a detailed message?: Yes at Home number on file [phone] (home)

## 2024-10-29 NOTE — TELEPHONE ENCOUNTER
Spoke with Paola and informed. She verbalized understanding and will inform patient, and follow-up for any worsening of symptoms.    LAVELLE Aquino RN  [facility]

## 2024-10-30 ENCOUNTER — TELEPHONE (OUTPATIENT)
Dept: FAMILY MEDICINE | Facility: CLINIC | Age: 64
End: 2024-10-30
Payer: COMMERCIAL

## 2024-10-30 DIAGNOSIS — G47.33 OSA (OBSTRUCTIVE SLEEP APNEA): Primary | Chronic | ICD-10-CM

## 2024-10-30 NOTE — TELEPHONE ENCOUNTER
Usually needs to get from sleep Physician as I do not know his settings  I have written a DME  If they do not accept this-it needs to go to his sleep MD

## 2024-10-30 NOTE — TELEPHONE ENCOUNTER
Order/Referral Request    Who is requesting: Patient     Orders being requested: Cpap    Reason service is needed/diagnosis: replacement    When are orders needed by: ASAP    Has this been discussed with Provider:  not sure    Does patient have a preference on a Group/Provider/Facility? ?    Does patient have an appointment scheduled?: No    Okay to leave a detailed message?: Yes at Home number on file [phone] (home)    Tasha Marcus,

## 2024-10-30 NOTE — TELEPHONE ENCOUNTER
LM for patient letting him know Dr. Villegas's message below and also asked patient to let us know where he would like the DME sent.    Tasha Marcus,

## 2024-11-06 NOTE — TELEPHONE ENCOUNTER
Called patient.  Spoke to wife, Rula.  Patient was at work.  Informed her of Dr. Villegas's message below.    DME mailed to their home address per patient's wife.    Tasha Marcus,

## 2024-11-14 ENCOUNTER — OFFICE VISIT (OUTPATIENT)
Dept: FAMILY MEDICINE | Facility: CLINIC | Age: 64
End: 2024-11-14
Payer: COMMERCIAL

## 2024-11-14 ENCOUNTER — LAB (OUTPATIENT)
Dept: LAB | Facility: CLINIC | Age: 64
End: 2024-11-14
Payer: COMMERCIAL

## 2024-11-14 VITALS
OXYGEN SATURATION: 95 % | SYSTOLIC BLOOD PRESSURE: 126 MMHG | WEIGHT: 205 LBS | DIASTOLIC BLOOD PRESSURE: 74 MMHG | RESPIRATION RATE: 16 BRPM | BODY MASS INDEX: 35 KG/M2 | HEART RATE: 97 BPM | TEMPERATURE: 97.3 F | HEIGHT: 64 IN

## 2024-11-14 DIAGNOSIS — E66.01 CLASS 2 SEVERE OBESITY DUE TO EXCESS CALORIES WITH SERIOUS COMORBIDITY AND BODY MASS INDEX (BMI) OF 35.0 TO 35.9 IN ADULT (H): ICD-10-CM

## 2024-11-14 DIAGNOSIS — I10 HYPERTENSION GOAL BP (BLOOD PRESSURE) < 140/90: ICD-10-CM

## 2024-11-14 DIAGNOSIS — E66.812 CLASS 2 SEVERE OBESITY DUE TO EXCESS CALORIES WITH SERIOUS COMORBIDITY AND BODY MASS INDEX (BMI) OF 35.0 TO 35.9 IN ADULT (H): ICD-10-CM

## 2024-11-14 DIAGNOSIS — E78.5 HYPERLIPIDEMIA LDL GOAL <70: ICD-10-CM

## 2024-11-14 DIAGNOSIS — I10 HYPERTENSION GOAL BP (BLOOD PRESSURE) < 140/90: Primary | ICD-10-CM

## 2024-11-14 DIAGNOSIS — E11.9 TYPE 2 DIABETES MELLITUS WITHOUT COMPLICATION, WITHOUT LONG-TERM CURRENT USE OF INSULIN (H): Chronic | ICD-10-CM

## 2024-11-14 DIAGNOSIS — G47.33 OSA (OBSTRUCTIVE SLEEP APNEA): Primary | Chronic | ICD-10-CM

## 2024-11-14 DIAGNOSIS — J30.9 ALLERGIC RHINITIS, UNSPECIFIED SEASONALITY, UNSPECIFIED TRIGGER: ICD-10-CM

## 2024-11-14 LAB
EST. AVERAGE GLUCOSE BLD GHB EST-MCNC: 171 MG/DL
HBA1C MFR BLD: 7.6 % (ref 0–5.6)

## 2024-11-14 PROCEDURE — G2211 COMPLEX E/M VISIT ADD ON: HCPCS | Performed by: FAMILY MEDICINE

## 2024-11-14 PROCEDURE — 83036 HEMOGLOBIN GLYCOSYLATED A1C: CPT

## 2024-11-14 PROCEDURE — 36415 COLL VENOUS BLD VENIPUNCTURE: CPT

## 2024-11-14 PROCEDURE — 80048 BASIC METABOLIC PNL TOTAL CA: CPT

## 2024-11-14 PROCEDURE — 99214 OFFICE O/P EST MOD 30 MIN: CPT | Performed by: FAMILY MEDICINE

## 2024-11-14 RX ORDER — OLOPATADINE HYDROCHLORIDE 1 MG/ML
1 SOLUTION/ DROPS OPHTHALMIC 2 TIMES DAILY
Qty: 5 ML | Refills: 0 | Status: SHIPPED | OUTPATIENT
Start: 2024-11-14

## 2024-11-14 RX ORDER — METFORMIN HYDROCHLORIDE 500 MG/1
1500 TABLET, EXTENDED RELEASE ORAL DAILY
Qty: 270 TABLET | Refills: 1 | Status: SHIPPED | OUTPATIENT
Start: 2024-11-14

## 2024-11-14 RX ORDER — OLOPATADINE HYDROCHLORIDE 1 MG/ML
1 SOLUTION/ DROPS OPHTHALMIC 2 TIMES DAILY
COMMUNITY

## 2024-11-14 RX ORDER — LOSARTAN POTASSIUM 25 MG/1
25 TABLET ORAL DAILY
Qty: 90 TABLET | Refills: 3 | Status: SHIPPED | OUTPATIENT
Start: 2024-11-14

## 2024-11-14 RX ORDER — TETRAHYDROZOLINE HCL 0.05 %
1 DROPS OPHTHALMIC (EYE) 3 TIMES DAILY
Qty: 5 ML | Refills: 1 | Status: SHIPPED | OUTPATIENT
Start: 2024-11-14

## 2024-11-14 ASSESSMENT — PAIN SCALES - GENERAL: PAINLEVEL_OUTOF10: NO PAIN (0)

## 2024-11-14 ASSESSMENT — ENCOUNTER SYMPTOMS: EYE PAIN: 1

## 2024-11-14 NOTE — PROGRESS NOTES
Assessment & Plan     Type 2 diabetes mellitus without complication, without long-term current use of insulin (H)  Borderline High  He has gained wt   Discussed Strict Diabetic diet  He does not want to increase meds  Follow up 3 months  Exercise   - metFORMIN (GLUCOPHAGE XR) 500 MG 24 hr tablet; Take 3 tablets (1,500 mg) by mouth daily.  - empagliflozin (JARDIANCE) 10 MG TABS tablet; Take 1 tablet (10 mg) by mouth daily.  - Adult Eye  Referral; Future    Hypertension goal BP (blood pressure) < 140/90  Stable   - losartan (COZAAR) 25 MG tablet; Take 1 tablet (25 mg) by mouth daily.    SANDRA (obstructive sleep apnea)- severe (AHI 45)  On cpap    Hyperlipidemia LDL goal <70  Stable     Allergic rhinitis, unspecified seasonality, unspecified trigger    - olopatadine (PATANOL) 0.1 % ophthalmic solution; Place 1 drop into both eyes 2 times daily.  - tetrahydrozoline (VISINE) 0.05 % ophthalmic solution; Place 1 drop into both eyes 3 times daily.    Class 2 severe obesity due to excess calories with serious comorbidity and body mass index (BMI) of 35.0 to 35.9 in adult (H)  Losing wt will help Diabetes      Follow up 3 months  Subjective   Yair is a 64 year old, presenting for the following health issues:  Eye Problem (/) and Diabetes        11/14/2024     2:28 PM   Additional Questions   Roomed by Juani     Via the Health Maintenance questionnaire, the patient has reported the following services have been completed -Eye Exam: 53099 2023-09-29, this information has been sent to the abstraction team.  Eye Problem     History of Present Illness       Diabetes:   He presents for follow up of diabetes.  He is checking home blood glucose a few times a week.     Blood glucose is never over 200 and never under 70.  When his blood glucose is low, the patient is asymptomatic for confusion, blurred vision, lethargy and reports not feeling dizzy, shaky, or weak.   He has no concerns regarding his diabetes at this time.     "The patient has not had a diabetic eye exam in the last 12 months.          Hyperlipidemia:  He presents for follow up of hyperlipidemia.   He is taking medication to lower cholesterol. He is not having myalgia or other side effects to statin medications.    Hypertension: He presents for follow up of hypertension.  He does check blood pressure  regularly outside of the clinic. Outpatient blood pressures have not been over 140/90. He follows a low salt diet.     Reason for visit:  Diabetes   He is taking medications regularly.     Pt has allergic conjunctivitis and wants patanol and clear eys prescription  He has gained weight and has Not been watching hs diet    Pt has sleep apnea and on CPAP        Review of Systems  CONSTITUTIONAL: NEGATIVE for fever, chills, change in weight  ENT/MOUTH: NEGATIVE for ear, mouth and throat problems  RESP: NEGATIVE for significant cough or SOB  CV: NEGATIVE for chest pain, palpitations or peripheral edema  GI: NEGATIVE for nausea, abdominal pain, heartburn, or change in bowel habits  MUSCULOSKELETAL: NEGATIVE for significant arthralgias or myalgia  PSYCHIATRIC: NEGATIVE for changes in mood or affect      Objective    /74   Pulse 97   Temp 97.3  F (36.3  C) (Temporal)   Resp 16   Ht 1.621 m (5' 3.82\")   Wt 93 kg (205 lb)   SpO2 95%   BMI 35.39 kg/m    Body mass index is 35.39 kg/m .  Physical Exam   GENERAL: alert and no distress  EYES: Eyes grossly normal to inspection  NECK: no adenopathy, no asymmetry, masses, or scars  RESP: lungs clear to auscultation - no rales, rhonchi or wheezes  CV: regular rate and rhythm, normal S1 S2, no S3 or S4, no murmur, click or rub, no peripheral edema  ABDOMEN: soft, nontender, no hepatosplenomegaly, no masses and bowel sounds normal  MS: no gross musculoskeletal defects noted, no edema  SKIN: no suspicious lesions or rashes  PSYCH: mentation appears normal  Diabetic foot exam: normal PT pulse     Lab on 11/14/2024   Component Date " Value Ref Range Status    Estimated Average Glucose 11/14/2024 171 (H)  <117 mg/dL Final    Hemoglobin A1C 11/14/2024 7.6 (H)  0.0 - 5.6 % Final    Normal <5.7%   Prediabetes 5.7-6.4%    Diabetes 6.5% or higher     Note: Adopted from ADA consensus guidelines.     Results for orders placed or performed in visit on 11/14/24   Hemoglobin A1c     Status: Abnormal   Result Value Ref Range    Estimated Average Glucose 171 (H) <117 mg/dL    Hemoglobin A1C 7.6 (H) 0.0 - 5.6 %         Time spent reviewing chart, addressing her medical Problems as above, ordering labs, refilling meds and discussing her medical Problems, , documenting-Labs will be reviewed when back and will make further recommendations based on her labs  31 minutes  Signed Electronically by: Radha Villegas MD

## 2024-11-15 LAB
ANION GAP SERPL CALCULATED.3IONS-SCNC: 12 MMOL/L (ref 7–15)
BUN SERPL-MCNC: 17.4 MG/DL (ref 8–23)
CALCIUM SERPL-MCNC: 9.3 MG/DL (ref 8.8–10.4)
CHLORIDE SERPL-SCNC: 103 MMOL/L (ref 98–107)
CREAT SERPL-MCNC: 0.99 MG/DL (ref 0.67–1.17)
EGFRCR SERPLBLD CKD-EPI 2021: 84 ML/MIN/1.73M2
GLUCOSE SERPL-MCNC: 200 MG/DL (ref 70–99)
HCO3 SERPL-SCNC: 25 MMOL/L (ref 22–29)
POTASSIUM SERPL-SCNC: 4.2 MMOL/L (ref 3.4–5.3)
SODIUM SERPL-SCNC: 140 MMOL/L (ref 135–145)

## 2024-11-25 ENCOUNTER — TELEPHONE (OUTPATIENT)
Dept: FAMILY MEDICINE | Facility: CLINIC | Age: 64
End: 2024-11-25
Payer: COMMERCIAL

## 2024-11-25 NOTE — TELEPHONE ENCOUNTER
[facility] nurse calling    Pt has several historical supplements that he takes on his own.    Wants to discontinue the boost supplement and take Super Beta Prostate instead-     He was taking Coq10 gummies but picked up capsules this time.    Vit D3 - currently 125 mcg- pt picked the 25mcg dose and want to stay on this dose.  He takes this for seasonal depression.  Encouraged for him to stay on, home care nurse already tried but pt adamant on staying with the 25mcg dose.    Verbal ok to change supplements as indicated above as PCP does not prescribe.      Blanche, RN    Triage Nurse  [facility]

## 2024-12-06 ENCOUNTER — TELEPHONE (OUTPATIENT)
Dept: FAMILY MEDICINE | Facility: CLINIC | Age: 64
End: 2024-12-06
Payer: COMMERCIAL

## 2024-12-15 ENCOUNTER — MEDICAL CORRESPONDENCE (OUTPATIENT)
Dept: HEALTH INFORMATION MANAGEMENT | Facility: CLINIC | Age: 64
End: 2024-12-15

## 2024-12-16 ENCOUNTER — TELEPHONE (OUTPATIENT)
Dept: FAMILY MEDICINE | Facility: CLINIC | Age: 64
End: 2024-12-16
Payer: COMMERCIAL

## 2024-12-16 NOTE — TELEPHONE ENCOUNTER
Home Care is calling regarding an established patient with [facility].       Requesting orders from: Radha Villegas  Provider is following patient: Yes  Is this a 60-day recertification request?  No    Orders Requested    Skilled Nursing  Request for delay in care, service is not able to be provided within same scheduled day due to pt request, SN is planning to assess pt 12/17/24 for re-certification.     Routed as High priority as Skilled Nursing is needing verbal OK today (12/16/24) to proceed.     Information was gathered and will be sent to provider for review.  RN will contact Home Care with information after provider review.  Confirmed ok to leave a detailed message with call back.  Contact information confirmed and updated as needed.    Nadia Storey RN

## 2024-12-17 ENCOUNTER — TELEPHONE (OUTPATIENT)
Dept: FAMILY MEDICINE | Facility: CLINIC | Age: 64
End: 2024-12-17
Payer: COMMERCIAL

## 2024-12-17 NOTE — TELEPHONE ENCOUNTER
Home Care is calling regarding an established patient with [facility].       Requesting orders from: Radha Villegas  Provider is following patient: Yes  Is this a 60-day recertification request?  No    Orders Requested    Skilled Nursing  Request for continuation of care with no increase or decrease in frequency  Frequency:  every other week x8 weeks, 3 PRN's for medication changes         Verbal orders given.  Home Care will send orders for provider to sign.    Ana Maria Guidry RN

## 2024-12-23 ENCOUNTER — PATIENT OUTREACH (OUTPATIENT)
Dept: GERIATRIC MEDICINE | Facility: CLINIC | Age: 64
End: 2024-12-23
Payer: COMMERCIAL

## 2024-12-23 NOTE — PROGRESS NOTES
[facility] Care Coordination Contact    Called spouse Rula  to schedule annual HRA home visit. HRA has been scheduled for 1/6/24 at 1pm. .    Cierra Saenz RN  [facility]  [phone]

## 2024-12-27 ENCOUNTER — TELEPHONE (OUTPATIENT)
Dept: FAMILY MEDICINE | Facility: CLINIC | Age: 64
End: 2024-12-27
Payer: COMMERCIAL

## 2024-12-27 NOTE — TELEPHONE ENCOUNTER
Forms/Letter Request    Type of form/letter: Home Health Certification      Do we have the form/letter: Yes:     Who is the form from? Home care    Where did/will the form come from? form was faxed in    When is form/letter needed by: ?    How would you like the form/letter returned: Fax : [phone]    Tasha Marcus,

## 2024-12-27 NOTE — TELEPHONE ENCOUNTER
[facility] received.  Dr. Villegas is currently out of the office until Monday, January 6th, 2025.    LM with [facility] to see if another provider can sign or if another provider can sign for her.    Tasha Marcus,

## 2025-01-02 NOTE — TELEPHONE ENCOUNTER
[facility]    Will hold for Dr. Villegas to sign when she returns to the clinic on Monday, January 6th, 2025. Tasha Marcus,

## 2025-01-20 ENCOUNTER — PATIENT OUTREACH (OUTPATIENT)
Dept: GERIATRIC MEDICINE | Facility: CLINIC | Age: 65
End: 2025-01-20
Payer: COMMERCIAL

## 2025-01-20 ASSESSMENT — ACTIVITIES OF DAILY LIVING (ADL): DEPENDENT_IADLS:: CLEANING;MEDICATION MANAGEMENT

## 2025-01-20 NOTE — PROGRESS NOTES
[facility] Care Coordination Contact  [facility] CCDB Assessment   (for members on other waivers)    Home visit for Health Risk Assessment with Yair Benites completed on January 20, 2025    Type of residence:: Private home - stairs  Current living arrangement:: I live in a private home with spouse     Assessment completed with:: Patient, Spouse or significant other    Current Care Plan  Member is a recipient of the CADI Waiver program.  Member currently receiving the following home care services: Skilled Nursing   Member currently receiving the following community resources: [facility] Programs, Financial/Insurance, Home Care, Meals on Wheels, Other (see comment), Housekeeping/Chore Agency (ILS)      Medication Review  Medication reconciliation completed in Epic: If no, please explain homecare nurse manages meds and reviews qow  Medication set-up & administration: RN set up every two weeks.  Self-administers medications.  Medication Risk Assessment Medication (1 or more, place referral to MTM): N/A: No risk factors identified  MTM Referral Placed: No: No risk factors idenified    Mental/Behavioral Health   Depression Screening:           Mental health DX:: Yes   Mental health DX how managed:: Medication, Psychiatrist    Falls Assessment:   Fallen 2 or more times in the past year?: No   Any fall with injury in the past year?: No    ADL/IADL Dependencies:   Dependent ADLs:: Independent  Dependent IADLs:: Cleaning, Medication Management    Health Plan sponsored benefits: [employer]: Shared information regarding [employer] Fitness Program. Reviewed preventative health screening and health plan supplemental benefits/incentives. Reviewed medication disposal form.    CFSS Assessment completed at visit: [place] assessment completed, and assessment indicates that member does not meet access criteria for CFSS.      Care Plan & Recommendations: Osmani is doing well at home. He still needs homemaking and chore  services. I will touch base with his cadi waiver cm for this. He states he missed recent pcp appointment. I did advise him on decreasing sugar/carb intake, continue to exercise regularly and reschedule that appointment for diabetes check. He continues to work part time.     CADI support plan reviewed in [employer].     [employer] message sent to CADI  with notification of HRA being complete and HRA support plan in [place] for their review.    Follow-Up Plan: Member informed of future contact, plan to f/u with member with a 6 month telephone assessment.  Contact information shared with member and family, encouraged member to call with any questions or concerns at any time.    [place] care continuum providers: Please see Snapshot and Care Management Flowsheets for Specific details of care plan.    This CC note routed to PCP, Radha Villegas.     Cierra Seanz RN  [facility] [place]  [phone]

## 2025-01-22 ENCOUNTER — PATIENT OUTREACH (OUTPATIENT)
Dept: GERIATRIC MEDICINE | Facility: CLINIC | Age: 65
End: 2025-01-22
Payer: COMMERCIAL

## 2025-01-22 NOTE — PROGRESS NOTES
[facility] Care Coordination Contact    Received after visit chart from care coordinator.  Completed following tasks: Mailed copy of support plan to member, Mailed [place] Cho[place] signature sheet pages 3-4, Mailed Safe Medication Disposal , and Mailed [employer] Leave Behind Letter    Jenn Colin  Care Management Specialist  [facility]  [phone]

## 2025-01-22 NOTE — LETTER
January 22, 2025    MELISSA BRO CROSSER  [address] [place] [address]  [place] [address]      Yessica Mazariegos,    I hope you ve been well since we last spoke. Attached is your copy of your personalized Support Plan which summarizes our conversation.   My goal is to help you keep your health on track. Your Support Plan includes the steps we agreed would help you with that. We can talk about these steps during our next meeting or sooner if you d like.   Here are additional programs and services I can help you with:    Get to appointments with [facility]    If you need a ride to medical or dental visits, [facility] can help. Call to schedule a ride. 2 [phone] (TTY:[phone]), 8 a.m. - 6 p.m. CT, Monday - Friday.    Access One-Pass to boost your health    Get a gym membership, at-home fitness classes, and free access to fun activities that help your brain. To get access, go to [employer] or call One-Pass.   2 [phone] (TTY:[phone]), 8 a.m. - 9 p.m. CT, Monday - Friday      Set up your health care directive  A directive is a legal form that explains your health care wishes. You can request this form from me, and I ll walk you through it before you discuss your plans with your doctor.    Schedule your annual physical  I can help set up your annual physical at your clinic of choice. It's an important step towards good health.      Have questions? I m here to help.  Call me at [phone] (TTY:[phone]) 8am - 5pm, Monday - Friday.  I ll reach out to you again in 6 months to follow up via telephone.  Warm regards,    Cierra Saenz RN    E-mail: [email or web address]  Phone: [phone]      [employer]    cc: member records                                                                    [id number]

## 2025-01-23 ENCOUNTER — OFFICE VISIT (OUTPATIENT)
Dept: FAMILY MEDICINE | Facility: CLINIC | Age: 65
End: 2025-01-23
Payer: COMMERCIAL

## 2025-01-23 VITALS
TEMPERATURE: 98.1 F | RESPIRATION RATE: 16 BRPM | SYSTOLIC BLOOD PRESSURE: 128 MMHG | OXYGEN SATURATION: 96 % | DIASTOLIC BLOOD PRESSURE: 84 MMHG | WEIGHT: 204 LBS | HEART RATE: 101 BPM | BODY MASS INDEX: 35.22 KG/M2

## 2025-01-23 DIAGNOSIS — E66.812 CLASS 2 SEVERE OBESITY DUE TO EXCESS CALORIES WITH SERIOUS COMORBIDITY AND BODY MASS INDEX (BMI) OF 35.0 TO 35.9 IN ADULT (H): ICD-10-CM

## 2025-01-23 DIAGNOSIS — F20.0 PARANOID SCHIZOPHRENIA, CHRONIC CONDITION WITH ACUTE EXACERBATION (H): ICD-10-CM

## 2025-01-23 DIAGNOSIS — G47.33 OSA (OBSTRUCTIVE SLEEP APNEA): Chronic | ICD-10-CM

## 2025-01-23 DIAGNOSIS — M54.50 LOW BACK PAIN WITHOUT SCIATICA, UNSPECIFIED BACK PAIN LATERALITY, UNSPECIFIED CHRONICITY: ICD-10-CM

## 2025-01-23 DIAGNOSIS — I10 HYPERTENSION GOAL BP (BLOOD PRESSURE) < 140/90: Primary | ICD-10-CM

## 2025-01-23 DIAGNOSIS — E66.01 CLASS 2 SEVERE OBESITY DUE TO EXCESS CALORIES WITH SERIOUS COMORBIDITY AND BODY MASS INDEX (BMI) OF 35.0 TO 35.9 IN ADULT (H): ICD-10-CM

## 2025-01-23 DIAGNOSIS — J30.9 ALLERGIC RHINITIS, UNSPECIFIED SEASONALITY, UNSPECIFIED TRIGGER: ICD-10-CM

## 2025-01-23 DIAGNOSIS — E78.5 HYPERLIPIDEMIA LDL GOAL <70: ICD-10-CM

## 2025-01-23 DIAGNOSIS — E11.9 TYPE 2 DIABETES MELLITUS WITHOUT COMPLICATION, WITHOUT LONG-TERM CURRENT USE OF INSULIN (H): Chronic | ICD-10-CM

## 2025-01-23 PROCEDURE — 99207 PR FOOT EXAM NO CHARGE: CPT | Performed by: FAMILY MEDICINE

## 2025-01-23 PROCEDURE — 36415 COLL VENOUS BLD VENIPUNCTURE: CPT | Performed by: FAMILY MEDICINE

## 2025-01-23 PROCEDURE — 99214 OFFICE O/P EST MOD 30 MIN: CPT | Performed by: FAMILY MEDICINE

## 2025-01-23 PROCEDURE — 82565 ASSAY OF CREATININE: CPT | Performed by: FAMILY MEDICINE

## 2025-01-23 PROCEDURE — G2211 COMPLEX E/M VISIT ADD ON: HCPCS | Performed by: FAMILY MEDICINE

## 2025-01-23 RX ORDER — NABUMETONE 500 MG/1
500 TABLET, FILM COATED ORAL 2 TIMES DAILY
Qty: 180 TABLET | Refills: 1 | Status: SHIPPED | OUTPATIENT
Start: 2025-01-23

## 2025-01-23 RX ORDER — OLOPATADINE HYDROCHLORIDE 1 MG/ML
1 SOLUTION/ DROPS OPHTHALMIC 2 TIMES DAILY
Qty: 5 ML | Refills: 0 | Status: SHIPPED | OUTPATIENT
Start: 2025-01-23

## 2025-01-23 RX ORDER — METFORMIN HYDROCHLORIDE 500 MG/1
1500 TABLET, EXTENDED RELEASE ORAL DAILY
Qty: 270 TABLET | Refills: 1 | Status: SHIPPED | OUTPATIENT
Start: 2025-01-23

## 2025-01-23 RX ORDER — TETRAHYDROZOLINE HCL 0.05 %
1 DROPS OPHTHALMIC (EYE) 3 TIMES DAILY
Qty: 5 ML | Refills: 1 | Status: CANCELLED | OUTPATIENT
Start: 2025-01-23

## 2025-01-23 ASSESSMENT — PATIENT HEALTH QUESTIONNAIRE - PHQ9
SUM OF ALL RESPONSES TO PHQ QUESTIONS 1-9: 0
10. IF YOU CHECKED OFF ANY PROBLEMS, HOW DIFFICULT HAVE THESE PROBLEMS MADE IT FOR YOU TO DO YOUR WORK, TAKE CARE OF THINGS AT HOME, OR GET ALONG WITH OTHER PEOPLE: NOT DIFFICULT AT ALL
SUM OF ALL RESPONSES TO PHQ QUESTIONS 1-9: 0

## 2025-01-23 ASSESSMENT — PAIN SCALES - GENERAL: PAINLEVEL_OUTOF10: NO PAIN (0)

## 2025-01-23 NOTE — PROGRESS NOTES
"  Assessment & Plan     Type 2 diabetes mellitus without complication, without long-term current use of insulin (H)  Stable   - empagliflozin (JARDIANCE) 10 MG TABS tablet; Take 1 tablet (10 mg) by mouth daily.  - metFORMIN (GLUCOPHAGE XR) 500 MG 24 hr tablet; Take 3 tablets (1,500 mg) by mouth daily.  - Adult Eye  Referral; Future  - Creatinine; Future  - Creatinine  - FOOT EXAM    Low back pain without sciatica, unspecified back pain laterality, unspecified chronicity  Doing well   - nabumetone (RELAFEN) 500 MG tablet; Take 1 tablet (500 mg) by mouth 2 times daily.    Allergic rhinitis, unspecified seasonality, unspecified trigger  Refilled  Stable   - olopatadine (PATANOL) 0.1 % ophthalmic solution; Place 1 drop into both eyes 2 times daily.    Hypertension goal BP (blood pressure) < 140/90  Controlled     SANDRA (obstructive sleep apnea)- severe (AHI 45)  On CPAP  Hyperlipidemia-on statins  ((F20.0) Paranoid schizophrenia, chronic condition with acute exacerbation (H)  Comment: sees psych and doing well   Plan:     (E66.812,  E66.01,  Z68.35) Class 2 severe obesity due to excess calories with serious comorbidity and body mass index (BMI) of 35.0 to 35.9 in adult (H)  Comment: Losing wt will help with Diabetes and Hypertesnion   Plan: pt says he is Going to Exercise     (E78.5) Hyperlipidemia LDL goal <70  Comment: stable             BMI  Estimated body mass index is 35.22 kg/m  as calculated from the following:    Height as of 11/14/24: 1.621 m (5' 3.82\").    Weight as of this encounter: 92.5 kg (204 lb).   Weight management plan: as above   The longitudinal plan of care for the diagnosis(es)/condition(s) as documented were addressed during this visit. Due to the added complexity in care, I will continue to support Yair in the subsequent management and with ongoing continuity of care.    Follow up 3 months    Subjective   Yair is a 64 year old, presenting for the following health issues:  Recheck " Medication        1/23/2025     2:14 PM   Additional Questions   Roomed by Juani     History of Present Illness       Diabetes:   He presents for follow up of diabetes.  He is checking home blood glucose one time daily.   He checks blood glucose before meals.  Blood glucose is sometimes over 200 and never under 70.  When his blood glucose is low, the patient is asymptomatic for confusion, blurred vision, lethargy and reports not feeling dizzy, shaky, or weak.   He has no concerns regarding his diabetes at this time.   He is not experiencing numbness or burning in feet, excessive thirst, blurry vision, weight changes or redness, sores or blisters on feet. The patient has not had a diabetic eye exam in the last 12 months.          Hypertension: He presents for follow up of hypertension.  He does check blood pressure  regularly outside of the clinic. Outpatient blood pressures have not been over 140/90. He does not follow a low salt diet.     He eats 4 or more servings of fruits and vegetables daily.He consumes 0 sweetened beverage(s) daily.He exercises with enough effort to increase his heart rate 9 or less minutes per day.  He exercises with enough effort to increase his heart rate 3 or less days per week.   He is taking medications regularly.         Hyperlipidemia Follow-Up    Are you regularly taking any medication or supplement to lower your cholesterol?   Yes- statins  Are you having muscle aches or other side effects that you think could be caused by your cholesterol lowering medication?  No          Review of Systems  CONSTITUTIONAL: NEGATIVE for fever, chills, change in weight  ENT/MOUTH: NEGATIVE for ear, mouth and throat problems  RESP: NEGATIVE for significant cough or SOB  CV: NEGATIVE for chest pain, palpitations or peripheral edema  GI: NEGATIVE for nausea, abdominal pain, heartburn, or change in bowel habits  MUSCULOSKELETAL: NEGATIVE for significant arthralgias or myalgia  PSYCHIATRIC: NEGATIVE for  changes in mood or affect      Objective    /84   Pulse 101   Temp 98.1  F (36.7  C) (Temporal)   Resp 16   Wt 92.5 kg (204 lb)   SpO2 96%   BMI 35.22 kg/m    Body mass index is 35.22 kg/m .  Physical Exam   GENERAL: alert and no distress  EYES: Eyes grossly normal to inspection  NECK: no adenopathy, no asymmetry, masses, or scars  RESP: lungs clear to auscultation - no rales, rhonchi or wheezes  CV: regular rate and rhythm, normal S1 S2, no S3 or S4, no murmur, click or rub, no peripheral edema  ABDOMEN: soft, nontender, no hepatosplenomegaly, no masses and bowel sounds normal  MS: no gross musculoskeletal defects noted, no edema  Diabetic foot exam: normal DP and PT pulses, no trophic changes or ulcerative lesions, and normal sensory exam    Lab on 11/14/2024   Component Date Value Ref Range Status    Estimated Average Glucose 11/14/2024 171 (H)  <117 mg/dL Final    Hemoglobin A1C 11/14/2024 7.6 (H)  0.0 - 5.6 % Final    Normal <5.7%   Prediabetes 5.7-6.4%    Diabetes 6.5% or higher     Note: Adopted from ADA consensus guidelines.    Sodium 11/14/2024 140  135 - 145 mmol/L Final    Potassium 11/14/2024 4.2  3.4 - 5.3 mmol/L Final    Chloride 11/14/2024 103  98 - 107 mmol/L Final    Carbon Dioxide (CO2) 11/14/2024 25  22 - 29 mmol/L Final    Anion Gap 11/14/2024 12  7 - 15 mmol/L Final    Urea Nitrogen 11/14/2024 17.4  8.0 - 23.0 mg/dL Final    Creatinine 11/14/2024 0.99  0.67 - 1.17 mg/dL Final    GFR Estimate 11/14/2024 84  >60 mL/min/1.73m2 Final    eGFR calculated using 2021 CKD-EPI equation.    Calcium 11/14/2024 9.3  8.8 - 10.4 mg/dL Final    Reference intervals for this test were updated on 7/16/2024 to reflect our healthy population more accurately. There may be differences in the flagging of prior results with similar values performed with this method. Those prior results can be interpreted in the context of the updated reference intervals.    Glucose 11/14/2024 200 (H)  70 - 99 mg/dL Final      No results found for any visits on 01/23/25.        Signed Electronically by: Radha Villegas MD

## 2025-01-23 NOTE — LETTER
January 24, 2025      Yair Benites  [address] [place] [id number]        Dear Yair:    We are writing to inform you of your test results.    Kidney test is normal     Resulted Orders   Creatinine   Result Value Ref Range    Creatinine 0.90 0.67 - 1.17 mg/dL    GFR Estimate >90 >60 mL/min/1.73m2      Comment:      eGFR calculated using 2021 CKD-EPI equation.     If you have any questions or concerns, please call the clinic at the number listed above.     Sincerely,      Radha Villegas MD/[facility]    Electronically signed

## 2025-01-24 LAB
CREAT SERPL-MCNC: 0.9 MG/DL (ref 0.67–1.17)
EGFRCR SERPLBLD CKD-EPI 2021: >90 ML/MIN/1.73M2

## 2025-02-03 NOTE — TELEPHONE ENCOUNTER
Home care referral printed and faxed to intake (f: [phone]).  Tasha Marcus,      Spoke to pt via phone, pt verbalized understanding. Pt had no further questions at this time.

## 2025-02-10 ENCOUNTER — TELEPHONE (OUTPATIENT)
Dept: FAMILY MEDICINE | Facility: CLINIC | Age: 65
End: 2025-02-10
Payer: COMMERCIAL

## 2025-02-10 NOTE — TELEPHONE ENCOUNTER
Home Care is calling regarding an established patient with [facility].       Requesting orders from: Radha Villegas  Provider is following patient: Yes  Is this a 60-day recertification request?  Yes    Orders Requested    Skilled Nursing  Request for recertification   Frequency:  1 visit every other week for 7 weeks. 1 visit once a week for 1 week. 3 prn visits for med management.      Confirmed ok to leave a detailed message with call back.  Contact information confirmed and updated as needed.    Ana Brito RN

## 2025-02-11 ENCOUNTER — NURSE TRIAGE (OUTPATIENT)
Dept: FAMILY MEDICINE | Facility: CLINIC | Age: 65
End: 2025-02-11
Payer: COMMERCIAL

## 2025-02-11 NOTE — TELEPHONE ENCOUNTER
Pt's spouse, Rula, calling (consent to communicate on file).   Pt is experiencing diarrhea and vomiting.  Symptoms began today.  He has had 5 episodes of diarrhea so far today and vomiting x1.  Denies any fever.  He took 4 tums and reports stomach pain 7/10 that is constant and started about 45 minutes ago.  He feels a little lightheaded  No blood in vomit or diarrhea  Has headache  Feels nauseous.   Advised that if pain continues constant for more than 2 hours with no relief, or he has vomiting that increases to 3-5 times or more today, then they should either call us back to speak with triage nurse or go to ED.   Advised to call us back if symptoms worsening or not improving.   Home care advise given.     Reason for Disposition   MILD vomiting with diarrhea    Additional Information   Negative: Shock suspected (e.g., cold/pale/clammy skin, too weak to stand, low BP, rapid pulse)   Negative: Difficult to awaken or acting confused (e.g., disoriented, slurred speech)   Negative: Sounds like a life-threatening emergency to the triager   Negative: Vomiting occurs only while coughing   Negative: Pregnant < 20 Weeks and nausea/vomiting began in early pregnancy (i.e., 4-8 weeks pregnant)   Negative: Chest pain   Negative: Headache is main symptom   Negative: Vomiting red blood or black (coffee ground) material   Negative: Vomiting and hernia is more painful or swollen than usual   Negative: Recent head injury (within 3 days)   Negative: Recent abdominal injury (within 7 days)   Negative: Insulin-dependent diabetes and glucose > 240 mg/dL (13 mmol/L)   Negative: Severe pain in one eye   Negative: SEVERE vomiting (e.g., 6 or more times/day)  (Exception: Patient sounds well, is drinking liquids, does not sound dehydrated, and vomiting has lasted less than 24 hours.)   Negative: MODERATE vomiting (e.g., 3 - 5 times/day) and age > 60 years   Negative: Constant abdominal pain lasting > 2 hours   Negative: High-risk adult  (e.g., brain tumor, V-P shunt, hernia)   Negative: Drinking very little and has signs of dehydration (e.g., no urine > 12 hours, very dry mouth, very lightheaded)   Negative: Patient sounds very sick or weak to the triager   Negative: Vomiting and abdomen looks much more swollen than usual   Negative: Vomiting contains bile (green color)   Negative: Fever > 103 F (39.4 C)   Negative: Fever > 101 F (38.3 C) and over 60 years of age   Negative: Fever > 100 F (37.8 C) and has a weak immune system (e.g., HIV positive, cancer chemo, organ transplant, splenectomy, chronic steroids)   Negative: Fever > 100 F (37.8 C) and bedridden (e.g., CVA, chronic illness, recovering from surgery)   Negative: Taking any of the following medications: digoxin (Lanoxin), lithium, theophylline, phenytoin (Dilantin)   Negative: Severe headache and vomiting   Negative: MILD to MODERATE vomiting (e.g., 1-5 times/day) and lasts > 48 hours (2 days)   Negative: Fever present > 3 days (72 hours)   Negative: Patient wants to be seen   Negative: Vomiting a prescription medication   Negative: Substance use (drug use) or unhealthy alcohol use, known or suspected   Negative: Vomiting is a chronic symptom (recurrent or ongoing AND lasting > 4 weeks)   Negative: SEVERE vomiting (e.g., 6 or more times/day, vomits everything) BUT hydrated   Negative: MILD to MODERATE vomiting (e.g., 1 - 5 times / day)    Protocols used: Vomiting-A-[place]  Tyesha Blanco RN

## 2025-02-11 NOTE — TELEPHONE ENCOUNTER
Patients wife calling back in and was asking for care advice for diarrhea if immodium can be given.   RN discussed care advice for diarrhea and giving loperamide.     RN reiterated previous RN's recommendations as they just spoke with her 20 minutes ago. As of now patients wife states patient has had 1 more episode of diarrhea but is still taking in fluids and does not feel he has sx of dehydration. RN reiterated if abdominal pain continues for 2 hours or greater be seen in ER or any sx of dehydration.     RN also education on 24 hour nurse line.       Wife stated she had to go as patient was calling her and ended call.       Treasure Rosales RN on 2/11/2025 at 3:14 PM        Reason for Disposition   MILD-MODERATE diarrhea (e.g., 1-6 times / day more than normal)    Protocols used: Diarrhea-A-[place]

## 2025-02-11 NOTE — TELEPHONE ENCOUNTER
Left a  for home care nurse with message from PCP below regarding approved HC orders.    Wendie REA RN  [facility]

## 2025-02-13 ENCOUNTER — PATIENT OUTREACH (OUTPATIENT)
Dept: GERIATRIC MEDICINE | Facility: CLINIC | Age: 65
End: 2025-02-13
Payer: COMMERCIAL

## 2025-02-13 ENCOUNTER — MEDICAL CORRESPONDENCE (OUTPATIENT)
Dept: HEALTH INFORMATION MANAGEMENT | Facility: CLINIC | Age: 65
End: 2025-02-13

## 2025-02-13 NOTE — PROGRESS NOTES
"[facility] Care Coordination Contact  CC received notification of Emergency Room visit.  ER visit occurred on 2/11/25 at [facility] with Dx of diarrhea, vomitting.    CC contacted spouse Rula  and reviewed discharge summary.  Member has a follow-up appointment with PCP: No: Offered Assistance with setting up a follow up appointment  Member has had a change in condition: No. \"He is back to normal\"  New referrals placed: No  Home Visit Needed: No  Support Plan reviewed and updated.  PCP notified of ED visit via EMR.    Cierra Saenz RN  [facility]  [phone]            "

## 2025-02-27 DIAGNOSIS — Z53.9 DIAGNOSIS NOT YET DEFINED: Primary | ICD-10-CM

## 2025-02-27 PROCEDURE — G0179 MD RECERTIFICATION HHA PT: HCPCS | Performed by: FAMILY MEDICINE

## 2025-03-06 ENCOUNTER — NURSE TRIAGE (OUTPATIENT)
Dept: FAMILY MEDICINE | Facility: CLINIC | Age: 65
End: 2025-03-06
Payer: COMMERCIAL

## 2025-03-06 NOTE — TELEPHONE ENCOUNTER
"No appointments available today. Directed pt to [facility]. He said he will go there now. Advised him to call back if he has any further concerns/symptoms.    Wendie REA RN  [facility]     Reason for Disposition   Wheezing is present    Additional Information   Negative: Bluish (or gray) lips or face   Negative: SEVERE difficulty breathing (e.g., struggling for each breath, speaks in single words)   Negative: Rapid onset of cough and has hives   Negative: Coughing started suddenly after medicine, an allergic food or bee sting   Negative: Difficulty breathing after exposure to flames, smoke, or fumes   Negative: Sounds like a life-threatening emergency to the triager   Negative: Previous asthma attacks and this feels like asthma attack   Negative: Dry cough (non-productive; no sputum or minimal clear sputum) and within 14 days of COVID-19 Exposure   Negative: MODERATE difficulty breathing (e.g., speaks in phrases, SOB even at rest, pulse 100-120) and still present when not coughing   Negative: Chest pain present when not coughing   Negative: Passed out (e.g., fainted, lost consciousness, blacked out and was not responding)   Negative: Patient sounds very sick or weak to the triager   Negative: MILD difficulty breathing (e.g., minimal/no SOB at rest, SOB with walking, pulse <100) and still present when not coughing   Negative: Coughed up > 1 tablespoon (15 ml) blood (Exception: Blood-tinged sputum.)   Negative: Fever > 103 F (39.4 C)   Negative: Fever > 101 F (38.3 C) and over 60 years of age   Negative: Fever > 100 F (37.8 C) and has diabetes mellitus or a weak immune system (e.g., HIV positive, cancer chemotherapy, organ transplant, splenectomy, chronic steroids)   Negative: Fever > 100 F (37.8 C) and bedridden (e.g., CVA, chronic illness, recovering from surgery)   Negative: Increasing ankle swelling    Answer Assessment - Initial Assessment Questions  1. ONSET: \"When did the " "cough begin?\"       yesterday  2. SEVERITY: \"How bad is the cough today?\"       Severe  3. SPUTUM: \"Describe the color of your sputum\" (e.g., none, dry cough; clear, white, yellow, green)      Yellow  4. HEMOPTYSIS: \"Are you coughing up any blood?\" If Yes, ask: \"How much?\" (e.g., flecks, streaks, tablespoons, etc.)      no  5. DIFFICULTY BREATHING: \"Are you having difficulty breathing?\" If Yes, ask: \"How bad is it?\" (e.g., mild, moderate, severe)       Intermittent mild shortness of breath  6. FEVER: \"Do you have a fever?\" If Yes, ask: \"What is your temperature, how was it measured, and when did it start?\"      no  7. CARDIAC HISTORY: \"Do you have any history of heart disease?\" (e.g., heart attack, congestive heart failure)       no  8. LUNG HISTORY: \"Do you have any history of lung disease?\"  (e.g., pulmonary embolus, asthma, emphysema)      no  9. PE RISK FACTORS: \"Do you have a history of blood clots?\" (or: recent major surgery, recent prolonged travel, bedridden)      no  10. OTHER SYMPTOMS: \"Do you have any other symptoms?\" (e.g., runny nose, wheezing, chest pain)        Congested sinuses and chest, wheezing (audible on the phone with this writer), voice is hoarse  11. PREGNANCY: \"Is there any chance you are pregnant?\" \"When was your last menstrual period?\"        N/a  12. TRAVEL: \"Have you traveled out of the country in the last month?\" (e.g., travel history, exposures)        no    Protocols used: Cough-A-[place]    "

## 2025-03-25 DIAGNOSIS — K59.09 CHRONIC CONSTIPATION: ICD-10-CM

## 2025-03-26 RX ORDER — POLYETHYLENE GLYCOL 3350 17 G/17G
17 POWDER, FOR SOLUTION ORAL
Qty: 714 G | Refills: 1 | Status: SHIPPED | OUTPATIENT
Start: 2025-03-26

## 2025-04-10 ENCOUNTER — TELEPHONE (OUTPATIENT)
Dept: FAMILY MEDICINE | Facility: CLINIC | Age: 65
End: 2025-04-10

## 2025-04-10 NOTE — TELEPHONE ENCOUNTER
FYI: [facility] last set meds up on 3/31, should have been good until 4/14. [facility] notes today (4/10) that he was out of meds. Patient is not sure what happened. He blames his parakeet. Nurse reset the meds up again, wife will try to monitor.    Ana Brito RN on 4/10/2025 at 3:01 PM

## 2025-04-10 NOTE — TELEPHONE ENCOUNTER
Home Care is calling regarding an established patient with [facility].  Requesting orders from: Radha Villegas RN APPROVED: RN able to provide verbal orders.  Home Care will send orders for signature.  RN will close encounter.  Is this a request for a temporary pause in the home care episode?  No    Orders Requested    Skilled Nursing  Request for continuation of care with no increase or decrease in frequency  Frequency: 1 time every other week for 9 weeks, with 3 prn visits for med management.  RN gave verbal order: Yes            Contacts       Contact Date/Time Type Contact Phone/Fax    04/10/2025 02:58 PM CDT Phone (Incoming) [place] [phone]     [facility]  Confidential          Ana Brito, RN

## 2025-04-14 ENCOUNTER — MEDICAL CORRESPONDENCE (OUTPATIENT)
Dept: HEALTH INFORMATION MANAGEMENT | Facility: CLINIC | Age: 65
End: 2025-04-14

## 2025-04-28 ENCOUNTER — TELEPHONE (OUTPATIENT)
Dept: FAMILY MEDICINE | Facility: CLINIC | Age: 65
End: 2025-04-28
Payer: COMMERCIAL

## 2025-04-28 NOTE — TELEPHONE ENCOUNTER
Dr. Villegas    Home care nurse Yeimi called asking for clarification on medications.     Per nurse patient has been receiving olanzapine 5 mg tabs at bedtime but there was a new script sent for 10 mg tablets. Needs clarification on signature for the olanzapine. She is also asking is pain medication was discontinued? Nabumetone? Per the nurse the patient told her that this was discontinued due to causing issues with his BP but I do not see any notes indicating this. Please clarify.     Thanks,  RIYA Mistry  [facility]

## 2025-04-29 ENCOUNTER — TELEPHONE (OUTPATIENT)
Dept: FAMILY MEDICINE | Facility: CLINIC | Age: 65
End: 2025-04-29
Payer: COMMERCIAL

## 2025-04-29 NOTE — TELEPHONE ENCOUNTER
"Please clarify question about Nabumetone as well    \"She is also asking is pain medication was discontinued? Nabumetone? Per the nurse the patient told her that this was discontinued due to causing issues with his BP but I do not see any notes indicating this. Please clarify \"    Team will have HC nurse contact psychiatry regarding olanzapine     Sohail Spencer RN  [facility]    "

## 2025-04-29 NOTE — TELEPHONE ENCOUNTER
Forms/Letter Request    Type of form/letter: Home Health Certification      Do we have the form/letter: Yes:     Who is the form from? Home care    Where did/will the form come from? form was faxed in    When is form/letter needed by: ?    How would you like the form/letter returned: Fax : [phone]    Tasha Marcus,

## 2025-04-30 DIAGNOSIS — Z53.9 DIAGNOSIS NOT YET DEFINED: Primary | ICD-10-CM

## 2025-04-30 PROCEDURE — G0179 MD RECERTIFICATION HHA PT: HCPCS | Performed by: FAMILY MEDICINE

## 2025-04-30 NOTE — TELEPHONE ENCOUNTER
Left a detailed VM for home care nurse informing that Dr. Villegas recommends Tylenol as per note below rather than nabumetone. Gave clinic call back number with any questions.    Also called pt, and his wife Rula answered (consent to communicate on file). Informed her of the same message. Rula states the patient has not been taking nabumetone and she said she knows the patient was told not to take it. She had no further questions.    Wendie REA RN  [facility]

## 2025-04-30 NOTE — TELEPHONE ENCOUNTER
"Routing to Dr. Bakari Vigil (nabumetone) is on current med list. Should he be taking this medication or not?     \"Per the [home care] nurse the patient told her that this was discontinued due to causing issues with his BP.\"        Wendie REA RN  [facility]   "

## 2025-05-05 ENCOUNTER — OFFICE VISIT (OUTPATIENT)
Dept: FAMILY MEDICINE | Facility: CLINIC | Age: 65
End: 2025-05-05
Payer: COMMERCIAL

## 2025-05-05 VITALS
SYSTOLIC BLOOD PRESSURE: 135 MMHG | HEIGHT: 66 IN | TEMPERATURE: 97.1 F | DIASTOLIC BLOOD PRESSURE: 84 MMHG | BODY MASS INDEX: 32.62 KG/M2 | RESPIRATION RATE: 16 BRPM | OXYGEN SATURATION: 93 % | HEART RATE: 97 BPM | WEIGHT: 203 LBS

## 2025-05-05 DIAGNOSIS — I10 HYPERTENSION GOAL BP (BLOOD PRESSURE) < 140/90: ICD-10-CM

## 2025-05-05 DIAGNOSIS — E66.09 CLASS 1 OBESITY DUE TO EXCESS CALORIES WITH SERIOUS COMORBIDITY AND BODY MASS INDEX (BMI) OF 32.0 TO 32.9 IN ADULT: ICD-10-CM

## 2025-05-05 DIAGNOSIS — E66.811 CLASS 1 OBESITY DUE TO EXCESS CALORIES WITH SERIOUS COMORBIDITY AND BODY MASS INDEX (BMI) OF 32.0 TO 32.9 IN ADULT: ICD-10-CM

## 2025-05-05 DIAGNOSIS — E11.9 TYPE 2 DIABETES MELLITUS WITHOUT COMPLICATION, WITHOUT LONG-TERM CURRENT USE OF INSULIN (H): ICD-10-CM

## 2025-05-05 DIAGNOSIS — E78.5 HYPERLIPIDEMIA LDL GOAL <70: ICD-10-CM

## 2025-05-05 LAB
ANION GAP SERPL CALCULATED.3IONS-SCNC: 13 MMOL/L (ref 7–15)
BUN SERPL-MCNC: 19.8 MG/DL (ref 8–23)
CALCIUM SERPL-MCNC: 10.1 MG/DL (ref 8.8–10.4)
CHLORIDE SERPL-SCNC: 98 MMOL/L (ref 98–107)
CREAT SERPL-MCNC: 0.86 MG/DL (ref 0.67–1.17)
EGFRCR SERPLBLD CKD-EPI 2021: >90 ML/MIN/1.73M2
EST. AVERAGE GLUCOSE BLD GHB EST-MCNC: 200 MG/DL
GLUCOSE SERPL-MCNC: 294 MG/DL (ref 70–99)
HBA1C MFR BLD: 8.6 % (ref 0–5.6)
HCO3 SERPL-SCNC: 23 MMOL/L (ref 22–29)
POTASSIUM SERPL-SCNC: 4.4 MMOL/L (ref 3.4–5.3)
SODIUM SERPL-SCNC: 134 MMOL/L (ref 135–145)

## 2025-05-05 PROCEDURE — 91320 SARSCV2 VAC 30MCG TRS-SUC IM: CPT | Performed by: FAMILY MEDICINE

## 2025-05-05 PROCEDURE — G2211 COMPLEX E/M VISIT ADD ON: HCPCS | Performed by: FAMILY MEDICINE

## 2025-05-05 PROCEDURE — 80048 BASIC METABOLIC PNL TOTAL CA: CPT | Performed by: FAMILY MEDICINE

## 2025-05-05 PROCEDURE — 83036 HEMOGLOBIN GLYCOSYLATED A1C: CPT | Performed by: FAMILY MEDICINE

## 2025-05-05 PROCEDURE — 36415 COLL VENOUS BLD VENIPUNCTURE: CPT | Performed by: FAMILY MEDICINE

## 2025-05-05 PROCEDURE — 99214 OFFICE O/P EST MOD 30 MIN: CPT | Mod: 25 | Performed by: FAMILY MEDICINE

## 2025-05-05 PROCEDURE — 3075F SYST BP GE 130 - 139MM HG: CPT | Performed by: FAMILY MEDICINE

## 2025-05-05 PROCEDURE — 3079F DIAST BP 80-89 MM HG: CPT | Performed by: FAMILY MEDICINE

## 2025-05-05 PROCEDURE — 90480 ADMN SARSCOV2 VAC 1/ONLY CMP: CPT | Performed by: FAMILY MEDICINE

## 2025-05-05 PROCEDURE — 1126F AMNT PAIN NOTED NONE PRSNT: CPT | Performed by: FAMILY MEDICINE

## 2025-05-05 RX ORDER — ATORVASTATIN CALCIUM 20 MG/1
20 TABLET, FILM COATED ORAL DAILY
Qty: 90 TABLET | Refills: 3 | Status: SHIPPED | OUTPATIENT
Start: 2025-05-05

## 2025-05-05 RX ORDER — METFORMIN HYDROCHLORIDE 500 MG/1
1500 TABLET, EXTENDED RELEASE ORAL DAILY
Qty: 270 TABLET | Refills: 1 | Status: SHIPPED | OUTPATIENT
Start: 2025-05-05

## 2025-05-05 RX ORDER — METFORMIN HYDROCHLORIDE 500 MG/1
2000 TABLET, EXTENDED RELEASE ORAL
Qty: 360 TABLET | Refills: 1 | Status: SHIPPED | OUTPATIENT
Start: 2025-05-05

## 2025-05-05 ASSESSMENT — PAIN SCALES - GENERAL: PAINLEVEL_OUTOF10: NO PAIN (0)

## 2025-05-05 NOTE — PROGRESS NOTES
Assessment & Plan     Type 2 diabetes mellitus without complication, without long-term current use of insulin (H)  Pending labs  If High increase meds  Diabetic diet   - Albumin Random Urine Quantitative with Creat Ratio; Future  - HEMOGLOBIN A1C; Future  - metFORMIN (GLUCOPHAGE XR) 500 MG 24 hr tablet; Take 3 tablets (1,500 mg) by mouth daily.  - Basic metabolic panel  (Ca, Cl, CO2, Creat, Gluc, K, Na, BUN); Future  - HEMOGLOBIN A1C  - Basic metabolic panel  (Ca, Cl, CO2, Creat, Gluc, K, Na, BUN)    Hypertension goal BP (blood pressure) < 140/90  Stable     Hyperlipidemia LDL goal <70  On statins  though has Not taken it for 3 weeks  Will check Lipid in 2 months   - atorvastatin (LIPITOR) 20 MG tablet; Take 1 tablet (20 mg) by mouth daily.    Class 1 obesity due to excess calories with serious comorbidity and body mass index (BMI) of 32.0 to 32.9 in adult  Low josé miguel diet/Exercise         Follow up Diabetes in 3 months      Subjective   Yair is a 64 year old, presenting for the following health issues:  Physical and Recheck Medication        1/23/2025     2:14 PM   Additional Questions   Roomed by Juani     History of Present Illness       Diabetes:   He presents for follow up of diabetes.  He is checking home blood glucose a few times a week.   He checks blood glucose before meals.  Blood glucose is sometimes over 200 and sometimes under 70. He is aware of hypoglycemia symptoms including weakness.    He has no concerns regarding his diabetes at this time.  He is having numbness in feet, burning in feet and redness, sores, or blisters on feet.  The patient has not had a diabetic eye exam in the last 12 months.          Hyperlipidemia:  He presents for follow up of hyperlipidemia.   He is taking medication to lower cholesterol. He is not having myalgia or other side effects to statin medications.    Hypertension: He presents for follow up of hypertension.  He does check blood pressure  regularly outside of the  "clinic. Outpatient blood pressures have not been over 140/90. He follows a low salt diet.     He eats 2-3 servings of fruits and vegetables daily.He consumes 2 sweetened beverage(s) daily.He exercises with enough effort to increase his heart rate 60 or more minutes per day.  He exercises with enough effort to increase his heart rate 3 or less days per week.   He is taking medications regularly.    Accucheck 180 average              Review of Systems  CONSTITUTIONAL: NEGATIVE for fever, chills, change in weight  ENT/MOUTH: NEGATIVE for ear, mouth and throat problems  RESP: NEGATIVE for significant cough or SOB  CV: NEGATIVE for chest pain, palpitations or peripheral edema  GI: NEGATIVE for nausea, abdominal pain, heartburn, or change in bowel habits  MUSCULOSKELETAL: NEGATIVE for significant arthralgias or myalgia  PSYCHIATRIC: NEGATIVE for changes in mood or affect      Objective    /84   Pulse 97   Temp 97.1  F (36.2  C) (Temporal)   Resp 16   Ht 1.676 m (5' 6\")   Wt 92.1 kg (203 lb)   SpO2 93%   BMI 32.77 kg/m    Body mass index is 32.77 kg/m .  Physical Exam   GENERAL: alert and no distress  EYES: Eyes grossly normal to inspection  NECK: no adenopathy, no asymmetry, masses, or scars  RESP: lungs clear to auscultation - no rales, rhonchi or wheezes  CV: regular rate and rhythm, normal S1 S2, no S3 or S4, no murmur, click or rub, no peripheral edema  ABDOMEN: soft, nontender, no hepatosplenomegaly, no masses and bowel sounds normal  MS: no gross musculoskeletal defects noted, no edema  PSYCH: mentation appears normal, affect normal/bright  Diabetic foot exam: normal DP and PT pulses, no trophic changes or ulcerative lesions, and normal sensory exam    Office Visit on 01/23/2025   Component Date Value Ref Range Status    Creatinine 01/23/2025 0.90  0.67 - 1.17 mg/dL Final    GFR Estimate 01/23/2025 >90  >60 mL/min/1.73m2 Final    eGFR calculated using 2021 CKD-EPI equation.           Signed " Electronically by: Radha Villegas MD

## 2025-05-05 NOTE — PATIENT INSTRUCTIONS
Patient Education   Preventive Care Advice   This is general advice given by our system to help you stay healthy. However, your care team may have specific advice just for you. Please talk to your care team about your preventive care needs.  Nutrition  Eat 5 or more servings of fruits and vegetables each day.  Try wheat bread, brown rice and whole grain pasta (instead of white bread, rice, and pasta).  Get enough calcium and vitamin D. Check the label on foods and aim for 100% of the RDA (recommended daily allowance).  Lifestyle  Exercise at least 150 minutes each week  (30 minutes a day, 5 days a week).  Do muscle strengthening activities 2 days a week. These help control your weight and prevent disease.  No smoking.  Wear sunscreen to prevent skin cancer.  Have a dental exam and cleaning every 6 months.  Yearly exams  See your health care team every year to talk about:  Any changes in your health.  Any medicines your care team has prescribed.  Preventive care, family planning, and ways to prevent chronic diseases.  Shots (vaccines)   HPV shots (up to age 26), if you've never had them before.  Hepatitis B shots (up to age 59), if you've never had them before.  COVID-19 shot: Get this shot when it's due.  Flu shot: Get a flu shot every year.  Tetanus shot: Get a tetanus shot every 10 years.  Pneumococcal, hepatitis A, and RSV shots: Ask your care team if you need these based on your risk.  Shingles shot (for age 50 and up)  General health tests  Diabetes screening:  Starting at age 35, Get screened for diabetes at least every 3 years.  If you are younger than age 35, ask your care team if you should be screened for diabetes.  Cholesterol test: At age 39, start having a cholesterol test every 5 years, or more often if advised.  Bone density scan (DEXA): At age 50, ask your care team if you should have this scan for osteoporosis (brittle bones).  Hepatitis C: Get tested at least once in your life.  STIs (sexually  transmitted infections)  Before age 24: Ask your care team if you should be screened for STIs.  After age 24: Get screened for STIs if you're at risk. You are at risk for STIs (including HIV) if:  You are sexually active with more than one person.  You don't use condoms every time.  You or a partner was diagnosed with a sexually transmitted infection.  If you are at risk for HIV, ask about PrEP medicine to prevent HIV.  Get tested for HIV at least once in your life, whether you are at risk for HIV or not.  Cancer screening tests  Cervical cancer screening: If you have a cervix, begin getting regular cervical cancer screening tests starting at age 21.  Breast cancer scan (mammogram): If you've ever had breasts, begin having regular mammograms starting at age 40. This is a scan to check for breast cancer.  Colon cancer screening: It is important to start screening for colon cancer at age 45.  Have a colonoscopy test every 10 years (or more often if you're at risk) Or, ask your provider about stool tests like a FIT test every year or Cologuard test every 3 years.  To learn more about your testing options, visit:   .  For help making a decision, visit:   https://bit.ly/sf83830.  Prostate cancer screening test: If you have a prostate, ask your care team if a prostate cancer screening test (PSA) at age 55 is right for you.  Lung cancer screening: If you are a current or former smoker ages 50 to 80, ask your care team if ongoing lung cancer screenings are right for you.  For informational purposes only. Not to replace the advice of your health care provider. Copyright   2023 [employer]. All rights reserved. Clinically reviewed by the [facility] Transitions Program. Prot-On 275272 - REV 01/24.

## 2025-05-27 ENCOUNTER — TELEPHONE (OUTPATIENT)
Dept: FAMILY MEDICINE | Facility: CLINIC | Age: 65
End: 2025-05-27
Payer: COMMERCIAL

## 2025-05-27 NOTE — TELEPHONE ENCOUNTER
C calling in regards to Olanzapine dosing. Informed [facility] that Dr. Villegas is not managing the medication, so unsure of correct dosing if it was updated.    Gave provider that our system provides for who filled it last: Dr. Jian Waters. Gave [facility] number as it appears this is where the provider works.    Ana Brito RN on 5/27/2025 at 3:12 PM

## 2025-06-09 ENCOUNTER — TELEPHONE (OUTPATIENT)
Dept: FAMILY MEDICINE | Facility: CLINIC | Age: 65
End: 2025-06-09
Payer: COMMERCIAL

## 2025-06-09 NOTE — TELEPHONE ENCOUNTER
Home Care is calling regarding an established patient with [facility].  Requesting orders from: Radha Villegas RN APPROVED: RN able to provide verbal orders.  Home Care will send orders for signature.  RN will close encounter.  Is this a request for a temporary pause in the home care episode?  No    Orders Requested    Skilled Nursing  Request for initial certification (first set of orders)   Frequency: 1x every other week for 7 weeks   1x/wk/1 wk  3 prn  Med mngt   RN gave verbal order: Yes        Phone number Home Care can be reached at: Klarissa Schreiber to leave a detailed message?: Yes    Contacts       Contact Date/Time Type Contact Phone/Fax    06/09/2025 12:03 PM CDT Phone (Incoming) Klarissa [phone]     secure line          Fidelia Garcia RN

## 2025-06-10 ENCOUNTER — TELEPHONE (OUTPATIENT)
Dept: FAMILY MEDICINE | Facility: CLINIC | Age: 65
End: 2025-06-10
Payer: COMMERCIAL

## 2025-06-10 NOTE — TELEPHONE ENCOUNTER
Forms/Letter Request    Type of form/letter: Home Health Certification      Do we have the form/letter: Yes:     Who is the form from? Home care    Where did/will the form come from? form was faxed in    When is form/letter needed by: ?    How would you like the form/letter returned: Fax : [phone]    Tasha Marcus,

## 2025-06-26 ENCOUNTER — TRANSFERRED RECORDS (OUTPATIENT)
Dept: HEALTH INFORMATION MANAGEMENT | Facility: CLINIC | Age: 65
End: 2025-06-26
Payer: COMMERCIAL

## 2025-07-07 ENCOUNTER — TELEPHONE (OUTPATIENT)
Dept: FAMILY MEDICINE | Facility: CLINIC | Age: 65
End: 2025-07-07
Payer: COMMERCIAL

## 2025-07-07 NOTE — TELEPHONE ENCOUNTER
Routing Message to provider.    [facility] pharmacy calling.    Prescriptions being transferred to them from [facility].    They received 2 RX for metformin.      metFORMIN (GLUCOPHAGE XR) 500 MG 24 hr tablet Take 3 tablets (1,500 mg) by mouth daily     metFORMIN (GLUCOPHAGE XR) 500 MG 24 hr tablet Take 4 tablets (2,000 mg) by mouth daily (with dinner).         Last OV note had dose of metFORMIN (GLUCOPHAGE XR) 500 MG 24 hr tablet; Take 3 tablets (1,500 mg) by mouth daily.       Which dose would you like him to be on daily?    Please advise.    Dax RN, BSN  Triage nurse  [facility]

## 2025-07-08 NOTE — TELEPHONE ENCOUNTER
Spoke with [facility] pharmacist and notified of below. Verbalized understanding.     Thanks,  Fidelia RN  [facility]

## 2025-07-16 ENCOUNTER — PATIENT OUTREACH (OUTPATIENT)
Dept: GERIATRIC MEDICINE | Facility: CLINIC | Age: 65
End: 2025-07-16
Payer: COMMERCIAL
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
## 2025-07-23 ENCOUNTER — TELEPHONE (OUTPATIENT)
Dept: FAMILY MEDICINE | Facility: CLINIC | Age: 65
End: 2025-07-23
Payer: COMMERCIAL

## 2025-07-23 NOTE — TELEPHONE ENCOUNTER
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   Says he almost fainted today due to the heat.   His supervisor had him sit in the lunch room where it was cooler and his supervisor gave him a ride home.    Patient says he feels fine now that he is in the air conditioning.   States he has urinated a few times today, is drinking fluids.    Denies dizziness or weakness currently.    Says his supervisor wants patient's provider to give him a note advising of parameters for when patient should stay home due to the heat.     I advised he will need a visit of some sort for that since this has not been recently addressed.    Patient says he already scheduled a visit for tomorrow, I see [place] provider, 1:10 pm.   I advised patient to keep that visit, ask that provider about a note.    Patient verbalized understanding of and agreement with plan.    Rosalba BARDALES RN  [facility] Triage

## 2025-07-24 ENCOUNTER — OFFICE VISIT (OUTPATIENT)
Dept: FAMILY MEDICINE | Facility: CLINIC | Age: 65
End: 2025-07-24
Payer: COMMERCIAL

## 2025-07-24 VITALS
OXYGEN SATURATION: 96 % | RESPIRATION RATE: 20 BRPM | WEIGHT: 198 LBS | HEART RATE: 83 BPM | BODY MASS INDEX: 31.82 KG/M2 | TEMPERATURE: 96.9 F | DIASTOLIC BLOOD PRESSURE: 82 MMHG | HEIGHT: 66 IN | SYSTOLIC BLOOD PRESSURE: 126 MMHG

## 2025-07-24 DIAGNOSIS — Z13.6 SCREENING FOR CARDIOVASCULAR CONDITION: Primary | ICD-10-CM

## 2025-07-24 DIAGNOSIS — R00.2 PALPITATIONS: ICD-10-CM

## 2025-07-24 DIAGNOSIS — R55 PRE-SYNCOPE: ICD-10-CM

## 2025-07-24 DIAGNOSIS — E11.9 TYPE 2 DIABETES MELLITUS WITHOUT COMPLICATION, WITHOUT LONG-TERM CURRENT USE OF INSULIN (H): ICD-10-CM

## 2025-07-24 LAB
ALBUMIN SERPL BCG-MCNC: 4.7 G/DL (ref 3.5–5.2)
ALP SERPL-CCNC: 71 U/L (ref 40–150)
ALT SERPL W P-5'-P-CCNC: 73 U/L (ref 0–70)
ANION GAP SERPL CALCULATED.3IONS-SCNC: 14 MMOL/L (ref 7–15)
AST SERPL W P-5'-P-CCNC: 51 U/L (ref 0–45)
BILIRUB SERPL-MCNC: 0.4 MG/DL
BUN SERPL-MCNC: 14 MG/DL (ref 8–23)
CALCIUM SERPL-MCNC: 10.8 MG/DL (ref 8.8–10.4)
CHLORIDE SERPL-SCNC: 99 MMOL/L (ref 98–107)
CHOLEST SERPL-MCNC: 167 MG/DL
CREAT SERPL-MCNC: 0.94 MG/DL (ref 0.67–1.17)
EGFRCR SERPLBLD CKD-EPI 2021: 89 ML/MIN/1.73M2
ERYTHROCYTE [DISTWIDTH] IN BLOOD BY AUTOMATED COUNT: 12.2 % (ref 10–15)
EST. AVERAGE GLUCOSE BLD GHB EST-MCNC: 203 MG/DL
FASTING STATUS PATIENT QL REPORTED: ABNORMAL
FASTING STATUS PATIENT QL REPORTED: ABNORMAL
GLUCOSE SERPL-MCNC: 153 MG/DL (ref 70–99)
HBA1C MFR BLD: 8.7 % (ref 0–5.6)
HCO3 SERPL-SCNC: 26 MMOL/L (ref 22–29)
HCT VFR BLD AUTO: 45.1 % (ref 40–53)
HDLC SERPL-MCNC: 35 MG/DL
HGB BLD-MCNC: 15.2 G/DL (ref 13.3–17.7)
LDLC SERPL CALC-MCNC: ABNORMAL MG/DL
LDLC SERPL DIRECT ASSAY-MCNC: 74 MG/DL
MCH RBC QN AUTO: 30.7 PG (ref 26.5–33)
MCHC RBC AUTO-ENTMCNC: 33.7 G/DL (ref 31.5–36.5)
MCV RBC AUTO: 91 FL (ref 78–100)
NONHDLC SERPL-MCNC: 132 MG/DL
PLATELET # BLD AUTO: 235 10E3/UL (ref 150–450)
POTASSIUM SERPL-SCNC: 4 MMOL/L (ref 3.4–5.3)
PROT SERPL-MCNC: 8 G/DL (ref 6.4–8.3)
RBC # BLD AUTO: 4.95 10E6/UL (ref 4.4–5.9)
SODIUM SERPL-SCNC: 139 MMOL/L (ref 135–145)
TRIGL SERPL-MCNC: 509 MG/DL
WBC # BLD AUTO: 7 10E3/UL (ref 4–11)

## 2025-07-24 RX ORDER — UBIDECARENONE 100 MG
200 CAPSULE ORAL DAILY
Status: CANCELLED | OUTPATIENT
Start: 2025-07-24

## 2025-07-24 ASSESSMENT — PAIN SCALES - GENERAL: PAINLEVEL_OUTOF10: NO PAIN (0)

## 2025-07-24 ASSESSMENT — PATIENT HEALTH QUESTIONNAIRE - PHQ9
SUM OF ALL RESPONSES TO PHQ QUESTIONS 1-9: 4
SUM OF ALL RESPONSES TO PHQ QUESTIONS 1-9: 4
10. IF YOU CHECKED OFF ANY PROBLEMS, HOW DIFFICULT HAVE THESE PROBLEMS MADE IT FOR YOU TO DO YOUR WORK, TAKE CARE OF THINGS AT HOME, OR GET ALONG WITH OTHER PEOPLE: NOT DIFFICULT AT ALL

## 2025-07-24 NOTE — PROGRESS NOTES
"  Assessment & Plan     Screening for cardiovascular condition:  - EKG did not show abnormal rhythm or historical heart attacks.  - Perform blood work to check for anemia, liver, and kidney function. Apply Zio patch for long-term heart rhythm monitoring. Follow-up with primary care physician, Dr. Villegas.    Type 2 diabetes mellitus without complication, without long-term current use of insulin:  - Include diabetes test in blood work.    Palpitations:  - Palpitations noted, EKG did not show abnormal rhythm.  - Apply Zio patch for long-term heart rhythm monitoring. Instructed to press button on Zio patch if palpitations occur.    Pre-syncope:  - Episodes of pre-syncope noted, no loss of consciousness.  - Apply Zio patch for long-term heart rhythm monitoring. Instructed to call 911 if feeling faint when alone. Blood work to check for contributing factors.    Consent was obtained from the patient to use an AI documentation tool in the creation of this note.    BMI  Estimated body mass index is 31.96 kg/m  as calculated from the following:    Height as of this encounter: 1.676 m (5' 6\").    Weight as of this encounter: 89.8 kg (198 lb).       Subjective   Yair is a 64 year old, presenting for the following health issues:  Letter for School/Work (Pt states that because of his meds he feels faint and dizzy in the heat) and cpap problem (Pt states he got a bloody nose last time he wore his cpap)        7/24/2025     1:18 PM   Additional Questions   Roomed by Yessica Nolen     History of Present Illness       Reason for visit:  Work letter He is missing 1 dose(s) of medications per week.  He is not taking prescribed medications regularly due to remembering to take.   Yair BRO Crosser, 64 years    Syncope and Palpitations  - First episode of feeling like passing out about one week ago, occurred during hot and humid weather  - Another episode yesterday, described as feeling bad while standing, needing to sit down, but did " "not lose consciousness  - No actual loss of consciousness; able to sit or take a knee before falling  - No chest pain or trouble breathing reported during episodes  - Noted sensation of heart \"beating funny\" during episodes, new symptom  - No prior similar episodes  - No head trauma during episodes  - Did not recently change diabetes medication; last change was addition of Jardiance, but this was a while ago  - Poor sleep reported prior to episode on day of visit    Family History Relevant to Present Illness  - Father  of heart attack on operating table    Misc  - No history of heart attack reported        Objective    /82   Pulse 83   Temp 96.9  F (36.1  C) (Temporal)   Resp 20   Ht 1.676 m (5' 6\")   Wt 89.8 kg (198 lb)   SpO2 96%   BMI 31.96 kg/m    Body mass index is 31.96 kg/m .  Physical Exam  Constitutional:       General: He is not in acute distress.  Eyes:      General: No scleral icterus.  Pulmonary:      Effort: No respiratory distress.   Neurological:      Mental Status: He is alert.   Psychiatric:         Mood and Affect: Mood normal.         Behavior: Behavior normal.             EKG Interpretation:      Interpreted by Ramón Parker DO   Symptoms at time of EKG: None   Rhythm: Normal sinus   Rate: Normal  Axis: Normal  Ectopy: None  Conduction: Normal  ST Segments/ T Waves: No ST-T wave changes and No acute ischemic changes  Q Waves: None  Comparison to prior: No old EKG available    Clinical Impression: normal EKG            Signed Electronically by: Ramón Parker DO    "

## 2025-07-24 NOTE — LETTER
July 24, 2025      Yair Benites  [address] [place] [id number]        To Whom It May Concern,     Mr Benites was seen in clinic today.  He has a medical condition that puts him at risk of complication if he works in an environment that is great than 80 degrees farenheit.  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      Sincerely,        Ramón Parker, DO    Electronically signed

## 2025-07-31 PROBLEM — H16.9 KERATITIS: Status: RESOLVED | Noted: 2023-11-25 | Resolved: 2025-07-31

## 2025-08-04 ENCOUNTER — TELEPHONE (OUTPATIENT)
Dept: FAMILY MEDICINE | Facility: CLINIC | Age: 65
End: 2025-08-04
Payer: COMMERCIAL

## 2025-08-05 ENCOUNTER — TELEPHONE (OUTPATIENT)
Dept: FAMILY MEDICINE | Facility: CLINIC | Age: 65
End: 2025-08-05
Payer: COMMERCIAL

## 2025-08-07 ENCOUNTER — TELEPHONE (OUTPATIENT)
Dept: SLEEP MEDICINE | Facility: CLINIC | Age: 65
End: 2025-08-07
Payer: COMMERCIAL

## 2025-08-07 ENCOUNTER — TELEPHONE (OUTPATIENT)
Dept: FAMILY MEDICINE | Facility: CLINIC | Age: 65
End: 2025-08-07
Payer: COMMERCIAL

## 2025-08-07 DIAGNOSIS — G47.33 OSA (OBSTRUCTIVE SLEEP APNEA): Primary | Chronic | ICD-10-CM

## 2025-08-11 ENCOUNTER — TELEPHONE (OUTPATIENT)
Dept: FAMILY MEDICINE | Facility: CLINIC | Age: 65
End: 2025-08-11
Payer: COMMERCIAL

## 2025-08-12 ENCOUNTER — MEDICAL CORRESPONDENCE (OUTPATIENT)
Dept: HEALTH INFORMATION MANAGEMENT | Facility: CLINIC | Age: 65
End: 2025-08-12
Payer: COMMERCIAL

## 2025-08-14 ENCOUNTER — TELEPHONE (OUTPATIENT)
Dept: FAMILY MEDICINE | Facility: CLINIC | Age: 65
End: 2025-08-14
Payer: COMMERCIAL

## 2025-08-14 DIAGNOSIS — G47.33 OSA (OBSTRUCTIVE SLEEP APNEA): Primary | ICD-10-CM

## 2025-08-18 ENCOUNTER — OFFICE VISIT (OUTPATIENT)
Dept: FAMILY MEDICINE | Facility: CLINIC | Age: 65
End: 2025-08-18
Payer: COMMERCIAL

## 2025-08-18 VITALS
SYSTOLIC BLOOD PRESSURE: 113 MMHG | RESPIRATION RATE: 14 BRPM | WEIGHT: 195.8 LBS | HEIGHT: 66 IN | DIASTOLIC BLOOD PRESSURE: 75 MMHG | TEMPERATURE: 96.9 F | HEART RATE: 99 BPM | OXYGEN SATURATION: 92 % | BODY MASS INDEX: 31.47 KG/M2

## 2025-08-18 DIAGNOSIS — E78.1 HIGH TRIGLYCERIDES: ICD-10-CM

## 2025-08-18 DIAGNOSIS — I10 HYPERTENSION GOAL BP (BLOOD PRESSURE) < 140/90: ICD-10-CM

## 2025-08-18 DIAGNOSIS — E66.09 CLASS 1 OBESITY DUE TO EXCESS CALORIES WITH SERIOUS COMORBIDITY AND BODY MASS INDEX (BMI) OF 31.0 TO 31.9 IN ADULT: ICD-10-CM

## 2025-08-18 DIAGNOSIS — E11.9 TYPE 2 DIABETES MELLITUS WITHOUT COMPLICATION, WITHOUT LONG-TERM CURRENT USE OF INSULIN (H): Primary | Chronic | ICD-10-CM

## 2025-08-18 DIAGNOSIS — E66.811 CLASS 1 OBESITY DUE TO EXCESS CALORIES WITH SERIOUS COMORBIDITY AND BODY MASS INDEX (BMI) OF 31.0 TO 31.9 IN ADULT: ICD-10-CM

## 2025-08-18 DIAGNOSIS — E78.5 HYPERLIPIDEMIA LDL GOAL <70: ICD-10-CM

## 2025-08-18 PROCEDURE — G2211 COMPLEX E/M VISIT ADD ON: HCPCS | Performed by: FAMILY MEDICINE

## 2025-08-18 PROCEDURE — 82570 ASSAY OF URINE CREATININE: CPT | Performed by: FAMILY MEDICINE

## 2025-08-18 PROCEDURE — 3074F SYST BP LT 130 MM HG: CPT | Performed by: FAMILY MEDICINE

## 2025-08-18 PROCEDURE — 3078F DIAST BP <80 MM HG: CPT | Performed by: FAMILY MEDICINE

## 2025-08-18 PROCEDURE — 99214 OFFICE O/P EST MOD 30 MIN: CPT | Performed by: FAMILY MEDICINE

## 2025-08-18 PROCEDURE — 1126F AMNT PAIN NOTED NONE PRSNT: CPT | Performed by: FAMILY MEDICINE

## 2025-08-18 PROCEDURE — 82043 UR ALBUMIN QUANTITATIVE: CPT | Performed by: FAMILY MEDICINE

## 2025-08-18 ASSESSMENT — PAIN SCALES - GENERAL: PAINLEVEL_OUTOF10: NO PAIN (0)

## 2025-08-19 LAB
CREAT UR-MCNC: 56.7 MG/DL
MICROALBUMIN UR-MCNC: <12 MG/L
MICROALBUMIN/CREAT UR: NORMAL MG/G{CREAT}

## 2025-08-20 ENCOUNTER — TELEPHONE (OUTPATIENT)
Dept: FAMILY MEDICINE | Facility: CLINIC | Age: 65
End: 2025-08-20

## 2025-08-20 LAB — CV ZIO PRELIM RESULTS: NORMAL

## 2025-08-25 DIAGNOSIS — E11.9 TYPE 2 DIABETES MELLITUS WITHOUT COMPLICATION, WITHOUT LONG-TERM CURRENT USE OF INSULIN (H): ICD-10-CM

## 2025-08-26 ENCOUNTER — TELEPHONE (OUTPATIENT)
Dept: FAMILY MEDICINE | Facility: CLINIC | Age: 65
End: 2025-08-26
Payer: COMMERCIAL

## 2025-09-02 ENCOUNTER — TELEPHONE (OUTPATIENT)
Dept: CARDIOLOGY | Facility: CLINIC | Age: 65
End: 2025-09-02
Payer: COMMERCIAL

## 2025-09-03 ENCOUNTER — TELEPHONE (OUTPATIENT)
Dept: FAMILY MEDICINE | Facility: CLINIC | Age: 65
End: 2025-09-03
Payer: COMMERCIAL